# Patient Record
Sex: FEMALE | Race: BLACK OR AFRICAN AMERICAN | NOT HISPANIC OR LATINO | Employment: OTHER | URBAN - METROPOLITAN AREA
[De-identification: names, ages, dates, MRNs, and addresses within clinical notes are randomized per-mention and may not be internally consistent; named-entity substitution may affect disease eponyms.]

---

## 2017-05-02 ENCOUNTER — LAB REQUISITION (OUTPATIENT)
Dept: LAB | Facility: HOSPITAL | Age: 72
End: 2017-05-02

## 2017-05-02 DIAGNOSIS — I99.8 OTHER DISORDER OF CIRCULATORY SYSTEM: ICD-10-CM

## 2017-05-02 LAB
INR PPP: 1.73
PROTHROMBIN TIME: 19.2 SECONDS (ref 9.6–11.5)

## 2017-05-02 PROCEDURE — 85610 PROTHROMBIN TIME: CPT | Performed by: FAMILY MEDICINE

## 2017-05-04 ENCOUNTER — LAB REQUISITION (OUTPATIENT)
Dept: LAB | Facility: HOSPITAL | Age: 72
End: 2017-05-04

## 2017-05-04 DIAGNOSIS — E11.9 TYPE 2 DIABETES MELLITUS WITHOUT COMPLICATIONS (HCC): ICD-10-CM

## 2017-05-04 LAB — HBA1C MFR BLD: 4.5 % (ref 4.8–5.6)

## 2017-05-04 PROCEDURE — 83036 HEMOGLOBIN GLYCOSYLATED A1C: CPT | Performed by: FAMILY MEDICINE

## 2018-02-14 ENCOUNTER — APPOINTMENT (OUTPATIENT)
Dept: MRI IMAGING | Facility: HOSPITAL | Age: 73
End: 2018-02-14

## 2018-02-14 ENCOUNTER — APPOINTMENT (OUTPATIENT)
Dept: GENERAL RADIOLOGY | Facility: HOSPITAL | Age: 73
End: 2018-02-14

## 2018-02-14 ENCOUNTER — HOSPITAL ENCOUNTER (INPATIENT)
Facility: HOSPITAL | Age: 73
LOS: 2 days | Discharge: HOME HEALTH | End: 2018-02-16
Attending: EMERGENCY MEDICINE | Admitting: INTERNAL MEDICINE

## 2018-02-14 ENCOUNTER — TRANSCRIBE ORDERS (OUTPATIENT)
Dept: ADMINISTRATIVE | Facility: HOSPITAL | Age: 73
End: 2018-02-14

## 2018-02-14 ENCOUNTER — APPOINTMENT (OUTPATIENT)
Dept: CT IMAGING | Facility: HOSPITAL | Age: 73
End: 2018-02-14

## 2018-02-14 DIAGNOSIS — R47.01 EXPRESSIVE APHASIA: ICD-10-CM

## 2018-02-14 DIAGNOSIS — Z74.09 IMPAIRED MOBILITY AND ADLS: ICD-10-CM

## 2018-02-14 DIAGNOSIS — R47.81 SLURRED SPEECH: ICD-10-CM

## 2018-02-14 DIAGNOSIS — N28.9 RENAL INSUFFICIENCY: ICD-10-CM

## 2018-02-14 DIAGNOSIS — G93.6: ICD-10-CM

## 2018-02-14 DIAGNOSIS — Z74.09 IMPAIRED FUNCTIONAL MOBILITY, BALANCE, GAIT, AND ENDURANCE: ICD-10-CM

## 2018-02-14 DIAGNOSIS — G93.89 BRAIN MASS: ICD-10-CM

## 2018-02-14 DIAGNOSIS — D49.6 NEOPLASM OF BRAIN CAUSING MASS EFFECT ON ADJACENT STRUCTURES (HCC): Primary | ICD-10-CM

## 2018-02-14 DIAGNOSIS — R41.82 MENTAL STATUS, DECREASED: Primary | ICD-10-CM

## 2018-02-14 DIAGNOSIS — Z78.9 IMPAIRED MOBILITY AND ADLS: ICD-10-CM

## 2018-02-14 DIAGNOSIS — G93.5 NEOPLASM OF BRAIN CAUSING MASS EFFECT ON ADJACENT STRUCTURES (HCC): Primary | ICD-10-CM

## 2018-02-14 PROBLEM — I10 HTN (HYPERTENSION): Status: ACTIVE | Noted: 2018-02-14

## 2018-02-14 PROBLEM — R51.9 HEADACHE: Status: ACTIVE | Noted: 2018-02-14

## 2018-02-14 PROBLEM — I73.9 PAD (PERIPHERAL ARTERY DISEASE) (HCC): Status: ACTIVE | Noted: 2018-02-14

## 2018-02-14 PROBLEM — I82.501 CHRONIC VENOUS EMBOLISM AND THROMBOSIS OF DEEP VESSELS OF RIGHT LOWER EXTREMITY: Status: ACTIVE | Noted: 2018-02-14

## 2018-02-14 PROBLEM — Z79.01 CHRONIC ANTICOAGULATION: Status: ACTIVE | Noted: 2018-02-14

## 2018-02-14 LAB
ALBUMIN SERPL-MCNC: 4.4 G/DL (ref 3.2–4.8)
ALBUMIN/GLOB SERPL: 1.1 G/DL (ref 1.5–2.5)
ALP SERPL-CCNC: 127 U/L (ref 25–100)
ALT SERPL W P-5'-P-CCNC: 23 U/L (ref 7–40)
ANION GAP SERPL CALCULATED.3IONS-SCNC: 5 MMOL/L (ref 3–11)
AST SERPL-CCNC: 21 U/L (ref 0–33)
BACTERIA UR QL AUTO: ABNORMAL /HPF
BASOPHILS # BLD AUTO: 0.03 10*3/MM3 (ref 0–0.2)
BASOPHILS NFR BLD AUTO: 0.5 % (ref 0–1)
BILIRUB SERPL-MCNC: 0.7 MG/DL (ref 0.3–1.2)
BILIRUB UR QL STRIP: NEGATIVE
BUN BLD-MCNC: 22 MG/DL (ref 9–23)
BUN/CREAT SERPL: 14.7 (ref 7–25)
CALCIUM SPEC-SCNC: 9.5 MG/DL (ref 8.7–10.4)
CHLORIDE SERPL-SCNC: 105 MMOL/L (ref 99–109)
CLARITY UR: ABNORMAL
CO2 SERPL-SCNC: 29 MMOL/L (ref 20–31)
COLOR UR: YELLOW
CREAT BLD-MCNC: 1.5 MG/DL (ref 0.6–1.3)
DEPRECATED RDW RBC AUTO: 44.7 FL (ref 37–54)
EOSINOPHIL # BLD AUTO: 0.17 10*3/MM3 (ref 0–0.3)
EOSINOPHIL NFR BLD AUTO: 2.8 % (ref 0–3)
ERYTHROCYTE [DISTWIDTH] IN BLOOD BY AUTOMATED COUNT: 14.5 % (ref 11.3–14.5)
GFR SERPL CREATININE-BSD FRML MDRD: 34 ML/MIN/1.73
GFR SERPL CREATININE-BSD FRML MDRD: 42 ML/MIN/1.73
GLOBULIN UR ELPH-MCNC: 3.9 GM/DL
GLUCOSE BLD-MCNC: 105 MG/DL (ref 70–100)
GLUCOSE UR STRIP-MCNC: NEGATIVE MG/DL
HCT VFR BLD AUTO: 38.1 % (ref 34.5–44)
HGB BLD-MCNC: 12.3 G/DL (ref 11.5–15.5)
HGB UR QL STRIP.AUTO: ABNORMAL
HOLD SPECIMEN: NORMAL
HOLD SPECIMEN: NORMAL
IMM GRANULOCYTES # BLD: 0.01 10*3/MM3 (ref 0–0.03)
IMM GRANULOCYTES NFR BLD: 0.2 % (ref 0–0.6)
INR PPP: 4.07 (ref 0.91–1.09)
KETONES UR QL STRIP: NEGATIVE
LEUKOCYTE ESTERASE UR QL STRIP.AUTO: ABNORMAL
LYMPHOCYTES # BLD AUTO: 2.45 10*3/MM3 (ref 0.6–4.8)
LYMPHOCYTES NFR BLD AUTO: 39.7 % (ref 24–44)
MCH RBC QN AUTO: 27.1 PG (ref 27–31)
MCHC RBC AUTO-ENTMCNC: 32.3 G/DL (ref 32–36)
MCV RBC AUTO: 83.9 FL (ref 80–99)
MONOCYTES # BLD AUTO: 0.6 10*3/MM3 (ref 0–1)
MONOCYTES NFR BLD AUTO: 9.7 % (ref 0–12)
NEUTROPHILS # BLD AUTO: 2.91 10*3/MM3 (ref 1.5–8.3)
NEUTROPHILS NFR BLD AUTO: 47.1 % (ref 41–71)
NITRITE UR QL STRIP: NEGATIVE
PH UR STRIP.AUTO: 5.5 [PH] (ref 5–8)
PLATELET # BLD AUTO: 387 10*3/MM3 (ref 150–450)
PMV BLD AUTO: 8.9 FL (ref 6–12)
POTASSIUM BLD-SCNC: 4 MMOL/L (ref 3.5–5.5)
PROT SERPL-MCNC: 8.3 G/DL (ref 5.7–8.2)
PROT UR QL STRIP: ABNORMAL
PROTHROMBIN TIME: 42.7 SECONDS (ref 9.6–11.5)
RBC # BLD AUTO: 4.54 10*6/MM3 (ref 3.89–5.14)
RBC # UR: ABNORMAL /HPF
REF LAB TEST METHOD: ABNORMAL
SODIUM BLD-SCNC: 139 MMOL/L (ref 132–146)
SP GR UR STRIP: 1.02 (ref 1–1.03)
SQUAMOUS #/AREA URNS HPF: ABNORMAL /HPF
TROPONIN I SERPL-MCNC: 0.01 NG/ML (ref 0–0.07)
UROBILINOGEN UR QL STRIP: ABNORMAL
WBC NRBC COR # BLD: 6.17 10*3/MM3 (ref 3.5–10.8)
WBC UR QL AUTO: ABNORMAL /HPF
WHOLE BLOOD HOLD SPECIMEN: NORMAL
WHOLE BLOOD HOLD SPECIMEN: NORMAL

## 2018-02-14 PROCEDURE — 71045 X-RAY EXAM CHEST 1 VIEW: CPT

## 2018-02-14 PROCEDURE — 70553 MRI BRAIN STEM W/O & W/DYE: CPT

## 2018-02-14 PROCEDURE — 85610 PROTHROMBIN TIME: CPT | Performed by: HOSPITALIST

## 2018-02-14 PROCEDURE — 99221 1ST HOSP IP/OBS SF/LOW 40: CPT | Performed by: PHYSICIAN ASSISTANT

## 2018-02-14 PROCEDURE — 74176 CT ABD & PELVIS W/O CONTRAST: CPT

## 2018-02-14 PROCEDURE — 80053 COMPREHEN METABOLIC PANEL: CPT | Performed by: EMERGENCY MEDICINE

## 2018-02-14 PROCEDURE — 87086 URINE CULTURE/COLONY COUNT: CPT | Performed by: EMERGENCY MEDICINE

## 2018-02-14 PROCEDURE — 81001 URINALYSIS AUTO W/SCOPE: CPT | Performed by: EMERGENCY MEDICINE

## 2018-02-14 PROCEDURE — 0 GADOBENATE DIMEGLUMINE 529 MG/ML SOLUTION: Performed by: EMERGENCY MEDICINE

## 2018-02-14 PROCEDURE — 71250 CT THORAX DX C-: CPT

## 2018-02-14 PROCEDURE — A9577 INJ MULTIHANCE: HCPCS | Performed by: EMERGENCY MEDICINE

## 2018-02-14 PROCEDURE — 85025 COMPLETE CBC W/AUTO DIFF WBC: CPT | Performed by: EMERGENCY MEDICINE

## 2018-02-14 PROCEDURE — 99284 EMERGENCY DEPT VISIT MOD MDM: CPT

## 2018-02-14 PROCEDURE — 25010000002 HYDRALAZINE PER 20 MG: Performed by: HOSPITALIST

## 2018-02-14 PROCEDURE — 84484 ASSAY OF TROPONIN QUANT: CPT

## 2018-02-14 PROCEDURE — 93005 ELECTROCARDIOGRAM TRACING: CPT | Performed by: EMERGENCY MEDICINE

## 2018-02-14 PROCEDURE — 99223 1ST HOSP IP/OBS HIGH 75: CPT | Performed by: HOSPITALIST

## 2018-02-14 PROCEDURE — 25010000002 DEXAMETHASONE PER 1 MG: Performed by: EMERGENCY MEDICINE

## 2018-02-14 RX ORDER — WARFARIN SODIUM 7.5 MG/1
7.5 TABLET ORAL
COMMUNITY
End: 2018-02-16 | Stop reason: HOSPADM

## 2018-02-14 RX ORDER — DEXAMETHASONE SODIUM PHOSPHATE 4 MG/ML
4 INJECTION, SOLUTION INTRA-ARTICULAR; INTRALESIONAL; INTRAMUSCULAR; INTRAVENOUS; SOFT TISSUE EVERY 6 HOURS
Status: DISCONTINUED | OUTPATIENT
Start: 2018-02-15 | End: 2018-02-14 | Stop reason: SDUPTHER

## 2018-02-14 RX ORDER — HYDRALAZINE HYDROCHLORIDE 20 MG/ML
10 INJECTION INTRAMUSCULAR; INTRAVENOUS EVERY 6 HOURS PRN
Status: DISCONTINUED | OUTPATIENT
Start: 2018-02-14 | End: 2018-02-16 | Stop reason: HOSPADM

## 2018-02-14 RX ORDER — FUROSEMIDE 40 MG/1
20 TABLET ORAL DAILY
COMMUNITY
End: 2018-03-06

## 2018-02-14 RX ORDER — LOSARTAN POTASSIUM 100 MG/1
100 TABLET ORAL DAILY
COMMUNITY
End: 2019-02-20

## 2018-02-14 RX ORDER — DEXAMETHASONE SODIUM PHOSPHATE 4 MG/ML
10 INJECTION, SOLUTION INTRA-ARTICULAR; INTRALESIONAL; INTRAMUSCULAR; INTRAVENOUS; SOFT TISSUE ONCE
Status: DISCONTINUED | OUTPATIENT
Start: 2018-02-14 | End: 2018-02-14 | Stop reason: SDUPTHER

## 2018-02-14 RX ORDER — ATORVASTATIN CALCIUM 40 MG/1
40 TABLET, FILM COATED ORAL DAILY
COMMUNITY
End: 2021-01-18 | Stop reason: HOSPADM

## 2018-02-14 RX ORDER — HYDROCODONE BITARTRATE AND ACETAMINOPHEN 5; 325 MG/1; MG/1
1 TABLET ORAL EVERY 4 HOURS PRN
Status: DISCONTINUED | OUTPATIENT
Start: 2018-02-14 | End: 2018-02-16 | Stop reason: HOSPADM

## 2018-02-14 RX ORDER — DEXAMETHASONE IN 0.9 % SOD CHL 10 MG/50ML
10 INTRAVENOUS SOLUTION, PIGGYBACK (ML) INTRAVENOUS EVERY 8 HOURS
Status: DISCONTINUED | OUTPATIENT
Start: 2018-02-15 | End: 2018-02-14

## 2018-02-14 RX ORDER — DEXAMETHASONE SODIUM PHOSPHATE 4 MG/ML
4 INJECTION, SOLUTION INTRA-ARTICULAR; INTRALESIONAL; INTRAMUSCULAR; INTRAVENOUS; SOFT TISSUE EVERY 6 HOURS
Status: DISCONTINUED | OUTPATIENT
Start: 2018-02-15 | End: 2018-02-16 | Stop reason: HOSPADM

## 2018-02-14 RX ORDER — SODIUM CHLORIDE 9 MG/ML
125 INJECTION, SOLUTION INTRAVENOUS CONTINUOUS
Status: DISCONTINUED | OUTPATIENT
Start: 2018-02-14 | End: 2018-02-16

## 2018-02-14 RX ORDER — SODIUM CHLORIDE 0.9 % (FLUSH) 0.9 %
1-10 SYRINGE (ML) INJECTION AS NEEDED
Status: DISCONTINUED | OUTPATIENT
Start: 2018-02-14 | End: 2018-02-16 | Stop reason: HOSPADM

## 2018-02-14 RX ORDER — NIFEDIPINE 60 MG/1
60 TABLET, EXTENDED RELEASE ORAL 2 TIMES DAILY
COMMUNITY
End: 2018-02-16 | Stop reason: HOSPADM

## 2018-02-14 RX ORDER — CLONIDINE HYDROCHLORIDE 0.1 MG/1
0.1 TABLET ORAL DAILY
COMMUNITY
End: 2021-01-18 | Stop reason: HOSPADM

## 2018-02-14 RX ADMIN — DEXAMETHASONE SODIUM PHOSPHATE 10 MG: 10 INJECTION INTRAMUSCULAR; INTRAVENOUS at 18:05

## 2018-02-14 RX ADMIN — DEXAMETHASONE SODIUM PHOSPHATE 4 MG: 4 INJECTION, SOLUTION INTRAMUSCULAR; INTRAVENOUS at 23:50

## 2018-02-14 RX ADMIN — SODIUM CHLORIDE 125 ML/HR: 9 INJECTION, SOLUTION INTRAVENOUS at 15:44

## 2018-02-14 RX ADMIN — Medication 10 MG: at 18:15

## 2018-02-14 RX ADMIN — GADOBENATE DIMEGLUMINE 12 ML: 529 INJECTION, SOLUTION INTRAVENOUS at 16:41

## 2018-02-14 RX ADMIN — HYDROCODONE BITARTRATE AND ACETAMINOPHEN 1 TABLET: 5; 325 TABLET ORAL at 21:24

## 2018-02-14 NOTE — H&P
[facility] Medicine Services  HISTORY AND PHYSICAL    Patient Name: Chika Posey  : 1945  MRN: [id number]  Primary Care Physician: Alyssia Greenfield MD    Subjective   Subjective     Chief Complaint:  US    HPI:  Chika Posey is a 72 y.o. female presents with progressive HA. Waxes and wanes, but as bad as 10/10 frontal, described as a sledgehammer hitting her head. No visual disturbances. No ataxia. No dizziness but sister states that she was dizzy 3 days ago and had n/v. No n/v/dizziness in past 48 hours. But has loss of appetite now. No vertigo. No focal numbness/weakness/tingling. No rashes. No edema. No dyspnea. No palpitations.  Notes slurred speech since 2 AM today.    Review of Systems   Constitutional: Positive for activity change and fatigue.   Respiratory: Negative.    Cardiovascular: Negative.    Gastrointestinal: Negative.    Musculoskeletal: Negative.    Skin: Negative.    Neurological: Positive for speech difficulty and headaches.   Hematological: Negative.    Psychiatric/Behavioral: Negative.           Otherwise 10-system ROS reviewed and is negative except as mentioned in the HPI.    Personal History     Past Medical History:   Diagnosis Date   • DVT (deep venous thrombosis)/AT  last year, on coumadin therapy    • Hypertension    Hx of PAD, s/p B LE stents, at  data deficient    Past Surgical History:   Procedure Laterality Date   • TUBAL ABDOMINAL LIGATION         Family History: HTN and CAD. No Cancer  Social History:  Smoked but quit several years ago. She does not have any smokeless tobacco history on file. Alcohol use questions deferred to the physician. She reports that she does not use illicit drugs.  Social History     Social History Narrative   • No narrative on file       Medications:  Not on file, but takes coumadin    (Not in a hospital admission)    Not on File    Objective   Objective     Vital Signs:   Temp:  [98.9 °F (37.2 °C)] 98.9 °F  (37.2 °C)  Heart Rate:  [79] 79  Resp:  [16] 16  BP: (137-186)/(61-78) 186/75   Total (NIH Stroke Scale): 1    Physical Exam   NAD, alert and oriented  OP clear, MMM  PERRL  Neck supple  RRR  CTAB  +BS, ND, NT  DELEON, strength preserved  No c/c/e  Slurred speech, face symmetric, strength and sensation grossly intact  Gait not test  No palpable cervical/axillary/inguinal adenopathy    Results Reviewed:  I have personally reviewed current lab, radiology, and data and agree.      Results from last 7 days  Lab Units 02/14/18  1502   WBC 10*3/mm3 6.17   HEMOGLOBIN g/dL 12.3   HEMATOCRIT % 38.1   PLATELETS 10*3/mm3 387       Results from last 7 days  Lab Units 02/14/18  1502   SODIUM mmol/L 139   POTASSIUM mmol/L 4.0   CHLORIDE mmol/L 105   CO2 mmol/L 29.0   BUN mg/dL 22   CREATININE mg/dL 1.50*   GLUCOSE mg/dL 105*   CALCIUM mg/dL 9.5   ALT (SGPT) U/L 23   AST (SGOT) U/L 21     Estimated Creatinine Clearance: 29.1 mL/min (by C-G formula based on Cr of 1.5).  Brief Urine Lab Results  (Last result in the past 365 days)      Color   Clarity   Blood   Leuk Est   Nitrite   Protein   CREAT   Urine HCG        02/14/18 1544 Yellow Cloudy(A) Trace(A) Small (1+)(A) Negative 30 mg/dL (1+)(A)             No results found for: BNP  No results found for: PHART  Imaging Results (last 24 hours)     Procedure Component Value Units Date/Time    MRI Brain With & Without Contrast [[id number]] Collected:  02/14/18 1709     Updated:  02/14/18 1709    Narrative:       EXAMINATION: MRI BRAIN W/WO CONTRAST - 02/14/2018     INDICATION: Speech difficulty.      TECHNIQUE: MR datasets the brain were performed without and with  intravenous contrast.     COMPARISON: None.     FINDINGS:   1. There is mild tonsillar ectopia. The cervical medullary junction is  intact.     There is mild prominence of the pituitary gland with mixed signal  especially in the posterior pituitary. Please correlate for clinical  significance. The enlarged pituitary gland does  not compress the optic  chiasm. Pathologic significance is indeterminate.     2. Paranasal sinuses are clear. Ocular lens are symmetrical and intact.  The conal contents are normal. There is mild peripheral cortical  atrophy, and there is marked white matter microangiopathy on the FLAIR  datasets indicating severe small vessel disease.     3. The diffusion-weighted sequence reveals no acute ischemic restricted  diffusion.     4. The dominant finding is a mass in the left mid parietal hemisphere.  This is surrounded with significant edema. Interestingly, however, this  mass does not show significant enhancement. Although this may represent  a rounded infarct which is bland, a malignancy or low-grade glioma can  present in this fashion. The T1 datasets demonstrate a very slight  amount of increased T1 signal which could represent minimal petechial  hemorrhages. This mass is sizable and measures 3.6 x 2.6 x 3 cm.       Impression:       Large mass left mid parietal hemisphere involving gray and  white matter as measured above perhaps with the slightest degree of  petechial hemorrhage with surrounding edema and overall mass effect but  with a paucity of enhancement. Differential diagnosis would be a round  infarct with petechial hemorrhages and mass effect versus a low grade  metastatic process   versus a low-grade glioma or primary brain malignancy. This is the  dominant finding. The patient has a background pattern of extreme  microangiopathic white matter disease on the FLAIR datasets. There is  also enlargement of the pituitary gland with mixed signal as described.  Evidence of high-grade hemorrhage or midline shift is not identified.     DICTATED:     02/14/2018  EDITED    :     02/14/2018            XR Chest 1 View [[id number]] Collected:  02/14/18 1738     Updated:  02/14/18 1738    Narrative:          EXAMINATION: XR CHEST 1 VW - 02/14/2018     INDICATION: Brain cancer.       COMPARISON: None.     FINDINGS: The  heart is normal in size. The vasculature appears normal.  Lungs appear clear bilaterally. A couple of eventrations are noted of  the right hemidiaphragm.           Impression:       Portable chest exam with no evidence of active disease.     DICTATED:     02/14/2018  EDITED    :     02/14/2018                 Assessment/Plan   Assessment / Plan     Hospital Problem List     * (Principal)Brain mass    Headache    Slurred speech    Chronic venous embolism and thrombosis of deep vessels of right lower extremity    PAD (peripheral artery disease)    HTN (hypertension)    Chronic anticoagulation            Assessment & Plan:  Brain Mass  --steroids/consult NSG  --CT chest/abd/pelvis  HA  --steroids  Hx of DVT, on coumadin  --check INR, hold for now  --get records, may be able to hold  Hx of PAD s/p stents  --get [facility] records  Hx of HTN, prns for now      DVT prophylaxis:  SCDS    CODE STATUS:  No Order    Admission Status:  I believe this patient meets inpatient criteria    Electronically signed by Joseph Gipson MD, 02/14/18, 5:41 PM.

## 2018-02-14 NOTE — CONSULTS
[facility]    Referring Provider: Hospital medicine  Reason for Consultation: headache, garbled speech  [place] [facility] [place]    [phone]    Chika Posey is a 72 y.o. female , right handed    Chief Complaint   Patient presents with   • Headache   • Speech Problem     HPI  Patient is a very pleasant 72-year-old -American female who has been in her usual normal health until the weekend when she developed severe throbbing headaches associated with nausea or or vomiting. She's had decreased appetitie since the week-end.  She's been taking Tylenol for her headache.  This morning she developed difficulties with using the correct words and then had garbled speech that was uninterpretable by her sister.  Her sister left work and went and got her and [place] [facility] [place]. Huber today who diagnosed her with a urinary tract infection.  They were going to obtain a CT scan of the head however with her speech problems the sister decided it was best to bring her to the emergency room.  In the emergency room she has had a chest x-ray which was negative and an MRI of the brain which shows a left posterior temporal mass and associated edema.  She was started on IV dexamethasone and consultation was made for neurosurgery evaluation.      Allergies   Allergen Reactions   • Morphine And Related Itching   • Plavix [Clopidogrel Bisulfate] Itching         Current Facility-Administered Medications:   •  [START ON 2/15/2018] Dexamethasone Sod Phos-NaCl 10-0.9 MG/50ML-% IVPB solution 10 mg, 10 mg, Intravenous, Q8H, Joseph Gipson MD  •  hydrALAZINE (APRESOLINE) injection 10 mg, 10 mg, Intravenous, Q6H PRN, Joseph Gipson MD, 10 mg at 02/14/18 1815  •  sodium chloride 0.9 % infusion, 125 mL/hr, Intravenous, Continuous, Fredrick Brown MD, Last Rate: 125 mL/hr at 02/14/18 1544, 125 mL/hr at 02/14/18  "1544  Home medications:  Lipitor 40 mg daily, Catapres 0.1 mg daily, Cozaar 100 mg daily, magnesium 400 mg daily Procardia XL 60 mg, 24 hours tablet; Coumadin 7.5 mg daily    Past Medical History:   Diagnosis Date   • DVT (deep venous thrombosis)    • Hypertension    coumadin therapy    Past Surgical History:   Procedure Laterality Date   • TUBAL ABDOMINAL LIGATION         Social History     Social History   • Marital status: Unknown     Spouse name: N/A   • Number of children: N/A   • Years of education: N/A     Social History Main Topics   • Smoking status: Never Smoker   • Smokeless tobacco: None   • Alcohol use Defer   • Drug use: No   • Sexual activity: Defer     Other Topics Concern   • None     Social History Narrative   • None     Family history:  Hypertension.  No history of cancer or brain tumor.    Review of Systems  Constitutional: Positive for activity change and fatigue.   Respiratory: Negative for cough or sore throat.   Cardiovascular: Negative.    Gastrointestinal: Negative.    Musculoskeletal: Negative.    Skin: Negative.    Neurological: Positive for speech difficulty and headaches.   Hematological: Negative.    Psychiatric/Behavioral: Negative.      Otherwise 10-system ROS reviewed and is negative except as mentioned in the HPI.     Physical Exam:  /85 (BP Location: Left arm, Patient Position: Lying)  Pulse 89  Temp 98.6 °F (37 °C) (Oral)   Resp 16  Ht 154.9 cm (61\")  Wt 60.3 kg (133 lb)  SpO2 98%  BMI 25.13 kg/m2    HEENT:wnl  Neck:supple  Lungs: clear  COR: regular rate and rhythm  Abd: soft  EXT: positive pulses no edema    Neuro Exam:  CRANIAL NERVES:  Cranial nerve II:  Visual fields are full to confrontation.  Cranial nerves III, IV and VI:  PERRLADC.  Extraocular movements are intact.  Nystagmus is not present.  Cranial nerve V:  Facial sensation is intact to light touch.  Cranial nerve VII:  Muscles of facial expression reveal no asymmetry.  Cranial nerve VIII:  Hearing is " intact to finger rub bilaterally.  Cranial nerves IX and X:  Palate elevates symmetrically.  Cranial nerve XI:  Shoulder shrug is intact.  Cranial nerve XII:  Tongue is midline without evidence of atrophy or fasciculation.    Musculoskeletal exam:  No focal weakness to direct testing in the upper or lower extremities.    Radiological studies:  MRI of the brain is reviewed which shows a left temporal lobe mass associated edema.  Small vessel disease is noted    MDM  This unfortunate 72-year-old -American female has presented with severe headache and speech difficulties.  Her MRI scan shows a left temporal mass associated with Wernicke's aphasia.  At the present time dexamethasone is most appropriate treatment and she will be monitored as her response.  We will reevaluate and follow along with her workup to determine best treatment plan.  She is on Coumadin therapy for DVT from 2017.  The sister states that this has been more than 6 months ago.  Her PT is 4.07 therefore her Coumadin will be placed on hold.  We will plan on a follow-up CT scan of the head in the a.m. she is treated for hypertension in the emergency room she did have elevated blood pressure which has been treated with good response.      Lali Zamora PA-C  02/14/18  6:49 PM

## 2018-02-14 NOTE — ED PROVIDER NOTES
Subjective   HPI Comments: Ms. Chika Posey is a 72 y.o. female who presents to the ED with c/o speech difficulty onset approximately 6 hours ago. Pt reports she had a severe generalized headache 4 days ago that has been intermittent since. She states the headache was resolved yesterday however it came back this morning with slurred speech. Pt was seen today by her PCP for possible UTI due to frequent urination and she was instructed to come to ED now. She also complains of N/V/D but denies any facial asymmetry, fever, chills, change in vision, change in hearing, sore throat, difficulty walking, dizziness, rhinorrhea, congestion, and any other acute sx at this time. Family reports her current speech is abnormal compared to baseline. Pt has hx of HTN but denies a hx of similar sx, CVA, CAD, or DM.         Patient is a 72 y.o. female presenting with neurologic complaint.   History provided by:  Patient  Neurologic Problem   The patient's primary symptoms include slurred speech. The patient's pertinent negatives include no visual change. This is a new problem. The current episode started today. The neurological problem developed suddenly. Last Known Well Instant: No sx of slurred speech last night.  The problem is unchanged. Associated symptoms include headaches, nausea and vomiting. Pertinent negatives include no auditory change, dizziness or fever. Past treatments include nothing. There is no history of a CVA or head trauma.       Review of Systems   Constitutional: Negative for activity change, chills and fever.   HENT: Negative for congestion, rhinorrhea and sore throat.         No change in hearing.    Eyes:        No change in vision.    Gastrointestinal: Positive for diarrhea, nausea and vomiting.   Genitourinary: Positive for frequency.   Neurological: Positive for speech difficulty (Slurred speech) and headaches. Negative for dizziness and facial asymmetry.   All other systems reviewed and are  negative.      Past Medical History:   Diagnosis Date   • DVT (deep venous thrombosis)        Not on File    Past Surgical History:   Procedure Laterality Date   • TUBAL ABDOMINAL LIGATION         History reviewed. No pertinent family history.    Social History     Social History   • Marital status: Unknown     Spouse name: N/A   • Number of children: N/A   • Years of education: N/A     Social History Main Topics   • Smoking status: Never Smoker   • Smokeless tobacco: None   • Alcohol use Defer   • Drug use: No   • Sexual activity: Defer     Other Topics Concern   • None     Social History Narrative   • None         Objective   Physical Exam   Constitutional: She is oriented to person, place, and time. She appears well-developed and well-nourished. No distress.   Pt is alert, oriented and in NAD.    HENT:   Head: Normocephalic and atraumatic.   Right Ear: External ear normal.   Left Ear: External ear normal.   Nose: Nose normal.   Mouth/Throat: Oropharynx is clear and moist.   Mild generalized tenderness to palpation to head. Weave in place but no irritation around that.    Eyes: Conjunctivae and EOM are normal. Pupils are equal, round, and reactive to light. No scleral icterus.   Neck: Normal range of motion. Neck supple.   Cardiovascular: Normal rate, regular rhythm and normal heart sounds.  Exam reveals no gallop and no friction rub.    No murmur heard.  Pulmonary/Chest: Effort normal and breath sounds normal. No respiratory distress. She has no wheezes. She has no rales. She exhibits no tenderness.   Abdominal: Soft. Bowel sounds are normal. She exhibits no mass. There is no tenderness. There is no rebound and no guarding. No hernia.   Musculoskeletal: Normal range of motion.   Lymphadenopathy:     She has no cervical adenopathy.   Neurological: She is alert and oriented to person, place, and time. She has normal reflexes. No cranial nerve deficit.   Face symmetric, voice strong, tongue midline; Vision and   hearing preserved. Also montse motor strength normal, DTRs normal, and negative for sensory deficit. Pt had occasional catches in speech which is the only abnormality that family noticed as well. No pronator drift.    Skin: Skin is warm and dry. She is not diaphoretic.   Psychiatric: She has a normal mood and affect. Her behavior is normal.   Nursing note and vitals reviewed.      Procedures         ED Course  ED Course     Recent Results (from the past 24 hour(s))   Comprehensive Metabolic Panel    Collection Time: 02/14/18  3:02 PM   Result Value Ref Range    Glucose 105 (H) 70 - 100 mg/dL    BUN 22 9 - 23 mg/dL    Creatinine 1.50 (H) 0.60 - 1.30 mg/dL    Sodium 139 132 - 146 mmol/L    Potassium 4.0 3.5 - 5.5 mmol/L    Chloride 105 99 - 109 mmol/L    CO2 29.0 20.0 - 31.0 mmol/L    Calcium 9.5 8.7 - 10.4 mg/dL    Total Protein 8.3 (H) 5.7 - 8.2 g/dL    Albumin 4.40 3.20 - 4.80 g/dL    ALT (SGPT) 23 7 - 40 U/L    AST (SGOT) 21 0 - 33 U/L    Alkaline Phosphatase 127 (H) 25 - 100 U/L    Total Bilirubin 0.7 0.3 - 1.2 mg/dL    eGFR Non African Amer 34 (L) >60 mL/min/1.73    eGFR  African Amer 42 (L) >60 mL/min/1.73    Globulin 3.9 gm/dL    A/G Ratio 1.1 (L) 1.5 - 2.5 g/dL    BUN/Creatinine Ratio 14.7 7.0 - 25.0    Anion Gap 5.0 3.0 - 11.0 mmol/L   CBC Auto Differential    Collection Time: 02/14/18  3:02 PM   Result Value Ref Range    WBC 6.17 3.50 - 10.80 10*3/mm3    RBC 4.54 3.89 - 5.14 10*6/mm3    Hemoglobin 12.3 11.5 - 15.5 g/dL    Hematocrit 38.1 34.5 - 44.0 %    MCV 83.9 80.0 - 99.0 fL    MCH 27.1 27.0 - 31.0 pg    MCHC 32.3 32.0 - 36.0 g/dL    RDW 14.5 11.3 - 14.5 %    RDW-SD 44.7 37.0 - 54.0 fl    MPV 8.9 6.0 - 12.0 fL    Platelets 387 150 - 450 10*3/mm3    Neutrophil % 47.1 41.0 - 71.0 %    Lymphocyte % 39.7 24.0 - 44.0 %    Monocyte % 9.7 0.0 - 12.0 %    Eosinophil % 2.8 0.0 - 3.0 %    Basophil % 0.5 0.0 - 1.0 %    Immature Grans % 0.2 0.0 - 0.6 %    Neutrophils, Absolute 2.91 1.50 - 8.30 10*3/mm3    Lymphocytes,  Absolute 2.45 0.60 - 4.80 10*3/mm3    Monocytes, Absolute 0.60 0.00 - 1.00 10*3/mm3    Eosinophils, Absolute 0.17 0.00 - 0.30 10*3/mm3    Basophils, Absolute 0.03 0.00 - 0.20 10*3/mm3    Immature Grans, Absolute 0.01 0.00 - 0.03 10*3/mm3   Light Blue Top    Collection Time: 02/14/18  3:02 PM   Result Value Ref Range    Extra Tube hold for add-on    Green Top (Gel)    Collection Time: 02/14/18  3:02 PM   Result Value Ref Range    Extra Tube Hold for add-ons.    Lavender Top    Collection Time: 02/14/18  3:02 PM   Result Value Ref Range    Extra Tube hold for add-on    Gold Top - SST    Collection Time: 02/14/18  3:02 PM   Result Value Ref Range    Extra Tube Hold for add-ons.    POC Troponin, Rapid    Collection Time: 02/14/18  3:04 PM   Result Value Ref Range    Troponin I 0.01 0.00 - 0.07 ng/mL   Urinalysis With / Culture If Indicated - Urine, Clean Catch    Collection Time: 02/14/18  3:44 PM   Result Value Ref Range    Color, UA Yellow Yellow, Straw    Appearance, UA Cloudy (A) Clear    pH, UA 5.5 5.0 - 8.0    Specific Gravity, UA 1.021 1.001 - 1.030    Glucose, UA Negative Negative    Ketones, UA Negative Negative    Bilirubin, UA Negative Negative    Blood, UA Trace (A) Negative    Protein, UA 30 mg/dL (1+) (A) Negative    Leuk Esterase, UA Small (1+) (A) Negative    Nitrite, UA Negative Negative    Urobilinogen, UA 0.2 E.U./dL 0.2 - 1.0 E.U./dL   Urinalysis, Microscopic Only - Urine, Clean Catch    Collection Time: 02/14/18  3:44 PM   Result Value Ref Range    RBC, UA 0-2 None Seen, 0-2 /HPF    WBC, UA 0-2 (A) None Seen /HPF    Bacteria, UA None Seen None Seen, Trace /HPF    Squamous Epithelial Cells, UA 0-2 None Seen, 0-2 /HPF    Methodology Automated Microscopy      Note: In addition to lab results from this visit, the labs listed above may include labs taken at another facility or during a different encounter within the last 24 hours. Please correlate lab times with ED admission and discharge times for  further clarification of the services performed during this visit.    MRI Brain With & Without Contrast   Preliminary Result   Large mass left mid parietal hemisphere involving gray and   white matter as measured above perhaps with the slightest degree of   petechial hemorrhage with surrounding edema and overall mass effect but   with a paucity of enhancement. Differential diagnosis would be a round   infarct with petechial hemorrhages and mass effect versus a low grade   metastatic process    versus a low-grade glioma or primary brain malignancy. This is the   dominant finding. The patient has a background pattern of extreme   microangiopathic white matter disease on the FLAIR datasets. There is   also enlargement of the pituitary gland with mixed signal as described.   Evidence of high-grade hemorrhage or midline shift is not identified.       DICTATED:     02/14/2018   EDITED    :     02/14/2018                XR Chest 1 View    (Results Pending)     Vitals:    02/14/18 1502 02/14/18 1530 02/14/18 1531 02/14/18 1702   BP:  137/61  (!) 186/75   Pulse:   79    Resp:       Temp: 98.9 °F (37.2 °C)      TempSrc: Oral      SpO2:   98% 97%   Weight:       Height:         Medications   sodium chloride 0.9 % infusion (125 mL/hr Intravenous New Bag 2/14/18 1544)   dexamethasone (DECADRON) 10 mg in sodium chloride 0.9 % IVPB (not administered)   gadobenate dimeglumine (MULTIHANCE) injection 12 mL (12 mL Intravenous Given 2/14/18 1641)     ECG/EMG Results (last 24 hours)     Procedure Component Value Units Date/Time    ECG 12 Lead [[id number]] Collected:  02/14/18 1501     Updated:  02/14/18 1501                        MDM  Number of Diagnoses or Management Options  Expressive aphasia:   Neoplasm of brain causing mass effect on adjacent structures:   Nontraumatic cerebral edema:   Renal insufficiency:   Diagnosis management comments:       I reviewed all available studies at the bedside with the patient and her  family.    Unfortunately she appears to have a brain mass in the left temporoparietal area.  I think this is causing her current symptom complex.  Unfortunately it probably represents some form of cancer but in the differential per Dr. Grajeda around infarct cannot be excluded.  However does not enhance knee favors tumor.  She has cerebral edema and mass effect without midline shift.  Start the patient on IV Decadron here.  She is also hypertensive and has renal insufficiency and will proceed cautiously with this.    This point I think she needs to be admitted for symptomatic control this and to see if this represents a primary brain lesion or if she has metastatic disease from a different site to the brain.    I spoke to Dr. Cornejo, on-call neurosurgery, and he agrees with the plan.    I spoke Dr. Navarro, [facility] medicine he will admit the patient.    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 The electronic translation of spoken language may permit erroneous, or at times, nonsensical words or phrases to be inadvertently transcribed; Although I have reviewed the note for such errors, some may still exist.   Documentation assistance provided by morelia Schofield.  Information recorded by the scribe was done at my direction and has been verified and validated by me.     Cristi Schofield  02/14/18 1522       Fredrick Brown MD  02/14/18 1729       Fredrick Brown MD  02/14/18 1735

## 2018-02-15 ENCOUNTER — APPOINTMENT (OUTPATIENT)
Dept: CT IMAGING | Facility: HOSPITAL | Age: 73
End: 2018-02-15

## 2018-02-15 PROBLEM — I61.9 CEREBRAL HEMORRHAGE (HCC): Status: ACTIVE | Noted: 2018-02-14

## 2018-02-15 LAB
ANION GAP SERPL CALCULATED.3IONS-SCNC: 9 MMOL/L (ref 3–11)
BASOPHILS # BLD AUTO: 0 10*3/MM3 (ref 0–0.2)
BASOPHILS NFR BLD AUTO: 0 % (ref 0–1)
BUN BLD-MCNC: 19 MG/DL (ref 9–23)
BUN/CREAT SERPL: 15.8 (ref 7–25)
CALCIUM SPEC-SCNC: 9.1 MG/DL (ref 8.7–10.4)
CHLORIDE SERPL-SCNC: 109 MMOL/L (ref 99–109)
CO2 SERPL-SCNC: 19 MMOL/L (ref 20–31)
CREAT BLD-MCNC: 1.2 MG/DL (ref 0.6–1.3)
DEPRECATED RDW RBC AUTO: 44.7 FL (ref 37–54)
EOSINOPHIL # BLD AUTO: 0 10*3/MM3 (ref 0–0.3)
EOSINOPHIL NFR BLD AUTO: 0 % (ref 0–3)
ERYTHROCYTE [DISTWIDTH] IN BLOOD BY AUTOMATED COUNT: 14.6 % (ref 11.3–14.5)
GFR SERPL CREATININE-BSD FRML MDRD: 44 ML/MIN/1.73
GFR SERPL CREATININE-BSD FRML MDRD: 54 ML/MIN/1.73
GLUCOSE BLD-MCNC: 181 MG/DL (ref 70–100)
HCT VFR BLD AUTO: 37.3 % (ref 34.5–44)
HGB BLD-MCNC: 12 G/DL (ref 11.5–15.5)
IMM GRANULOCYTES # BLD: 0.03 10*3/MM3 (ref 0–0.03)
IMM GRANULOCYTES NFR BLD: 0.4 % (ref 0–0.6)
INR PPP: 2.3 (ref 0.91–1.09)
LYMPHOCYTES # BLD AUTO: 1.09 10*3/MM3 (ref 0.6–4.8)
LYMPHOCYTES NFR BLD AUTO: 13.8 % (ref 24–44)
MCH RBC QN AUTO: 26.9 PG (ref 27–31)
MCHC RBC AUTO-ENTMCNC: 32.2 G/DL (ref 32–36)
MCV RBC AUTO: 83.6 FL (ref 80–99)
MONOCYTES # BLD AUTO: 0.06 10*3/MM3 (ref 0–1)
MONOCYTES NFR BLD AUTO: 0.8 % (ref 0–12)
NEUTROPHILS # BLD AUTO: 6.74 10*3/MM3 (ref 1.5–8.3)
NEUTROPHILS NFR BLD AUTO: 85 % (ref 41–71)
PLATELET # BLD AUTO: 386 10*3/MM3 (ref 150–450)
PMV BLD AUTO: 9 FL (ref 6–12)
POTASSIUM BLD-SCNC: 3.9 MMOL/L (ref 3.5–5.5)
PROTHROMBIN TIME: 24.2 SECONDS (ref 9.6–11.5)
RBC # BLD AUTO: 4.46 10*6/MM3 (ref 3.89–5.14)
SODIUM BLD-SCNC: 137 MMOL/L (ref 132–146)
WBC NRBC COR # BLD: 7.92 10*3/MM3 (ref 3.5–10.8)

## 2018-02-15 PROCEDURE — 25010000002 DEXAMETHASONE PER 1 MG: Performed by: HOSPITALIST

## 2018-02-15 PROCEDURE — 85610 PROTHROMBIN TIME: CPT | Performed by: PHYSICIAN ASSISTANT

## 2018-02-15 PROCEDURE — 70496 CT ANGIOGRAPHY HEAD: CPT

## 2018-02-15 PROCEDURE — 99232 SBSQ HOSP IP/OBS MODERATE 35: CPT | Performed by: PHYSICIAN ASSISTANT

## 2018-02-15 PROCEDURE — 25010000002 HYDRALAZINE PER 20 MG: Performed by: HOSPITALIST

## 2018-02-15 PROCEDURE — 70450 CT HEAD/BRAIN W/O DYE: CPT

## 2018-02-15 PROCEDURE — 85025 COMPLETE CBC W/AUTO DIFF WBC: CPT | Performed by: HOSPITALIST

## 2018-02-15 PROCEDURE — 80048 BASIC METABOLIC PNL TOTAL CA: CPT | Performed by: HOSPITALIST

## 2018-02-15 PROCEDURE — 99233 SBSQ HOSP IP/OBS HIGH 50: CPT | Performed by: INTERNAL MEDICINE

## 2018-02-15 PROCEDURE — 0 IOPAMIDOL PER 1 ML: Performed by: INTERNAL MEDICINE

## 2018-02-15 RX ORDER — NIFEDIPINE 30 MG/1
60 TABLET, EXTENDED RELEASE ORAL 2 TIMES DAILY
Status: DISCONTINUED | OUTPATIENT
Start: 2018-02-15 | End: 2018-02-16 | Stop reason: HOSPADM

## 2018-02-15 RX ORDER — LABETALOL HYDROCHLORIDE 5 MG/ML
10 INJECTION, SOLUTION INTRAVENOUS ONCE
Status: COMPLETED | OUTPATIENT
Start: 2018-02-15 | End: 2018-02-15

## 2018-02-15 RX ORDER — ATORVASTATIN CALCIUM 40 MG/1
40 TABLET, FILM COATED ORAL DAILY
Status: DISCONTINUED | OUTPATIENT
Start: 2018-02-15 | End: 2018-02-16 | Stop reason: HOSPADM

## 2018-02-15 RX ORDER — LOSARTAN POTASSIUM 50 MG/1
100 TABLET ORAL DAILY
Status: DISCONTINUED | OUTPATIENT
Start: 2018-02-15 | End: 2018-02-16 | Stop reason: HOSPADM

## 2018-02-15 RX ADMIN — SODIUM CHLORIDE 125 ML/HR: 9 INJECTION, SOLUTION INTRAVENOUS at 23:32

## 2018-02-15 RX ADMIN — LOSARTAN POTASSIUM 100 MG: 50 TABLET ORAL at 13:30

## 2018-02-15 RX ADMIN — SODIUM CHLORIDE 125 ML/HR: 9 INJECTION, SOLUTION INTRAVENOUS at 03:27

## 2018-02-15 RX ADMIN — IOPAMIDOL 75 ML: 755 INJECTION, SOLUTION INTRAVENOUS at 11:31

## 2018-02-15 RX ADMIN — DEXAMETHASONE SODIUM PHOSPHATE 4 MG: 4 INJECTION, SOLUTION INTRAMUSCULAR; INTRAVENOUS at 05:25

## 2018-02-15 RX ADMIN — NIFEDIPINE 60 MG: 30 TABLET, FILM COATED, EXTENDED RELEASE ORAL at 22:07

## 2018-02-15 RX ADMIN — DEXAMETHASONE SODIUM PHOSPHATE 4 MG: 4 INJECTION, SOLUTION INTRAMUSCULAR; INTRAVENOUS at 23:34

## 2018-02-15 RX ADMIN — ATORVASTATIN CALCIUM 40 MG: 40 TABLET, FILM COATED ORAL at 22:08

## 2018-02-15 RX ADMIN — DEXAMETHASONE SODIUM PHOSPHATE 4 MG: 4 INJECTION, SOLUTION INTRAMUSCULAR; INTRAVENOUS at 13:31

## 2018-02-15 RX ADMIN — DEXAMETHASONE SODIUM PHOSPHATE 4 MG: 4 INJECTION, SOLUTION INTRAMUSCULAR; INTRAVENOUS at 17:34

## 2018-02-15 RX ADMIN — NIFEDIPINE 60 MG: 30 TABLET, FILM COATED, EXTENDED RELEASE ORAL at 13:31

## 2018-02-15 RX ADMIN — Medication 10 MG: at 03:26

## 2018-02-15 RX ADMIN — LABETALOL HYDROCHLORIDE 10 MG: 5 INJECTION INTRAVENOUS at 04:00

## 2018-02-15 NOTE — PROGRESS NOTES
"    [facility]    Chika Posey  1945  [id number]    Principal Problem:    Cerebral hemorrhage  Active Problems:    Headache    Slurred speech    Chronic venous embolism and thrombosis of deep vessels of right lower extremity    PAD (peripheral artery disease)    HTN (hypertension)    Chronic anticoagulation      HPI:  The patient continues with speech difficulties. She is taking po well. She has a large, very supportive family. I have updated the patient, brother and nephew regarding the findings on the new studies.    Past Medical History:   Diagnosis Date   • DVT (deep venous thrombosis)    • Hypertension        Allergies   Allergen Reactions   • Morphine And Related Itching   • Plavix [Clopidogrel Bisulfate] Itching         Current Facility-Administered Medications:   •  atorvastatin (LIPITOR) tablet 40 mg, 40 mg, Oral, Daily, Andie Randall II, DO  •  dexamethasone (DECADRON) injection 4 mg, 4 mg, Intravenous, Q6H, Joseph Gipson MD, 4 mg at 02/15/18 1734  •  hydrALAZINE (APRESOLINE) injection 10 mg, 10 mg, Intravenous, Q6H PRN, Joseph Gipson MD, 10 mg at 02/15/18 0326  •  HYDROcodone-acetaminophen (NORCO) 5-325 MG per tablet 1 tablet, 1 tablet, Oral, Q4H PRN, Joseph Gipson MD, 1 tablet at 02/14/18 2124  •  losartan (COZAAR) tablet 100 mg, 100 mg, Oral, Daily, Andieluis carlos Armendarizon II, DO, 100 mg at 02/15/18 1330  •  NIFEdipine XL (PROCARDIA XL) 24 hr tablet 60 mg, 60 mg, Oral, BID, Andie Armendarizon II, DO, 60 mg at 02/15/18 1331  •  sodium chloride 0.9 % flush 1-10 mL, 1-10 mL, Intravenous, PRN, Joseph Gipson MD  •  sodium chloride 0.9 % infusion, 125 mL/hr, Intravenous, Continuous, Fredrick Brown MD, Last Rate: 125 mL/hr at 02/15/18 0327, 125 mL/hr at 02/15/18 0327    PE:  Blood pressure 165/77, pulse 96, temperature 99.3 °F (37.4 °C), temperature source Oral, resp. rate 20, height 154.9 cm (61\"), weight 60.3 kg (133 lb), SpO2 97 %, not currently " breastfeeding.  I/O this shift:  In: 200 [P.O.:200]  Out: 600 [Urine:600]    Neurologic Exam  No weakness, speech is still dysarthric, some sentences are clear.    Plan:  PT, OT, we will change IV dexamethasone to po and continue to monitor at the present time. The records from [facility] would be very beneficial if she needs angiogram. We do option f/u MRI/MRA in 6-8 weeks. Will re-evaluate in am.    Lali Zamora PA-C    2/15/2018

## 2018-02-15 NOTE — PROGRESS NOTES
"NEUROSURGERY PROGRESS NOTE    Vital Signs  Blood pressure 178/89, pulse 95, temperature 99.2 °F (37.3 °C), temperature source Oral, resp. rate 18, height 154.9 cm (61\"), weight 60.3 kg (133 lb), SpO2 99 %, not currently breastfeeding.    Interval History:   1-2 week history of speech impairment, imbalance, and some visual disturbance.  CT scan of the head shows a left superior posterior temporal lobe intracerebral hemorrhage grade 2 x 3.5 cm.  MRI scan shows surrounding edema of this lesion in the posterior temporal lobe in Wernicke's area, and an odd vascular channel crossing through it.    She has a history of aortic stenosis and surgery about 10 or 11 years ago, and treatment at  about a year ago for recurrent stenosis of the aorta treated with stenting, but complicated by vascular problems in the left arm requiring skin grafting apparently.      ASSESSMENT: Left posterior temporal ICH.  Rule out AVM.    PLAN: Vascular study to rule out left posterior temporal AVM.    Adams Cornejo MD  02/15/18  8:26 AM    "

## 2018-02-15 NOTE — PROGRESS NOTES
[facility] Medicine Services  PROGRESS NOTE    Patient Name: Chika Posey  : 1945  MRN: [id number]    Date of Admission: 2018  Length of Stay: 1  Primary Care Physician: Alyssia Greenfield MD    Subjective   Subjective     CC: f/u ICH    HPI: Sitting up in bed with family at bedside. Feeling much better this am. Speech better.     Review of Systems  Gen- No fevers, chills  CV- No chest pain, palpitations  Resp- No cough, dyspnea  GI- No N/V/D, abd pain    Otherwise ROS is negative except as mentioned in the HPI.    Objective   Objective     Vital Signs:   Temp:  [98.6 °F (37 °C)-99.3 °F (37.4 °C)] 99.2 °F (37.3 °C)  Heart Rate:  [] 95  Resp:  [16-18] 18  BP: (137-218)/() 178/89  Total (NIH Stroke Scale): 1     Physical Exam:  Constitutional: No acute distress, awake, alert  HENT: NCAT, mucous membranes moist  Respiratory: Clear to auscultation bilaterally, respiratory effort normal   Cardiovascular: RRR, no murmurs, rubs, or gallops, palpable pedal pulses bilaterally  Gastrointestinal: Positive bowel sounds, soft, nontender, nondistended  Musculoskeletal: No bilateral ankle edema  Psychiatric: Appropriate affect, cooperative  Neurologic: Oriented x 3, mild expressive aphasia/dysarthria, strength symmetric in all extremities, Cranial Nerves grossly intact to confrontation, speech clear  Skin: No rashes    Results Reviewed:  I have personally reviewed current lab, radiology, and data and agree.      Results from last 7 days  Lab Units 02/15/18  0554 18  1502   WBC 10*3/mm3 7.92 6.17   HEMOGLOBIN g/dL 12.0 12.3   HEMATOCRIT % 37.3 38.1   PLATELETS 10*3/mm3 386 387   INR   --  4.07*       Results from last 7 days  Lab Units 02/15/18  0554 18  1502   SODIUM mmol/L 137 139   POTASSIUM mmol/L 3.9 4.0   CHLORIDE mmol/L 109 105   CO2 mmol/L 19.0* 29.0   BUN mg/dL 19 22   CREATININE mg/dL 1.20 1.50*   GLUCOSE mg/dL 181* 105*   CALCIUM mg/dL 9.1 9.5   ALT (SGPT)  U/L  --  23   AST (SGOT) U/L  --  21     Estimated Creatinine Clearance: 36.4 mL/min (by C-G formula based on Cr of 1.2).  No results found for: BNP  No results found for: PHART    Microbiology Results Abnormal     Procedure Component Value - Date/Time    Urine Culture - Urine, Urine, Clean Catch [[id number]]  (Normal) Collected:  02/14/18 1544    Lab Status:  Preliminary result Specimen:  Urine from Urine, Clean Catch Updated:  02/15/18 0905     Urine Culture No growth        MRI brain personally reviewed with large left sided mass-like area with surrounding edema. Agree with interpretation.  Imaging Results (last 24 hours)     Procedure Component Value Units Date/Time    MRI Brain With & Without Contrast [[id number]] Collected:  02/14/18 1709     Updated:  02/14/18 1848    Narrative:       EXAMINATION: MRI BRAIN W/WO CONTRAST - 02/14/2018     INDICATION: Speech difficulty.      TECHNIQUE: MR datasets the brain were performed without and with  intravenous contrast.     COMPARISON: None.     FINDINGS:   1. There is mild tonsillar ectopia. The cervical medullary junction is  intact.     There is mild prominence of the pituitary gland with mixed signal  especially in the posterior pituitary. Please correlate for clinical  significance. The enlarged pituitary gland does not compress the optic  chiasm. Pathologic significance is indeterminate.     2. Paranasal sinuses are clear. Ocular lens are symmetrical and intact.  The conal contents are normal. There is mild peripheral cortical  atrophy, and there is marked white matter microangiopathy on the FLAIR  datasets indicating severe small vessel disease.     3. The diffusion-weighted sequence reveals no acute ischemic restricted  diffusion.     4. The dominant finding is a mass in the left mid parietal hemisphere.  This is surrounded with significant edema. Interestingly, however, this  mass does not show significant enhancement. Although this may represent  a rounded  infarct which is bland, a malignancy or low-grade glioma can  present in this fashion. The T1 datasets demonstrate a very slight  amount of increased T1 signal which could represent minimal petechial  hemorrhages. This mass is sizable and measures 3.6 x 2.6 x 3 cm.       Impression:       Large mass left mid parietal hemisphere involving gray and  white matter as measured above perhaps with the slightest degree of  petechial hemorrhage with surrounding edema and overall mass effect but  with a paucity of enhancement. Differential diagnosis would be a round  infarct with petechial hemorrhages and mass effect versus a low grade  metastatic process   versus a low-grade glioma or primary brain malignancy. This is the  dominant finding. The patient has a background pattern of extreme  microangiopathic white matter disease on the FLAIR datasets. There is  also enlargement of the pituitary gland with mixed signal as described.  Evidence of high-grade hemorrhage or midline shift is not identified.     DICTATED:     02/14/2018  EDITED    :     02/14/2018          This report was finalized on 2/14/2018 6:46 PM by Dr. Adams Grajeda MD.       XR Chest 1 View [[id number]] Collected:  02/14/18 1738     Updated:  02/14/18 2154    Narrative:          EXAMINATION: XR CHEST 1 VW - 02/14/2018     INDICATION: Brain cancer.       COMPARISON: None.     FINDINGS: The heart is normal in size. The vasculature appears normal.  Lungs appear clear bilaterally. A couple of eventrations are noted of  the right hemidiaphragm.           Impression:       Portable chest exam with no evidence of active disease.     DICTATED:     02/14/2018  EDITED    :     02/14/2018      This report was finalized on 2/14/2018 9:52 PM by DR. Joseph Dumas MD.       CT Abdomen Pelvis Without Contrast [[id number]] Collected:  02/14/18 1853     Updated:  02/14/18 2176    Narrative:       EXAMINATION: CT CHEST WO CONTRAST, CT ABDOMEN PELVIS WO CONTRAST -  02/14/2018       INDICATION:  D49.6-Neoplasm of unspecified behavior of brain;  G93.6-Cerebral edema; N28.9-Disorder of kidney and ureter, unspecified;  R47.01-Aphasia.     TECHNIQUE: 5 mm unenhanced images through the chest, abdomen and pelvis.     The radiation dose reduction device was turned on for each scan per the  ALARA (As Low as Reasonably Achievable) protocol.     COMPARISON: None.     FINDINGS: Patient history indicates brain mass, evaluate for metastatic  disease.     Chest CT without Contrast: Mediastinal window images show no evidence of  mass or adenopathy, no pericardial or pleural effusion. There is a 15 mm  left thyroid nodule. Extensive atherosclerotic calcification is noted of  the thoracic aorta.     Lung window images show moderate upper lobe bullous change and mild  lingular scarring but no evidence of pulmonary parenchymal mass or other  active disease. Bony structures appear intact.       Impression:       No evidence of malignancy or metastasis in the chest.  Incidentally noted small left thyroid nodule.     Abdomen and Pelvis CT without Contrast: Lobular contour abnormality of  the anterior superior liver appears to simply represent liver within a  hemidiaphragm eventration. The liver parenchyma here is uniform in  comparison to liver parenchyma elsewhere. There are a couple of small  left lobe liver cysts. There appear to be multiple gallstones in the  dependent portion of the otherwise normal-appearing gallbladder. No  significant abnormalities are seen of the spleen, pancreas, adrenal  glands, or kidneys for a non-IV contrast-enhanced exam.     In the mid abdomen, there is a somewhat unusual round well-defined  low-density lesion apparently arising within or along the peritoneum of  the left paracolic gutter, anterior to but separate from the left psoas  muscle. The lesion measures approximately 3 cm and is intermediate in  density between water and soft tissue. No similar peritoneal disease is  seen  elsewhere. Mass has no obvious invasive qualities.     In the lower abdomen and pelvis, the uterus and ovaries are  appropriately small. The bladder is nondistended and grossly normal in  appearance. No mass, adenopathy, ascites or inflammatory change is seen.  Bony structures appear intact. A few very dense small bony lesions are  seen of the proximal femurs and right acetabulum, most likely incidental  benign bone islands.     IMPRESSION:   1. Simple-appearing liver cysts.  2. Gallstones.  3. Low-density, possibly cystic, 3 cm mass or cyst of the left lateral  peritoneal margin. Although neoplasm might appear similar, features are  relatively benign, and could represent an entity such as a primary  peritoneal cyst. If IV contrast is not an option for further imaging,  evaluation with MRI or even dedicated ultrasound via left lateral  approach could be considered.  4. Few small benign bone islands of the lower pelvis and proximal  femurs.  5. Liver contour abnormality which appears to simply reflect liver  contained within a right hemidiaphragm eventration. No evidence of  metastatic disease or other acute disease elsewhere.     DICTATED:     02/14/2018  EDITED    :     02/14/2018         This report was finalized on 2/14/2018 10:56 PM by DR. Joseph Dumas MD.       CT Chest Without Contrast [[id number]] Collected:  02/14/18 1853     Updated:  02/14/18 2258    Narrative:       EXAMINATION: CT CHEST WO CONTRAST, CT ABDOMEN PELVIS WO CONTRAST -  02/14/2018      INDICATION:  D49.6-Neoplasm of unspecified behavior of brain;  G93.6-Cerebral edema; N28.9-Disorder of kidney and ureter, unspecified;  R47.01-Aphasia.     TECHNIQUE: 5 mm unenhanced images through the chest, abdomen and pelvis.     The radiation dose reduction device was turned on for each scan per the  ALARA (As Low as Reasonably Achievable) protocol.     COMPARISON: None.     FINDINGS: Patient history indicates brain mass, evaluate for metastatic  disease.      Chest CT without Contrast: Mediastinal window images show no evidence of  mass or adenopathy, no pericardial or pleural effusion. There is a 15 mm  left thyroid nodule. Extensive atherosclerotic calcification is noted of  the thoracic aorta.     Lung window images show moderate upper lobe bullous change and mild  lingular scarring but no evidence of pulmonary parenchymal mass or other  active disease. Bony structures appear intact.       Impression:       No evidence of malignancy or metastasis in the chest.  Incidentally noted small left thyroid nodule.     Abdomen and Pelvis CT without Contrast: Lobular contour abnormality of  the anterior superior liver appears to simply represent liver within a  hemidiaphragm eventration. The liver parenchyma here is uniform in  comparison to liver parenchyma elsewhere. There are a couple of small  left lobe liver cysts. There appear to be multiple gallstones in the  dependent portion of the otherwise normal-appearing gallbladder. No  significant abnormalities are seen of the spleen, pancreas, adrenal  glands, or kidneys for a non-IV contrast-enhanced exam.     In the mid abdomen, there is a somewhat unusual round well-defined  low-density lesion apparently arising within or along the peritoneum of  the left paracolic gutter, anterior to but separate from the left psoas  muscle. The lesion measures approximately 3 cm and is intermediate in  density between water and soft tissue. No similar peritoneal disease is  seen elsewhere. Mass has no obvious invasive qualities.     In the lower abdomen and pelvis, the uterus and ovaries are  appropriately small. The bladder is nondistended and grossly normal in  appearance. No mass, adenopathy, ascites or inflammatory change is seen.  Bony structures appear intact. A few very dense small bony lesions are  seen of the proximal femurs and right acetabulum, most likely incidental  benign bone islands.     IMPRESSION:   1. Simple-appearing  liver cysts.  2. Gallstones.  3. Low-density, possibly cystic, 3 cm mass or cyst of the left lateral  peritoneal margin. Although neoplasm might appear similar, features are  relatively benign, and could represent an entity such as a primary  peritoneal cyst. If IV contrast is not an option for further imaging,  evaluation with MRI or even dedicated ultrasound via left lateral  approach could be considered.  4. Few small benign bone islands of the lower pelvis and proximal  femurs.  5. Liver contour abnormality which appears to simply reflect liver  contained within a right hemidiaphragm eventration. No evidence of  metastatic disease or other acute disease elsewhere.     DICTATED:     02/14/2018  EDITED    :     02/14/2018         This report was finalized on 2/14/2018 10:56 PM by DR. Joseph Dumas MD.       CT Head Without Contrast [[id number]] Collected:  02/15/18 1009     Updated:  02/15/18 1009    Narrative:       EXAMINATION: CT HEAD WO CONTRAST-02/15/2018:      INDICATION: Neoplasm-CNS primary.         TECHNIQUE: 5 mm unenhanced images through the brain.     The radiation dose reduction device was turned on for each scan per the  ALARA (As Low as Reasonably Achievable) protocol.     COMPARISON: NONE.     FINDINGS: Previous study report indicated left mid parietal mass with  evidence of some hemorrhage, questionable on that exam for primary brain  neoplasm.     CT scan, however, appears to show a well-defined, uncomplicated  parenchymal parietotemporal hemorrhage 3.2 x 2.4 cm in diameter, with  mild surrounding edema. There is no obvious underlying mass. By ABC/2  volume calculation, hemorrhage measures approximately 5.8 mL.There  appears to be a trace amount of hemorrhage along the most dependent  portion of the left sylvian cistern, and a small amount of subarachnoid  hemorrhage and possibly petechial hemorrhage in the superior left  parietal lobe. No hemorrhage, infarct, or mass is seen elsewhere. There  is  expected degree of generalized cerebral atrophy for age. There is no  hydrocephalus. Chronic appearing central white matter changes are again  noted.       Impression:       1. Approximately 3.2 x 2.4 cm, uncomplicated appearing parietotemporal  hemorrhage with mild surrounding edema, and no visible underlying mass  or infarct.  2. Small amount of adjacent subarachnoid hemorrhage in the sylvian  cistern, subarachnoid and petechial hemorrhage in the superior left  parietal lobe.  3. No evidence of acute intracranial disease is appreciated elsewhere.     D:  02/15/2018  E:  02/15/2018                      I have reviewed the medications.    Assessment/Plan   Assessment / Plan     Hospital Problem List     * (Principal)Brain mass    Headache    Slurred speech    Chronic venous embolism and thrombosis of deep vessels of right lower extremity    PAD (peripheral artery disease)    HTN (hypertension)    Chronic anticoagulation             Brief Hospital Course to date:  Chika Posey is a 72 y.o. female presenting from home with headache and difficulty ambulating x 2 weeks found to have left sided intracerebral hemorrhage w/ edema    Assessment & Plan:  --NS has seen. Planning for CTA head to r/o AVM. Hold coumadin.  --Continue steroids for now. Seems to have improved some with them per family report.  --BP control. Restart home meds.  --Creatinine somewhat improved. Continue to hold lasix.  --PT/OT. CM, may need rehab.  --Labs in am.    DVT Prophylaxis:  Mechanical    CODE STATUS: Full Code    Disposition: I expect the patient to be discharged TBD.      Electronically signed by Andie Randall II, DO, 02/15/18, 11:17 AM.

## 2018-02-15 NOTE — PROGRESS NOTES
CTA reviewed. No obvious structural etiology for her hemorrhage. No indication for endovascular intervention or DSA at this time.    Recommend MRI with and without contrast and MRA of the head in 6-8 weeks, and she can follow-up in my clinic at that time.

## 2018-02-15 NOTE — PLAN OF CARE
Problem: Patient Care Overview (Adult)  Goal: Plan of Care Review  Outcome: Ongoing (interventions implemented as appropriate)    Goal: Adult Individualization and Mutuality  Outcome: Ongoing (interventions implemented as appropriate)    Goal: Discharge Needs Assessment  Outcome: Ongoing (interventions implemented as appropriate)      Problem: Pain, Acute (Adult)  Goal: Identify Related Risk Factors and Signs and Symptoms  Outcome: Ongoing (interventions implemented as appropriate)    Goal: Acceptable Pain Control/Comfort Level  Outcome: Ongoing (interventions implemented as appropriate)

## 2018-02-15 NOTE — PROGRESS NOTES
Discharge Planning Assessment  [facility]     Patient Name: Chika Posey  MRN: [id number]  Today's Date: 2/15/2018    Admit Date: 2/14/2018          Discharge Needs Assessment       02/15/18 0913    Living Environment    Lives With alone    Living Arrangements house    Home Accessibility no concerns    Stair Railings at Home none    Type of Financial/Environmental Concern none    Transportation Available car;family or friend will provide    Living Environment    Provides Primary Care For no one    Quality Of Family Relationships supportive;involved    Able to Return to Prior Living Arrangements yes    Discharge Needs Assessment    Concerns To Be Addressed no discharge needs identified    Readmission Within The Last 30 Days no previous admission in last 30 days    Anticipated Changes Related to Illness none    Equipment Currently Used at Home cane, quad;shower chair;walker, rolling    Equipment Needed After Discharge none    Current Discharge Risk lives alone    Discharge Disposition still a patient            Discharge Plan       02/15/18 0914    Case Management/Social Work Plan    Plan Home    Patient/Family In Agreement With Plan yes    Additional Comments Spoke with patient in room to initiate discharge planning.  She lives alone in [place] [facility].  Prior to admission, she was independent with ADL's.  She has a quad cane, rolling walker and shower chair at home.  She has had [employer] in the past.  She has RX coverage and has her scripts filled at [email or web address] on [address].  Her plan is home at discharge.  She denies any needs at this time.  CM will continue to follow.  Latisha Weeks RN [phone]        Discharge Placement     No information found        Expected Discharge Date and Time     Expected Discharge Date Expected Discharge Time    Feb 18, 2018               Demographic Summary       02/15/18 0912    Referral Information    Admission Type inpatient    Arrived From home or self-care     Referral Source admission list    Reason For Consult discharge planning    Record Reviewed history and physical;medical record    Primary Care Physician Information    Name Alyssia Greenfield            Functional Status       02/15/18 0913    Functional Status Current    Current Functional Level Comment see nurse's notes    Change in Functional Status Since Onset of Current Illness/Injury no    Functional Status Prior    Ambulation 0-->independent    Transferring 0-->independent    Toileting 0-->independent    Bathing 0-->independent    Dressing 0-->independent    Eating 0-->independent    Communication 0-->understands/communicates without difficulty    IADL    Medications independent    Meal Preparation independent    Housekeeping independent    Laundry independent    Shopping independent    Oral Care independent    Activity Tolerance    Current Activity Limitations none    Usual Activity Tolerance moderate    Current Activity Tolerance moderate    Employment/Financial    Employment/Finance Comments Verified with patient that she has [employer].  No issues obtaining medications.            Psychosocial     None            Abuse/Neglect     None            Legal     None            Substance Abuse     None            Patient Forms     None          Latisha Weeks RN

## 2018-02-16 VITALS
DIASTOLIC BLOOD PRESSURE: 76 MMHG | TEMPERATURE: 98.8 F | SYSTOLIC BLOOD PRESSURE: 149 MMHG | OXYGEN SATURATION: 96 % | WEIGHT: 133 LBS | RESPIRATION RATE: 18 BRPM | HEART RATE: 87 BPM | HEIGHT: 61 IN | BODY MASS INDEX: 25.11 KG/M2

## 2018-02-16 LAB
ANION GAP SERPL CALCULATED.3IONS-SCNC: 10 MMOL/L (ref 3–11)
BACTERIA SPEC AEROBE CULT: NORMAL
BACTERIA SPEC AEROBE CULT: NORMAL
BUN BLD-MCNC: 16 MG/DL (ref 9–23)
BUN/CREAT SERPL: 16 (ref 7–25)
CALCIUM SPEC-SCNC: 8.5 MG/DL (ref 8.7–10.4)
CHLORIDE SERPL-SCNC: 108 MMOL/L (ref 99–109)
CO2 SERPL-SCNC: 19 MMOL/L (ref 20–31)
CREAT BLD-MCNC: 1 MG/DL (ref 0.6–1.3)
GFR SERPL CREATININE-BSD FRML MDRD: 55 ML/MIN/1.73
GFR SERPL CREATININE-BSD FRML MDRD: 66 ML/MIN/1.73
GLUCOSE BLD-MCNC: 170 MG/DL (ref 70–100)
POTASSIUM BLD-SCNC: 3.6 MMOL/L (ref 3.5–5.5)
SODIUM BLD-SCNC: 137 MMOL/L (ref 132–146)

## 2018-02-16 PROCEDURE — 80048 BASIC METABOLIC PNL TOTAL CA: CPT | Performed by: INTERNAL MEDICINE

## 2018-02-16 PROCEDURE — 99239 HOSP IP/OBS DSCHRG MGMT >30: CPT | Performed by: PHYSICIAN ASSISTANT

## 2018-02-16 PROCEDURE — 97162 PT EVAL MOD COMPLEX 30 MIN: CPT

## 2018-02-16 PROCEDURE — 25010000002 DEXAMETHASONE PER 1 MG: Performed by: HOSPITALIST

## 2018-02-16 PROCEDURE — 99221 1ST HOSP IP/OBS SF/LOW 40: CPT | Performed by: PHYSICIAN ASSISTANT

## 2018-02-16 PROCEDURE — 97165 OT EVAL LOW COMPLEX 30 MIN: CPT | Performed by: OCCUPATIONAL THERAPIST

## 2018-02-16 RX ADMIN — LOSARTAN POTASSIUM 100 MG: 50 TABLET ORAL at 09:38

## 2018-02-16 RX ADMIN — DEXAMETHASONE SODIUM PHOSPHATE 4 MG: 4 INJECTION, SOLUTION INTRAMUSCULAR; INTRAVENOUS at 11:35

## 2018-02-16 RX ADMIN — DEXAMETHASONE SODIUM PHOSPHATE 4 MG: 4 INJECTION, SOLUTION INTRAMUSCULAR; INTRAVENOUS at 06:53

## 2018-02-16 NOTE — PLAN OF CARE
Problem: Patient Care Overview (Adult)  Goal: Plan of Care Review  Outcome: Outcome(s) achieved Date Met: 02/16/18 02/16/18 1030   Coping/Psychosocial Response Interventions   Plan Of Care Reviewed With patient   Outcome Evaluation   Outcome Summary/Follow up Plan Patient demonstrates safe mobility around room and in hallway. Slightly elevated BP after [id number]. No further need for skilled PT   Patient Care Overview   Progress improving       Problem: Inpatient Physical Therapy  Goal: Gait Training Goal STG- PT  Outcome: Outcome(s) achieved Date Met: 02/16/18 02/16/18 1030   Gait Training PT STG   Gait Training Goal PT STG, Date Established 02/16/18   Gait Training Goal PT STG, Time to Achieve by discharge   Gait Training Goal PT STG, [place] Level supervision required   Gait Training Goal PT STG, Distance to Achieve 350   Gait Training Goal PT STG, Outcome goal met     Goal: Patient Education Goal STG- PT  Outcome: Outcome(s) achieved Date Met: 02/16/18 02/16/18 1030   Patient Education PT STG   Patient Education PT STG, Date Established 02/16/18   Patient Education PT STG, Time to Achieve by discharge   Patient Education PT STG, Education Type precaution per surgeon   Patient Education PT STG, Education Understanding demonstrate adequately   Patient Education PT STG Outcome goal met

## 2018-02-16 NOTE — THERAPY DISCHARGE NOTE
Acute Care - Physical Therapy Initial Eval/Discharge  [facility]     Patient Name: Chika Psoey  : 1945  MRN: [id number]  Today's Date: 2018   Onset of Illness/Injury or Date of Surgery Date: 18  Date of Referral to PT: 02/15/18  Referring Physician: DO Prakash      Admit Date: 2018    Visit Dx:    ICD-10-CM ICD-9-CM   1. 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 Nontraumatic cerebral edema G93.6 348.5   3. Renal insufficiency N28.9 593.9   4. Expressive aphasia R47.01 784.3   5. Impaired mobility and ADLs Z74.09 799.89   6. Impaired functional mobility, balance, gait, and endurance Z74.09 V49.89     Patient Active Problem List   Diagnosis   • Cerebral hemorrhage   • Headache   • Slurred speech   • Chronic venous embolism and thrombosis of deep vessels of right lower extremity   • PAD (peripheral artery disease)   • HTN (hypertension)   • Chronic anticoagulation     Past Medical History:   Diagnosis Date   • DVT (deep venous thrombosis)    • Hypertension      Past Surgical History:   Procedure Laterality Date   • TUBAL ABDOMINAL LIGATION            PT ASSESSMENT (last 72 hours)      PT Evaluation       18 0937 18 0805    Rehab Evaluation    Document Type evaluation  -SC evaluation;discharge summary  -ST    Subjective Information no complaints;agree to therapy  -SC no complaints;agree to therapy  -ST    Patient Effort, Rehab Treatment good  -SC good  -ST    Symptoms Noted During/After Treatment none  -SC none  -ST    General Information    Patient Profile Review yes  -SC yes  -ST    Onset of Illness/Injury or Date of Surgery Date 18  -SC 18  -ST    Referring Physician DO Prakash  -LUANNE Randall II DO  -    General Observations in bed,   -SC pt supine upon arrival; IV intact; sister present  -ST    Pertinent History Of Current Problem admitted with c/o expressive aphasia, HA x4 days, recent NVD. Dx with L posterior temporal ICH   -SC Pt presents to ED w/progressive H/A that waxes and wanes, 10/10 pain rating, frontal in nature with loss of appetite, N/V and difficulty expressing her words. MRI head: lg mass L mid parietal hemisphere, enlargement of pituitary gland and hemorrhage; L temporal ICH  -ST    Precautions/Limitations other (see comments)   BP  -SC other (see comments)   expressive aphasia; ICH  -ST    Prior Level of Function independent:;community mobility;gait;ADL's  -SC independent:;all household mobility;community mobility;transfer;bed mobility;feeding;grooming;dressing;bathing;home management;cooking;cleaning;driving;shopping  -    Equipment Currently Used at Home cane, straight;walker, rolling   does not use a walking devise at present  -SC cane, straight;walker, rolling  -    Plans/Goals Discussed With patient;agreed upon  -SC patient and family;agreed upon  -    Risks Reviewed patient:;increased discomfort;change in vital signs  -SC patient and family:;LOB;nausea/vomiting;dizziness;change in vital signs;increased discomfort  -    Benefits Reviewed patient:;improve function;increase independence;increase strength  -SC patient and family:;improve function;increase independence;increase strength;increase balance;decrease pain;increase knowledge  -    Barriers to Rehab none identified  -SC none identified  -ST    Living Environment    Lives With alone  -SC alone  -ST    Living Arrangements house  -SC house  -ST    Home Accessibility stairs within home  -SC stairs to enter home  -    Number of Stairs to Enter Home 1  -SC 1  -ST    Stair Railings at Home  none  -ST    Living Environment Comment  has tub and walk-in shower; has 5 sisters and b/t them/other family, can have 24/7 assist if needed  -    Clinical Impression    Date of Referral to PT 02/15/18  -SC     PT Diagnosis expressive aphasia  -SC     Patient/Family Goals Statement to go home  -SC     Criteria for Skilled Therapeutic Interventions Met yes;other (see  comments)   evaluation only  -SC     Pathology/Pathophysiology Noted (Describe Specifically for Each System) neuromuscular  -SC     Impairments Found (describe specific impairments) other (see comments)   expressive aphasia  -SC     Rehab Potential good, to achieve stated therapy goals  -SC     Vital Signs    Pre Systolic BP Rehab 147  -SC     Pre Treatment Diastolic BP 81  -SC     Post Systolic BP Rehab 153  -SC     Post Treatment Diastolic BP 74  -SC     Pretreatment Heart Rate (beats/min) 70  -SC     Intratreatment Heart Rate (beats/min) 108  -SC     Posttreatment Heart Rate (beats/min) 80  -SC     Pain Assessment    Pain Assessment Dan-Baker FACES  -SC No/denies pain  -    Dan-Phelan FACES Pain Rating 0  -SC     Vision Assessment/Intervention    Visual Impairment WNL  -SC WNL  -ST    Cognitive Assessment/Intervention    Current Cognitive/Communication Assessment impaired   expressive aphasia  -SC impaired   expressive aphasia  -    Orientation Status oriented x 4  -SC oriented x 4   able to correct most verbal mistakes w/increased time  -    Follows Commands/Answers Questions 100% of the time  -% of the time  -    Personal Safety WNL/WFL   moves quickly   -SC WNL/WFL  -ST    Personal Safety Interventions gait belt;fall prevention program maintained  -SC fall prevention program maintained;gait belt;nonskid shoes/slippers when out of bed  -ST    ROM (Range of Motion)    General ROM no range of motion deficits identified  -SC no range of motion deficits identified  -ST    MMT (Manual Muscle Testing)    General MMT Assessment no strength deficits identified  -SC no strength deficits identified  -    General MMT Assessment Detail  5/5 BUE's  -ST    Muscle Tone Assessment    Muscle Tone Assessment --   normal  -SC     Bed Mobility, Assessment/Treatment    Bed Mobility, Assistive Device  head of bed elevated  -ST    Bed Mobility, Scoot/Bridge, [place] independent  -SC independent  -    Bed Mob,  Supine to Sit, [place]  independent  -ST    Bed Mob, Sit to Supine, [place]  independent  -    Transfer Assessment/Treatment    Transfers, Sit-Stand [place] independent  -SC independent  -    Transfers, Stand-Sit [place] independent  -SC independent  -    Gait Assessment/Treatment    Gait, [place] Level independent  -SC     Gait, Distance (Feet) 500  -SC     Gait, Gait Pattern Analysis swing-through gait  -SC     Gait, Gait Deviations forward flexed posture  -SC     Gait, Comment demonstrated safe ambulation in hallway. Able to negoitate objects . No sway noted  -SC     Motor Skills/Interventions    Additional Documentation  Balance Skills Training (Group);Fine Motor Coordination Training (Group);Gross Motor Coordination Training (Group)  -    Balance Skills Training    Sitting-Level of Assistance  Independent  -    Standing-Level of Assistance  Independent  -    Standing-Balance Activities  Retrieve object from floor  -    Gait Balance-Level of Assistance Close supervision  -SC Close supervision  -    Fine Motor Coordination Training    Opposition  Right:;Left:;intact  -    Orthotics Prosthetics    Additional Documentation  --   rapid/alternating: intact bilaterally   -    Sensory Assessment/Intervention    Sensory Impairment --   wnl  -SC     Light Touch  LUE;RUE  -ST    LUE Light Touch  WNL  -    RUE Light Touch  WNL  -    Positioning and Restraints    Pre-Treatment Position in bed  -SC in bed  -    Post Treatment Position chair  -SC bed  -ST    In Bed sitting;call light within reach;encouraged to call for assist;with family/caregiver  -SC notified nsg;supine;call light within reach;encouraged to call for assist;with family/caregiver  -      02/16/18 0800 02/15/18 0913    General Information    Equipment Currently Used at Home  cane, quad;shower chair;walker, rolling  -PS    Living Environment    Lives With  alone  -PS    Living Arrangements  house  -PS    Home  Accessibility  no concerns  -PS    Stair Railings at Home  none  -PS    Type of Financial/Environmental Concern  none  -PS    Transportation Available  car;family or friend will provide  -PS    Sensory Assessment/Intervention    LUE Light Touch WNL  -LC     RUE Light Touch WNL  -LC       02/14/18 1905 02/14/18 1902    General Information    Equipment Currently Used at Home  none  -RB    Living Environment    Lives With alone  -RB     Living Arrangements house  -RB     Home Accessibility no concerns  -RB     Stair Railings at Home none  -RB     Type of Financial/Environmental Concern none  -RB     Transportation Available car;family or friend will provide  -RB       User Key  (r) = Recorded By, (t) = Taken By, (c) = Cosigned By    Initials Name Provider Type    SC Rachell Coto, PT Physical Therapist    ST Monica Chong, OTR Occupational Therapist    DEANN Vazquez, RN Registered Nurse    JOHNY Weeks, RN Registered Nurse    REGAN Riggs, RN Registered Nurse          Physical Therapy Education     Title: PT OT SLP Therapies (Done)     Topic: Physical Therapy (Done)     Point: Mobility training (Done)    Learning Progress Summary    Learner Readiness Method Response Comment Documented by Status   Patient MIGUEL ANGEL Steiner discussed benefits of activirty  discussed safety at home and need to keep walking paths clear  Sister to help with taking garbage out SC 02/16/18 1029 Done   Family MIGUEL ANGEL Steiner discussed benefits of activirty  discussed safety at home and need to keep walking paths clear  Sister to help with taking garbage out SC 02/16/18 1029 Done               Point: Home exercise program (Done)    Learning Progress Summary    Learner Readiness Method Response Comment Documented by Status   Patient MIGUEL ANGEL Steiner discussed benefits of activirty  discussed safety at home and need to keep walking paths clear  Sister to help with taking garbage out SC 02/16/18 1029 Done   Family MIGUEL ANGEL Steiner discussed  benefits of activirty  discussed safety at home and need to keep walking paths clear  Sister to help with taking garbage out SC 02/16/18 1029 Done               Point: Body mechanics (Done)    Learning Progress Summary    Learner Readiness Method Response Comment Documented by Status   Patient EagMIGUEL ANGEL Grubbs discussed benefits of activirty  discussed safety at home and need to keep walking paths clear  Sister to help with taking garbage out SC 02/16/18 1029 Done   Family MIGUEL ANGEL Steiner discussed benefits of activirty  discussed safety at home and need to keep walking paths clear  Sister to help with taking garbage out SC 02/16/18 1029 Done               Point: Precautions (Done)    Learning Progress Summary    Learner Readiness Method Response Comment Documented by Status   Patient MIGUEL ANGEL Steiner discussed benefits of activirty  discussed safety at home and need to keep walking paths clear  Sister to help with taking garbage out SC 02/16/18 1029 Done   Family MIGUEL ANGEL Steiner discussed benefits of activirty  discussed safety at home and need to keep walking paths clear  Sister to help with taking garbage out SC 02/16/18 1029 Done                      User Key     Initials Effective Dates Name Provider Type Discipline    SC 06/19/15 -  Rachell Coto, PT Physical Therapist PT                PT Recommendation and Plan  Anticipated Equipment Needs At Discharge:  (none)  Anticipated Discharge Disposition: home with assist  Planned Therapy Interventions: neuromuscular re-education, patient/family education  PT Frequency: evaluation only  Plan of Care Review  Plan Of Care Reviewed With: patient  Progress: improving  Outcome Summary/Follow up Plan: Patient demonstrates safe mobility arround room and in hallway. Slightly elevated BP after [id number]. No further need for skilled PT          IP PT Goals       02/16/18 1030          Gait Training PT STG    Gait Training Goal PT STG, Date Established 02/16/18  -SC      Gait  Training Goal PT STG, Time to Achieve by discharge  -SC      Gait Training Goal PT STG, [place] Level supervision required  -SC      Gait Training Goal PT STG, Distance to Achieve 350  -SC      Gait Training Goal PT STG, Outcome goal met  -SC      Patient Education PT STG    Patient Education PT STG, Date Established 02/16/18  -SC      Patient Education PT STG, Time to Achieve by discharge  -SC      Patient Education PT STG, Education Type precaution per surgeon  -SC      Patient Education PT STG, Education Understanding demonstrate adequately  -SC      Patient Education PT STG Outcome goal met  -SC        User Key  (r) = Recorded By, (t) = Taken By, (c) = Cosigned By    Initials Name Provider Type    SC Rachell Coto, PT Physical Therapist                Outcome Measures       02/16/18 1000 02/16/18 0805       How much help from another person do you currently need...    Turning from your back to your side while in flat bed without using bedrails? 4  -SC      Moving from lying on back to sitting on the side of a flat bed without bedrails? 4  -SC      Moving to and from a bed to a chair (including a wheelchair)? 4  -SC      Standing up from a chair using your arms (e.g., wheelchair, bedside chair)? 4  -SC      Climbing 3-5 steps with a railing? 3  -SC      To walk in hospital room? 3  -SC      AM-PAC 6 Clicks Score 22  -SC      How much help from another is currently needed...    Putting on and taking off regular lower body clothing?  4  -ST     Bathing (including washing, rinsing, and drying)  4  -ST     Toileting (which includes using toilet bed pan or urinal)  4  -ST     Putting on and taking off regular upper body clothing  4  -ST     Taking care of personal grooming (such as brushing teeth)  4  -ST     Eating meals  4  -ST     Score  24  -ST     Functional Assessment    Outcome Measure Options AM-PAC 6 Clicks Basic Mobility (PT)  -SC AM-PAC 6 Clicks Daily Activity (OT)  -ST       User Key  (r) = Recorded  By, (t) = Taken By, (c) = Cosigned By    Initials Name Provider Type    SC Rachell Coto, PT Physical Therapist    ST Monica Chong, OTR Occupational Therapist           Time Calculation:         PT Charges       02/16/18 1035          Time Calculation    Start Time 0937  -SC      PT Received On 02/16/18  -SC        User Key  (r) = Recorded By, (t) = Taken By, (c) = Cosigned By    Initials Name Provider Type    SC Rachell Coto, PT Physical Therapist          Therapy Charges for Today     Code Description Service Date Service Provider Modifiers Qty    16252176042 HC PT EVAL MOD COMPLEXITY 4 2/16/2018 Rachell Coto, PT GP 1          PT G-Codes  Outcome Measure Options: AM-PAC 6 Clicks Basic Mobility (PT)    PT Discharge Summary  Anticipated Discharge Disposition: home with assist  Reason for Discharge: All goals achieved  Outcomes Achieved: Able to achieve all goals within established timeline  Discharge Destination: Home with assist    Rachell Coto, PT  2/16/2018

## 2018-02-16 NOTE — THERAPY DISCHARGE NOTE
Acute Care - Occupational Therapy Initial Eval/Discharge  [facility]     Patient Name: Chika Posey  : 1945  MRN: [id number]  Today's Date: 2018  Onset of Illness/Injury or Date of Surgery Date: 18  Date of Referral to OT: 18  Referring Physician: Prakash ZIMMERMAN DO      Admit Date: 2018       ICD-10-CM ICD-9-CM   1. 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 Nontraumatic cerebral edema G93.6 348.5   3. Renal insufficiency N28.9 593.9   4. Expressive aphasia R47.01 784.3   5. Impaired mobility and ADLs Z74.09 799.89     Patient Active Problem List   Diagnosis   • Cerebral hemorrhage   • Headache   • Slurred speech   • Chronic venous embolism and thrombosis of deep vessels of right lower extremity   • PAD (peripheral artery disease)   • HTN (hypertension)   • Chronic anticoagulation     Past Medical History:   Diagnosis Date   • DVT (deep venous thrombosis)    • Hypertension      Past Surgical History:   Procedure Laterality Date   • TUBAL ABDOMINAL LIGATION            OT ASSESSMENT FLOWSHEET (last 72 hours)      OT Evaluation       18 0927 18 0805 02/15/18 0913 18 1905 18 1902    Rehab Evaluation    Document Type  evaluation;discharge summary  -ST       Subjective Information  no complaints;agree to therapy  -ST       Patient Effort, Rehab Treatment  good  -ST       Symptoms Noted During/After Treatment  none  -ST       General Information    Patient Profile Review  yes  -ST       Onset of Illness/Injury or Date of Surgery Date  18  -ST       Referring Physician  Prakash ZIMMERMAN DO  -ST       General Observations  pt supine upon arrival; IV intact; sister present  -ST       Pertinent History Of Current Problem  Pt presents to ED w/progressive H/A that waxes and wanes, 10/10 pain rating, frontal in nature with loss of appetite, N/V and difficulty expressing her words. MRI head: lg mass L mid parietal hemisphere, enlargement  of pituitary gland and hemorrhage; L temporal ICH  -ST       Precautions/Limitations  other (see comments)   expressive aphasia; ICH  -ST       Prior Level of Function  independent:;all household mobility;community mobility;transfer;bed mobility;feeding;grooming;dressing;bathing;home management;cooking;cleaning;driving;shopping  -ST       Equipment Currently Used at Home  cane, straight;walker, rolling  -ST cane, quad;shower chair;walker, rolling  -PS  none  -RB    Plans/Goals Discussed With  patient and family;agreed upon  -ST       Risks Reviewed  patient and family:;LOB;nausea/vomiting;dizziness;change in vital signs;increased discomfort  -ST       Benefits Reviewed  patient and family:;improve function;increase independence;increase strength;increase balance;decrease pain;increase knowledge  -ST       Barriers to Rehab  none identified  -ST       Living Environment    Lives With  alone  -ST alone  -PS alone  -RB     Living Arrangements  house  -ST house  -PS house  -RB     Home Accessibility  stairs to enter home  -ST no concerns  -PS no concerns  -RB     Number of Stairs to Enter Home  1  -ST       Stair Railings at Home  none  -ST none  -PS none  -RB     Type of Financial/Environmental Concern   none  -PS none  -RB     Transportation Available   car;family or friend will provide  -PS car;family or friend will provide  -RB     Living Environment Comment  has tub and walk-in shower; has 5 sisters and b/t them/other family, can have 24/7 assist if needed  -ST       Clinical Impression    Date of Referral to OT  02/16/18  -ST       OT Diagnosis  impaired ADLs  -ST       Prognosis  good  -ST       Patient/Family Goals Statement  return home  -ST       Criteria for Skilled Therapeutic Interventions Met  no problems identified which require skilled intervention  -ST       Therapy Frequency  evaluation only  -ST       Anticipated Equipment Needs At Discharge  --   none  -ST       Anticipated Discharge Disposition home  with outpatient services;home with home health;home with assist   HH/OP SLP services if recommended by speech pathology   -ST home with outpatient services;home with home health;home with assist   SLP services per their recommendations   -ST       Functional Level Prior    Ambulation   0-->independent  -PS  0-->independent  -RB    Transferring   0-->independent  -PS  0-->independent  -RB    Toileting   0-->independent  -PS  0-->independent  -RB    Bathing   0-->independent  -PS  0-->independent  -RB    Dressing   0-->independent  -PS  0-->independent  -RB    Eating   0-->independent  -PS  0-->independent  -RB    Communication   0-->understands/communicates without difficulty  -PS  0-->understands/communicates without difficulty  -RB    Swallowing     0-->swallows foods/liquids without difficulty  -RB    Pain Assessment    Pain Assessment  No/denies pain  -ST       Vision Assessment/Intervention    Visual Impairment  WNL  -ST       Cognitive Assessment/Intervention    Current Cognitive/Communication Assessment  impaired   expressive aphasia  -ST       Orientation Status  oriented x 4   able to correct most verbal mistakes w/increased time  -ST       Follows Commands/Answers Questions  100% of the time  -ST       Personal Safety  WNL/WFL  -ST       Personal Safety Interventions  fall prevention program maintained;gait belt;nonskid shoes/slippers when out of bed  -ST       ROM (Range of Motion)    General ROM  no range of motion deficits identified  -ST       MMT (Manual Muscle Testing)    General MMT Assessment  no strength deficits identified  -ST       General MMT Assessment Detail  5/5 BUE's  -ST       Bed Mobility, Assessment/Treatment    Bed Mobility, Assistive Device  head of bed elevated  -ST       Bed Mobility, Scoot/Bridge, [place]  independent  -ST       Bed Mob, Supine to Sit, [place]  independent  -ST       Bed Mob, Sit to Supine, [place]  independent  -ST       Transfer Assessment/Treatment     Transfers, Sit-Stand [place]  independent  -ST       Transfers, Stand-Sit [place]  independent  -ST       Functional Mobility    Functional Mobility- Ind. Level  supervision required  -ST       Functional Mobility-Distance (Feet)  10  -ST       Functional Mobility- Comment  no instances of LOB or dizziness  -ST       Lower Body Dressing Assessment/Training    LB Dressing Assess/Train, Clothing Type  doffing:;donning:;socks  -ST       LB Dressing Assess/Train, Position  sitting  -ST       LB Dressing Assess/Train, [place]  independent  -ST       Motor Skills/Interventions    Additional Documentation  Balance Skills Training (Group);Fine Motor Coordination Training (Group);Gross Motor Coordination Training (Group)  -ST       Balance Skills Training    Sitting-Level of Assistance  Independent  -ST       Standing-Level of Assistance  Independent  -ST       Standing-Balance Activities  Retrieve object from floor  -ST       Gait Balance-Level of Assistance  Close supervision  -ST       Fine Motor Coordination Training    Opposition  Right:;Left:;intact  -ST       Orthotics Prosthetics    Additional Documentation  --   rapid/alternating: intact bilaterally   -ST       Sensory Assessment/Intervention    Light Touch  LUE;RUE  -ST       LUE Light Touch  WNL  -ST       RUE Light Touch  WNL  -ST       Positioning and Restraints    Pre-Treatment Position  in bed  -ST       Post Treatment Position  bed  -ST       In Bed  notified nsg;supine;call light within reach;encouraged to call for assist;with family/caregiver  -ST         User Key  (r) = Recorded By, (t) = Taken By, (c) = Cosigned By    Initials Name Effective Dates     Monica LAVERNE Chong, OTR 02/20/17 -     RB Rachael Vazquez RN 06/16/16 -     PS Latisha Weeks RN 06/16/16 -           Occupational Therapy Education     Title: PT OT SLP Therapies (Done)     Topic: Occupational Therapy (Done)     Point: ADL training (Done)    Description: Instruct  learner(s) on proper safety adaptation and remediation techniques during self care or transfers.   Instruct in proper use of assistive devices.    Learning Progress Summary    Learner Readiness Method Response Comment Documented by Status   Patient Acceptance E,TB,D VU,DU role of OT, benefits of activity, transfers, bed mobility, home safety ST 02/16/18 0924 Done   Family Acceptance E,TB,D VU,DU role of OT, benefits of activity, transfers, bed mobility, home safety ST 02/16/18 0924 Done               Point: Home exercise program (Done)    Description: Instruct learner(s) on appropriate technique for monitoring, assisting and/or progressing therapeutic exercises/activities.    Learning Progress Summary    Learner Readiness Method Response Comment Documented by Status   Patient Acceptance E,TB,D VU,DU role of OT, benefits of activity, transfers, bed mobility, home safety ST 02/16/18 0924 Done   Family Acceptance E,TB,D VU,DU role of OT, benefits of activity, transfers, bed mobility, home safety ST 02/16/18 0924 Done               Point: Precautions (Done)    Description: Instruct learner(s) on prescribed precautions during self-care and functional transfers.    Learning Progress Summary    Learner Readiness Method Response Comment Documented by Status   Patient Acceptance E,TB,D VU,DU role of OT, benefits of activity, transfers, bed mobility, home safety ST 02/16/18 0924 Done   Family Acceptance E,TBDANII VU,DU role of OT, benefits of activity, transfers, bed mobility, home safety ST 02/16/18 0924 Done               Point: Body mechanics (Done)    Description: Instruct learner(s) on proper positioning and spine alignment during self-care, functional mobility activities and/or exercises.    Learning Progress Summary    Learner Readiness Method Response Comment Documented by Status   Patient Acceptance E,TB,D VU,DU role of OT, benefits of activity, transfers, bed mobility, home safety ST 02/16/18 0924 Done   Family  Acceptance E,TB,D VU,DU role of OT, benefits of activity, transfers, bed mobility, home safety  02/16/18 0924 Done                      User Key     Initials Effective Dates Name Provider Type [facility] 02/20/17 -  JACK Gonzalez Occupational Therapist OT                OT Recommendation and Plan  Anticipated Equipment Needs At Discharge:  (none)  Anticipated Discharge Disposition: home with outpatient services, home with home health, home with assist (HH/OP SLP services if recommended by speech pathology )  Therapy Frequency: evaluation only  Plan of Care Review  Plan Of Care Reviewed With: patient  Outcome Summary/Follow up Plan: Pt presents with speech difficulties noted as expressive aphasia this date. Pt able to complete all ADL related tasks and demonstrates normal BUE strength, coordination, sensation and appropriate balance and ability to perform bed mobility, transfers and fxnl mobility. No further skilled OT needed at this time.               Outcome Measures       02/16/18 0805          How much help from another is currently needed...    Putting on and taking off regular lower body clothing? 4  -ST      Bathing (including washing, rinsing, and drying) 4  -ST      Toileting (which includes using toilet bed pan or urinal) 4  -ST      Putting on and taking off regular upper body clothing 4  -ST      Taking care of personal grooming (such as brushing teeth) 4  -ST      Eating meals 4  -ST      Score 24  -ST      Functional Assessment    Outcome Measure Options AM-PAC 6 Clicks Daily Activity (OT)  -ST        User Key  (r) = Recorded By, (t) = Taken By, (c) = Cosigned By    Initials Name Provider Type     JACK Gonzalez Occupational Therapist          Time Calculation:         Time Calculation- OT       02/16/18 0928          Time Calculation- OT    OT Start Time 0805  -ST      Total Timed Code Minutes- OT 0 minute(s)  -ST      OT Received On 02/16/18  -ST        User Key  (r) =  Recorded By, (t) = Taken By, (c) = Cosigned By    Initials Name Provider Type     Monica Chong OTDAREN Occupational Therapist          Therapy Charges for Today     Code Description Service Date Service Provider Modifiers Qty    [id number]  OT EVAL LOW COMPLEXITY 4 2/16/2018 JACK Gonzalez GO 1               OT Discharge Summary  Anticipated Discharge Disposition: home with outpatient services, home with home health, home with assist (HH/OP SLP services if recommended by speech pathology )  Reason for Discharge: Maximum functional potential achieved  Outcomes Achieved: Refer to plan of care for updates on goals achieved  Discharge Destination: Home with assist, Home with home health, Home with outpatient services (see above)    JACK Aguilar  2/16/2018

## 2018-02-16 NOTE — PROGRESS NOTES
Continued Stay Note   [facility]     Patient Name: Chika Posey  MRN: [id number]  Today's Date: 2/16/2018    Admit Date: 2/14/2018          Discharge Plan       02/16/18 1622    Case Management/Social Work Plan    Plan Home with [facility]    Patient/Family In Agreement With Plan yes    Additional Comments Contacted by IVÁN Rees with Dr. Snell, to see if CM could arrange home health for PT and speech. Met with patient's family in the room. Patient was in the restroom. Family has requested [facility]. Referral called to [facility], and [facility] will f/u with patient at home to begin services. CM will continue to follow.               Discharge Codes       02/16/18 1622    Discharge Codes    Discharge Codes 06  Discharged/transferred to home under care of organized home health service organization in anticipation of skilled care        Expected Discharge Date and Time     Expected Discharge Date Expected Discharge Time    Feb 16, 2018             Farzaneh Marquez

## 2018-02-16 NOTE — PROGRESS NOTES
Continued Stay Note  [facility]     Patient Name: Chika Posey  MRN: [id number]  Today's Date: 2/16/2018    Admit Date: 2/14/2018          Discharge Plan       02/16/18 1110    Case Management/Social Work Plan    Plan Home    Patient/Family In Agreement With Plan yes    Additional Comments Spoke with patient in room.  Her plan is still to go home at discharge.  She denies and needs at this time.  CM will continue to follow.  Latisha Weeks RN [phone]              Discharge Codes     None        Expected Discharge Date and Time     Expected Discharge Date Expected Discharge Time    Feb 18, 2018             Latisha Weeks RN

## 2018-02-16 NOTE — PROGRESS NOTES
"NEUROSURGERY PROGRESS NOTE    Vital Signs  Blood pressure 130/74, pulse 94, temperature 98.5 °F (36.9 °C), temperature source Oral, resp. rate 16, height 154.9 cm (61\"), weight 60.3 kg (133 lb), SpO2 97 %, not currently breastfeeding.    Interval History:   Dr. Hitchcock's note appreciated.  Defer further recommendations to Dr. Hitchcock.      ASSESSMENT: Left temporal intracerebral hemorrhage.    PLAN: Follow-up MRI and MRA of the head in 6 weeks and follow up with Dr. Hitchcock.    Adams Cornejo MD  02/16/18  7:00 AM    "

## 2018-02-16 NOTE — PLAN OF CARE
Problem: Patient Care Overview (Adult)  Goal: Adult Individualization and Mutuality  Outcome: Ongoing (interventions implemented as appropriate)    Goal: Discharge Needs Assessment  Outcome: Ongoing (interventions implemented as appropriate)      Problem: Pain, Acute (Adult)  Goal: Identify Related Risk Factors and Signs and Symptoms  Outcome: Ongoing (interventions implemented as appropriate)    Goal: Acceptable Pain Control/Comfort Level  Outcome: Ongoing (interventions implemented as appropriate)

## 2018-02-16 NOTE — DISCHARGE SUMMARY
[facility] Medicine Services  DISCHARGE SUMMARY    Patient Name: Chika Posey  : 1945  MRN: [id number]    Date of Admission: 2018  Date of Discharge:  18  Primary Care Physician: Alyssia Greenfield MD    Consults     Date and Time Order Name Status Description    2018 1129 Inpatient Consult to Neurosurgery Completed         Hospital Course     Presenting Problem:   Neoplasm of brain causing mass effect on adjacent structures [D49.6]    Active Hospital Problems (** Indicates Principal Problem)    Diagnosis Date Noted   • **Cerebral hemorrhage [I61.9] 2018   • Headache [R51] 2018   • Slurred speech [R47.81] 2018   • Chronic venous embolism and thrombosis of deep vessels of right lower extremity [I82.501] 2018   • PAD (peripheral artery disease) [I73.9] 2018   • HTN (hypertension) [I10] 2018   • Chronic anticoagulation [Z79.01] 2018      Resolved Hospital Problems    Diagnosis Date Noted Date Resolved   No resolved problems to display.      Hospital Course:  Chika Posey is a 72 y.o. female with PMH significant for HTN, hyperlipidemia and PAD s/p aortobifemoral bypass and stents. 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 She underwent catheter-directed thrombolysis. Hospitalization was complicated by LUE compartment syndrome requiring fasciotomy, acute hypoxic respiratory failure requiring intubation (extubated ) and psoas muscle hematoma with significant extravasation. She received 6 units PRBCs, 2 units FFP, 1 unit of platelets and 1 unit Plasmalyte. She was discharged on Plavix and Coumadin. Plavix was later discontinued and she has been maintained on ASA and Coumadin.     She presented to [facility] ED on 18 for evaluation of headache and dizziness x 3 days and onset of slurred/garbled speech with word-finding  difficulties that morning. MRI brain revealed a left posterior temporal lobe intracerebral hemorrhage 2 x 3.5cm. MRI brain showed surrounding edema of the lesion in the posterior temporal lobe in Wernicke's area. A CT angiogram was performed to rule out AVM. CT angio showed no evidence of any vascularity associated with the patient's left-sided hemorrhage. Hemorrhage was felt to be spontaneous.     PT/OT evaluated Ms. Posey and felt that she was safe to return home. Case management will assist with arranging home PT and speech therapy.   Dr. Hitchcock recommends no anticoagulation x 1 week. She will follow up in the office on 2/21 and anticoagulation may be resumed at that time if he feels it is suitable.     The plan was discussed with the patient and her family who voiced understanding. Ms. Posey will return home in stable condition on 2/16/18.        Day of Discharge     HPI:   Doing well, she has no complaints currently. Denies headache, chest pain, dyspnea, nausea, vomiting or diarrhea. Did well with therapy today and wants to go home.     Review of Systems  Gen- No fevers, chills  CV- No chest pain, palpitations  Resp- No cough, dyspnea  GI- No N/V/D, abd pain    Otherwise ROS is negative except as mentioned in the HPI.    Vital Signs:   Temp:  [98.3 °F (36.8 °C)-99.3 °F (37.4 °C)] 98.8 °F (37.1 °C)  Heart Rate:  [81-95] 87  Resp:  [16-20] 18  BP: (130-181)/(69-86) 149/76     Physical Exam:  Constitutional: No acute distress, awake, alert  HENT: NCAT, mucous membranes moist  Respiratory: Clear to auscultation bilaterally, respiratory effort normal   Cardiovascular: RRR, no murmurs, rubs, or gallops, palpable pedal pulses bilaterally  Gastrointestinal: Positive bowel sounds, soft, nontender, nondistended  Musculoskeletal: No bilateral ankle edema  Psychiatric: Appropriate affect, cooperative  Neurologic: Oriented x 3, strength symmetric in all extremities, Cranial Nerves grossly intact to confrontation,  speech clear. Occasional word-finding difficulties.   Skin: No rashes    Pertinent  and/or Most Recent Results       Results from last 7 days  Lab Units 02/16/18  0517 02/15/18  0554 02/14/18  1502   WBC 10*3/mm3  --  7.92 6.17   HEMOGLOBIN g/dL  --  12.0 12.3   HEMATOCRIT %  --  37.3 38.1   PLATELETS 10*3/mm3  --  386 387   SODIUM mmol/L 137 137 139   POTASSIUM mmol/L 3.6 3.9 4.0   CHLORIDE mmol/L 108 109 105   CO2 mmol/L 19.0* 19.0* 29.0   BUN mg/dL 16 19 22   CREATININE mg/dL 1.00 1.20 1.50*   GLUCOSE mg/dL 170* 181* 105*   CALCIUM mg/dL 8.5* 9.1 9.5       Results from last 7 days  Lab Units 02/15/18  1710 02/14/18  1502   BILIRUBIN mg/dL  --  0.7   ALK PHOS U/L  --  127*   ALT (SGPT) U/L  --  23   AST (SGOT) U/L  --  21   PROTIME Seconds 24.2* 42.7*   INR  2.30* 4.07*         Brief Urine Lab Results  (Last result in the past 365 days)      Color   Clarity   Blood   Leuk Est   Nitrite   Protein   CREAT   Urine HCG        02/14/18 1544 Yellow Cloudy(A) Trace(A) Small (1+)(A) Negative 30 mg/dL (1+)(A)             Microbiology Results Abnormal     Procedure Component Value - Date/Time    Urine Culture - Urine, Urine, Clean Catch [[id number]] Collected:  02/14/18 1544    Lab Status:  Final result Specimen:  Urine from Urine, Clean Catch Updated:  02/16/18 1315     Urine Culture --      20,000-30,000 CFU/mL Normal Urogenital Ayesha        Imaging Results (all)     Procedure Component Value Units Date/Time    MRI Brain With & Without Contrast [[id number]] Collected:  02/14/18 1709     Updated:  02/14/18 1848    Narrative:       EXAMINATION: MRI BRAIN W/WO CONTRAST - 02/14/2018     INDICATION: Speech difficulty.      TECHNIQUE: MR datasets the brain were performed without and with  intravenous contrast.     COMPARISON: None.     FINDINGS:   1. There is mild tonsillar ectopia. The cervical medullary junction is  intact.     There is mild prominence of the pituitary gland with mixed signal  especially in the posterior  pituitary. Please correlate for clinical  significance. The enlarged pituitary gland does not compress the optic  chiasm. Pathologic significance is indeterminate.     2. Paranasal sinuses are clear. Ocular lens are symmetrical and intact.  The conal contents are normal. There is mild peripheral cortical  atrophy, and there is marked white matter microangiopathy on the FLAIR  datasets indicating severe small vessel disease.     3. The diffusion-weighted sequence reveals no acute ischemic restricted  diffusion.     4. The dominant finding is a mass in the left mid parietal hemisphere.  This is surrounded with significant edema. Interestingly, however, this  mass does not show significant enhancement. Although this may represent  a rounded infarct which is bland, a malignancy or low-grade glioma can  present in this fashion. The T1 datasets demonstrate a very slight  amount of increased T1 signal which could represent minimal petechial  hemorrhages. This mass is sizable and measures 3.6 x 2.6 x 3 cm.       Impression:       Large mass left mid parietal hemisphere involving gray and  white matter as measured above perhaps with the slightest degree of  petechial hemorrhage with surrounding edema and overall mass effect but  with a paucity of enhancement. Differential diagnosis would be a round  infarct with petechial hemorrhages and mass effect versus a low grade  metastatic process   versus a low-grade glioma or primary brain malignancy. This is the  dominant finding. The patient has a background pattern of extreme  microangiopathic white matter disease on the FLAIR datasets. There is  also enlargement of the pituitary gland with mixed signal as described.  Evidence of high-grade hemorrhage or midline shift is not identified.     DICTATED:     02/14/2018  EDITED    :     02/14/2018       This report was finalized on 2/14/2018 6:46 PM by Dr. Adams Grajeda MD.       XR Chest 1 View [[id number]] Collected:  02/14/18 0484      Updated:  02/14/18 2154    Narrative:          EXAMINATION: XR CHEST 1 VW - 02/14/2018     INDICATION: Brain cancer.       COMPARISON: None.     FINDINGS: The heart is normal in size. The vasculature appears normal.  Lungs appear clear bilaterally. A couple of eventrations are noted of  the right hemidiaphragm.           Impression:       Portable chest exam with no evidence of active disease.     DICTATED:     02/14/2018  EDITED    :     02/14/2018      This report was finalized on 2/14/2018 9:52 PM by DR. Joseph Dumas MD.       CT Abdomen Pelvis Without Contrast [[id number]] Collected:  02/14/18 1853     Updated:  02/14/18 2258    Narrative:       EXAMINATION: CT CHEST WO CONTRAST, CT ABDOMEN PELVIS WO CONTRAST -  02/14/2018      INDICATION:  D49.6-Neoplasm of unspecified behavior of brain;  G93.6-Cerebral edema; N28.9-Disorder of kidney and ureter, unspecified;  R47.01-Aphasia.     TECHNIQUE: 5 mm unenhanced images through the chest, abdomen and pelvis.     The radiation dose reduction device was turned on for each scan per the  ALARA (As Low as Reasonably Achievable) protocol.     COMPARISON: None.     FINDINGS: Patient history indicates brain mass, evaluate for metastatic  disease.     Chest CT without Contrast: Mediastinal window images show no evidence of  mass or adenopathy, no pericardial or pleural effusion. There is a 15 mm  left thyroid nodule. Extensive atherosclerotic calcification is noted of  the thoracic aorta.     Lung window images show moderate upper lobe bullous change and mild  lingular scarring but no evidence of pulmonary parenchymal mass or other  active disease. Bony structures appear intact.       Impression:       No evidence of malignancy or metastasis in the chest.  Incidentally noted small left thyroid nodule.     Abdomen and Pelvis CT without Contrast: Lobular contour abnormality of  the anterior superior liver appears to simply represent liver within a  hemidiaphragm eventration.  The liver parenchyma here is uniform in  comparison to liver parenchyma elsewhere. There are a couple of small  left lobe liver cysts. There appear to be multiple gallstones in the  dependent portion of the otherwise normal-appearing gallbladder. No  significant abnormalities are seen of the spleen, pancreas, adrenal  glands, or kidneys for a non-IV contrast-enhanced exam.     In the mid abdomen, there is a somewhat unusual round well-defined  low-density lesion apparently arising within or along the peritoneum of  the left paracolic gutter, anterior to but separate from the left psoas  muscle. The lesion measures approximately 3 cm and is intermediate in  density between water and soft tissue. No similar peritoneal disease is  seen elsewhere. Mass has no obvious invasive qualities.     In the lower abdomen and pelvis, the uterus and ovaries are  appropriately small. The bladder is nondistended and grossly normal in  appearance. No mass, adenopathy, ascites or inflammatory change is seen.  Bony structures appear intact. A few very dense small bony lesions are  seen of the proximal femurs and right acetabulum, most likely incidental  benign bone islands.     IMPRESSION:   1. Simple-appearing liver cysts.  2. Gallstones.  3. Low-density, possibly cystic, 3 cm mass or cyst of the left lateral  peritoneal margin. Although neoplasm might appear similar, features are  relatively benign, and could represent an entity such as a primary  peritoneal cyst. If IV contrast is not an option for further imaging,  evaluation with MRI or even dedicated ultrasound via left lateral  approach could be considered.  4. Few small benign bone islands of the lower pelvis and proximal  femurs.  5. Liver contour abnormality which appears to simply reflect liver  contained within a right hemidiaphragm eventration. No evidence of  metastatic disease or other acute disease elsewhere.     DICTATED:     02/14/2018  EDITED    :     02/14/2018       This report was finalized on 2/14/2018 10:56 PM by DR. Joseph Dumas MD.       CT Chest Without Contrast [[id number]] Collected:  02/14/18 1853     Updated:  02/14/18 2258    Narrative:       EXAMINATION: CT CHEST WO CONTRAST, CT ABDOMEN PELVIS WO CONTRAST -  02/14/2018      INDICATION:  D49.6-Neoplasm of unspecified behavior of brain;  G93.6-Cerebral edema; N28.9-Disorder of kidney and ureter, unspecified;  R47.01-Aphasia.     TECHNIQUE: 5 mm unenhanced images through the chest, abdomen and pelvis.     The radiation dose reduction device was turned on for each scan per the  ALARA (As Low as Reasonably Achievable) protocol.     COMPARISON: None.     FINDINGS: Patient history indicates brain mass, evaluate for metastatic  disease.     Chest CT without Contrast: Mediastinal window images show no evidence of  mass or adenopathy, no pericardial or pleural effusion. There is a 15 mm  left thyroid nodule. Extensive atherosclerotic calcification is noted of  the thoracic aorta.     Lung window images show moderate upper lobe bullous change and mild  lingular scarring but no evidence of pulmonary parenchymal mass or other  active disease. Bony structures appear intact.       Impression:       No evidence of malignancy or metastasis in the chest.  Incidentally noted small left thyroid nodule.     Abdomen and Pelvis CT without Contrast: Lobular contour abnormality of  the anterior superior liver appears to simply represent liver within a  hemidiaphragm eventration. The liver parenchyma here is uniform in  comparison to liver parenchyma elsewhere. There are a couple of small  left lobe liver cysts. There appear to be multiple gallstones in the  dependent portion of the otherwise normal-appearing gallbladder. No  significant abnormalities are seen of the spleen, pancreas, adrenal  glands, or kidneys for a non-IV contrast-enhanced exam.     In the mid abdomen, there is a somewhat unusual round well-defined  low-density lesion  apparently arising within or along the peritoneum of  the left paracolic gutter, anterior to but separate from the left psoas  muscle. The lesion measures approximately 3 cm and is intermediate in  density between water and soft tissue. No similar peritoneal disease is  seen elsewhere. Mass has no obvious invasive qualities.     In the lower abdomen and pelvis, the uterus and ovaries are  appropriately small. The bladder is nondistended and grossly normal in  appearance. No mass, adenopathy, ascites or inflammatory change is seen.  Bony structures appear intact. A few very dense small bony lesions are  seen of the proximal femurs and right acetabulum, most likely incidental  benign bone islands.     IMPRESSION:   1. Simple-appearing liver cysts.  2. Gallstones.  3. Low-density, possibly cystic, 3 cm mass or cyst of the left lateral  peritoneal margin. Although neoplasm might appear similar, features are  relatively benign, and could represent an entity such as a primary  peritoneal cyst. If IV contrast is not an option for further imaging,  evaluation with MRI or even dedicated ultrasound via left lateral  approach could be considered.  4. Few small benign bone islands of the lower pelvis and proximal  femurs.  5. Liver contour abnormality which appears to simply reflect liver  contained within a right hemidiaphragm eventration. No evidence of  metastatic disease or other acute disease elsewhere.     DICTATED:     02/14/2018  EDITED    :     02/14/2018      This report was finalized on 2/14/2018 10:56 PM by DR. Joseph Dumas MD.       CT Angiogram Head With & Without Contrast [[id number]] Collected:  02/15/18 1309     Updated:  02/15/18 1309    Narrative:       EXAMINATION: CT ANGIOGRAM HEAD W WO CONTRAST-      INDICATION: D49.6-Neoplasm of unspecified behavior of brain;  G93.6-Cerebral edema; N28.9-Disorder of kidney and ureter, unspecified;  R47.01-Aphasia.     TECHNIQUE: Pre and postcontrast 3 mm axial images through  "the brain with  sagittal, axial and coronal 2-D angiographic reconstructions and 3-D VRT  and MIP angiographic reconstructions.     The radiation dose reduction device was turned on for each scan per the  ALARA (As Low as Reasonably Achievable) protocol.     COMPARISON: Head CT scan 02/15/2018. Brain MRI 02/14/2018.     FINDINGS: History indicates headache, hemorrhage, questionable brain  mass. Patient's hyperdense left sided \"mass\" presumably discrete  hemorrhage is unchanged in appearance on the axial scan. Small amount of  subarachnoid hemorrhage is again noted.  Two-D images show no  interruption of the left parietotemporal vascular pattern, and no  evidence of any underlying tumor vascularity. The \"mass itself \" is  apparently completely avascular as seen on axial sagittal and coronal  series.     Review of the major intracranial vessels shows distal vertebral arteries  and basilar artery to appear grossly normal. Superior cerebellar  arteries appear normal. Posterior cerebral arteries are moderate in size  and appear grossly normal. The intracranial portions of the internal  carotid arteries are moderately calcified, and it would be difficult to  exclude a significant stenosis of the proximal right carotid siphon.  There is probably moderate stenosis of the distal left carotid siphon,  although again, imaging here is very limited. The left A1 segment is  hypoplastic. Anterior and middle cerebral arteries and proximal branches  otherwise appear unremarkable.       Impression:       1. No evidence of any vascularity associated with the patient's  left-sided parenchymal hemorrhage or hemorrhagic lesion.  2. Probable moderate stenoses of the right and left carotid siphons.   3. Hypoplastic left A1 segment noted.     D:  02/15/2018  E:  02/15/2018       CT Head Without Contrast [[id number]] Collected:  02/15/18 1009     Updated:  02/15/18 2336    Narrative:       EXAMINATION: CT HEAD WO CONTRAST-02/15/2018:    "   INDICATION: Neoplasm-CNS primary.         TECHNIQUE: 5 mm unenhanced images through the brain.     The radiation dose reduction device was turned on for each scan per the  ALARA (As Low as Reasonably Achievable) protocol.     COMPARISON: NONE.     FINDINGS: Previous study report indicated left mid parietal mass with  evidence of some hemorrhage, questionable on that exam for primary brain  neoplasm.     CT scan, however, appears to show a well-defined, uncomplicated  parenchymal parietotemporal hemorrhage 3.2 x 2.4 cm in diameter, with  mild surrounding edema. There is no obvious underlying mass. By ABC/2  volume calculation, hemorrhage measures approximately 5.8 mL.There  appears to be a trace amount of hemorrhage along the most dependent  portion of the left sylvian cistern, and a small amount of subarachnoid  hemorrhage and possibly petechial hemorrhage in the superior left  parietal lobe. No hemorrhage, infarct, or mass is seen elsewhere. There  is expected degree of generalized cerebral atrophy for age. There is no  hydrocephalus. Chronic appearing central white matter changes are again  noted.       Impression:       1. Approximately 3.2 x 2.4 cm, uncomplicated appearing left  parietotemporal hemorrhage with mild surrounding edema, and no visible  underlying mass or infarct.  2. Small amount of adjacent subarachnoid hemorrhage in the sylvian  cistern, subarachnoid and petechial hemorrhage in the superior left  parietal lobe.  3. No evidence of acute intracranial disease is appreciated elsewhere.     D:  02/15/2018  E:  02/15/2018     This report was finalized on 2/15/2018 11:33 PM by DR. Joseph Dumas MD.           Discharge Details      Chika Posey   Home Medication Instructions CHANTAL:[id number]    Printed on:02/16/18 1552   Medication Information                      atorvastatin (LIPITOR) 40 MG tablet  Take 40 mg by mouth Daily.             CloNIDine (CATAPRES) 0.1 MG tablet  Take 0.1 mg by mouth  Daily.             furosemide (LASIX) 40 MG tablet  Take 40 mg by mouth Daily.             losartan (COZAAR) 100 MG tablet  Take 100 mg by mouth Daily.             magnesium oxide (MAGOX) 400 (241.3 Mg) MG tablet tablet  Take 400 mg by mouth Daily.               Discharge Disposition:  Home or Self Care    Discharge Diet:  Diet Instructions     Diet: Regular, Cardiac; Thin       Discharge Diet:   Regular  Cardiac      Fluid Consistency:  Thin               Discharge Activity:   Activity Instructions     Activity as Tolerated                   Future Appointments  Date Time Provider Department Center   2/21/2018 10:30 AM Silvino Hitchcock MD [facility] None     Time Spent on Discharge:  45 minutes    Electronically signed by Negrita Estrada PA-C, 02/16/18, 3:57 PM.

## 2018-02-21 ENCOUNTER — OFFICE VISIT (OUTPATIENT)
Dept: NEUROSURGERY | Facility: CLINIC | Age: 73
End: 2018-02-21

## 2018-02-21 VITALS
DIASTOLIC BLOOD PRESSURE: 70 MMHG | SYSTOLIC BLOOD PRESSURE: 130 MMHG | BODY MASS INDEX: 25.26 KG/M2 | HEIGHT: 61 IN | TEMPERATURE: 97.4 F | WEIGHT: 133.8 LBS

## 2018-02-21 DIAGNOSIS — I61.9 CEREBRAL HEMORRHAGE (HCC): Primary | ICD-10-CM

## 2018-02-21 PROCEDURE — 99214 OFFICE O/P EST MOD 30 MIN: CPT | Performed by: NEUROLOGICAL SURGERY

## 2018-02-21 NOTE — PROGRESS NOTES
Subjective     Chief Complaint: Stroke, intracranial hemorrhage    Patient ID: Chika Posey is a 72 y.o. female is here today for follow-up.    Stroke   The current episode started in the past 7 days. The problem occurs rarely. The problem has been gradually improving. Associated symptoms include headaches. Pertinent negatives include no abdominal pain, anorexia, arthralgias, change in bowel habit, chest pain, chills, congestion, coughing, diaphoresis, fatigue, fever, joint swelling, myalgias, nausea, neck pain, numbness, rash, sore throat, swollen glands, urinary symptoms, vertigo, visual change, vomiting or weakness. Nothing aggravates the symptoms. She has tried nothing for the symptoms. The treatment provided significant relief.       This is a 72-year-old woman who I saw in consultation in the hospital last week for a spontaneous intracranial hemorrhage.  She has peripheral vascular disease and chronic hypertension.  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  She presents today for routine follow-up.  She reports no problems with numbness, tingling, weakness, or strokelike symptoms since being discharged from the hospital.  She does endorse occasional headaches.  She is currently taking aspirin and has held her Coumadin under my direction.    The following portions of the patient's history were reviewed and updated as appropriate: allergies, current medications, past family history, past medical history, past social history, past surgical history and problem list.    Family history: History reviewed. No pertinent family history.    Social history:   Social History     Social History   • Marital status: Single     Spouse name: N/A   • Number of children: N/A   • Years of education: N/A     Occupational History   • Not on file.     Social History Main Topics   • Smoking status: Never Smoker   • Smokeless  tobacco: Not on file   • Alcohol use Defer   • Drug use: No   • Sexual activity: Defer     Other Topics Concern   • Not on file     Social History Narrative       Review of Systems   Constitutional: Negative for activity change, appetite change, chills, diaphoresis, fatigue, fever and unexpected weight change.   HENT: Positive for dental problem and tinnitus. Negative for congestion, drooling, ear discharge, ear pain, facial swelling, hearing loss, mouth sores, nosebleeds, postnasal drip, rhinorrhea, sinus pressure, sneezing, sore throat, trouble swallowing and voice change.    Eyes: Negative for photophobia, pain, discharge, redness, itching and visual disturbance.   Respiratory: Negative for apnea, cough, choking, chest tightness, shortness of breath, wheezing and stridor.    Cardiovascular: Negative for chest pain, palpitations and leg swelling.   Gastrointestinal: Negative for abdominal distention, abdominal pain, anal bleeding, anorexia, blood in stool, change in bowel habit, constipation, diarrhea, nausea, rectal pain and vomiting.   Endocrine: Negative for cold intolerance, heat intolerance, polydipsia, polyphagia and polyuria.   Genitourinary: Positive for frequency. Negative for decreased urine volume, difficulty urinating, dysuria, enuresis, flank pain, genital sores, hematuria and urgency.   Musculoskeletal: Negative for arthralgias, back pain, gait problem, joint swelling, myalgias, neck pain and neck stiffness.   Skin: Negative for color change, pallor, rash and wound.   Allergic/Immunologic: Positive for food allergies. Negative for environmental allergies and immunocompromised state.   Neurological: Positive for speech difficulty and headaches. Negative for dizziness, vertigo, tremors, seizures, syncope, facial asymmetry, weakness, light-headedness and numbness.   Hematological: Negative for adenopathy. Bruises/bleeds easily.   Psychiatric/Behavioral: Positive for confusion. Negative for agitation,  "behavioral problems, decreased concentration, dysphoric mood, hallucinations, self-injury, sleep disturbance and suicidal ideas. The patient is not nervous/anxious and is not hyperactive.        Objective   Blood pressure 130/70, temperature 97.4 °F (36.3 °C), height 154.9 cm (60.98\"), weight 60.7 kg (133 lb 12.8 oz), not currently breastfeeding.  Body mass index is 25.29 kg/(m^2).    Physical Exam   Constitutional: She is oriented to person, place, and time. She appears well-developed and well-nourished.  Non-toxic appearance. No distress.   HENT:   Head: Normocephalic and atraumatic.   Mouth/Throat: Oropharynx is clear and moist.   Eyes: EOM are normal. Pupils are equal, round, and reactive to light. Right eye exhibits no discharge. Left eye exhibits no discharge.   Neck: Phonation normal. No JVD present. No tracheal deviation present. No thyromegaly present.   Cardiovascular: Normal rate and regular rhythm.    Pulmonary/Chest: Effort normal. No stridor. No respiratory distress. She has no wheezes.   Abdominal: Soft. Normal appearance. She exhibits no distension. There is no tenderness.   Musculoskeletal: She exhibits no edema.   Muscle Group     L          R  Deltoid                5          5  Bicep                  5          5  Tricep                 5          5                       5          5  Hand IO              5          5  Hip Flexor           5          5  Knee Extensor   5          5     ADF                    5          5  APF                    5          5      Neurological: She is alert and oriented to person, place, and time. She has normal reflexes. She displays normal reflexes. No cranial nerve deficit or sensory deficit. She exhibits normal muscle tone. She displays a negative Romberg sign. Coordination and gait normal. GCS eye subscore is 4. GCS verbal subscore is 5. GCS motor subscore is 6.   Reflex Scores:       Tricep reflexes are 2+ on the right side and 2+ on the left side.       " Bicep reflexes are 2+ on the right side and 2+ on the left side.       Brachioradialis reflexes are 2+ on the right side and 2+ on the left side.       Patellar reflexes are 2+ on the right side and 2+ on the left side.       Achilles reflexes are 2+ on the right side and 2+ on the left side.  CN II-XII grossly intact.    No pronator drift. FTN testing performed without dysmetria or bradykinesia.   Skin: Skin is warm and dry. She is not diaphoretic. No erythema.   Psychiatric: She has a normal mood and affect. Her speech is normal and behavior is normal. Judgment and thought content normal.   Nursing note and vitals reviewed.        Assessment/Plan     Independent Review of Radiographic Studies:      Available for my review is a noncontrast CT scan of the head that was performed last week.  Available also is a CT angiogram of the head that was performed during her hospitalization last week.  There is a roughly 3 x 2 1/2 cm hyperdensity situated in the posterior left frontal lobe.  There is no obvious arteriovenous malformation or fistula on the CT angiogram. the hemorrhage is stable interval scans.    Medical Decision Making:      This is a 72-year-old woman who was admitted to the hospital last week following a spontaneous intracranial hemorrhage.  There is no clear etiology for her hemorrhage, so my suspicion is that it is secondary to microvascular chronic hypertensive changes.  I would like to order a MRI and MRA of the brain with and without contrast in 8 weeks to determine if there is some sort of underlying structural etiology such as an arteriovenous malformation or tumor.  I reviewed the signs and symptoms of stroke and intracranial hemorrhage with the patient.  I directed her to contact my office with new, or worsening symptoms.    It is safe for her to start her anticoagulation back up again.    I like to follow up with her after the MRI and MRA of the brain have been performed.    Chika was seen today  for ed f/u.    Diagnoses and all orders for this visit:    Cerebral hemorrhage  -     MRI Brain With & Without Contrast; Future      Return in about 2 months (around 4/21/2018).           This document signed by GINGER Hitchcock MD February 21, 2018 12:07 PM

## 2018-02-26 ENCOUNTER — TELEPHONE (OUTPATIENT)
Dept: NEUROSURGERY | Facility: CLINIC | Age: 73
End: 2018-02-26

## 2018-02-26 NOTE — TELEPHONE ENCOUNTER
Provider:  Naldo  Caller: Dr. Greenfield  Time of call:   02:22 PM  Phone #:  [phone]  Last visit:   02/21/18  Next visit: none    Reason for call:       Dr. Greenfield called in and wanted to know if Dr. Hitchcock had a preference on Coumadin vs Plavix? They are wanting to switch her from the Coumadin to Plavix if that is ok with him

## 2018-02-28 NOTE — TELEPHONE ENCOUNTER
I verified with Yaritza that is was alright to switch her to Plavix and she said she spoke with Dr. Hitchcock and it is fine.  I will call Dr. Albarado and let them know.

## 2018-03-02 ENCOUNTER — HOSPITAL ENCOUNTER (EMERGENCY)
Facility: HOSPITAL | Age: 73
Discharge: HOME | End: 2018-03-02
Attending: EMERGENCY MEDICINE | Admitting: EMERGENCY MEDICINE

## 2018-03-02 ENCOUNTER — APPOINTMENT (OUTPATIENT)
Dept: CARDIOLOGY | Facility: HOSPITAL | Age: 73
End: 2018-03-02
Attending: EMERGENCY MEDICINE

## 2018-03-02 ENCOUNTER — APPOINTMENT (OUTPATIENT)
Dept: CT IMAGING | Facility: HOSPITAL | Age: 73
End: 2018-03-02

## 2018-03-02 VITALS
DIASTOLIC BLOOD PRESSURE: 69 MMHG | SYSTOLIC BLOOD PRESSURE: 148 MMHG | HEIGHT: 61 IN | HEART RATE: 67 BPM | WEIGHT: 128 LBS | RESPIRATION RATE: 16 BRPM | BODY MASS INDEX: 24.17 KG/M2 | TEMPERATURE: 98.3 F | OXYGEN SATURATION: 100 %

## 2018-03-02 DIAGNOSIS — I69.398 WEAKNESS FOLLOWING CEREBROVASCULAR ACCIDENT (CVA): ICD-10-CM

## 2018-03-02 DIAGNOSIS — I73.9 PERIPHERAL ARTERIAL DISEASE (HCC): Primary | ICD-10-CM

## 2018-03-02 DIAGNOSIS — I73.9 RIGHT LEG CLAUDICATION (HCC): ICD-10-CM

## 2018-03-02 DIAGNOSIS — R53.1 WEAKNESS FOLLOWING CEREBROVASCULAR ACCIDENT (CVA): ICD-10-CM

## 2018-03-02 LAB
ALBUMIN SERPL-MCNC: 4.2 G/DL (ref 3.2–4.8)
ALBUMIN/GLOB SERPL: 1.3 G/DL (ref 1.5–2.5)
ALP SERPL-CCNC: 103 U/L (ref 25–100)
ALT SERPL W P-5'-P-CCNC: 24 U/L (ref 7–40)
ANION GAP SERPL CALCULATED.3IONS-SCNC: 9 MMOL/L (ref 3–11)
AST SERPL-CCNC: 25 U/L (ref 0–33)
BASOPHILS # BLD AUTO: 0.03 10*3/MM3 (ref 0–0.2)
BASOPHILS NFR BLD AUTO: 0.4 % (ref 0–1)
BH CV ECHO MEAS - BSA(HAYCOCK): 1.6 M^2
BH CV ECHO MEAS - BSA: 1.6 M^2
BH CV ECHO MEAS - BZI_BMI: 24.2 KILOGRAMS/M^2
BH CV ECHO MEAS - BZI_METRIC_HEIGHT: 154.9 CM
BH CV ECHO MEAS - BZI_METRIC_WEIGHT: 58.1 KG
BH CV LOWER VASCULAR LEFT COMMON FEMORAL AUGMENT: NORMAL
BH CV LOWER VASCULAR LEFT COMMON FEMORAL COMPRESS: NORMAL
BH CV LOWER VASCULAR LEFT COMMON FEMORAL PHASIC: NORMAL
BH CV LOWER VASCULAR LEFT COMMON FEMORAL SPONT: NORMAL
BH CV LOWER VASCULAR LEFT SAPHENOFEMORAL JUNCTION AUGMENT: NORMAL
BH CV LOWER VASCULAR LEFT SAPHENOFEMORAL JUNCTION COMPRESS: NORMAL
BH CV LOWER VASCULAR LEFT SAPHENOFEMORAL JUNCTION PHASIC: NORMAL
BH CV LOWER VASCULAR LEFT SAPHENOFEMORAL JUNCTION SPONT: NORMAL
BH CV LOWER VASCULAR RIGHT COMMON FEMORAL AUGMENT: NORMAL
BH CV LOWER VASCULAR RIGHT COMMON FEMORAL COMPRESS: NORMAL
BH CV LOWER VASCULAR RIGHT COMMON FEMORAL PHASIC: NORMAL
BH CV LOWER VASCULAR RIGHT COMMON FEMORAL SPONT: NORMAL
BH CV LOWER VASCULAR RIGHT DISTAL FEMORAL AUGMENT: NORMAL
BH CV LOWER VASCULAR RIGHT DISTAL FEMORAL COMPRESS: NORMAL
BH CV LOWER VASCULAR RIGHT DISTAL FEMORAL PHASIC: NORMAL
BH CV LOWER VASCULAR RIGHT DISTAL FEMORAL SPONT: NORMAL
BH CV LOWER VASCULAR RIGHT GASTRONEMIUS COMPRESS: NORMAL
BH CV LOWER VASCULAR RIGHT GREATER SAPH AK COMPRESS: NORMAL
BH CV LOWER VASCULAR RIGHT GREATER SAPH BK COMPRESS: NORMAL
BH CV LOWER VASCULAR RIGHT LESSER SAPH COMPRESS: NORMAL
BH CV LOWER VASCULAR RIGHT MID FEMORAL AUGMENT: NORMAL
BH CV LOWER VASCULAR RIGHT MID FEMORAL COMPRESS: NORMAL
BH CV LOWER VASCULAR RIGHT MID FEMORAL PHASIC: NORMAL
BH CV LOWER VASCULAR RIGHT MID FEMORAL SPONT: NORMAL
BH CV LOWER VASCULAR RIGHT PERONEAL AUGMENT: NORMAL
BH CV LOWER VASCULAR RIGHT PERONEAL COMPRESS: NORMAL
BH CV LOWER VASCULAR RIGHT POPLITEAL AUGMENT: NORMAL
BH CV LOWER VASCULAR RIGHT POPLITEAL COMPRESS: NORMAL
BH CV LOWER VASCULAR RIGHT POPLITEAL PHASIC: NORMAL
BH CV LOWER VASCULAR RIGHT POPLITEAL SPONT: NORMAL
BH CV LOWER VASCULAR RIGHT POSTERIOR TIBIAL AUGMENT: NORMAL
BH CV LOWER VASCULAR RIGHT POSTERIOR TIBIAL COMPRESS: NORMAL
BH CV LOWER VASCULAR RIGHT PROXIMAL FEMORAL AUGMENT: NORMAL
BH CV LOWER VASCULAR RIGHT PROXIMAL FEMORAL COMPRESS: NORMAL
BH CV LOWER VASCULAR RIGHT PROXIMAL FEMORAL PHASIC: NORMAL
BH CV LOWER VASCULAR RIGHT PROXIMAL FEMORAL SPONT: NORMAL
BH CV LOWER VASCULAR RIGHT SAPHENOFEMORAL JUNCTION AUGMENT: NORMAL
BH CV LOWER VASCULAR RIGHT SAPHENOFEMORAL JUNCTION COMPRESS: NORMAL
BH CV LOWER VASCULAR RIGHT SAPHENOFEMORAL JUNCTION PHASIC: NORMAL
BH CV LOWER VASCULAR RIGHT SAPHENOFEMORAL JUNCTION SPONT: NORMAL
BILIRUB SERPL-MCNC: 0.9 MG/DL (ref 0.3–1.2)
BUN BLD-MCNC: 30 MG/DL (ref 9–23)
BUN/CREAT SERPL: 18.8 (ref 7–25)
CALCIUM SPEC-SCNC: 9.4 MG/DL (ref 8.7–10.4)
CHLORIDE SERPL-SCNC: 102 MMOL/L (ref 99–109)
CO2 SERPL-SCNC: 25 MMOL/L (ref 20–31)
CREAT BLD-MCNC: 1.6 MG/DL (ref 0.6–1.3)
DEPRECATED RDW RBC AUTO: 47.6 FL (ref 37–54)
EOSINOPHIL # BLD AUTO: 0.04 10*3/MM3 (ref 0–0.3)
EOSINOPHIL NFR BLD AUTO: 0.5 % (ref 0–3)
ERYTHROCYTE [DISTWIDTH] IN BLOOD BY AUTOMATED COUNT: 15.6 % (ref 11.3–14.5)
GFR SERPL CREATININE-BSD FRML MDRD: 32 ML/MIN/1.73
GFR SERPL CREATININE-BSD FRML MDRD: 39 ML/MIN/1.73
GLOBULIN UR ELPH-MCNC: 3.3 GM/DL
GLUCOSE BLD-MCNC: 105 MG/DL (ref 70–100)
HCT VFR BLD AUTO: 38.8 % (ref 34.5–44)
HGB BLD-MCNC: 12.3 G/DL (ref 11.5–15.5)
HOLD SPECIMEN: NORMAL
HOLD SPECIMEN: NORMAL
IMM GRANULOCYTES # BLD: 0.03 10*3/MM3 (ref 0–0.03)
IMM GRANULOCYTES NFR BLD: 0.4 % (ref 0–0.6)
LYMPHOCYTES # BLD AUTO: 1.58 10*3/MM3 (ref 0.6–4.8)
LYMPHOCYTES NFR BLD AUTO: 19.5 % (ref 24–44)
MCH RBC QN AUTO: 26.9 PG (ref 27–31)
MCHC RBC AUTO-ENTMCNC: 31.7 G/DL (ref 32–36)
MCV RBC AUTO: 84.7 FL (ref 80–99)
MONOCYTES # BLD AUTO: 0.9 10*3/MM3 (ref 0–1)
MONOCYTES NFR BLD AUTO: 11.1 % (ref 0–12)
NEUTROPHILS # BLD AUTO: 5.52 10*3/MM3 (ref 1.5–8.3)
NEUTROPHILS NFR BLD AUTO: 68.1 % (ref 41–71)
PLATELET # BLD AUTO: 326 10*3/MM3 (ref 150–450)
PMV BLD AUTO: 9.7 FL (ref 6–12)
POTASSIUM BLD-SCNC: 4.5 MMOL/L (ref 3.5–5.5)
PROT SERPL-MCNC: 7.5 G/DL (ref 5.7–8.2)
RBC # BLD AUTO: 4.58 10*6/MM3 (ref 3.89–5.14)
SODIUM BLD-SCNC: 136 MMOL/L (ref 132–146)
TROPONIN I SERPL-MCNC: 0.01 NG/ML (ref 0–0.07)
WBC NRBC COR # BLD: 8.1 10*3/MM3 (ref 3.5–10.8)
WHOLE BLOOD HOLD SPECIMEN: NORMAL
WHOLE BLOOD HOLD SPECIMEN: NORMAL

## 2018-03-02 PROCEDURE — 84484 ASSAY OF TROPONIN QUANT: CPT

## 2018-03-02 PROCEDURE — 70450 CT HEAD/BRAIN W/O DYE: CPT

## 2018-03-02 PROCEDURE — 80053 COMPREHEN METABOLIC PANEL: CPT | Performed by: EMERGENCY MEDICINE

## 2018-03-02 PROCEDURE — 99284 EMERGENCY DEPT VISIT MOD MDM: CPT

## 2018-03-02 PROCEDURE — 93925 LOWER EXTREMITY STUDY: CPT | Performed by: INTERNAL MEDICINE

## 2018-03-02 PROCEDURE — 85025 COMPLETE CBC W/AUTO DIFF WBC: CPT | Performed by: EMERGENCY MEDICINE

## 2018-03-02 PROCEDURE — 93971 EXTREMITY STUDY: CPT

## 2018-03-02 PROCEDURE — 93971 EXTREMITY STUDY: CPT | Performed by: INTERNAL MEDICINE

## 2018-03-02 PROCEDURE — 93925 LOWER EXTREMITY STUDY: CPT

## 2018-03-02 RX ORDER — AMLODIPINE BESYLATE 10 MG/1
5 TABLET ORAL DAILY
Status: ON HOLD | COMMUNITY
End: 2018-03-09

## 2018-03-02 RX ORDER — SODIUM CHLORIDE 0.9 % (FLUSH) 0.9 %
10 SYRINGE (ML) INJECTION AS NEEDED
Status: DISCONTINUED | OUTPATIENT
Start: 2018-03-02 | End: 2018-03-02 | Stop reason: HOSPADM

## 2018-03-02 RX ORDER — ASPIRIN 81 MG/1
81 TABLET ORAL DAILY
COMMUNITY
End: 2021-01-18 | Stop reason: HOSPADM

## 2018-03-02 NOTE — ED PROVIDER NOTES
Subjective   HPI Comments: Chika Posey is a 72 y.o.female who presents to the emergency department with complaints of difficult speaking. The patient had a hemorrhagic stroke three weeks ago that left her with a lingering speech deficit. She had been anticoagulated on Warfarin but was taken off this medication during admission. The patient followed up with her neurologist Dr. Haresh Hitchcock following discharge from the hospital and is scheduled for a repeat CT in two months. The patient tells us that her speech has not been getting better despite regular speech therapy and actually started getting worse two days ago. She also reports having pain and weakness in her right distal leg and ankle but states that she has able to ambulate without difficulty. Her leg pain has resolved here in the emergency department. The patient's family tells us that she has been sleeping more and has not been eating or drinking due to loss of appetite. They are concerned because it does not seem like she wants to talk at all and and now all she does is moan and rub at her legs. The patient has had frequent headaches since the stroke according to her family, however, she denies a headache at this time. When asked if she has had any abdominal pain, the patient states that she had some earlier but none now. She denies fever, cough, congestion, rhinorrhea, or other cold symptoms. She has not had nausea, vomiting, chest pain, or difficulty breathing. She denies history of urinary tract infections or related urinary symptoms. The patient apparently had a DVT in the right leg in April of 2017 and was seen at  for this. There are no other acute complaints at this time.    Patient is a 72 y.o. female presenting with general illness.   History provided by:  Patient and relative  Illness   Quality:  Speech difficulty  Severity:  Unable to specify  Onset quality:  Gradual  Duration:  3 weeks  Timing:  Constant  Progression:   Worsening  Chronicity:  New  Context:  The patient had a hemorrhagic stroke three weeks ago with lingering speech deficits. Her speech has gotten worse in the last two days.  Relieved by:  None tried  Worsened by:  Nothing  Ineffective treatments:  None tried  Associated symptoms: abdominal pain, fatigue and headaches    Associated symptoms: no chest pain, no congestion, no cough, no rhinorrhea and no shortness of breath    Abdominal pain:     Progression:  Resolved  Headaches:     Progression:  Resolved      Review of Systems   Constitutional: Positive for appetite change and fatigue.   HENT: Negative for congestion and rhinorrhea.    Respiratory: Negative for cough and shortness of breath.    Cardiovascular: Negative for chest pain.   Gastrointestinal: Positive for abdominal pain.   Genitourinary: Negative for difficulty urinating.   Musculoskeletal: Positive for arthralgias (right distal leg and ankle). Negative for gait problem.   Neurological: Positive for speech difficulty, weakness (right lower leg) and headaches.   All other systems reviewed and are negative.      Past Medical History:   Diagnosis Date   • DVT (deep venous thrombosis)    • Hypertension        Allergies   Allergen Reactions   • Morphine And Related Itching   • Plavix [Clopidogrel Bisulfate] Itching       Past Surgical History:   Procedure Laterality Date   • TUBAL ABDOMINAL LIGATION         History reviewed. No pertinent family history.    Social History     Social History   • Marital status: Single     Social History Main Topics   • Smoking status: Never Smoker   • Smokeless tobacco: Never Used   • Alcohol use No   • Drug use: No   • Sexual activity: Defer         Objective   Physical Exam   Constitutional: She is oriented to person, place, and time. She appears well-developed and well-nourished. No distress.   HENT:   Head: Normocephalic and atraumatic.   Nose: Nose normal.   Airway patent.   Eyes: Conjunctivae and EOM are normal. No  scleral icterus.   Pupils are constricted but round and equal.   Neck: Normal range of motion and phonation normal. Neck supple.   Cardiovascular: Normal rate, regular rhythm and normal heart sounds.    PT pulse is not easily palpable.   Pulmonary/Chest: Effort normal and breath sounds normal. No respiratory distress.   Abdominal: Soft. Bowel sounds are normal. She exhibits no distension and no mass. There is no tenderness. There is no rebound and no guarding.   Neurological: She is alert and oriented to person, place, and time. No cranial nerve deficit.   Cranial nerves are normal as tested.   Skin: Skin is dry.   Feet are cool to the touch.   Psychiatric: She has a normal mood and affect. Her behavior is normal.   Nursing note and vitals reviewed.      Procedures         ED Course  ED Course   Comment By Time   Preliminary tech read for venous Doppler was veins compressible.  Awaiting arterial studies. Demian Rodriguez MD 03/02 1318   Dr. Rodriguez is at the bedside reevaluating the patient. Tahmina Sheppard 03/02 1336   Dr. Rodriguez paged Dr. Azam Sheppard 03/02 1417   Dr. Rodriguez discussed the case in detail with Dr. Azam Sheppard 03/02 1419   Dr. Rodriguez is at the bedside reevaluating the patient. The patient is resting comfortably. Discussed findings and plan. All are agreeable. Tahmina Sheppard 03/02 1439     Recent Results (from the past 24 hour(s))   Light Blue Top    Collection Time: 03/02/18 10:47 AM   Result Value Ref Range    Extra Tube hold for add-on    Green Top (Gel)    Collection Time: 03/02/18 10:47 AM   Result Value Ref Range    Extra Tube Hold for add-ons.    Lavender Top    Collection Time: 03/02/18 10:47 AM   Result Value Ref Range    Extra Tube hold for add-on    Gold Top - SST    Collection Time: 03/02/18 10:47 AM   Result Value Ref Range    Extra Tube Hold for add-ons.    Comprehensive Metabolic Panel    Collection Time: 03/02/18 10:47 AM   Result Value Ref Range    Glucose 105  (H) 70 - 100 mg/dL    BUN 30 (H) 9 - 23 mg/dL    Creatinine 1.60 (H) 0.60 - 1.30 mg/dL    Sodium 136 132 - 146 mmol/L    Potassium 4.5 3.5 - 5.5 mmol/L    Chloride 102 99 - 109 mmol/L    CO2 25.0 20.0 - 31.0 mmol/L    Calcium 9.4 8.7 - 10.4 mg/dL    Total Protein 7.5 5.7 - 8.2 g/dL    Albumin 4.20 3.20 - 4.80 g/dL    ALT (SGPT) 24 7 - 40 U/L    AST (SGOT) 25 0 - 33 U/L    Alkaline Phosphatase 103 (H) 25 - 100 U/L    Total Bilirubin 0.9 0.3 - 1.2 mg/dL    eGFR Non African Amer 32 (L) >60 mL/min/1.73    eGFR  African Amer 39 (L) >60 mL/min/1.73    Globulin 3.3 gm/dL    A/G Ratio 1.3 (L) 1.5 - 2.5 g/dL    BUN/Creatinine Ratio 18.8 7.0 - 25.0    Anion Gap 9.0 3.0 - 11.0 mmol/L   CBC Auto Differential    Collection Time: 03/02/18 10:47 AM   Result Value Ref Range    WBC 8.10 3.50 - 10.80 10*3/mm3    RBC 4.58 3.89 - 5.14 10*6/mm3    Hemoglobin 12.3 11.5 - 15.5 g/dL    Hematocrit 38.8 34.5 - 44.0 %    MCV 84.7 80.0 - 99.0 fL    MCH 26.9 (L) 27.0 - 31.0 pg    MCHC 31.7 (L) 32.0 - 36.0 g/dL    RDW 15.6 (H) 11.3 - 14.5 %    RDW-SD 47.6 37.0 - 54.0 fl    MPV 9.7 6.0 - 12.0 fL    Platelets 326 150 - 450 10*3/mm3    Neutrophil % 68.1 41.0 - 71.0 %    Lymphocyte % 19.5 (L) 24.0 - 44.0 %    Monocyte % 11.1 0.0 - 12.0 %    Eosinophil % 0.5 0.0 - 3.0 %    Basophil % 0.4 0.0 - 1.0 %    Immature Grans % 0.4 0.0 - 0.6 %    Neutrophils, Absolute 5.52 1.50 - 8.30 10*3/mm3    Lymphocytes, Absolute 1.58 0.60 - 4.80 10*3/mm3    Monocytes, Absolute 0.90 0.00 - 1.00 10*3/mm3    Eosinophils, Absolute 0.04 0.00 - 0.30 10*3/mm3    Basophils, Absolute 0.03 0.00 - 0.20 10*3/mm3    Immature Grans, Absolute 0.03 0.00 - 0.03 10*3/mm3   POC Troponin, Rapid    Collection Time: 03/02/18 10:55 AM   Result Value Ref Range    Troponin I 0.01 0.00 - 0.07 ng/mL   Duplex Lower Extremity Art / Grafts - Bilateral    Collection Time: 03/02/18  2:36 PM   Result Value Ref Range    RIGHT EDDIE RATIO 0.18     LEFT EDDIE RATIO 0.25     Right Brachial Pressure 158 mmHg     DFA Prox PSV-Right -16.90 cm/s    SFA Prox PSV-Right 48.10 cm/s    SFA Mid PSV-Right 10.40 cm/s    SFA Distal PSV-Right 8.45 cm/s    Popiteal A Prox PSV-Right 9.04 cm/s    Popiteal A Distal PSV-Right 9.82 cm/s    PTA Prox PSV-Right 7.27 cm/s    PTA Mid PSV-Right 6.48 cm/s    PTA Distal PSV-Right 7.66 cm/s    Peroneal Mid PSV-Right 6.09 cm/s    Ant Tibial A Prox PSV-Right 11.20 cm/s    Ant Tibial A Mid PSV-Right 6.48 cm/s    Ant Tibial A Distal PSV-Right 12.00 cm/s    DFA Prox PSV-Left -19.30 cm/s    SFA Prox PSV-Left 19.60 cm/s    SFA Mid PSV-Left 20.60 cm/s    SFA Distal PSV-Left 11.40 cm/s    Popiteal A Prox PSV-Left 18.70 cm/s    Popiteal A Distal PSV-Left 10.20 cm/s    PTA Prox PSV-Left 6.88 cm/s    PTA Mid PSV-Left 6.29 cm/s    PTA Distal PSV-Left 9.23 cm/s    Peroneal Mid PSV-Left 5.89 cm/s    Ant Tibial A Prox PSV-Left 8.84 cm/s    Ant Tibial A Mid PSV-Left 7.46 cm/s    Ant Tibial A Distal PSV-Left 5.30 cm/s    LEFT PTA PRESSURE 40 mmHg    RIGHT DPA PRESSURE 30 mmHg   Duplex Venous Lower Extremity - Right    Collection Time: 03/02/18  2:36 PM   Result Value Ref Range    BSA 1.6 m^2     CV ECHO [place] - BZI_BMI 24.2 kilograms/m^2     CV ECHO [place] - BSA([facility]) 1.6 m^2     CV ECHO [place] - BZI_METRIC_WEIGHT 58.1 kg     CV ECHO [place] - BZI_METRIC_HEIGHT 154.9 cm    Right Common Femoral Spont Y     Right Common Femoral Phasic Y     Right Common Femoral Augment Y     Right Common Femoral Compress C     Right Saphenofemoral Junction Spont Y     Right Saphenofemoral Junction Phasic Y     Right Saphenofemoral Junction Augment Y     Right Saphenofemoral Junction Compress C     Right Proximal Femoral Spont Y     Right Proximal Femoral Phasic Y     Right Proximal Femoral Augment Y     Right Proximal Femoral Compress C     Right Mid Femoral Spont Y     Right Mid Femoral Phasic Y     Right Mid Femoral Augment Y     Right Mid Femoral Compress C     Right Distal Femoral Spont Y     Right Distal Femoral Phasic Y      Right Distal Femoral Augment Y     Right Distal Femoral Compress C     Right Popliteal Spont Y     Right Popliteal Phasic Y     Right Popliteal Augment Y     Right Popliteal Compress C     Right Posterior Tibial Augment Y     Right Posterior Tibial Compress C     Right Peroneal Augment Y     Right Peroneal Compress C     Right GastronemiusSoleal Compress C     Right Greater Saph AK Compress C     Right Greater Saph BK Compress C     Right Lesser Saph Compress C     Left Common Femoral Spont Y     Left Common Femoral Phasic Y     Left Common Femoral Augment Y     Left Common Femoral Compress C     Left Saphenofemoral Junction Spont Y     Left Saphenofemoral Junction Phasic Y     Left Saphenofemoral Junction Augment Y     Left Saphenofemoral Junction Compress C      Note: In addition to lab results from this visit, the labs listed above may include labs taken at another facility or during a different encounter within the last 24 hours. Please correlate lab times with ED admission and discharge times for further clarification of the services performed during this visit.    CT Head Without Contrast   Final Result   Interval evolution of left parietotemporal hemorrhage with   aging blood products and minimal increase in surrounding vasogenic edema   however no evidence for increasing mass effect developing hydrocephalus   or midline shift.       D:  03/02/2018   E:  03/02/2018           This report was finalized on 3/2/2018 12:58 PM by Dr. Tyler Beckford.            Vitals:    03/02/18 1254 03/02/18 1315 03/02/18 1400 03/02/18 1430   BP: 152/76 153/81 158/84 148/69   BP Location: Left arm      Patient Position: Lying      Pulse: 73 70 77 67   Resp: 16      Temp:       TempSrc:       SpO2: 100% 100% 100% 100%   Weight:       Height:         Medications   sodium chloride 0.9 % flush 10 mL (not administered)     ECG/EMG Results (last 24 hours)     ** No results found for the last 24 hours. **                         MDM    Final diagnoses:   Peripheral arterial disease   Weakness following cerebrovascular accident (CVA)   Right leg claudication       Documentation assistance provided by morelia Sheppard.  Information recorded by the scribe was done at my direction and has been verified and validated by me.     Tahmina Sheppard  03/02/18 [id number]       Demian Rodriguez MD  03/02/18 [id number]

## 2018-03-02 NOTE — DISCHARGE INSTRUCTIONS
Follow up with Dr. Nascimento on Monday, March 12th.  Call for an appointment time.    Activity as tolerated.    Follow up with Dr. Hitchcock as previously scheduled.    Immediately return to the emergency department if you develop new or worsening symptoms.    Continue your other medications as previously prescribed.      Please review the medications you are supposed to be taking according to prior physician recommendations. I have not changed your home medications during this visit. If your discharge instructions indicate that I have changed your home medications, this is not the case, and you should disregard. If you have any questions about the medication you should be taking at home, please call your physician.

## 2018-03-03 LAB
BH CV GRAFT BRACHIAL PRESSURE RIGHT: 158 MMHG
BH CV LEA LEFT ANT TIBIAL A DISTAL PSV: 5.3 CM/S
BH CV LEA LEFT ANT TIBIAL A MID PSV: 7.46 CM/S
BH CV LEA LEFT ANT TIBIAL A PROX PSV: 8.84 CM/S
BH CV LEA LEFT DFA PROX PSV: -19.3 CM/S
BH CV LEA LEFT PERONEAL  MID PSV: 5.89 CM/S
BH CV LEA LEFT POPITEAL A  DISTAL PSV: 10.2 CM/S
BH CV LEA LEFT POPITEAL A  PROX PSV: 18.7 CM/S
BH CV LEA LEFT PTA DISTAL PSV: 9.23 CM/S
BH CV LEA LEFT PTA MID PSV: 6.29 CM/S
BH CV LEA LEFT PTA PRESSURE: 40 MMHG
BH CV LEA LEFT PTA PROX PSV: 6.88 CM/S
BH CV LEA LEFT SFA DISTAL PSV: 11.4 CM/S
BH CV LEA LEFT SFA MID PSV: 20.6 CM/S
BH CV LEA LEFT SFA PROX PSV: 19.6 CM/S
BH CV LEA RIGHT ANT TIBIAL A DISTAL PSV: 12 CM/S
BH CV LEA RIGHT ANT TIBIAL A MID PSV: 6.48 CM/S
BH CV LEA RIGHT ANT TIBIAL A PROX PSV: 11.2 CM/S
BH CV LEA RIGHT DFA PROX PSV: -16.9 CM/S
BH CV LEA RIGHT DPA PRESSURE: 30 MMHG
BH CV LEA RIGHT PERONEAL  MID PSV: 6.09 CM/S
BH CV LEA RIGHT POPITEAL A  DISTAL PSV: 9.82 CM/S
BH CV LEA RIGHT POPITEAL A  PROX PSV: 9.04 CM/S
BH CV LEA RIGHT PTA DISTAL PSV: 7.66 CM/S
BH CV LEA RIGHT PTA MID PSV: 6.48 CM/S
BH CV LEA RIGHT PTA PROX PSV: 7.27 CM/S
BH CV LEA RIGHT SFA DISTAL PSV: 8.45 CM/S
BH CV LEA RIGHT SFA MID PSV: 10.4 CM/S
BH CV LEA RIGHT SFA PROX PSV: 48.1 CM/S
BH CV LOWER ARTERIAL LEFT ABI RATIO: 0.25
BH CV LOWER ARTERIAL RIGHT ABI RATIO: 0.18

## 2018-03-06 ENCOUNTER — APPOINTMENT (OUTPATIENT)
Dept: MRI IMAGING | Facility: HOSPITAL | Age: 73
End: 2018-03-06

## 2018-03-06 ENCOUNTER — APPOINTMENT (OUTPATIENT)
Dept: CT IMAGING | Facility: HOSPITAL | Age: 73
End: 2018-03-06

## 2018-03-06 ENCOUNTER — HOSPITAL ENCOUNTER (OUTPATIENT)
Facility: HOSPITAL | Age: 73
Setting detail: OBSERVATION
Discharge: HOME | End: 2018-03-09
Attending: EMERGENCY MEDICINE | Admitting: INTERNAL MEDICINE

## 2018-03-06 DIAGNOSIS — I82.501 CHRONIC VENOUS EMBOLISM AND THROMBOSIS OF DEEP VESSELS OF RIGHT LOWER EXTREMITY (HCC): ICD-10-CM

## 2018-03-06 DIAGNOSIS — G82.20 PARAPARESIS OF BOTH LOWER LIMBS (HCC): Primary | ICD-10-CM

## 2018-03-06 DIAGNOSIS — R55 PRE-SYNCOPE: ICD-10-CM

## 2018-03-06 DIAGNOSIS — Z86.79 HISTORY OF CEREBRAL HEMORRHAGE: ICD-10-CM

## 2018-03-06 DIAGNOSIS — R53.1 GENERALIZED WEAKNESS: ICD-10-CM

## 2018-03-06 DIAGNOSIS — I73.9 PAD (PERIPHERAL ARTERY DISEASE) (HCC): ICD-10-CM

## 2018-03-06 DIAGNOSIS — Z74.09 IMPAIRED MOBILITY AND ADLS: ICD-10-CM

## 2018-03-06 DIAGNOSIS — Z86.718 HISTORY OF DVT (DEEP VEIN THROMBOSIS): ICD-10-CM

## 2018-03-06 DIAGNOSIS — Z74.09 IMPAIRED FUNCTIONAL MOBILITY, BALANCE, GAIT, AND ENDURANCE: ICD-10-CM

## 2018-03-06 DIAGNOSIS — Z78.9 IMPAIRED MOBILITY AND ADLS: ICD-10-CM

## 2018-03-06 PROBLEM — R41.89 SPELL OF ALTERED COGNITION: Status: ACTIVE | Noted: 2018-03-06

## 2018-03-06 PROBLEM — N17.9 ACUTE-ON-CHRONIC KIDNEY INJURY (HCC): Status: ACTIVE | Noted: 2018-03-06

## 2018-03-06 PROBLEM — N18.9 ACUTE-ON-CHRONIC KIDNEY INJURY (HCC): Status: ACTIVE | Noted: 2018-03-06

## 2018-03-06 PROBLEM — M54.9 BACK PAIN: Status: ACTIVE | Noted: 2018-03-06

## 2018-03-06 PROBLEM — M62.81 MUSCULAR WEAKNESS: Status: ACTIVE | Noted: 2018-03-06

## 2018-03-06 LAB
ANION GAP SERPL CALCULATED.3IONS-SCNC: 9 MMOL/L (ref 3–11)
BACTERIA UR QL AUTO: ABNORMAL /HPF
BILIRUB UR QL STRIP: NEGATIVE
BUN BLD-MCNC: 18 MG/DL (ref 9–23)
BUN/CREAT SERPL: 12.9 (ref 7–25)
CALCIUM SPEC-SCNC: 9.8 MG/DL (ref 8.7–10.4)
CHLORIDE SERPL-SCNC: 102 MMOL/L (ref 99–109)
CK SERPL-CCNC: 98 U/L (ref 26–174)
CLARITY UR: CLEAR
CO2 SERPL-SCNC: 28 MMOL/L (ref 20–31)
COLOR UR: YELLOW
CREAT BLD-MCNC: 1.4 MG/DL (ref 0.6–1.3)
DEPRECATED RDW RBC AUTO: 47.6 FL (ref 37–54)
ERYTHROCYTE [DISTWIDTH] IN BLOOD BY AUTOMATED COUNT: 15.3 % (ref 11.3–14.5)
GFR SERPL CREATININE-BSD FRML MDRD: 37 ML/MIN/1.73
GFR SERPL CREATININE-BSD FRML MDRD: 45 ML/MIN/1.73
GLUCOSE BLD-MCNC: 176 MG/DL (ref 70–100)
GLUCOSE UR STRIP-MCNC: NEGATIVE MG/DL
HCT VFR BLD AUTO: 37.9 % (ref 34.5–44)
HGB BLD-MCNC: 12 G/DL (ref 11.5–15.5)
HGB UR QL STRIP.AUTO: NEGATIVE
HYALINE CASTS UR QL AUTO: ABNORMAL /LPF
KETONES UR QL STRIP: NEGATIVE
LEUKOCYTE ESTERASE UR QL STRIP.AUTO: ABNORMAL
MCH RBC QN AUTO: 27 PG (ref 27–31)
MCHC RBC AUTO-ENTMCNC: 31.7 G/DL (ref 32–36)
MCV RBC AUTO: 85.2 FL (ref 80–99)
NITRITE UR QL STRIP: NEGATIVE
PH UR STRIP.AUTO: 5.5 [PH] (ref 5–8)
PLATELET # BLD AUTO: 301 10*3/MM3 (ref 150–450)
PMV BLD AUTO: 9.1 FL (ref 6–12)
POTASSIUM BLD-SCNC: 3.9 MMOL/L (ref 3.5–5.5)
PROT UR QL STRIP: NEGATIVE
RBC # BLD AUTO: 4.45 10*6/MM3 (ref 3.89–5.14)
RBC # UR: ABNORMAL /HPF
REF LAB TEST METHOD: ABNORMAL
SODIUM BLD-SCNC: 139 MMOL/L (ref 132–146)
SP GR UR STRIP: 1.01 (ref 1–1.03)
SQUAMOUS #/AREA URNS HPF: ABNORMAL /HPF
UROBILINOGEN UR QL STRIP: ABNORMAL
WBC NRBC COR # BLD: 6.68 10*3/MM3 (ref 3.5–10.8)
WBC UR QL AUTO: ABNORMAL /HPF

## 2018-03-06 PROCEDURE — 85027 COMPLETE CBC AUTOMATED: CPT | Performed by: EMERGENCY MEDICINE

## 2018-03-06 PROCEDURE — 72146 MRI CHEST SPINE W/O DYE: CPT

## 2018-03-06 PROCEDURE — 81001 URINALYSIS AUTO W/SCOPE: CPT | Performed by: INTERNAL MEDICINE

## 2018-03-06 PROCEDURE — 99220 PR INITIAL OBSERVATION CARE/DAY 70 MINUTES: CPT | Performed by: INTERNAL MEDICINE

## 2018-03-06 PROCEDURE — G0378 HOSPITAL OBSERVATION PER HR: HCPCS

## 2018-03-06 PROCEDURE — 99284 EMERGENCY DEPT VISIT MOD MDM: CPT

## 2018-03-06 PROCEDURE — 70551 MRI BRAIN STEM W/O DYE: CPT

## 2018-03-06 PROCEDURE — 82550 ASSAY OF CK (CPK): CPT | Performed by: NURSE PRACTITIONER

## 2018-03-06 PROCEDURE — 70450 CT HEAD/BRAIN W/O DYE: CPT

## 2018-03-06 PROCEDURE — 72141 MRI NECK SPINE W/O DYE: CPT

## 2018-03-06 PROCEDURE — 72148 MRI LUMBAR SPINE W/O DYE: CPT

## 2018-03-06 PROCEDURE — 80048 BASIC METABOLIC PNL TOTAL CA: CPT | Performed by: EMERGENCY MEDICINE

## 2018-03-06 PROCEDURE — 87086 URINE CULTURE/COLONY COUNT: CPT | Performed by: INTERNAL MEDICINE

## 2018-03-06 RX ORDER — AMLODIPINE BESYLATE 5 MG/1
5 TABLET ORAL NIGHTLY
Status: DISCONTINUED | OUTPATIENT
Start: 2018-03-06 | End: 2018-03-09 | Stop reason: HOSPADM

## 2018-03-06 RX ORDER — ACETAMINOPHEN 325 MG/1
650 TABLET ORAL EVERY 4 HOURS PRN
Status: DISCONTINUED | OUTPATIENT
Start: 2018-03-06 | End: 2018-03-09 | Stop reason: HOSPADM

## 2018-03-06 RX ORDER — ONDANSETRON 2 MG/ML
4 INJECTION INTRAMUSCULAR; INTRAVENOUS EVERY 6 HOURS PRN
Status: DISCONTINUED | OUTPATIENT
Start: 2018-03-06 | End: 2018-03-09 | Stop reason: HOSPADM

## 2018-03-06 RX ORDER — AMLODIPINE BESYLATE 10 MG/1
10 TABLET ORAL DAILY
Status: DISCONTINUED | OUTPATIENT
Start: 2018-03-07 | End: 2018-03-06

## 2018-03-06 RX ORDER — POTASSIUM CHLORIDE 20 MEQ/1
20 TABLET, EXTENDED RELEASE ORAL DAILY
COMMUNITY
End: 2019-05-31

## 2018-03-06 RX ORDER — WARFARIN SODIUM 7.5 MG/1
7.5 TABLET ORAL
COMMUNITY
End: 2018-03-06

## 2018-03-06 RX ORDER — LOSARTAN POTASSIUM 50 MG/1
50 TABLET ORAL EVERY MORNING
Status: DISCONTINUED | OUTPATIENT
Start: 2018-03-07 | End: 2018-03-09 | Stop reason: HOSPADM

## 2018-03-06 RX ORDER — ASPIRIN 81 MG/1
81 TABLET ORAL DAILY
Status: DISCONTINUED | OUTPATIENT
Start: 2018-03-07 | End: 2018-03-09 | Stop reason: HOSPADM

## 2018-03-06 RX ORDER — FUROSEMIDE 20 MG/1
10 TABLET ORAL DAILY
COMMUNITY
End: 2018-03-09 | Stop reason: HOSPADM

## 2018-03-06 RX ORDER — ATORVASTATIN CALCIUM 40 MG/1
40 TABLET, FILM COATED ORAL DAILY
Status: DISCONTINUED | OUTPATIENT
Start: 2018-03-07 | End: 2018-03-09 | Stop reason: HOSPADM

## 2018-03-06 RX ORDER — SODIUM CHLORIDE 0.9 % (FLUSH) 0.9 %
1-10 SYRINGE (ML) INJECTION AS NEEDED
Status: DISCONTINUED | OUTPATIENT
Start: 2018-03-06 | End: 2018-03-09 | Stop reason: HOSPADM

## 2018-03-06 RX ORDER — SODIUM CHLORIDE AND POTASSIUM CHLORIDE 150; 900 MG/100ML; MG/100ML
75 INJECTION, SOLUTION INTRAVENOUS CONTINUOUS
Status: DISCONTINUED | OUTPATIENT
Start: 2018-03-06 | End: 2018-03-06

## 2018-03-06 RX ORDER — LOSARTAN POTASSIUM 50 MG/1
100 TABLET ORAL EVERY MORNING
Status: DISCONTINUED | OUTPATIENT
Start: 2018-03-07 | End: 2018-03-06

## 2018-03-06 RX ORDER — ONDANSETRON 4 MG/1
4 TABLET, FILM COATED ORAL EVERY 6 HOURS PRN
Status: DISCONTINUED | OUTPATIENT
Start: 2018-03-06 | End: 2018-03-09 | Stop reason: HOSPADM

## 2018-03-06 RX ORDER — CLONIDINE HYDROCHLORIDE 0.1 MG/1
0.1 TABLET ORAL DAILY
Status: DISCONTINUED | OUTPATIENT
Start: 2018-03-07 | End: 2018-03-09 | Stop reason: HOSPADM

## 2018-03-06 RX ORDER — CETIRIZINE HYDROCHLORIDE 10 MG/1
10 TABLET ORAL DAILY
COMMUNITY
End: 2020-06-03

## 2018-03-06 RX ORDER — SODIUM CHLORIDE 9 MG/ML
50 INJECTION, SOLUTION INTRAVENOUS CONTINUOUS
Status: DISCONTINUED | OUTPATIENT
Start: 2018-03-06 | End: 2018-03-09 | Stop reason: HOSPADM

## 2018-03-06 RX ADMIN — SODIUM CHLORIDE 50 ML/HR: 9 INJECTION, SOLUTION INTRAVENOUS at 22:32

## 2018-03-06 RX ADMIN — AMLODIPINE BESYLATE 5 MG: 5 TABLET ORAL at 22:32

## 2018-03-06 NOTE — H&P
[facility] Medicine Services  HISTORY AND PHYSICAL    Patient Name: Chika Posey  : 1945  MRN: [id number]  Primary Care Physician: Alyssia Greenfield MD    Subjective   Subjective     Chief Complaint:  Unresponsive event/ paraparesis BLE    HPI:  Chika Posey is a 72 y.o. female with PMH significant for HTN, HLP, PAD s/p aorto/fem bypass with subsequent occlusion of grafting and femoral arteries in 2017 where she was admitted to [facility] for complicate 1 month course requiring intubation/ thrombolysis/ fasciotomy or LUE for compartment syndrome. Patient was released on coumadin, which she has been on since May 2017 and admitted  for hemorrhagic stroke. Patient was evaluated by Dr. Hitchcock without surgical intervention and taken off coumadin until follow up (next week) and continued on ASA 81.     Patient returned on 3/2/18 for acute low back pain. RLE weakness, pain and poor po intake. Work up was unremarkable and patient sent home with no changes to medications. Patient returns today after unresponsive event occurring while shopping in [place] this morning. Patient was ambulating and developed acute pain in low back radiating to anterior bilateral thighs with severe bilateral weakness. Patient's daughter states they got her to car to come to ED and patient had eyes open, but not talking, did not move arms or legs during drive. When patient arrived she became more alert, with slight confusion and slurred speech that has now resolved.    No recent illness, but poor po intake last week. No n/v/d. No f/c/s. No falls or injuries. No similar previous episodes such as this. Daughter states BP been low on occasion and intermittently holding BP meds. Patient to be admitted to hospitalist service for further work up and management.    Review of Systems   Constitutional: Positive for activity change, appetite change and fatigue. Negative for chills and fever.   HENT: Negative.     Eyes: Negative.    Respiratory: Negative.    Cardiovascular: Negative.    Gastrointestinal: Positive for nausea.   Endocrine: Negative.    Genitourinary: Positive for decreased urine volume.   Musculoskeletal: Positive for back pain and gait problem.   Skin: Negative.    Neurological: Positive for syncope, speech difficulty and weakness.   Psychiatric/Behavioral: Positive for confusion.          Otherwise 10-system ROS reviewed and is negative except as mentioned in the HPI.    Personal History     Past Medical History:   Diagnosis Date   • Compartment syndrome    • DVT (deep venous thrombosis)    • Hyperlipidemia    • Hypertension    • Stroke     bleed       Past Surgical History:   Procedure Laterality Date   • FOREARM FASCIOTOMY Left    • HYSTERECTOMY     • THROMBOLYSIS     • TUBAL ABDOMINAL LIGATION         Family History: family history is not on file.     Social History:  reports that she has never smoked. She has never used smokeless tobacco. She reports that she does not drink alcohol or use illicit drugs.  Social History     Social History Narrative   • No narrative on file       Medications:    (Not in a hospital admission)    Allergies   Allergen Reactions   • Morphine And Related Itching   • Plavix [Clopidogrel Bisulfate] Itching       Objective   Objective     Vital Signs:   Temp:  [98 °F (36.7 °C)] 98 °F (36.7 °C)  Heart Rate:  [85-91] 85  Resp:  [18] 18  BP: (126-151)/(69-75) 151/72   Total (NIH Stroke Scale): 9    Physical Exam   Constitutional: No acute distress, awake, alert  Eyes: PERRLA, sclerae anicteric, no conjunctival injection  HENT: NCAT, mucous membranes moist  Neck: Supple, no thyromegaly, no lymphadenopathy, trachea midline  Respiratory: Clear to auscultation bilaterally, nonlabored respirations   Cardiovascular: RRR, no murmurs, rubs, or gallops, palpable pedal pulses bilaterally  Gastrointestinal: Positive bowel sounds, soft, nontender, nondistended  Musculoskeletal: No bilateral  ankle edema, no clubbing or cyanosis to extremities. Bilateral upper ext equal strength 3/5; BLE 1/5 pedal push/ pulls. Able to raise BLE for brief period   Psychiatric: Appropriate affect, cooperative  Neurologic: Oriented x 3, strength symmetric in all extremities, Cranial Nerves grossly intact to confrontation, speech clear. + DTR  Skin: No rashes      Results Reviewed:  I have personally reviewed current lab, radiology, and data and agree.      Results from last 7 days  Lab Units 03/06/18  1450   WBC 10*3/mm3 6.68   HEMOGLOBIN g/dL 12.0   HEMATOCRIT % 37.9   PLATELETS 10*3/mm3 301       Results from last 7 days  Lab Units 03/06/18  1450 03/02/18  1047   SODIUM mmol/L 139 136   POTASSIUM mmol/L 3.9 4.5   CHLORIDE mmol/L 102 102   CO2 mmol/L 28.0 25.0   BUN mg/dL 18 30*   CREATININE mg/dL 1.40* 1.60*   GLUCOSE mg/dL 176* 105*   CALCIUM mg/dL 9.8 9.4   ALT (SGPT) U/L  --  24   AST (SGOT) U/L  --  25     Estimated Creatinine Clearance: 30.6 mL/min (by C-G formula based on Cr of 1.4).  Brief Urine Lab Results  (Last result in the past 365 days)      Color   Clarity   Blood   Leuk Est   Nitrite   Protein   CREAT   Urine HCG        02/14/18 1544 Yellow Cloudy(A) Trace(A) Small (1+)(A) Negative 30 mg/dL (1+)(A)             No results found for: BNP  No results found for: PHART  Imaging Results (last 24 hours)     Procedure Component Value Units Date/Time    CT Head Without Contrast [[id number]] Updated:  03/06/18 1347    MRI Lumbar Spine Without Contrast [[id number]] Updated:  03/06/18 1623    MRI Brain Without Contrast [[id number]] Updated:  03/06/18 1628             Assessment/Plan   Assessment / Plan     Hospital Problem List     * (Principal)Pre-syncope    Spell of altered cognition    Acute-on-chronic kidney injury    Muscular weakness    History of cerebral hemorrhage    Chronic venous embolism and thrombosis of deep vessels of right lower extremity    PAD (peripheral artery disease)    HTN (hypertension)    Back  pain    History of DVT (deep vein thrombosis)            Assessment & Plan:  Presyncopal event  -- brief loss of consciousness/ unresponsiveness followed by mild confusion. ? New onset seizure vs. orthostasis  -- orthostatic vitals if able  -- EEG  -- neuro consult  -- MRI/ CT head with expected findings post hemorrhagic CVA on prelim readings  --  Neuro checks and sz/ fall precautions  -- Hold parameters on norvasc/ clonidine  -- am labs: cbc, bmp, a1c    Paraparesis BLE with new onset low back pain  -- MRI lumbar pending, check C and T spine  -- noted numbness in bilateral feet only (new). Possible EMG, defer to neuro  -- PT/OT/CM consult  -- check Vit D, B12, folate, tsh    MATT/ CKD  -- mild, poor po intake lately, but improving.  -- light IVF hydration  -- hold lasix and losartan  -- monitor volume overload    Recent ICH  -- followed by Dr. Hitchcock with upcoming appointment to reassess restarting coumadin  -- off coumadin x 3 weeks  -- continue Asa 81mg  -- c/s Dr. Hitchcock    PAD  -- h/o of aortobifemoral bypass and stents per Dr. Nascimento  -- evidence of PAD on recent duplex     DVT  -- complete occlusion of aorta/ common iliac grafts into superficial femoral arteries s/p thrombolysis April 2017 with complicated hospital course at  with compartment syndrome LUE/ fasciotomy/ Resp failure with intubation  -repeat RLE venous duplex 3/2/18 no dvt    DVT prophylaxis:mechanical    CODE STATUS:  Full Code    Admission Status:  I believe this patient meets INPATIENT status due to the need for care which can only be reasonably provided in an hospital setting such as aggressive/expedited ancillary services and/or consultation services, the necessity for IV medications, close physician monitoring and/or the possible need for procedures.  In such, I feel patient’s risk for adverse outcomes and need for care warrant INPATIENT evaluation and predict the patient’s care encounter to likely last beyond 2  "midnights.        Electronically signed by EDMOND Doran, 03/06/18, 6:35 PM.      Brief Attending Admission Attestation     I have seen and examined the patient, performing an independent face-to-face diagnostic evaluation with plan of care reviewed and developed with the advanced practice clinician (APC).      Brief Summary Statement/HPI:   Chika Posey is a 72 y.o. female with prior hx extensive right dvt (at ) for which was on coumadin and then sustained intracranial hemorrhage for which was admitted here at [facility] last month and saw neurosurgery during that stay. Was at [facility] today walking and had bilateral groin/thigh pain which prompted her to sit down. Went to car and when getting in care had worsening of her pain and then \"passed out\" per family at bedside, although daughter who was with the patient is not current at bedside. Was \"out for about 15 minutes, I think\" per daughter. No tonic/clonic activity. Has had intermittent confusion since admission per family but currently at baseline. While in the er, patient had significant proximal muscle weakness of the upper and lower extremities. ER contacted neurology and recommended mr brain and l-spine. MR brain was consistent with evolving findings c/w known intracranial hemorrhage. Asked to admit due to diffuse upper and lower extremity weakness and neurologic spell.      Attending Physical Exam:  Constitutional:Alert, oriented x 3, nontoxic appearing  Psych:slightly flat affect  HEENT:Ncat, oroph clear  Neck: neck supple, full range of motion  Neuro: face symmetric, speech clear, equal , 5/5 arm extension, arm flexion, bilaterally; 4/5 leg raise bilaterally and knee extension strength; toes downgoing bilaterally  Cardiac: Rrr; No pretibial pitting edema  Resp: Ctab, normal effort  GI: abd soft, nontender  Skin: No extremity rash  Musculoskeletal/extremities: no cyanosis extremities; no significant ankle edema              Brief " "Assessment/Plan :  See above for further detailed assessment and plan developed with [facility] which I have reviewed and/or edited.    *feels better this evening. Moving arms, legs, still having intermittent spells confusion and word finding difficulty per family at bedside but overall improved. Ck normal. Mr c/t/l spine ok. The \"spell\" of unclear etiology but could be due to vagal response from what sounds like bilateral groin/proximal thigh muscular pain while flexing hips to get in car. Of note, had recent RLE duplex 3/2 (prior er visit) negative for dvt    -neuro checks  -urinalysis  -decrease losartan and hydrate gently, monitor renal function  -eeg in a.m.  -tsh, b12, a.m. labs  -neurology in a.m  -was to see dr. Hitchcock of neurosurgery soon for follow up; will ask to see while here  -pt ot in a.m.      *observation status for now. Reevaluate based on clinilcal course      Electronically signed by Poli Watson MD, 03/06/18, 9:52 PM.           "

## 2018-03-06 NOTE — ED PROVIDER NOTES
Subjective   HPI Comments: Chika Posey is a 72 y.o.female who presents to the ED with c/o abnormal neurologic symptoms. Three weeks ago the patient had a hemorrhagic CVA while on Warfarin. She has had a speech deficit since that time. Six days ago her speech began worsening, prompting her to present to the ED four days ago. At that time she had a negative workup, including an ultrasound of her right lower extremity to evaluate her c/o distal right lower extremity pain. She was discharged home with no medication changes. Around 1230 today she suddenly developed lower extremity weakness, which caused difficulty ambulating. Due to her symptoms her family called EMS to bring her to the ED for evaluation. At he ED she is able to provide a history and c/o slight nausea and difficulty urinating. She denies headache, fever or any other acute symptoms at this time.      Patient is a 72 y.o. female presenting with weakness.   History provided by:  Patient, EMS personnel and medical records  Weakness - Generalized   Onset quality:  Gradual  Duration:  3 days  Timing:  Constant  Progression:  Worsening  Chronicity:  New  Relieved by:  None tried  Worsened by:  Nothing  Ineffective treatments:  None tried  Associated symptoms: difficulty walking and nausea    Associated symptoms: no fever, no headaches and no vomiting        Review of Systems   Constitutional: Positive for fatigue. Negative for fever.   Gastrointestinal: Positive for nausea. Negative for vomiting.   Genitourinary: Positive for difficulty urinating.   Neurological: Positive for speech difficulty (chronic since hemorrhagic CVA) and weakness (generalized). Negative for headaches.   All other systems reviewed and are negative.      Past Medical History:   Diagnosis Date   • Compartment syndrome    • DVT (deep venous thrombosis)    • Hyperlipidemia    • Hypertension    • Stroke     bleed       Allergies   Allergen Reactions   • Morphine And Related Itching   •  Plavix [Clopidogrel Bisulfate] Itching       Past Surgical History:   Procedure Laterality Date   • FOREARM FASCIOTOMY Left    • HYSTERECTOMY     • THROMBOLYSIS     • TUBAL ABDOMINAL LIGATION         History reviewed. No pertinent family history.    Social History     Social History   • Marital status: Single     Social History Main Topics   • Smoking status: Never Smoker   • Smokeless tobacco: Never Used   • Alcohol use No   • Drug use: No   • Sexual activity: Defer         Objective   Physical Exam   Constitutional: She appears well-developed and well-nourished. No distress.   HENT:   Head: Normocephalic and atraumatic.   Mouth/Throat: No oropharyngeal exudate.   Eyes: Conjunctivae are normal. No scleral icterus.   Neck: Normal range of motion. Neck supple. No JVD present.   Cardiovascular: Normal rate, regular rhythm and normal heart sounds.  Exam reveals no gallop and no friction rub.    No murmur heard.  Pulmonary/Chest: Effort normal and breath sounds normal. No respiratory distress. She has no wheezes. She has no rales.   Abdominal: Soft. Bowel sounds are normal. She exhibits no distension. There is no tenderness. There is no rebound and no guarding.   Musculoskeletal: Normal range of motion. She exhibits no edema.   Neurological: She is alert. A cranial nerve deficit is present.   Making some antigravity efforts but is unable to lift her legs against gravity. 2+ knee jerks. Could not get any achilles jerks. 2+ reflexes in the upper extremities. She has light touch sensitivity. Lag with raising of the left eyebrow, but no other facial weakness. No deviation of her tongue. Cranial nerve motor and sensory intact as examined aside from the left frontal weakness.   Skin: Skin is warm and dry. She is not diaphoretic.   Nursing note and vitals reviewed.      Procedures         ED Course  ED Course   Comment By Time   Dr. Rodriguez spoke with Dr. Burt. He recommends an MRI. Lion Hernandez 03/06 7137   Dr. Rodriguez  at bedside with the patient. All findings are discussed. [facility] 03/06 1719   Dr. Rodriguez paged the hospitalist. [facility] 03/06 1723   Dr. Rodriguez spoke with Dr. Watson, hospitalist, who will admit. [facility] 03/06 1739     Recent Results (from the past 24 hour(s))   CBC (No Diff)    Collection Time: 03/06/18  2:50 PM   Result Value Ref Range    WBC 6.68 3.50 - 10.80 10*3/mm3    RBC 4.45 3.89 - 5.14 10*6/mm3    Hemoglobin 12.0 11.5 - 15.5 g/dL    Hematocrit 37.9 34.5 - 44.0 %    MCV 85.2 80.0 - 99.0 fL    MCH 27.0 27.0 - 31.0 pg    MCHC 31.7 (L) 32.0 - 36.0 g/dL    RDW 15.3 (H) 11.3 - 14.5 %    RDW-SD 47.6 37.0 - 54.0 fl    MPV 9.1 6.0 - 12.0 fL    Platelets 301 150 - 450 10*3/mm3   Basic Metabolic Panel    Collection Time: 03/06/18  2:50 PM   Result Value Ref Range    Glucose 176 (H) 70 - 100 mg/dL    BUN 18 9 - 23 mg/dL    Creatinine 1.40 (H) 0.60 - 1.30 mg/dL    Sodium 139 132 - 146 mmol/L    Potassium 3.9 3.5 - 5.5 mmol/L    Chloride 102 99 - 109 mmol/L    CO2 28.0 20.0 - 31.0 mmol/L    Calcium 9.8 8.7 - 10.4 mg/dL    eGFR  African Amer 45 (L) >60 mL/min/1.73    eGFR Non African Amer 37 (L) >60 mL/min/1.73    BUN/Creatinine Ratio 12.9 7.0 - 25.0    Anion Gap 9.0 3.0 - 11.0 mmol/L   CK    Collection Time: 03/06/18  2:50 PM   Result Value Ref Range    Creatine Kinase 98 26 - 174 U/L     Note: In addition to lab results from this visit, the labs listed above may include labs taken at another facility or during a different encounter within the last 24 hours. Please correlate lab times with ED admission and discharge times for further clarification of the services performed during this visit.    MRI Thoracic Spine Without Contrast   Final Result   1.  No acute abnormality demonstrated.   2.  Lower thoracic spondylosis and degenerative changes as described.   3.  Multilevel bilateral foraminal small perineural cysts.      THIS DOCUMENT HAS BEEN ELECTRONICALLY SIGNED BY SARI MULTANI JR. MD       MRI Cervical Spine Without Contrast   Final Result   1.  No acute abnormality demonstrated.   2.  Multilevel spondylosis and degenerative changes as described   3.  Small left foraminal or extraforaminal perineural cysts C7-T1 and T1-2.      THIS DOCUMENT HAS BEEN ELECTRONICALLY SIGNED BY SARI MULTANI JR. MD      CT Head Without Contrast    (Results Pending)   MRI Brain Without Contrast    (Results Pending)   MRI Lumbar Spine Without Contrast    (Results Pending)     Vitals:    03/06/18 1938 03/06/18 1939 03/06/18 2000 03/06/18 2030   BP: 134/80  139/67 148/80   BP Location:       Patient Position:       Pulse:  81 79 82   Resp:       Temp:       TempSrc:       SpO2: 96% 95% 95% 94%   Weight:       Height:         Medications   sodium chloride 0.9 % flush 1-10 mL (not administered)   sodium chloride 0.9 % with KCl 20 mEq/L infusion (not administered)   acetaminophen (TYLENOL) tablet 650 mg (not administered)   ondansetron (ZOFRAN) tablet 4 mg (not administered)     Or   ondansetron (ZOFRAN) injection 4 mg (not administered)   magnesium hydroxide (MILK OF MAGNESIA) suspension 2400 mg/10mL 10 mL (not administered)   melatonin sublingual tablet 5 mg (not administered)     ECG/EMG Results (last 24 hours)     ** No results found for the last 24 hours. **                  [facility]    Final diagnoses:   Paraparesis of both lower limbs       Documentation assistance provided by morelia Hernandez.  Information recorded by the scribe was done at my direction and has been verified and validated by me.     Lion Hernandez  03/06/18 1411       Demian Rodriguez MD  03/06/18 7081

## 2018-03-06 NOTE — PROGRESS NOTES
Discharge Planning Assessment  [facility]     Patient Name: Chika oPsey  MRN: [id number]  Today's Date: 3/6/2018    Admit Date: 3/6/2018          Discharge Needs Assessment       03/06/18 1825    Living Environment    Lives With alone   pt resides in [facility]     Living Arrangements house    Type of Financial/Environmental Concern none    Transportation Available car;family or friend will provide    Living Environment    Provides Primary Care For no one    Quality Of Family Relationships supportive;helpful;involved    Able to Return to Prior Living Arrangements yes    Discharge Needs Assessment    Concerns To Be Addressed no discharge needs identified;denies needs/concerns at this time    Readmission Within The Last 30 Days current reason for admission unrelated to previous admission    Outpatient/Agency/Support Group Needs homecare agency (specify level of care)   Pt reports she has [facility] for speech therapy    Anticipated Changes Related to Illness none    Equipment Currently Used at Home cane, straight;walker, rolling   pt does not use DME at this time    Equipment Needed After Discharge cane, quad    Discharge Contact Information if Applicable [phone]            Discharge Plan       03/06/18 1827    Case Management/Social Work Plan    Plan home    Patient/Family In Agreement With Plan yes    Additional Comments CM spoke with pt, son Holger and sister Kathy at bedside. Pt plans to return home and have continued hh services. Pt has assistance from family as needed. Pt is interested in quad cane, will have PT assess pt with DME for safety prior to arranging. Pt denies further needs at this time. CM will continue to follow.         Discharge Placement     No information found                Demographic Summary       03/06/18 1823    Referral Information    Referral Source admission list    Contact Information    Permission Granted to Share Information With     Primary Care  Physician Information    Name Alyssia Greenfield            Functional Status       03/06/18 1823    Functional Status Current    Current Functional Level Comment unable to assess    Functional Status Prior    Ambulation 0-->independent    Transferring 0-->independent    Toileting 0-->independent    Bathing 0-->independent    Dressing 0-->independent    Eating 0-->independent    Communication 0-->understands/communicates without difficulty    Swallowing 0-->swallows foods/liquids without difficulty    IADL    Medications other (see comments)   pt confirms she has prescription drug coverage and denies issues obtaining or affording current medications    Meal Preparation independent    Housekeeping independent    Laundry independent    Shopping independent    Oral Care independent    Activity Tolerance    Current Activity Limitations other (see comments)   weakness in bilateral legs    Usual Activity Tolerance moderate    Current Activity Tolerance poor    Employment/Financial    Financial Concerns none   pt confirms she has [employer] Medicare, denies concerns or disruption in coverage            Psychosocial     None            Abuse/Neglect     None            Legal     None            Substance Abuse     None            Patient Forms       03/06/18 1822    Patient Forms    Patient Observation Letter Delivered    Delivered to Patient    Method of delivery In person          Judith Qiu

## 2018-03-07 ENCOUNTER — APPOINTMENT (OUTPATIENT)
Dept: CARDIOLOGY | Facility: HOSPITAL | Age: 73
End: 2018-03-07
Attending: INTERNAL MEDICINE

## 2018-03-07 ENCOUNTER — DOCUMENTATION (OUTPATIENT)
Dept: NEUROSURGERY | Facility: CLINIC | Age: 73
End: 2018-03-07

## 2018-03-07 ENCOUNTER — APPOINTMENT (OUTPATIENT)
Dept: NEUROLOGY | Facility: HOSPITAL | Age: 73
End: 2018-03-07

## 2018-03-07 LAB
25(OH)D3 SERPL-MCNC: 35.2 NG/ML
ANION GAP SERPL CALCULATED.3IONS-SCNC: 8 MMOL/L (ref 3–11)
BASOPHILS # BLD AUTO: 0.03 10*3/MM3 (ref 0–0.2)
BASOPHILS NFR BLD AUTO: 0.5 % (ref 0–1)
BILIRUB UR QL STRIP: NEGATIVE
BUN BLD-MCNC: 18 MG/DL (ref 9–23)
BUN/CREAT SERPL: 15 (ref 7–25)
CALCIUM SPEC-SCNC: 8.7 MG/DL (ref 8.7–10.4)
CHLORIDE SERPL-SCNC: 109 MMOL/L (ref 99–109)
CLARITY UR: CLEAR
CO2 SERPL-SCNC: 25 MMOL/L (ref 20–31)
COLOR UR: YELLOW
CREAT BLD-MCNC: 1.2 MG/DL (ref 0.6–1.3)
DEPRECATED RDW RBC AUTO: 47.1 FL (ref 37–54)
EOSINOPHIL # BLD AUTO: 0.24 10*3/MM3 (ref 0–0.3)
EOSINOPHIL NFR BLD AUTO: 4.1 % (ref 0–3)
ERYTHROCYTE [DISTWIDTH] IN BLOOD BY AUTOMATED COUNT: 15.3 % (ref 11.3–14.5)
FOLATE SERPL-MCNC: 7.28 NG/ML (ref 3.2–20)
GFR SERPL CREATININE-BSD FRML MDRD: 44 ML/MIN/1.73
GFR SERPL CREATININE-BSD FRML MDRD: 54 ML/MIN/1.73
GLUCOSE BLD-MCNC: 107 MG/DL (ref 70–100)
GLUCOSE BLDC GLUCOMTR-MCNC: 177 MG/DL (ref 70–130)
GLUCOSE UR STRIP-MCNC: NEGATIVE MG/DL
HBA1C MFR BLD: 6.6 % (ref 4.8–5.6)
HCT VFR BLD AUTO: 34.1 % (ref 34.5–44)
HGB BLD-MCNC: 10.8 G/DL (ref 11.5–15.5)
HGB UR QL STRIP.AUTO: NEGATIVE
IMM GRANULOCYTES # BLD: 0.02 10*3/MM3 (ref 0–0.03)
IMM GRANULOCYTES NFR BLD: 0.3 % (ref 0–0.6)
KETONES UR QL STRIP: NEGATIVE
LEUKOCYTE ESTERASE UR QL STRIP.AUTO: NEGATIVE
LYMPHOCYTES # BLD AUTO: 2.12 10*3/MM3 (ref 0.6–4.8)
LYMPHOCYTES NFR BLD AUTO: 36.3 % (ref 24–44)
MAGNESIUM SERPL-MCNC: 1.9 MG/DL (ref 1.3–2.7)
MCH RBC QN AUTO: 26.6 PG (ref 27–31)
MCHC RBC AUTO-ENTMCNC: 31.7 G/DL (ref 32–36)
MCV RBC AUTO: 84 FL (ref 80–99)
MONOCYTES # BLD AUTO: 0.58 10*3/MM3 (ref 0–1)
MONOCYTES NFR BLD AUTO: 9.9 % (ref 0–12)
NEUTROPHILS # BLD AUTO: 2.85 10*3/MM3 (ref 1.5–8.3)
NEUTROPHILS NFR BLD AUTO: 48.9 % (ref 41–71)
NITRITE UR QL STRIP: NEGATIVE
PH UR STRIP.AUTO: 6 [PH] (ref 5–8)
PLATELET # BLD AUTO: 318 10*3/MM3 (ref 150–450)
PMV BLD AUTO: 9.6 FL (ref 6–12)
POTASSIUM BLD-SCNC: 3.6 MMOL/L (ref 3.5–5.5)
PROT UR QL STRIP: NEGATIVE
RBC # BLD AUTO: 4.06 10*6/MM3 (ref 3.89–5.14)
SODIUM BLD-SCNC: 142 MMOL/L (ref 132–146)
SP GR UR STRIP: 1.02 (ref 1–1.03)
TSH SERPL DL<=0.05 MIU/L-ACNC: 0.91 MIU/ML (ref 0.35–5.35)
UROBILINOGEN UR QL STRIP: NORMAL
VIT B12 BLD-MCNC: 499 PG/ML (ref 211–911)
WBC NRBC COR # BLD: 5.84 10*3/MM3 (ref 3.5–10.8)

## 2018-03-07 PROCEDURE — 80048 BASIC METABOLIC PNL TOTAL CA: CPT | Performed by: NURSE PRACTITIONER

## 2018-03-07 PROCEDURE — 99221 1ST HOSP IP/OBS SF/LOW 40: CPT | Performed by: THORACIC SURGERY (CARDIOTHORACIC VASCULAR SURGERY)

## 2018-03-07 PROCEDURE — G8988 SELF CARE GOAL STATUS: HCPCS

## 2018-03-07 PROCEDURE — 82962 GLUCOSE BLOOD TEST: CPT

## 2018-03-07 PROCEDURE — G0378 HOSPITAL OBSERVATION PER HR: HCPCS

## 2018-03-07 PROCEDURE — 81003 URINALYSIS AUTO W/O SCOPE: CPT | Performed by: INTERNAL MEDICINE

## 2018-03-07 PROCEDURE — 97165 OT EVAL LOW COMPLEX 30 MIN: CPT

## 2018-03-07 PROCEDURE — G8987 SELF CARE CURRENT STATUS: HCPCS

## 2018-03-07 PROCEDURE — 82306 VITAMIN D 25 HYDROXY: CPT | Performed by: NURSE PRACTITIONER

## 2018-03-07 PROCEDURE — 93306 TTE W/DOPPLER COMPLETE: CPT

## 2018-03-07 PROCEDURE — 93306 TTE W/DOPPLER COMPLETE: CPT | Performed by: INTERNAL MEDICINE

## 2018-03-07 PROCEDURE — 93005 ELECTROCARDIOGRAM TRACING: CPT | Performed by: INTERNAL MEDICINE

## 2018-03-07 PROCEDURE — 99226 PR SBSQ OBSERVATION CARE/DAY 35 MINUTES: CPT | Performed by: INTERNAL MEDICINE

## 2018-03-07 PROCEDURE — 93010 ELECTROCARDIOGRAM REPORT: CPT | Performed by: INTERNAL MEDICINE

## 2018-03-07 PROCEDURE — 83036 HEMOGLOBIN GLYCOSYLATED A1C: CPT | Performed by: NURSE PRACTITIONER

## 2018-03-07 PROCEDURE — 97162 PT EVAL MOD COMPLEX 30 MIN: CPT

## 2018-03-07 PROCEDURE — 85025 COMPLETE CBC W/AUTO DIFF WBC: CPT | Performed by: NURSE PRACTITIONER

## 2018-03-07 PROCEDURE — 95816 EEG AWAKE AND DROWSY: CPT

## 2018-03-07 PROCEDURE — 82607 VITAMIN B-12: CPT | Performed by: NURSE PRACTITIONER

## 2018-03-07 PROCEDURE — G8978 MOBILITY CURRENT STATUS: HCPCS

## 2018-03-07 PROCEDURE — 82746 ASSAY OF FOLIC ACID SERUM: CPT | Performed by: NURSE PRACTITIONER

## 2018-03-07 PROCEDURE — 99205 OFFICE O/P NEW HI 60 MIN: CPT | Performed by: PSYCHIATRY & NEUROLOGY

## 2018-03-07 PROCEDURE — 84443 ASSAY THYROID STIM HORMONE: CPT | Performed by: NURSE PRACTITIONER

## 2018-03-07 PROCEDURE — 83735 ASSAY OF MAGNESIUM: CPT | Performed by: NURSE PRACTITIONER

## 2018-03-07 PROCEDURE — G8979 MOBILITY GOAL STATUS: HCPCS

## 2018-03-07 RX ADMIN — AMLODIPINE BESYLATE 5 MG: 5 TABLET ORAL at 20:05

## 2018-03-07 RX ADMIN — ATORVASTATIN CALCIUM 40 MG: 40 TABLET, FILM COATED ORAL at 09:14

## 2018-03-07 RX ADMIN — ASPIRIN 81 MG: 81 TABLET, COATED ORAL at 09:14

## 2018-03-07 RX ADMIN — SODIUM CHLORIDE 50 ML/HR: 9 INJECTION, SOLUTION INTRAVENOUS at 18:40

## 2018-03-07 RX ADMIN — CLONIDINE HYDROCHLORIDE 0.1 MG: 0.1 TABLET ORAL at 09:14

## 2018-03-07 RX ADMIN — LOSARTAN POTASSIUM 50 MG: 50 TABLET ORAL at 09:14

## 2018-03-07 NOTE — PROGRESS NOTES
Continued Stay Note  [facility]     Patient Name: Chika Posey  MRN: [id number]  Today's Date: 3/7/2018    Admit Date: 3/6/2018          Discharge Plan       03/07/18 1029    Case Management/Social Work Plan    Plan Home w/home health    Patient/Family In Agreement With Plan yes    Additional Comments Spoke with patient in room.  Her plan is still to go home with [facility] at discharge.  She is currently receiving PT and SLP services.  Will need resume home health orders at discharge.  CM will continue to follow.  Latisha Weeks RN [phone]              Discharge Codes     None        Expected Discharge Date and Time     Expected Discharge Date Expected Discharge Time    Mar 9, 2018             Latisha Weeks RN

## 2018-03-07 NOTE — PLAN OF CARE
Problem: Patient Care Overview (Adult)  Goal: Plan of Care Review  Outcome: Ongoing (interventions implemented as appropriate)   03/07/18 1533   Outcome Evaluation   Outcome Summary/Follow up Plan OT eval complete. Pt presents with weakness, decreased balance and decreased ADL independence. OT will follow to advance pt toward PLOF with self care. Recommend SNF for rehab upon d/c prior to return home.    Coping/Psychosocial Response Interventions   Plan Of Care Reviewed With patient   Patient Care Overview   Progress progress toward functional goals as expected       Problem: Inpatient Occupational Therapy  Goal: Transfer Training Goal 1 LTG- OT  Outcome: Ongoing (interventions implemented as appropriate)   03/07/18 1533   Transfer Training OT LTG   Transfer Training OT LTG, Date Established 03/07/18   Transfer Training OT LTG, Time to Achieve by discharge   Transfer Training OT LTG, Activity Type toilet;bed to chair /chair to bed   Transfer Training OT LTG, [place] Level supervision required   Transfer Training OT LTG, Assist Device walker, rolling     Goal: Toileting Goal LTG- OT  Outcome: Ongoing (interventions implemented as appropriate)   03/07/18 1533   Toileting OT LTG   Toileting Goal OT LTG, Date Established 03/07/18   Toileting Goal OT LTG, Time to Achieve by discharge   Toileting Goal OT LTG, [place] Level set up required     Goal: LB Dressing Goal LTG- OT  Outcome: Ongoing (interventions implemented as appropriate)   03/07/18 1533   LB Dressing OT LTG   LB Dressing Goal OT LTG, Date Established 03/07/18   LB Dressing Goal OT LTG, Time to Achieve by discharge   LB Dressing Goal OT LTG, [place] Level set up required;supervision required   LB Dressing Goal OT LTG, Adaptive Equipment (AE prn)     Goal: Functional Mobility Goal LTG- OT  Outcome: Ongoing (interventions implemented as appropriate)   03/07/18 1533   Functional Mobility OT LTG   Functional Mobility Goal OT LTG, Date Established  03/07/18   Functional Mobility Goal OT LTG, Time to Achieve by discharge   Functional Mobility Goal OT LTG, [place] Level contact guard   Functional Mobility Goal OT LTG, Assist Device rolling walker   Functional Mobility Goal OT LTG, Distance to Achieve to the bathroom

## 2018-03-07 NOTE — CONSULTS
Neurology    Referring Provider: Dr. Watson    Reason for Consultation: BLE weakness, syncope      Chief complaint: BLE weakness, syncope    Subjective .     History of present illness:  Ms. Posey is a pleasant 72-year-old female with a past medical history significant for DVT, prior compartment syndrome, hypertension, hyperlipidemia, prior intracranial hemorrhage who is admitted to the hospitalist service for a syncopal episode and bilateral lower extremity pain and weakness.  She reports that she was shopping in [employer] getting her prescription when she complained of sudden pain in the bilateral thighs and low back.  The pain became so severe that she was hunched over her shopping cart.  She eventually had to sit down but the pain was still present.  Family reports that she had a syncopal episode in the store while standing up and family each had to support her by one arm.  The first syncopal event did not last for long, approximately a few seconds per her sister.  When she was brought out to the car she had a second unresponsive episode that lasted for about 30 minutes or so.  She was brought here for further evaluation.    Review of Systems: Positive for leg pain and weakness, as well as occasional numbness.Otherwise complete review of systems was discussed with the patient and found to be negative except for that mentioned in history of present illness.    History  Past Medical History:   Diagnosis Date   • Compartment syndrome    • DVT (deep venous thrombosis)    • Hyperlipidemia    • Hypertension    • Stroke     bleed   ,   Past Surgical History:   Procedure Laterality Date   • FOREARM FASCIOTOMY Left    • HYSTERECTOMY     • THROMBOLYSIS     • TUBAL ABDOMINAL LIGATION     , History reviewed. No pertinent family history.,   Social History   Substance Use Topics   • Smoking status: Never Smoker   • Smokeless tobacco: Never Used   • Alcohol use No   ,   Prescriptions Prior to Admission   Medication Sig Dispense  "Refill Last Dose   • amLODIPine (NORVASC) 10 MG tablet Take 5 mg by mouth Daily.   3/5/2018 at Unknown time   • aspirin 81 MG EC tablet Take 81 mg by mouth Daily.   3/6/2018 at Unknown time   • atorvastatin (LIPITOR) 40 MG tablet Take 40 mg by mouth Daily.   3/5/2018 at Unknown time   • cetirizine (zyrTEC) 10 MG tablet Take 10 mg by mouth Daily.   3/5/2018 at Unknown time   • CloNIDine (CATAPRES) 0.1 MG tablet Take 0.1 mg by mouth Daily.   3/6/2018 at Unknown time   • furosemide (LASIX) 20 MG tablet Take 10 mg by mouth Daily.   3/6/2018 at Unknown time   • losartan (COZAAR) 100 MG tablet Take 100 mg by mouth Daily.   3/6/2018 at Unknown time   • potassium chloride (K-DUR,KLOR-CON) 20 MEQ CR tablet Take 20 mEq by mouth Daily.   3/5/2018 at Unknown time    and Allergies:  Morphine and related and Plavix [clopidogrel bisulfate]    Objective     Vital Signs   Blood pressure 161/75, pulse 78, temperature 98 °F (36.7 °C), temperature source Oral, resp. rate 18, height 170.2 cm (67\"), weight 60.7 kg (133 lb 14.4 oz), SpO2 95 %, not currently breastfeeding.    Physical Exam:      Gen: Lying in bedside chair with eyes open. In NAD. Appears stated age   Eyes: PERRL, conjuntivae/lids normal   ENT: External canals normal bilaterally. Oropharynx normal.    Neck: Supple. No thyroid enlargement noted   Respiratory: CTA bilaterally. Respirations unlabored   CV: RRR, S1 and S2 nml. Radial pulses 2+ bilaterally.    Skin: No rashes noted on exposed skin. Normal tugor.   MSK: Normal bulk and tone. Nml ROM     Neurologic:   Mental status: Awake, alert, oriented x4. Follows commands.  Occasional hesitancy when providing history but speech otherwise fluent.    CN: PERRL, EOM intact, sensation intact in upper/mid/lower face bilaterally, mild right lower facial weakness, hearing intact to finger rub bilaterally, palate elevates symmetrically, tongue movements and SCMs strong bilaterally    Motor: Full strength in the upper extremity is " bilaterally.  There is moderate weakness in the bilateral lower extremities with approximately 4-/5 hip flexion, 4+/5 quadriceps.  Dorsiflexion appears full bilaterally.  She does complain of subjective pain in the lumbar region, worse on the left, with straight leg raise    Reflexes: 2+ and symmetric in the upper extremity is bilaterally.  2+ right patellar and slightly more brisk 2+ left patellar.  Ankle jerks absent bilaterally    Sensory: Intact to LT throughout   Coordination: No dysmetria noted   Gait: Not tested        Results Reviewed:     Labs reviewed  MRI brain personally reviewed.  Her previously mentioned ICH is seen in the left temporoparietal region  MRI C-spine/T-spine/L-spine reports reviewed  EEG report reviewed and discussed with interpreting neurologist  EKG report reviewed                 Assessment/Plan     1.  Bilateral leg weakness = possibly related to her L-spine degeneration. The report discusses lumbar scoliosis with multi-level degenerative changes and foraminal stenosis. No canal stenosis seen in the C- or T-spines. Will check EMG/NCV in AM. Agree with neurosurgery consult. Rehab eval    2.  Syncope = unclear etiology but possibly secondary to pain or vagal response. EEG report negative for epileptiform activity. Recommend deferring AEDs for now unclear she has a clear seizure or unexplained recurrent syncope.        Andie Burt MD  03/07/18  1:47 PM

## 2018-03-07 NOTE — PROGRESS NOTES
Reason for consultation; follow-up intracerebral hemorrhage; numbness in both lower extremities.     Narrative of present illness: This Is a 72-year-old female who was hospitalized recently sustaining a intracerebral hemorrhage.  I'll to be related to hypertension and small vessel disease.  This is located in the posterior left frontal lobe.  She was seen by neurosurgery at that time and no intervention was indicated or warranted.  She had been off anticoagulants for generalized peripheral vascular disease.  It was thought that it was reasonable to continue.    She has been doing reasonably well until yesterday which time she had a syncopal episode and presented to the emergency department and was admitted to the hospitalist service.  Her major complaints other than syncope were that of coldness and numbness in both of her feet.  She has had peripheral vascular disease treated with stenting in the past.  A recent arterial DUS showed recurrent stenosis in her lower extremities.    She has shown steady improvement from the intracerebral hemorrhage showing no progressive deterioration or unilateral findings.    Neurological examination:    Mental status: The patient is alert and oriented.  This speech is clear.  There is no a dysphasia or dysarthria.  The patient is able to follow commands appropriately.  Cranial nerve examination:  Olfaction is intact.  The visual fields are full to finger confrontation.  There is no papilledema or retinal hemorrhages.  Eye movement is normal without diplopia or nystagmus.  There is no pupillary inequality.  Both pupils respond briskly to light.  Corneal reflex is intact.  There is no facial weakness or loss of sensation.  Finger rub is heard bilaterally.  The shoulder shrug is intact.  The voice is normal.  There is no dysarthria.  The tongue is protruded in the midline.  The gag reflex is intact.    Motor/sensory examination: She has no focal weakness.  I'm unable to palpate  peripheral pulses in the lower extremities and her feet feel a bit cool.  Her strength however is intact.  She is hyporeflexic.  Her gait was not tested.     Review of pertinent neurological data set:     CT HEAD/ IMPRESSION:  1. Improvement is noted in the intraaxial hemorrhage in the left  posterior temporal and lower parietal region surrounded by edema and  low-attenuation. Although there is still blood pigment in the region, it  is less dense and slightly decreased in size. The edema and mass effect  previously seen with a slight left to right midline shift is also  improved. There is low attenuation diffusely in the white matter.  Otherwise, there is no evidence of new hemorrhage, increasing edema,  increasing mass effect, or new acute finding superimposed upon slightly  improved previous abnormalities identified 4 days ago.    MRI BRAIN/IMPRESSION:  1.  There is a mass in the left posterior temporal region in the left  lower parietal area surrounded by edema and increased FLAIR and T2  signal indicative of some residual edema and reactive gliosis. The mass  is now mixed signal and is high signal on both T2 and T1 indicating  organizing intra-axial blood clot. Otherwise, the size of the mass has  not increased or changed nor has it improved or decreased.  2.  No other areas of acute insult are noted. There is no restricted  diffusion elsewhere in the brain. There is no edema, mass effect or  evidence of distant hemorrhage.  3.  The patient has a background pattern of substantial microangiopathy  and abnormal increased FLAIR white matter signal globally and diffusely.  These findings were noted on previous exams.    LUMBAR MRI/IMPRESSION:  1. Multilevel disc impingements are noted as described, most severe on  the right at L2-L3, bilaterally in the lateral recesses worse anterior  rightward on the dural sac and in the right lateral recess at L3-L4, and  high-grade central and lateral recess critical impingement  is noted  produced by a combination of factors described above at L4-L5. There is  left lateral recess impingement at L5-S1 produced by combination of disc  extension and protrusion in the left lateral recess with high-grade  facet bossing and hypertrophy.  2. Intramedullary lesion, acute epidural hematoma, cord or cauda equina  entrapment is not identified otherwise.    RECOMMENDATIONS:     1.  The syncopal episode is unlikely to have resulted from a resolving and cervical hemorrhage.  She has no deficit that would suggest progressive changes or focality.  Furthermore the CT/MRI has shown improvement.    2.  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  Furthermore, the family is related to history that she is had studies of the arteries in her lower extremities which are abnormal.    3.  I would recommend vascular surgery evaluation for peripheral vascular disease.    4.  Further evaluation for syncope to include EEG, echocardiogram would be appropriate.    Do not believe that she has a neurosurgical abnormality this time.         I greatly appreciate the opportunity of seeing this patient.  I have reviewed my observations and suggestions with the patient/family.    Richie Pacheco M.D.  [facility]  [phone]

## 2018-03-07 NOTE — PLAN OF CARE
Problem: Patient Care Overview (Adult)  Goal: Plan of Care Review  Outcome: Ongoing (interventions implemented as appropriate)   03/07/18 1850   Outcome Evaluation   Outcome Summary/Follow up Plan VSS, A&O x4, weak, up with assist x1 with PT and to chair/bsc. c/o pain bilat legs after ambulation, MD aware. SR on monitor. SCD on.    Coping/Psychosocial Response Interventions   Plan Of Care Reviewed With patient   Patient Care Overview   Progress no change       Problem: Fall Risk (Adult)  Goal: Identify Related Risk Factors and Signs and Symptoms  Outcome: Outcome(s) achieved Date Met: 03/07/18 03/07/18 1850   Fall Risk   Fall Risk: Related Risk Factors gait/mobility problems;history of falls;environment unfamiliar   Fall Risk: Signs and Symptoms presence of risk factors     Goal: Absence of Falls  Outcome: Ongoing (interventions implemented as appropriate)   03/07/18 1850   Fall Risk (Adult)   Absence of Falls making progress toward outcome       Problem: Seizure Disorder/Epilepsy (Adult)  Goal: Signs and Symptoms of Listed Potential Problems Will be Absent or Manageable (Seizure Disorder/Epilepsy)  Outcome: Ongoing (interventions implemented as appropriate)   03/07/18 1850   Seizure Disorder/Epilepsy   Problems Assessed (Seizure Disorder/Epilepsy) all   Problems Present (Seizure Disorder/Epilepsy) none

## 2018-03-07 NOTE — PLAN OF CARE
Problem: Patient Care Overview (Adult)  Goal: Plan of Care Review  Outcome: Ongoing (interventions implemented as appropriate)   03/07/18 1126   Outcome Evaluation   Outcome Summary/Follow up Plan PT franny completed. Pt presents with weakness, impaired balance, difficulty walking, and decreased independence with t/f's per PLOF. Pt amb 200ft with CGA x2, slow, unsteady gait. PT recommends d/c to acute rehab.   Coping/Psychosocial Response Interventions   Plan Of Care Reviewed With patient   Patient Care Overview   Progress progress toward functional goals as expected       Problem: Inpatient Physical Therapy  Goal: Transfer Training Goal 1 LTG- PT  Outcome: Ongoing (interventions implemented as appropriate)   03/07/18 1126   Transfer Training PT LTG   Transfer Training PT LTG, Date Established 03/07/18   Transfer Training PT LTG, Time to Achieve 2 wks   Transfer Training PT LTG, Activity Type bed to chair /chair to bed;sit to stand/stand to sit   Transfer Training PT LTG, [place] Level conditional independence   Transfer Training PT LTG, Outcome goal ongoing     Goal: Gait Training Goal LTG- PT  Outcome: Ongoing (interventions implemented as appropriate)   03/07/18 1126   Gait Training PT LTG   Gait Training Goal PT LTG, Date Established 03/07/18   Gait Training Goal PT LTG, Time to Achieve 2 wks   Gait Training Goal PT LTG, [place] Level conditional independence   Gait Training Goal PT LTG, Assist Device cane, straight  (or appropriate AD)   Gait Training Goal PT LTG, Distance to Achieve 400   Gait Training Goal PT LTG, Outcome goal ongoing     Goal: Dynamic Standing Balance Goal LTG- PT  Outcome: Ongoing (interventions implemented as appropriate)   03/07/18 1126   Dynamic Standing Balance PT LTG   Dynamic Standing Balance PT LTG, Date Established 03/07/18   Dynamic Standing Balance PT LTG, Time to Achieve 2 wks   Dynamic Standing Balance PT LTG, [place] Level conditional independence   Dynamic  Standing Balance PT LTG, Outcome goal ongoing

## 2018-03-07 NOTE — THERAPY EVALUATION
Acute Care - Physical Therapy Initial Evaluation  [facility]     Patient Name: Chika Posey  : 1945  MRN: [id number]  Today's Date: 3/7/2018   Onset of Illness/Injury or Date of Surgery Date: 18  Date of Referral to PT: 18  Referring Physician: EDMOND Pena      Admit Date: 3/6/2018     Visit Dx:    ICD-10-CM ICD-9-CM   1. 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 Impaired functional mobility, balance, gait, and endurance Z74.09 V49.89     Patient Active Problem List   Diagnosis   • Cerebral hemorrhage   • Headache   • Slurred speech   • Chronic venous embolism and thrombosis of deep vessels of right lower extremity   • PAD (peripheral artery disease)   • HTN (hypertension)   • Chronic anticoagulation   • Paraparesis of both lower limbs   • Pre-syncope   • Acute-on-chronic kidney injury   • Back pain   • Spell of altered cognition   • Generalized weakness   • Muscular weakness   • History of cerebral hemorrhage   • History of DVT (deep vein thrombosis)     Past Medical History:   Diagnosis Date   • Compartment syndrome    • DVT (deep venous thrombosis)    • Hyperlipidemia    • Hypertension    • Stroke     bleed     Past Surgical History:   Procedure Laterality Date   • FOREARM FASCIOTOMY Left    • HYSTERECTOMY     • THROMBOLYSIS     • TUBAL ABDOMINAL LIGATION            PT ASSESSMENT (last 72 hours)      PT Evaluation       18 1023 18 0830    Rehab Evaluation    Document Type evaluation  -SJ     Subjective Information agree to therapy;complains of;pain  -SJ     Patient Effort, Rehab Treatment good  -SJ     Symptoms Noted During/After Treatment increased pain  -SJ     General Information    Patient Profile Review yes  -SJ     Onset of Illness/Injury or Date of Surgery Date 18  -SJ     Referring Physician EDMOND Pena  -SJ     General Observations Pt supine in bed, awake, with family present  -SJ     Pertinent History Of Current Problem 72 y.o.F admitted after  unresponsive event while out shopping after experiencing LBP radiating down both legs. Pt hx incl LUE compartment syndrome, hemmorhagic CVA, RLE weakness, acute LBP  -SJ     Precautions/Limitations fall precautions  -SJ     Prior Level of Function independent:;all household mobility;community mobility;gait;transfer;bed mobility;ADL's;driving  -SJ     Equipment Currently Used at Home cane, straight;walker, rolling  -SJ     Plans/Goals Discussed With patient and family;agreed upon  -SJ     Risks Reviewed patient and family:;LOB;increased discomfort;change in vital signs;lines disloged  -SJ     Benefits Reviewed patient and family:;improve function;increase independence;increase strength;increase balance;decrease pain;increase knowledge  -SJ     Barriers to Rehab previous functional deficit;cognitive status  -SJ     Living Environment    Lives With alone  -SJ     Living Arrangements house  -SJ     Home Accessibility no concerns  -SJ     Living Environment Comment pt can have family assist at d/c if needed. Family states interest in patient going to acute rehab, however pt states she would prefer to go home.  -SJ     Clinical Impression    Date of Referral to PT 03/06/18  -SJ     PT Diagnosis impaired mobility  -SJ     Patient/Family Goals Statement find out source of pain and decrease pain  -SJ     Criteria for Skilled Therapeutic Interventions Met yes;treatment indicated  -SJ     Pathology/Pathophysiology Noted (Describe Specifically for Each System) musculoskeletal  -SJ     Impairments Found (describe specific impairments) gait, locomotion, and balance;arousal, attention, and cognition  -SJ     Functional Limitations in Following Categories (Describe Specific Limitations) self-care;home management;community/leisure  -SJ     Rehab Potential good, to achieve stated therapy goals  -SJ     Predicted Duration of Therapy Intervention (days/wks) 2wks  -SJ     Vital Signs    Pre Systolic BP Rehab 161  -SJ     Pre Treatment  Diastolic BP 75  -SJ     Pretreatment Heart Rate (beats/min) 84  -SJ     Pre SpO2 (%) 95  -SJ     O2 Delivery Pre Treatment room air  -SJ     Pre Patient Position Supine  -SJ     Intra Patient Position Standing  -SJ     Post Patient Position Sitting  -SJ     Pain Assessment    Pain Assessment [place] FACES  -SJ     Pain Score 2  -SJ     Post Pain Score 3  -SJ     Pain Type Acute pain  -     Pain Location Leg  -SJ     Pain Orientation Upper;Right;Left  -SJ     Pain Intervention(s) Repositioned;Ambulation/increased activity  -     Response to Interventions tyrone  -     Cognitive Assessment/Intervention    Current Cognitive/Communication Assessment impaired  -SJ     Orientation Status oriented to;person;place;situation  -SJ     Follows Commands/Answers Questions 100% of the time;able to follow single-step instructions;needs cueing;needs increased time  -     Personal Safety mild impairment;decreased awareness, need for assist;decreased awareness, need for safety;decreased insight to deficits  -     Personal Safety Interventions fall prevention program maintained;gait belt;muscle strengthening facilitated;nonskid shoes/slippers when out of bed  -SJ     ROM (Range of Motion)    General ROM no range of motion deficits identified  -SJ     MMT (Manual Muscle Testing)    General MMT Assessment lower extremity strength deficits identified  -SJ     General MMT Assessment Detail BLE's 4+/5  -SJ     Muscle Tone Assessment    Muscle Tone Assessment  Bilateral Upper Extremities;Bilateral Lower Extremities  -LN    Bilateral Upper Extremities Muscle Tone Assessment  mildly decreased tone  -LN    Bilateral Lower Extremities Muscle Tone Assessment  mildly decreased tone  -LN    Bed Mobility, Assessment/Treatment    Bed Mob, Supine to Sit, [place] supervision required  -SJ     Bed Mobility, Comment setup for lines and extra time needed  -SJ     Transfer Assessment/Treatment    Transfers, Sit-Stand [place] contact  guard assist;2 person assist required;verbal cues required  -SJ     Transfers, Stand-Sit [place] contact guard assist;verbal cues required  -SJ     Transfer, Impairments strength decreased;impaired balance;pain  -SJ     Transfer, Comment extra time needed, cues for safety  -SJ     Gait Assessment/Treatment    Gait, [place] Level contact guard assist;2 person assist required;verbal cues required  -SJ     Gait, Distance (Feet) 200  -SJ     Gait, Gait Pattern Analysis swing-through gait  -SJ     Gait, Gait Deviations ataxia;vy decreased;decreased heel strike;narrow base  -SJ     Gait, Safety Issues step length decreased;balance decreased during turns  -SJ     Gait, Impairments impaired balance;strength decreased;pain  -SJ     Gait, Comment slow speed, limited by pain and weakness  -SJ     Motor Skills/Interventions    Additional Documentation Balance Skills Training (Group)  -SJ     Balance Skills Training    Sitting-Level of Assistance Distant supervision  -SJ     Sitting-Balance Support Feet unsupported  -SJ     Standing-Level of Assistance Contact guard  -SJ     Static Standing Balance Support No upper extremity supported  -SJ     Gait Balance-Level of Assistance Contact guard;x2  -SJ     Gait Balance Support No upper extremity supported  -SJ     Positioning and Restraints    Pre-Treatment Position in bed  -SJ     Post Treatment Position chair  -SJ     In Chair reclined;call light within reach;encouraged to call for assist;exit alarm on;with family/caregiver;heels elevated;waffle cushion  -       03/06/18 2233 03/06/18 1820    General Information    Equipment Currently Used at Home  cane, straight;walker, rolling   pt does not use DME at this time  -KARLO    Living Environment    Lives With  alone   pt resides in [facility]   -KARLO    Living Arrangements  house  -    Type of Financial/Environmental Concern  none  -KARLO    Transportation Available  car;family or friend will provide  -KARLO    Muscle Tone  Assessment    Muscle Tone Assessment Bilateral Upper Extremities;Bilateral Lower Extremities  -DW     Bilateral Upper Extremities Muscle Tone Assessment mildly decreased tone  -DW     Bilateral Lower Extremities Muscle Tone Assessment mildly decreased tone  -DW       User Key  (r) = Recorded By, (t) = Taken By, (c) = Cosigned By    Initials Name Provider Type    OSMAR Rao, PT Physical Therapist    KARLO Qiu     NERI Smart, RN Registered Nurse    GEO Bacon RN Registered Nurse          Physical Therapy Education     Title: PT OT SLP Therapies (Active)     Topic: Physical Therapy (Active)     Point: Mobility training (Active)    Learning Progress Summary    Learner Readiness Method Response Comment Documented by Status   Patient Acceptance E,D NR   03/07/18 1128 Active   Family Acceptance E,D NR   03/07/18 1128 Active               Point: Home exercise program (Active)    Learning Progress Summary    Learner Readiness Method Response Comment Documented by Status   Patient Acceptance E,D NR   03/07/18 1128 Active   Family Acceptance E,D NR   03/07/18 1128 Active               Point: Body mechanics (Active)    Learning Progress Summary    Learner Readiness Method Response Comment Documented by Status   Patient Acceptance E,D NR   03/07/18 1128 Active   Family Acceptance E,D NR   03/07/18 1128 Active               Point: Precautions (Active)    Learning Progress Summary    Learner Readiness Method Response Comment Documented by Status   Patient Acceptance E,D NR   03/07/18 1128 Active   Family Acceptance E,D NR   03/07/18 1128 Active                      User Key     Initials Effective Dates Name Provider Type Discipline     06/19/15 -  Su Rao, PT Physical Therapist PT                PT Recommendation and Plan  Anticipated Equipment Needs At Discharge: standard cane  Anticipated Discharge Disposition: inpatient rehabilitation facility  Planned  Therapy Interventions: balance training, bed mobility training, gait training, home exercise program, patient/family education, strengthening, transfer training  PT Frequency: daily  Plan of Care Review  Plan Of Care Reviewed With: patient  Progress: progress toward functional goals as expected  Outcome Summary/Follow up Plan: PT franny completed. Pt presents with weakness, impaired balance, difficulty walking, and decreased independence with t/f's per PLOF. Pt amb 200ft with CGA x2, slow, unsteady gait. PT recommends d/c to acute rehab.          IP PT Goals       03/07/18 1126          Transfer Training PT LTG    Transfer Training PT LTG, Date Established 03/07/18  -SJ      Transfer Training PT LTG, Time to Achieve 2 wks  -SJ      Transfer Training PT LTG, Activity Type bed to chair /chair to bed;sit to stand/stand to sit  -SJ      Transfer Training PT LTG, [place] Level conditional independence  -SJ      Transfer Training PT LTG, Outcome goal ongoing  -SJ      Gait Training PT LTG    Gait Training Goal PT LTG, Date Established 03/07/18  -SJ      Gait Training Goal PT LTG, Time to Achieve 2 wks  -SJ      Gait Training Goal PT LTG, [place] Level conditional independence  -SJ      Gait Training Goal PT LTG, Assist Device cane, straight   or appropriate AD  -SJ      Gait Training Goal PT LTG, Distance to Achieve 400  -SJ      Gait Training Goal PT LTG, Outcome goal ongoing  -SJ      Dynamic Standing Balance PT LTG    Dynamic Standing Balance PT LTG, Date Established 03/07/18  -SJ      Dynamic Standing Balance PT LTG, Time to Achieve 2 wks  -SJ      Dynamic Standing Balance PT LTG, [place] Level conditional independence  -SJ      Dynamic Standing Balance PT LTG, Outcome goal ongoing  -SJ        User Key  (r) = Recorded By, (t) = Taken By, (c) = Cosigned By    Initials Name Provider Type    OSMAR Rao PT Physical Therapist                Outcome Measures       03/07/18 1023          How much help  from another person do you currently need...    Turning from your back to your side while in flat bed without using bedrails? 4  -SJ      Moving from lying on back to sitting on the side of a flat bed without bedrails? 4  -SJ      Moving to and from a bed to a chair (including a wheelchair)? 3  -SJ      Standing up from a chair using your arms (e.g., wheelchair, bedside chair)? 4  -SJ      Climbing 3-5 steps with a railing? 3  -SJ      To walk in hospital room? 3  -SJ      AM-PAC 6 Clicks Score 21  -SJ      Functional Assessment    Outcome Measure Options AM-PAC 6 Clicks Basic Mobility (PT)  -        User Key  (r) = Recorded By, (t) = Taken By, (c) = Cosigned By    Initials Name Provider Type    OSMAR Rao PT Physical Therapist           Time Calculation:         PT Charges       03/07/18 1129          Time Calculation    Start Time 1023  -SJ      PT Received On 03/07/18  -      PT Goal Re-Cert Due Date 03/17/18  -        User Key  (r) = Recorded By, (t) = Taken By, (c) = Cosigned By    Initials Name Provider Type     Su Rao PT Physical Therapist          Therapy Charges for Today     Code Description Service Date Service Provider Modifiers Qty    74325586326 HC PT EVAL MOD COMPLEXITY 4 3/7/2018 Su Rao, PT GP 1    67162118965 HC PT THER SUPP EA 15 MIN 3/7/2018 Su Rao, PT GP 2          PT G-Codes  Outcome Measure Options: AM-PAC 6 Clicks Basic Mobility (PT)      Su Rao PT  3/7/2018

## 2018-03-07 NOTE — PLAN OF CARE
Problem: Patient Care Overview (Adult)  Goal: Plan of Care Review  Outcome: Ongoing (interventions implemented as appropriate)  Pt arrived to unit with elevated BP. Pt alert and oriented. On room air. Seizure precautions maintained throughout shift. On NS at 50 ml/hr. No complaints overnight. Appeared to rest well.    Problem: Seizure Disorder/Epilepsy (Adult)  Goal: Signs and Symptoms of Listed Potential Problems Will be Absent or Manageable (Seizure Disorder/Epilepsy)  Outcome: Ongoing (interventions implemented as appropriate)

## 2018-03-07 NOTE — CONSULTS
CTS Consult  Chika Posey  [id number]  1945    Patient Care Team:  Alyssia Greenfield MD as PCP - General (Internal Medicine)  Alyssia Greenfield MD as Referring Physician (Internal Medicine)    CC: My legs hurt when I walk      Reason for Consult:  claudication both lower extremities    HPI: 72-year-old -American female being seen at the request of Dr. Cartwright for evaluation of claudication.  This patient has a complex past history.  Several years ago she underwent aortobifemoral bypass at [facility] for occlusion of the abdominal aorta.  She did very well for an extended period of time but last year was admitted to the [facility] with occlusion of her aortobifemoral bypass graft.  It was felt at the time that she should not be reoperated on with an open procedure.  Via the left brachial artery the patient was treated with TPA.  She had a complication of hemorrhage into the left arm she also developed renal insufficiency and had a prolonged hospital course but eventually was discharged doing reasonably well.  She was placed on Coumadin at that time to keep the graft open.  Unfortunately she had a subarachnoid bleed and the Coumadin needed to be stopped.  Now for the past several weeks she has had extreme claudication.  She's active up and around but states that if she walks more than 20 feet or so she gets severe pain in her buttocks that eventually moves down toward the calves.  She's gotten to the point that she has difficulty walking.  This was going to be evaluated as an outpatient over the next few weeks however yesterday the patient had a syncopal episode and was brought to the emergency room at [facility] and was admitted.  She has never had ulcers on her feet.  She has normal sensation in her feet and normal motor function.  She is a lifelong nonsmoker.  She denies diabetes.  She has hyperlipidemia and hypertension along with a positive family  history of hypertension.  She has previously had deep venous thrombosis bilaterally    Principal Problem:    Pre-syncope  Active Problems:    Chronic venous embolism and thrombosis of deep vessels of right lower extremity    PAD (peripheral artery disease)    HTN (hypertension)    Acute-on-chronic kidney injury    Back pain    Spell of altered cognition    Muscular weakness    History of cerebral hemorrhage    History of DVT (deep vein thrombosis)      Review of Systems:  General: No anorexia, no weight loss, general malaise and weakness.  No fever chills or night sweats.  HEENT: No headaches no visual changes no hearing loss no rhinitis no pharyngitis  Pulmonary: No shortness of breath, no cough, no hemoptysis  Heart: No palpitations,  No malaise or shortness of breath, no atrial fibrillation, no bradycardia, no syncope.  No anginal quality chest pain.  Gastrointestinal: No nausea, vomiting, diarrhea, or constipation. No acholic stool, no jaundice.  Renal: No dysuria, no frequency, no hematuria.  Skin: No rash, no skin lesions, no skin tumors.  Neurologic: No seizures, no muscle weakness, (+) syncopal episode, no sensory deficit, no amaurosis,  Psychiatric: No anxiety, no history of psychosis  Hematologic: No bleeding history, no ease of bruising, no history of blood disorder.  Extremities: Claudication when walking more than 15 or 20 feet.  Claudication starts in the buttocks    History  Past Medical History:   Diagnosis Date   • Compartment syndrome    • DVT (deep venous thrombosis)    • Hyperlipidemia    • Hypertension    • Stroke     bleed     Past Surgical History:   Procedure Laterality Date   • FOREARM FASCIOTOMY Left    • HYSTERECTOMY     • THROMBOLYSIS     • TUBAL ABDOMINAL LIGATION       History reviewed. No pertinent family history.  Social History   Substance Use Topics   • Smoking status: Never Smoker   • Smokeless tobacco: Never Used   • Alcohol use No     Prescriptions Prior to Admission   Medication  Sig Dispense Refill Last Dose   • amLODIPine (NORVASC) 10 MG tablet Take 5 mg by mouth Daily.   3/5/2018 at Unknown time   • aspirin 81 MG EC tablet Take 81 mg by mouth Daily.   3/6/2018 at Unknown time   • atorvastatin (LIPITOR) 40 MG tablet Take 40 mg by mouth Daily.   3/5/2018 at Unknown time   • cetirizine (zyrTEC) 10 MG tablet Take 10 mg by mouth Daily.   3/5/2018 at Unknown time   • CloNIDine (CATAPRES) 0.1 MG tablet Take 0.1 mg by mouth Daily.   3/6/2018 at Unknown time   • furosemide (LASIX) 20 MG tablet Take 10 mg by mouth Daily.   3/6/2018 at Unknown time   • losartan (COZAAR) 100 MG tablet Take 100 mg by mouth Daily.   3/6/2018 at Unknown time   • potassium chloride (K-DUR,KLOR-CON) 20 MEQ CR tablet Take 20 mEq by mouth Daily.   3/5/2018 at Unknown time     Allergies:  Morphine and related and Plavix [clopidogrel bisulfate]    Objective    Vital Signs  Temp:  [98 °F (36.7 °C)-98.2 °F (36.8 °C)] 98 °F (36.7 °C)  Heart Rate:  [76-91] 78  Resp:  [18] 18  BP: (134-178)/(67-91) 161/75    Physical Exam:  General Appearance: alert, appears stated age and cooperative  Head: normocephalic, without obvious abnormality and atraumatic  Throat: gums healthy and no oral lesions  Neck: no adenopathy, suppple, trachea midline, no thyromegaly, no carotid bruit and no JVD  Lungs: clear to auscultation, respirations regular, respirations even and respirations unlabored  Heart: regular rhythm & normal rate, normal S1, S2, no murmur  Abdomen: normal bowel sounds, no masses, soft, non-tender  Extremities: moves extremities well and no edema  Pulses: pulses not palpable, Doppler equal bilaterally (femoral, DP, PT) monophasic signals  Skin: no bleeding, bruising or rash  Neurologic: mental status orientated to person, place, time and situation, CN intact, no motor or sensory loss          Data Review:    Results from last 7 days  Lab Units 03/07/18  0524   WBC 10*3/mm3 5.84   HEMOGLOBIN g/dL 10.8*   HEMATOCRIT % 34.1*    PLATELETS 10*3/mm3 318       Results from last 7 days  Lab Units 03/07/18  0524   SODIUM mmol/L 142   POTASSIUM mmol/L 3.6   CHLORIDE mmol/L 109   CO2 mmol/L 25.0   BUN mg/dL 18   CREATININE mg/dL 1.20   GLUCOSE mg/dL 107*   CALCIUM mg/dL 8.7     Coagulation: No results found for: INR, APTT  Cardiac markers:     ABGs:       Invalid input(s): PO2, PCO2      Imaging Results (last 72 hours)     Procedure Component Value Units Date/Time    MRI Cervical Spine Without Contrast [[id number]] Collected:  03/06/18 1825     Updated:  03/06/18 2029    Narrative:       EXAM:  MR Cervical Spine Without Intravenous Contrast    EXAM DATE/TIME:  3/6/2018 6:25 PM    CLINICAL HISTORY:  72 years old, female; Paraplegia, unspecified; Signs and   symptoms; Weakness and other: Ataxia; Patient HX: Ataxia, stroke suspected as   etiology C/O abnormal neurologic symptoms. Three weeks ago the patient had a   hemorrhagic CVA while on warfarin. She has had a speech deficit since that   time. Six days ago her speech began worsening, prompting her to present to the   ed four days ago. At that time she had a negative workup, including an   ultrasound of her right lower extremity to evaluate her C/O distal right lower   extremity pain. She was discharged home with no medication changes. Around 1230   today she suddenly developed lower extremity weakness, which caused difficulty   ambulating. At he ed she is able to provide a history and C/O slight nausea and   difficulty urinating. No surgeries on back no HX of cancer    TECHNIQUE:  Magnetic resonance images of the cervical spine without intravenous   contrast in multiple planes.    COMPARISON:  No relevant prior studies available.    FINDINGS:    Vertebrae:  Slight degenerative C3 anterior subluxation.  Cervical   straightening with loss of the normal cervical lordosis.  No acute fracture.    Normal vertebral body height.  Multilevel spondylosis with degenerative   endplate spurring and osteophyte  formation.    Interspaces:  Multilevel disc desiccation and posterior disc bulging.    Spinal cord:  No acute abnormality.  Grossly normal cord caliber and cord   signal.    Soft tissues:  No significant soft tissue abnormalities.     DISCS/SPINAL CANAL/NEURAL FORAMINA:    C2-C3:  C2-3 mild bulging without significant spinal or foraminal stenosis.    C3-C4:  C3-4 mild diffuse disc bulging without significant spinal or   foraminal stenosis.    C4-C5:  C4-5 broad-based posterior disc bulging with indentation of the   ventral thecal sac, abutment of the ventral cord and mild to moderate spinal   and bilateral foraminal stenosis left greater than right.    C5-C6:  C5-6 diffuse posterior disc bulging with indentation of the ventral   thecal sac, mild spinal stenosis and moderate bilateral foraminal stenosis left   greater than right.    C6-C7:  C6-7 diffuse disc bulging with mild spinal stenosis and moderate left   foraminal stenosis.    C7-T1:  Small left foraminal or extraforaminal perineural cysts C7-T1 and   T1-2.  C7-T1 slight disc bulging without significant spinal or foraminal   stenosis.    Upper thoracic spine:  T1-2 slight posterior disc bulging without significant   spinal or foraminal stenosis.      Impression:       1.  No acute abnormality demonstrated.  2.  Multilevel spondylosis and degenerative changes as described  3.  Small left foraminal or extraforaminal perineural cysts C7-T1 and T1-2.    THIS DOCUMENT HAS BEEN ELECTRONICALLY SIGNED BY SARI MULTANI JR. MD    MRI Thoracic Spine Without Contrast [[id number]] Collected:  03/06/18 1825     Updated:  03/06/18 2037    Narrative:       EXAM:  MR Thoracic Spine Without Intravenous Contrast    EXAM DATE/TIME:  3/6/2018 6:25 PM    CLINICAL HISTORY:  72 years old, female; Paraplegia, unspecified; Signs and   symptoms; Weakness; Patient HX: T/l-spine trauma, significant injury suspected,   neurologic deficits ataxia, stroke suspected as etiology C/O abnormal    neurologic symptoms. Three weeks ago the patient had a hemorrhagic CVA while on   warfarin. She has had a speech deficit since that time. Six days ago her speech   began worsening, prompting her to present to the ed four days ago. At that time   she had a negative workup, including an ultrasound of her right lower extremity   to evaluate her C/O distal right lower extremity pain. She was discharged home   with no medication changes. Around 1230 today she suddenly developed lower   extremity weakness, which caused difficulty ambulating. At he ed she is able to   provide a history and C/O slight nausea and difficulty urinating. No surgeries   on back no HX of cancer    TECHNIQUE:  Magnetic resonance images of the thoracic spine without intravenous   contrast in multiple planes.    COMPARISON:  No relevant prior studies available.    FINDINGS:    Vertebrae:  Grossly normal thoracic alignment.  No subluxation.  No acute   fracture.  Normal vertebral body height.  Mild to moderate lower thoracic and   spondylosis with degenerative endplate spurring.    Marrow:  Incidental small T8 vertebral body hemangioma.    Discs/spinal canal/neural foramina:  Multilevel bilateral foraminal small   perineural cysts.  No focal disc herniation.  No significant spinal stenosis.    Spinal cord:  Unremarkable.  Normal cord caliber and cord signal.    Soft tissues:  No significant soft tissue abnormalities.      Impression:       1.  No acute abnormality demonstrated.  2.  Lower thoracic spondylosis and degenerative changes as described.  3.  Multilevel bilateral foraminal small perineural cysts.    THIS DOCUMENT HAS BEEN ELECTRONICALLY SIGNED BY SARI MULTANI JR. MD    MRI Lumbar Spine Without Contrast [[id number]] Collected:  03/07/18 1020     Updated:  03/07/18 1227    Narrative:       EXAMINATION: MRI LUMBAR SPINE WO CONTRAST-     INDICATION: Acute paraplegia.      TECHNIQUE: MR datasets of the lumbar spine were performed  without  intravenous contrast.     COMPARISON: None.     FINDINGS:   1. The patient has a substantial levoscoliosis of the lumbar spine.     2. Conus medullaris and cauda equina are intact without high-grade  entrapment or mass. Significant epidural hematoma is not identified.     3. By piecemeal examination considering the scoliosis, there is no acute  lumbar fracture. Paraspinal mass is not identified.     4. There is no marrow edema or marrow tumor.     5. The transaxial datasets are somewhat malaligned because of the  scoliosis.     There is a disc protrusion at multiple levels.     Specifically, at the L2-L3 level, there is a broad-based disc protrusion  which appears to embarrassed the right lateral recess with marked  stenosis and extension to the right lateral recess significantly,  compounded by the scoliosis.     At the L3-L4 level, there is a broad-based disc protrusion compressing  the central dural sac and narrowing the right lateral recess more than  the right lateral recess producing moderate impingement in the right  lateral recess at the 3-4 level.     The dominant finding is at L4-L5 where there is a large soft disc  protrusion in combination with facet arthropathy and hypertrophy and  ligamentum flaval hypertrophy.  This produces moderately severe central  impingement stenosis and bilateral high-grade lateral recess stenosis.  This is an impressive abnormal impingement level, however, this is  certainly not an acute event.     At L5-S1, there is a broad-based disc protrusion extending leftward to  the left lateral recess producing high-grade stenosis. This spares the  central dural sac.       Impression:       1. Multilevel disc impingements are noted as described, most severe on  the right at L2-L3, bilaterally in the lateral recesses worse anterior  rightward on the dural sac and in the right lateral recess at L3-L4, and  high-grade central and lateral recess critical impingement is  noted  produced by a combination of factors described above at L4-L5. There is  left lateral recess impingement at L5-S1 produced by combination of disc  extension and protrusion in the left lateral recess with high-grade  facet bossing and hypertrophy.  2. Intramedullary lesion, acute epidural hematoma, cord or cauda equina  entrapment is not identified otherwise.     D:  03/06/2018  E:  03/07/2018         This report was finalized on 3/7/2018 12:25 PM by Dr. Adams Grajeda MD.       MRI Brain Without Contrast [[id number]] Collected:  03/07/18 1011     Updated:  03/07/18 1227    Narrative:       EXAMINATION: MRI BRAIN WO CONTRAST-03/06/2018:     INDICATION: Acute paraplegia, change in neurologic status.     TECHNIQUE:  MR data sets of the brain were performed without intravenous  contrast.     COMPARISON:  Comparison is made to previous MR data sets of 02/14/2018.     FINDINGS:   1. There is an ovoid mass which is well delineated in the posterior left  temporal and left parietal region near the trigonal region on the left.  This mass exhibits mixed signal but is primarily high signal now on the  T2 data sets and intensely high signal on T1 data sets. On previous  comparative MR data sets of the brain, it was isointense and  intermediate in signal on T1 and had intermediate low signal on T2. This  has the appearance of organizing intra-axial left cerebral hematoma. It  has not changed in overall size or configuration and continues to  exhibit a mixed signal pattern internally. There is some mild reactive  edema and gliosis around this area of mass within the left posterior  temporal and lower parietal region.  2. There is small vessel microangiopathy diffusely. There is  periventricular abnormal increased white matter signal and there is  abnormal signal in the forceps major and forceps minor.  3. T1 data sets demonstrate that this mass lesion is now quite increased  in signal in its perimeter with central low  signal, likely indicative of  organizing products of hemorrhage. Although it remains abnormal on the  diffusion weighted sequence, there is no evidence of new restricted  diffusion to indicate an evolving infarct elsewhere. Further, on the T1  data sets, there is no evidence of hemorrhage elsewhere or a new  recurrent hemorrhage in the brain outside of the previous insult.       Impression:       1.  There is a mass in the left posterior temporal region in the left  lower parietal area surrounded by edema and increased FLAIR and T2  signal indicative of some residual edema and reactive gliosis. The mass  is now mixed signal and is high signal on both T2 and T1 indicating  organizing intra-axial blood clot. Otherwise, the size of the mass has  not increased or changed nor has it improved or decreased.  2.  No other areas of acute insult are noted. There is no restricted  diffusion elsewhere in the brain. There is no edema, mass effect or  evidence of distant hemorrhage.  3.  The patient has a background pattern of substantial microangiopathy  and abnormal increased FLAIR white matter signal globally and diffusely.  These findings were noted on previous exams.     D:  03/06/2018  E:  03/07/2018            This report was finalized on 3/7/2018 12:25 PM by Dr. Adams Grajeda MD.       CT Head Without Contrast [[id number]] Collected:  03/07/18 0804     Updated:  03/07/18 1228    Narrative:       EXAMINATION: CT HEAD WO CONTRAST-      INDICATION: Decreased alertness; slurred speech.     TECHNIQUE: CT datasets of the brain were performed without intravenous  contrast.     The radiation dose reduction device was turned on for each scan per the  ALARA (As Low as Reasonably Achievable) protocol.     COMPARISON: Compared to previous CT datasets of 4 days ago.     FINDINGS:   1. The left posterior temporal and parietal intraaxial hemorrhage is  again noted, less in overall size and density and slightly improved over  4 days.  2.  The edema and mass effect is also decreased.  There is only a trace  left to right midline shift which is substantially improved.  3. Evidence of new hemorrhage is not identified. The foramen magnum is  clear. Although there is low-attenuation surrounding the intraaxial clot  in the left posterior parietal and temporal region, and although there  is considerable low-attenuation throughout the periventricular white  matter, there is no evidence of new ischemic insult, mass, edema or new  area of hemorrhage. There is no extracerebral collection or midline  shift of significance.  4. Calvarium is intact. The paranasal sinuses and mastoids are clear.       Impression:       1. Improvement is noted in the intraaxial hemorrhage in the left  posterior temporal and lower parietal region surrounded by edema and  low-attenuation. Although there is still blood pigment in the region, it  is less dense and slightly decreased in size. The edema and mass effect  previously seen with a slight left to right midline shift is also  improved. There is low attenuation diffusely in the white matter.  Otherwise, there is no evidence of new hemorrhage, increasing edema,  increasing mass effect, or new acute finding superimposed upon slightly  improved previous abnormalities identified 4 days ago.  2. The datasets were complete at 1340 hours and the report is being  rendered to the emergency department at 1355 hours.     D:  03/06/2018  E:  03/07/2018        This report was finalized on 3/7/2018 12:26 PM by Dr. Adams Grajeda MD.               Imaging:none        Assessment:claudication secondary to aortic occlusion        Plan:  Angiography to assess possible ax-bifemoral bypass. I have reviewed, verified, and confirmed the above history and current status.  I have examined the patient and confirmed the above physical findings.Above plan and treatment regimen discussed in detail with patient.  Options of treatment, attendant risks vs  benefits, and my recommendations were discussed and all questions answered.      Bruce Ceballos MD  03/07/18  3:12 PM

## 2018-03-07 NOTE — PROGRESS NOTES
[facility] Medicine Services  PROGRESS NOTE    Patient Name: Chika Posey  : 1945  MRN: [id number]    Date of Admission: 3/6/2018  Length of Stay: 1  Primary Care Physician: Alyssia Greenfield MD    Subjective   Subjective     CC:  Syncope:  Started with leg pain, then lost consciousness.  Dtrs lowered her to a chair. Continued to be confused and sleepy for some time.     HPI:  Currently the bottoms of her feet are numb.   Some pain down left flank.  Leg pain increased whenshe walked with PT  No dizziness, chest pain or dyspnea   History supplied by patient, dtrs and bedside, and sister by phone    Review of Systems   As above    Otherwise ROS is negative except as mentioned in the HPI.    Objective   Objective     Vital Signs:   Temp:  [98 °F (36.7 °C)-98.2 °F (36.8 °C)] 98 °F (36.7 °C)  Heart Rate:  [76-91] 78  Resp:  [18] 18  BP: (126-178)/(67-91) 161/75  Total (NIH Stroke Scale): 5     Physical Exam:  Gen:  WD/WN lady in NAD in chair.  Pleasant, chatty, some memory defects but a fair historian.  Family helpful.   Neuro: alert and oriented, clear speech, follows commands, grossly nonfocal  HEENT:  NC/AT PERRL, OP benign  Neck:  Supple, no LAD  Heart RRR no murmur, rub, or gallop  Lungs CTA nonlabored.   Abd:  Soft, nontender, no rebound or guarding, pos BS  Extrem:  No c/c/e.  Could not palpate pulses in feet.  Feet warm.        Results Reviewed:  I have personally reviewed current lab, radiology, and data and agree.      Results from last 7 days  Lab Units 18  0524 18  1450 18  1047   WBC 10*3/mm3 5.84 6.68 8.10   HEMOGLOBIN g/dL 10.8* 12.0 12.3   HEMATOCRIT % 34.1* 37.9 38.8   PLATELETS 10*3/mm3 318 301 326       Results from last 7 days  Lab Units 18  0524 18  1450 18  1047   SODIUM mmol/L 142 139 136   POTASSIUM mmol/L 3.6 3.9 4.5   CHLORIDE mmol/L 109 102 102   CO2 mmol/L 25.0 28.0 25.0   BUN mg/dL 18 18 30*   CREATININE mg/dL 1.20  1.40* 1.60*   GLUCOSE mg/dL 107* 176* 105*   CALCIUM mg/dL 8.7 9.8 9.4   ALT (SGPT) U/L  --   --  24   AST (SGOT) U/L  --   --  25     Estimated Creatinine Clearance: 41.8 mL/min (by C-G formula based on Cr of 1.2).  No results found for: BNP  No results found for: PHART    Microbiology Results Abnormal     None          Imaging Results (last 24 hours)     Procedure Component Value Units Date/Time    MRI Cervical Spine Without Contrast [[id number]] Collected:  03/06/18 1825     Updated:  03/06/18 2029    Narrative:       EXAM:  MR Cervical Spine Without Intravenous Contrast    EXAM DATE/TIME:  3/6/2018 6:25 PM    CLINICAL HISTORY:  72 years old, female; Paraplegia, unspecified; Signs and   symptoms; Weakness and other: Ataxia; Patient HX: Ataxia, stroke suspected as   etiology C/O abnormal neurologic symptoms. Three weeks ago the patient had a   hemorrhagic CVA while on warfarin. She has had a speech deficit since that   time. Six days ago her speech began worsening, prompting her to present to the   ed four days ago. At that time she had a negative workup, including an   ultrasound of her right lower extremity to evaluate her C/O distal right lower   extremity pain. She was discharged home with no medication changes. Around 1230   today she suddenly developed lower extremity weakness, which caused difficulty   ambulating. At he ed she is able to provide a history and C/O slight nausea and   difficulty urinating. No surgeries on back no HX of cancer    TECHNIQUE:  Magnetic resonance images of the cervical spine without intravenous   contrast in multiple planes.    COMPARISON:  No relevant prior studies available.    FINDINGS:    Vertebrae:  Slight degenerative C3 anterior subluxation.  Cervical   straightening with loss of the normal cervical lordosis.  No acute fracture.    Normal vertebral body height.  Multilevel spondylosis with degenerative   endplate spurring and osteophyte formation.    Interspaces:   Multilevel disc desiccation and posterior disc bulging.    Spinal cord:  No acute abnormality.  Grossly normal cord caliber and cord   signal.    Soft tissues:  No significant soft tissue abnormalities.     DISCS/SPINAL CANAL/NEURAL FORAMINA:    C2-C3:  C2-3 mild bulging without significant spinal or foraminal stenosis.    C3-C4:  C3-4 mild diffuse disc bulging without significant spinal or   foraminal stenosis.    C4-C5:  C4-5 broad-based posterior disc bulging with indentation of the   ventral thecal sac, abutment of the ventral cord and mild to moderate spinal   and bilateral foraminal stenosis left greater than right.    C5-C6:  C5-6 diffuse posterior disc bulging with indentation of the ventral   thecal sac, mild spinal stenosis and moderate bilateral foraminal stenosis left   greater than right.    C6-C7:  C6-7 diffuse disc bulging with mild spinal stenosis and moderate left   foraminal stenosis.    C7-T1:  Small left foraminal or extraforaminal perineural cysts C7-T1 and   T1-2.  C7-T1 slight disc bulging without significant spinal or foraminal   stenosis.    Upper thoracic spine:  T1-2 slight posterior disc bulging without significant   spinal or foraminal stenosis.      Impression:       1.  No acute abnormality demonstrated.  2.  Multilevel spondylosis and degenerative changes as described  3.  Small left foraminal or extraforaminal perineural cysts C7-T1 and T1-2.    THIS DOCUMENT HAS BEEN ELECTRONICALLY SIGNED BY SARI MULTANI JR. MD    MRI Thoracic Spine Without Contrast [[id number]] Collected:  03/06/18 1825     Updated:  03/06/18 2037    Narrative:       EXAM:  MR Thoracic Spine Without Intravenous Contrast    EXAM DATE/TIME:  3/6/2018 6:25 PM    CLINICAL HISTORY:  72 years old, female; Paraplegia, unspecified; Signs and   symptoms; Weakness; Patient HX: T/l-spine trauma, significant injury suspected,   neurologic deficits ataxia, stroke suspected as etiology C/O abnormal   neurologic symptoms. Three  weeks ago the patient had a hemorrhagic CVA while on   warfarin. She has had a speech deficit since that time. Six days ago her speech   began worsening, prompting her to present to the ed four days ago. At that time   she had a negative workup, including an ultrasound of her right lower extremity   to evaluate her C/O distal right lower extremity pain. She was discharged home   with no medication changes. Around 1230 today she suddenly developed lower   extremity weakness, which caused difficulty ambulating. At he ed she is able to   provide a history and C/O slight nausea and difficulty urinating. No surgeries   on back no HX of cancer    TECHNIQUE:  Magnetic resonance images of the thoracic spine without intravenous   contrast in multiple planes.    COMPARISON:  No relevant prior studies available.    FINDINGS:    Vertebrae:  Grossly normal thoracic alignment.  No subluxation.  No acute   fracture.  Normal vertebral body height.  Mild to moderate lower thoracic and   spondylosis with degenerative endplate spurring.    Marrow:  Incidental small T8 vertebral body hemangioma.    Discs/spinal canal/neural foramina:  Multilevel bilateral foraminal small   perineural cysts.  No focal disc herniation.  No significant spinal stenosis.    Spinal cord:  Unremarkable.  Normal cord caliber and cord signal.    Soft tissues:  No significant soft tissue abnormalities.      Impression:       1.  No acute abnormality demonstrated.  2.  Lower thoracic spondylosis and degenerative changes as described.  3.  Multilevel bilateral foraminal small perineural cysts.    THIS DOCUMENT HAS BEEN ELECTRONICALLY SIGNED BY SARI MULTANI JR. MD    MRI Lumbar Spine Without Contrast [[id number]] Collected:  03/07/18 1020     Updated:  03/07/18 1227    Narrative:       EXAMINATION: MRI LUMBAR SPINE WO CONTRAST-     INDICATION: Acute paraplegia.      TECHNIQUE: MR datasets of the lumbar spine were performed without  intravenous contrast.      COMPARISON: None.     FINDINGS:   1. The patient has a substantial levoscoliosis of the lumbar spine.     2. Conus medullaris and cauda equina are intact without high-grade  entrapment or mass. Significant epidural hematoma is not identified.     3. By piecemeal examination considering the scoliosis, there is no acute  lumbar fracture. Paraspinal mass is not identified.     4. There is no marrow edema or marrow tumor.     5. The transaxial datasets are somewhat malaligned because of the  scoliosis.     There is a disc protrusion at multiple levels.     Specifically, at the L2-L3 level, there is a broad-based disc protrusion  which appears to embarrassed the right lateral recess with marked  stenosis and extension to the right lateral recess significantly,  compounded by the scoliosis.     At the L3-L4 level, there is a broad-based disc protrusion compressing  the central dural sac and narrowing the right lateral recess more than  the right lateral recess producing moderate impingement in the right  lateral recess at the 3-4 level.     The dominant finding is at L4-L5 where there is a large soft disc  protrusion in combination with facet arthropathy and hypertrophy and  ligamentum flaval hypertrophy.  This produces moderately severe central  impingement stenosis and bilateral high-grade lateral recess stenosis.  This is an impressive abnormal impingement level, however, this is  certainly not an acute event.     At L5-S1, there is a broad-based disc protrusion extending leftward to  the left lateral recess producing high-grade stenosis. This spares the  central dural sac.       Impression:       1. Multilevel disc impingements are noted as described, most severe on  the right at L2-L3, bilaterally in the lateral recesses worse anterior  rightward on the dural sac and in the right lateral recess at L3-L4, and  high-grade central and lateral recess critical impingement is noted  produced by a combination of factors  described above at L4-L5. There is  left lateral recess impingement at L5-S1 produced by combination of disc  extension and protrusion in the left lateral recess with high-grade  facet bossing and hypertrophy.  2. Intramedullary lesion, acute epidural hematoma, cord or cauda equina  entrapment is not identified otherwise.     D:  03/06/2018  E:  03/07/2018         This report was finalized on 3/7/2018 12:25 PM by Dr. Adams Grajeda MD.       MRI Brain Without Contrast [[id number]] Collected:  03/07/18 1011     Updated:  03/07/18 1227    Narrative:       EXAMINATION: MRI BRAIN WO CONTRAST-03/06/2018:     INDICATION: Acute paraplegia, change in neurologic status.     TECHNIQUE:  MR data sets of the brain were performed without intravenous  contrast.     COMPARISON:  Comparison is made to previous MR data sets of 02/14/2018.     FINDINGS:   1. There is an ovoid mass which is well delineated in the posterior left  temporal and left parietal region near the trigonal region on the left.  This mass exhibits mixed signal but is primarily high signal now on the  T2 data sets and intensely high signal on T1 data sets. On previous  comparative MR data sets of the brain, it was isointense and  intermediate in signal on T1 and had intermediate low signal on T2. This  has the appearance of organizing intra-axial left cerebral hematoma. It  has not changed in overall size or configuration and continues to  exhibit a mixed signal pattern internally. There is some mild reactive  edema and gliosis around this area of mass within the left posterior  temporal and lower parietal region.  2. There is small vessel microangiopathy diffusely. There is  periventricular abnormal increased white matter signal and there is  abnormal signal in the forceps major and forceps minor.  3. T1 data sets demonstrate that this mass lesion is now quite increased  in signal in its perimeter with central low signal, likely indicative of  organizing products  of hemorrhage. Although it remains abnormal on the  diffusion weighted sequence, there is no evidence of new restricted  diffusion to indicate an evolving infarct elsewhere. Further, on the T1  data sets, there is no evidence of hemorrhage elsewhere or a new  recurrent hemorrhage in the brain outside of the previous insult.       Impression:       1.  There is a mass in the left posterior temporal region in the left  lower parietal area surrounded by edema and increased FLAIR and T2  signal indicative of some residual edema and reactive gliosis. The mass  is now mixed signal and is high signal on both T2 and T1 indicating  organizing intra-axial blood clot. Otherwise, the size of the mass has  not increased or changed nor has it improved or decreased.  2.  No other areas of acute insult are noted. There is no restricted  diffusion elsewhere in the brain. There is no edema, mass effect or  evidence of distant hemorrhage.  3.  The patient has a background pattern of substantial microangiopathy  and abnormal increased FLAIR white matter signal globally and diffusely.  These findings were noted on previous exams.     D:  03/06/2018  E:  03/07/2018            This report was finalized on 3/7/2018 12:25 PM by Dr. Adams Grajeda MD.       CT Head Without Contrast [[id number]] Collected:  03/07/18 0804     Updated:  03/07/18 1228    Narrative:       EXAMINATION: CT HEAD WO CONTRAST-      INDICATION: Decreased alertness; slurred speech.     TECHNIQUE: CT datasets of the brain were performed without intravenous  contrast.     The radiation dose reduction device was turned on for each scan per the  ALARA (As Low as Reasonably Achievable) protocol.     COMPARISON: Compared to previous CT datasets of 4 days ago.     FINDINGS:   1. The left posterior temporal and parietal intraaxial hemorrhage is  again noted, less in overall size and density and slightly improved over  4 days.  2. The edema and mass effect is also decreased.   There is only a trace  left to right midline shift which is substantially improved.  3. Evidence of new hemorrhage is not identified. The foramen magnum is  clear. Although there is low-attenuation surrounding the intraaxial clot  in the left posterior parietal and temporal region, and although there  is considerable low-attenuation throughout the periventricular white  matter, there is no evidence of new ischemic insult, mass, edema or new  area of hemorrhage. There is no extracerebral collection or midline  shift of significance.  4. Calvarium is intact. The paranasal sinuses and mastoids are clear.       Impression:       1. Improvement is noted in the intraaxial hemorrhage in the left  posterior temporal and lower parietal region surrounded by edema and  low-attenuation. Although there is still blood pigment in the region, it  is less dense and slightly decreased in size. The edema and mass effect  previously seen with a slight left to right midline shift is also  improved. There is low attenuation diffusely in the white matter.  Otherwise, there is no evidence of new hemorrhage, increasing edema,  increasing mass effect, or new acute finding superimposed upon slightly  improved previous abnormalities identified 4 days ago.  2. The datasets were complete at 1340 hours and the report is being  rendered to the emergency department at 1355 hours.     D:  03/06/2018  E:  03/07/2018        This report was finalized on 3/7/2018 12:26 PM by Dr. Adams Grajeda MD.                I have reviewed the medications.    Assessment/Plan   Assessment / Plan     Hospital Problem List     * (Principal)Pre-syncope    Chronic venous embolism and thrombosis of deep vessels of right lower extremity    PAD (peripheral artery disease)    HTN (hypertension)    Acute-on-chronic kidney injury    Back pain    Spell of altered cognition    Muscular weakness    History of cerebral hemorrhage    History of DVT (deep vein thrombosis)              Brief Hospital Course to date:  Chika Posey is a 72 y.o. female  with compiated history as follows  - recent IC hemorrhage on coumadin, treated here.  Has had recent low back pain.  Then had spell of syncope while shopping 3/6 and has weankess and LE paresis after episode    Assessment & Plan:  Syncopal event, cause unclear  -- -- EEG - abnl but no epilept spikes  -- neuro consult pending  -- MRI/ CT head with expected findings post hemorrhagic CVA on prelim readings  - L post temp clot unchanged from 3 weeks ago  - check EKG and echo and carotids     Paraparesis BLE with new onset low back and bilat leg pain  -- MRI lumbar shows disc protrusions in L2-3 and L3-4, L4-5, L5-s1   -- these changes are chronic per NSurg, no surgery indicated.   -- suspect claudication, started when warfarin stopped in Feb  -- no longer follows with  Vascular - will get Vasc consult here.  Arterial dopplers noted    MATT/ CKD  -- improving  -- hold lasix and losartan  -- monitor for volume overload     Recent ICH  -- followed by Dr. 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 in ?? Days.       Electronically signed by Nichol Gr MD, 03/07/18, 1:29 PM.

## 2018-03-07 NOTE — THERAPY EVALUATION
Acute Care - Occupational Therapy Initial Evaluation  [facility]     Patient Name: Chika Posey  : 1945  MRN: [id number]  Today's Date: 3/7/2018  Onset of Illness/Injury or Date of Surgery Date: 18  Date of Referral to OT: 18  Referring Physician: EDMOND Pena    Admit Date: 3/6/2018       ICD-10-CM ICD-9-CM   1. 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 Impaired functional mobility, balance, gait, and endurance Z74.09 V49.89   3. Impaired mobility and ADLs Z74.09 799.89     Patient Active Problem List   Diagnosis   • Cerebral hemorrhage   • Headache   • Slurred speech   • Chronic venous embolism and thrombosis of deep vessels of right lower extremity   • PAD (peripheral artery disease)   • HTN (hypertension)   • Chronic anticoagulation   • Paraparesis of both lower limbs   • Pre-syncope   • Acute-on-chronic kidney injury   • Back pain   • Spell of altered cognition   • Generalized weakness   • Muscular weakness   • History of cerebral hemorrhage   • History of DVT (deep vein thrombosis)     Past Medical History:   Diagnosis Date   • Compartment syndrome    • DVT (deep venous thrombosis)    • Hyperlipidemia    • Hypertension    • Stroke     bleed     Past Surgical History:   Procedure Laterality Date   • FOREARM FASCIOTOMY Left    • HYSTERECTOMY     • THROMBOLYSIS     • TUBAL ABDOMINAL LIGATION            OT ASSESSMENT FLOWSHEET (last 72 hours)      OT Evaluation       18 1455 18 1200 18 1023 18 0830 18 2233    Rehab Evaluation    Document Type evaluation  -AC  evaluation  -SJ      Subjective Information agree to therapy  -AC  agree to therapy;complains of;pain  -SJ      Patient Effort, Rehab Treatment good  -AC  good  -SJ      Symptoms Noted During/After Treatment   increased pain  -SJ      General Information    Patient Profile Review yes  -AC  yes  -SJ      Onset of Illness/Injury or Date of Surgery Date 18  -AC  18  -SJ      Referring  Physician EDMOND Pena  -EDMOND Hendrickson  -OSMAR      General Observations Pt received supine in bed.  IV intact  -AC  Pt supine in bed, awake, with family present  -SJ      Pertinent History Of Current Problem 72 y.o.F admitted after unresponsive event while out shopping after experiencing LBP radiating down both legs. Pt hx incl LUE compartment syndrome, hemmorhagic CVA, RLE weakness, acute LBP  -AC  72 y.o.F admitted after unresponsive event while out shopping after experiencing LBP radiating down both legs. Pt hx incl LUE compartment syndrome, hemmorhagic CVA, RLE weakness, acute LBP  -SJ      Precautions/Limitations fall precautions  -AC  fall precautions  -SJ      Prior Level of Function independent:;all household mobility;ADL's;home management;driving  -AC  independent:;all household mobility;community mobility;gait;transfer;bed mobility;ADL's;driving  -SJ      Equipment Currently Used at Home cane, straight;walker, rolling  -AC  cane, straight;walker, rolling  -SJ      Plans/Goals Discussed With patient;agreed upon  -AC  patient and family;agreed upon  -      Risks Reviewed patient:;LOB  -AC  patient and family:;LOB;increased discomfort;change in vital signs;lines disloged  -SJ      Benefits Reviewed patient:;improve function;increase independence;increase balance;increase knowledge  -AC  patient and family:;improve function;increase independence;increase strength;increase balance;decrease pain;increase knowledge  -SJ      Barriers to Rehab   previous functional deficit;cognitive status  -SJ      Living Environment    Lives With alone  -AC  alone  -SJ      Living Arrangements house  -AC  house  -SJ      Home Accessibility stairs to enter home   walk in shower  -  no concerns  -SJ      Number of Stairs to Enter Home 1  -AC        Living Environment Comment   pt can have family assist at d/c if needed. Family states interest in patient going to acute rehab, however pt states she would prefer to go home.  -SJ       Clinical Impression    Date of Referral to OT 03/06/18  -AC        OT Diagnosis ADL decline  -AC        Patient/Family Goals Statement increase ADL indpendence  -AC        Criteria for Skilled Therapeutic Interventions Met yes;treatment indicated  -AC        Rehab Potential good, to achieve stated therapy goals  -AC        Therapy Frequency daily  -AC        Anticipated Discharge Disposition skilled nursing facility   for rehab  -AC        Vital Signs    Pre Systolic BP Rehab 130  -AC  161  -SJ      Pre Treatment Diastolic BP 86  -AC  75  -SJ      Post Systolic BP Rehab 143  -AC        Post Treatment Diastolic BP 83  -AC        Pretreatment Heart Rate (beats/min)   84  -SJ      Pre SpO2 (%)   95  -SJ      O2 Delivery Pre Treatment   room air  -SJ      Pre Patient Position   Supine  -SJ      Intra Patient Position   Standing  -SJ      Post Patient Position   Sitting  -SJ      Pain Assessment    Pain Assessment Dan-Phelan FACES  -AC  Dan-Phelan FACES  -SJ      Dan-Phelan FACES Pain Rating 2  -AC        Pain Score 1  -AC  2  -SJ      Post Pain Score 1  -AC  3  -SJ      Pain Type   Acute pain  -SJ      Pain Location Foot  -AC  Leg  -SJ      Pain Orientation Right;Left  -AC  Upper;Right;Left  -SJ      Pain Intervention(s) Repositioned  -AC  Repositioned;Ambulation/increased activity  -SJ      Response to Interventions tolerated  -AC  tyrone  -SJ      Vision Assessment/Intervention    Visual Impairment WFL with corrective lenses  -AC        Cognitive Assessment/Intervention    Current Cognitive/Communication Assessment functional  -AC  impaired  -SJ      Orientation Status oriented to;person;place   knew year, but not month  -AC  oriented to;person;place;situation  -SJ      Follows Commands/Answers Questions 100% of the time  -AC  100% of the time;able to follow single-step instructions;needs cueing;needs increased time  -SJ      Personal Safety mild impairment  -AC  mild impairment;decreased awareness, need for  assist;decreased awareness, need for safety;decreased insight to deficits  -      Personal Safety Interventions nonskid shoes/slippers when out of bed  -AC  fall prevention program maintained;gait belt;muscle strengthening facilitated;nonskid shoes/slippers when out of bed  -SJ      ROM (Range of Motion)    General ROM no range of motion deficits identified   UE  -AC  no range of motion deficits identified  -SJ      MMT (Manual Muscle Testing)    General MMT Assessment upper extremity strength deficits identified   BUE grossly 4/5  -AC  lower extremity strength deficits identified  -SJ      General MMT Assessment Detail   BLE's 4+/5  -SJ      Muscle Tone Assessment    Muscle Tone Assessment  Bilateral Upper Extremities;Bilateral Lower Extremities  -LN  Bilateral Upper Extremities;Bilateral Lower Extremities  -LN Bilateral Upper Extremities;Bilateral Lower Extremities  -DW    Bilateral Upper Extremities Muscle Tone Assessment  mildly decreased tone  -LN  mildly decreased tone  -LN mildly decreased tone  -DW    Bilateral Lower Extremities Muscle Tone Assessment  mildly decreased tone  -LN  mildly decreased tone  -LN mildly decreased tone  -DW    Bed Mobility, Assessment/Treatment    Bed Mobility, Assistive Device bed rails;head of bed elevated  -AC        Bed Mob, Supine to Sit, [place] supervision required  -  supervision required  -      Bed Mob, Sit to Supine, [place] verbal cues required;minimum assist (75% patient effort)  -        Bed Mobility, Comment   setup for lines and extra time needed  -      Transfer Assessment/Treatment    Transfers, Sit-Stand [place] verbal cues required;contact guard assist  -  contact guard assist;2 person assist required;verbal cues required  -SJ      Transfers, Stand-Sit [place] verbal cues required;supervision required  -  contact guard assist;verbal cues required  -      Transfers, Sit-Stand-Sit, Assist Device rolling walker  -         Transfer, Impairments strength decreased  -  strength decreased;impaired balance;pain  -SJ      Transfer, Comment   extra time needed, cues for safety  -      Functional Mobility    Functional Mobility- Ind. Level contact guard assist  -AC        Functional Mobility- Device rolling walker  -AC        Functional Mobility-Distance (Feet) 4  -AC        Functional Mobility- Comment sidestepped toward HOB  -        Motor Skills/Interventions    Additional Documentation Balance Skills Training (Group)  -  Balance Skills Training (Group)  -      Balance Skills Training    Sitting-Level of Assistance Distant supervision  -  Distant supervision  -      Sitting-Balance Support Feet unsupported  -  Feet unsupported  -SJ      Standing-Level of Assistance Contact guard  -AC  Contact guard  -SJ      Static Standing Balance Support assistive device  -AC  No upper extremity supported  -SJ      Gait Balance-Level of Assistance   Contact guard;x2  -SJ      Gait Balance Support   No upper extremity supported  -SJ      General Therapy Interventions    Planned Therapy Interventions adaptive equipment training;ADL retraining;balance training;bed mobility training;strengthening;transfer training  -        Positioning and Restraints    Pre-Treatment Position in bed  -AC  in bed  -      Post Treatment Position bed  -  chair  -      In Bed supine;call light within reach;encouraged to call for assist;exit alarm on  -        In Chair   reclined;call light within reach;encouraged to call for assist;exit alarm on;with family/caregiver;heels elevated;waffle cushion  -        03/06/18 2116 03/06/18 1825 03/06/18 1823          General Information    Equipment Currently Used at Home  cane, straight;walker, rolling   pt does not use DME at this time  -KARLO       Living Environment    Lives With  alone   pt resides in [facility]   -       Living Arrangements  house  -       Type of Financial/Environmental Concern   none  -KARLO       Transportation Available  car;family or friend will provide  -KARLO       Functional Level Prior    Ambulation 0-->independent  -SD  0-->independent  -KARLO      Transferring 0-->independent  -SD  0-->independent  -KARLO      Toileting 0-->independent  -SD  0-->independent  -KARLO      Bathing 0-->independent  -SD  0-->independent  -KARLO      Dressing 0-->independent  -SD  0-->independent  -KARLO      Eating 0-->independent  -SD  0-->independent  -KARLO      Communication 0-->understands/communicates without difficulty  -SD  0-->understands/communicates without difficulty  -KARLO      Swallowing 0-->swallows foods/liquids without difficulty  -SD  0-->swallows foods/liquids without difficulty  -KARLO      Prior Functional Level Comment independent  -SD          User Key  (r) = Recorded By, (t) = Taken By, (c) = Cosigned By    Initials Name Effective Dates     Bre Swanson OT 06/23/15 -     SJ Su Rao, PT 06/19/15 -     KARLO Judith Qiu 05/02/16 -     SD Debbie Cornell, RN 06/23/17 -     DW Mojgan Smart, [facility] 07/31/17 -     LN Genny Bacon, [facility] 10/16/17 -            Occupational Therapy Education     Title: PT OT SLP Therapies (Active)     Topic: Occupational Therapy (Active)     Point: ADL training (Done)    Description: Instruct learner(s) on proper safety adaptation and remediation techniques during self care or transfers.   Instruct in proper use of assistive devices.    Learning Progress Summary    Learner Readiness Method Response Comment Documented by Status   Patient Acceptance E VU Role of OT, benefits of activity  03/07/18 1532 Done                      User Key     Initials Effective Dates Name Provider Type Discipline     06/23/15 -  Bre Swanson OT Occupational Therapist OT                  OT Recommendation and Plan  Anticipated Discharge Disposition: skilled nursing facility (for rehab)  Planned Therapy Interventions: adaptive equipment training, ADL retraining, balance training, bed mobility  training, strengthening, transfer training  Therapy Frequency: daily  Plan of Care Review  Plan Of Care Reviewed With: patient  Progress: progress toward functional goals as expected  Outcome Summary/Follow up Plan: OT franny complete.  Pt presents with weakness, decreased balance and decreased ADL independence.  OT will follow to advance pt toward PLOF with self care.  Recommend SNF for rehab  upon d/c prior to return home.           OT Goals       03/07/18 1533          Transfer Training OT LTG    Transfer Training OT LTG, Date Established 03/07/18  -AC      Transfer Training OT LTG, Time to Achieve by discharge  -AC      Transfer Training OT LTG, Activity Type toilet;bed to chair /chair to bed  -AC      Transfer Training OT LTG, [place] Level supervision required  -AC      Transfer Training OT LTG, Assist Device walker, rolling  -AC      Toileting OT LTG    Toileting Goal OT LTG, Date Established 03/07/18  -AC      Toileting Goal OT LTG, Time to Achieve by discharge  -AC      Toileting Goal OT LTG, [place] Level set up required  -AC      LB Dressing OT LTG    LB Dressing Goal OT LTG, Date Established 03/07/18  -AC      LB Dressing Goal OT LTG, Time to Achieve by discharge  -AC      LB Dressing Goal OT LTG, [place] Level set up required;supervision required  -AC      LB Dressing Goal OT LTG, Adaptive Equipment --   AE prn  -AC      Functional Mobility OT LTG    Functional Mobility Goal OT LTG, Date Established 03/07/18  -AC      Functional Mobility Goal OT LTG, Time to Achieve by discharge  -AC      Functional Mobility Goal OT LTG, [place] Level contact guard  -AC      Functional Mobility Goal OT LTG, Assist Device rolling walker  -AC      Functional Mobility Goal OT LTG, Distance to Achieve to the bathroom  -AC        User Key  (r) = Recorded By, (t) = Taken By, (c) = Cosigned By    Initials Name Provider Type    HERBIE Swanson, OT Occupational Therapist                Outcome Measures        03/07/18 1455 03/07/18 1023       How much help from another person do you currently need...    Turning from your back to your side while in flat bed without using bedrails?  4  -SJ     Moving from lying on back to sitting on the side of a flat bed without bedrails?  4  -SJ     Moving to and from a bed to a chair (including a wheelchair)?  3  -SJ     Standing up from a chair using your arms (e.g., wheelchair, bedside chair)?  4  -SJ     Climbing 3-5 steps with a railing?  3  -SJ     To walk in hospital room?  3  -SJ     AM-PAC 6 Clicks Score  21  -SJ     How much help from another is currently needed...    Putting on and taking off regular lower body clothing? 2  -AC      Bathing (including washing, rinsing, and drying) 2  -AC      Toileting (which includes using toilet bed pan or urinal) 3  -AC      Putting on and taking off regular upper body clothing 3  -AC      Taking care of personal grooming (such as brushing teeth) 4  -AC      Eating meals 4  -AC      Score 18  -AC      Functional Assessment    Outcome Measure Options AM-PAC 6 Clicks Daily Activity (OT)  -AC AM-PAC 6 Clicks Basic Mobility (PT)  -SJ       User Key  (r) = Recorded By, (t) = Taken By, (c) = Cosigned By    Initials Name Provider Type    AC Bre Swanson OT Occupational Therapist    OSMAR Rao PT Physical Therapist          Time Calculation:   OT Start Time: 1455    Therapy Charges for Today     Code Description Service Date Service Provider Modifiers Qty    [id number]  OT EVAL LOW COMPLEXITY 4 3/7/2018 Bre Swanson OT GO 1               Bre Swanson OT  3/7/2018

## 2018-03-07 NOTE — SIGNIFICANT NOTE
03/07/18 0929   SLP Deferred Reason   SLP Deferred Reason Routine  (Pt eating breakfast tray, no concerns noted. Pt passed RN screen, on regular and thins. Pt also confirmed she was baseline w/ speech/langauge/cognition. Declined further SLP eval 2' baseline status. ST signing off.  )

## 2018-03-08 ENCOUNTER — APPOINTMENT (OUTPATIENT)
Dept: CARDIOLOGY | Facility: HOSPITAL | Age: 73
End: 2018-03-08
Attending: INTERNAL MEDICINE

## 2018-03-08 ENCOUNTER — APPOINTMENT (OUTPATIENT)
Dept: NEUROLOGY | Facility: HOSPITAL | Age: 73
End: 2018-03-08
Attending: PSYCHIATRY & NEUROLOGY

## 2018-03-08 ENCOUNTER — APPOINTMENT (OUTPATIENT)
Dept: CT IMAGING | Facility: HOSPITAL | Age: 73
End: 2018-03-08

## 2018-03-08 LAB
ANION GAP SERPL CALCULATED.3IONS-SCNC: 4 MMOL/L (ref 3–11)
BH CV ECHO MEAS - AO ROOT AREA (BSA CORRECTED): 1.5
BH CV ECHO MEAS - AO ROOT AREA: 5 CM^2
BH CV ECHO MEAS - AO ROOT DIAM: 2.5 CM
BH CV ECHO MEAS - BSA(HAYCOCK): 1.7 M^2
BH CV ECHO MEAS - BSA(HAYCOCK): 1.7 M^2
BH CV ECHO MEAS - BSA: 1.7 M^2
BH CV ECHO MEAS - BSA: 1.7 M^2
BH CV ECHO MEAS - BZI_BMI: 20.8 KILOGRAMS/M^2
BH CV ECHO MEAS - BZI_BMI: 20.8 KILOGRAMS/M^2
BH CV ECHO MEAS - BZI_METRIC_HEIGHT: 170.2 CM
BH CV ECHO MEAS - BZI_METRIC_HEIGHT: 170.2 CM
BH CV ECHO MEAS - BZI_METRIC_WEIGHT: 60.3 KG
BH CV ECHO MEAS - BZI_METRIC_WEIGHT: 60.3 KG
BH CV ECHO MEAS - CONTRAST EF (2CH): 47.2 ML/M^2
BH CV ECHO MEAS - CONTRAST EF 4CH: 55.3 ML/M^2
BH CV ECHO MEAS - EDV(CUBED): 52.1 ML
BH CV ECHO MEAS - EDV(MOD-SP2): 36 ML
BH CV ECHO MEAS - EDV(MOD-SP4): 47 ML
BH CV ECHO MEAS - EDV(TEICH): 59.4 ML
BH CV ECHO MEAS - EF(CUBED): 81.5 %
BH CV ECHO MEAS - EF(MOD-SP2): 47.2 %
BH CV ECHO MEAS - EF(MOD-SP4): 55.3 %
BH CV ECHO MEAS - EF(TEICH): 75 %
BH CV ECHO MEAS - ESV(CUBED): 9.6 ML
BH CV ECHO MEAS - ESV(MOD-SP2): 19 ML
BH CV ECHO MEAS - ESV(MOD-SP4): 21 ML
BH CV ECHO MEAS - ESV(TEICH): 14.9 ML
BH CV ECHO MEAS - FS: 43 %
BH CV ECHO MEAS - IVS/LVPW: 0.89
BH CV ECHO MEAS - IVSD: 0.9 CM
BH CV ECHO MEAS - LA DIMENSION: 3 CM
BH CV ECHO MEAS - LA/AO: 1.2
BH CV ECHO MEAS - LAT PEAK E' VEL: 4.8 CM/SEC
BH CV ECHO MEAS - LV DIASTOLIC VOL/BSA (35-75): 27.6 ML/M^2
BH CV ECHO MEAS - LV MASS(C)D: 107.7 GRAMS
BH CV ECHO MEAS - LV MASS(C)DI: 63.4 GRAMS/M^2
BH CV ECHO MEAS - LV SYSTOLIC VOL/BSA (12-30): 12.4 ML/M^2
BH CV ECHO MEAS - LVIDD: 3.7 CM
BH CV ECHO MEAS - LVIDS: 2.1 CM
BH CV ECHO MEAS - LVLD AP2: 7 CM
BH CV ECHO MEAS - LVLD AP4: 6.8 CM
BH CV ECHO MEAS - LVLS AP2: 5.7 CM
BH CV ECHO MEAS - LVLS AP4: 5.1 CM
BH CV ECHO MEAS - LVPWD: 1 CM
BH CV ECHO MEAS - MED PEAK E' VEL: 6.3 CM/SEC
BH CV ECHO MEAS - MV A MAX VEL: 71.1 CM/SEC
BH CV ECHO MEAS - MV E MAX VEL: 37.5 CM/SEC
BH CV ECHO MEAS - MV E/A: 0.53
BH CV ECHO MEAS - PA ACC SLOPE: 373 CM/SEC^2
BH CV ECHO MEAS - PA ACC TIME: 0.2 SEC
BH CV ECHO MEAS - PA PR(ACCEL): -9.7 MMHG
BH CV ECHO MEAS - SI(CUBED): 25 ML/M^2
BH CV ECHO MEAS - SI(MOD-SP2): 10 ML/M^2
BH CV ECHO MEAS - SI(MOD-SP4): 15.3 ML/M^2
BH CV ECHO MEAS - SI(TEICH): 26.2 ML/M^2
BH CV ECHO MEAS - SV(CUBED): 42.4 ML
BH CV ECHO MEAS - SV(MOD-SP2): 17 ML
BH CV ECHO MEAS - SV(MOD-SP4): 26 ML
BH CV ECHO MEAS - SV(TEICH): 44.5 ML
BH CV ECHO MEAS - TAPSE (>1.6): 1.1 CM2
BH CV VAS BP RIGHT ARM: NORMAL MMHG
BH CV XLRA - RV BASE: 2.8 CM
BH CV XLRA - RV LENGTH: 6.2 CM
BH CV XLRA - RV MID: 2.1 CM
BH CV XLRA - TDI S': 9.2 CM/SEC
BH CV XLRA MEAS LEFT CCA RATIO VEL: 77.8 CM/SEC
BH CV XLRA MEAS LEFT DIST CCA EDV: 22.8 CM/SEC
BH CV XLRA MEAS LEFT DIST CCA PSV: 92.7 CM/SEC
BH CV XLRA MEAS LEFT DIST ICA EDV: 33 CM/SEC
BH CV XLRA MEAS LEFT DIST ICA PSV: 91.9 CM/SEC
BH CV XLRA MEAS LEFT ICA RATIO VEL: 181 CM/SEC
BH CV XLRA MEAS LEFT ICA/CCA RATIO: 2.3
BH CV XLRA MEAS LEFT MID CCA EDV: 16.5 CM/SEC
BH CV XLRA MEAS LEFT MID CCA PSV: 78.6 CM/SEC
BH CV XLRA MEAS LEFT MID ICA EDV: 24.4 CM/SEC
BH CV XLRA MEAS LEFT MID ICA PSV: 89.6 CM/SEC
BH CV XLRA MEAS LEFT PROX CCA EDV: 16.7 CM/SEC
BH CV XLRA MEAS LEFT PROX CCA PSV: 96.3 CM/SEC
BH CV XLRA MEAS LEFT PROX ECA PSV: 88.6 CM/SEC
BH CV XLRA MEAS LEFT PROX ICA EDV: 51.9 CM/SEC
BH CV XLRA MEAS LEFT PROX ICA PSV: 182.4 CM/SEC
BH CV XLRA MEAS LEFT PROX SCLA PSV: 171.9 CM/SEC
BH CV XLRA MEAS LEFT VERTEBRAL A PSV: 57.9 CM/SEC
BH CV XLRA MEAS RIGHT CCA RATIO VEL: 81.7 CM/SEC
BH CV XLRA MEAS RIGHT DIST CCA EDV: 20.3 CM/SEC
BH CV XLRA MEAS RIGHT DIST CCA PSV: 84.5 CM/SEC
BH CV XLRA MEAS RIGHT ICA RATIO VEL: 105 CM/SEC
BH CV XLRA MEAS RIGHT ICA/CCA RATIO: 1.3
BH CV XLRA MEAS RIGHT MID CCA EDV: 14 CM/SEC
BH CV XLRA MEAS RIGHT MID CCA PSV: 82.4 CM/SEC
BH CV XLRA MEAS RIGHT MID ICA EDV: 22 CM/SEC
BH CV XLRA MEAS RIGHT MID ICA PSV: 68.4 CM/SEC
BH CV XLRA MEAS RIGHT PROX CCA EDV: 11.2 CM/SEC
BH CV XLRA MEAS RIGHT PROX CCA PSV: 67 CM/SEC
BH CV XLRA MEAS RIGHT PROX ECA PSV: 157.9 CM/SEC
BH CV XLRA MEAS RIGHT PROX ICA EDV: 25.1 CM/SEC
BH CV XLRA MEAS RIGHT PROX ICA PSV: 106.1 CM/SEC
BH CV XLRA MEAS RIGHT PROX SCLA PSV: 224.5 CM/SEC
BH CV XLRA MEAS RIGHT VERTEBRAL A PSV: 38.4 CM/SEC
BUN BLD-MCNC: 13 MG/DL (ref 9–23)
BUN/CREAT SERPL: 13 (ref 7–25)
CALCIUM SPEC-SCNC: 8.7 MG/DL (ref 8.7–10.4)
CHLORIDE SERPL-SCNC: 110 MMOL/L (ref 99–109)
CO2 SERPL-SCNC: 24 MMOL/L (ref 20–31)
CREAT BLD-MCNC: 1 MG/DL (ref 0.6–1.3)
DEPRECATED RDW RBC AUTO: 47.7 FL (ref 37–54)
ERYTHROCYTE [DISTWIDTH] IN BLOOD BY AUTOMATED COUNT: 15.4 % (ref 11.3–14.5)
GFR SERPL CREATININE-BSD FRML MDRD: 55 ML/MIN/1.73
GFR SERPL CREATININE-BSD FRML MDRD: 66 ML/MIN/1.73
GLUCOSE BLD-MCNC: 114 MG/DL (ref 70–100)
HCT VFR BLD AUTO: 35.2 % (ref 34.5–44)
HGB BLD-MCNC: 11.1 G/DL (ref 11.5–15.5)
LV EF 2D ECHO EST: 72 %
MAXIMAL PREDICTED HEART RATE: 150 BPM
MCH RBC QN AUTO: 26.7 PG (ref 27–31)
MCHC RBC AUTO-ENTMCNC: 31.5 G/DL (ref 32–36)
MCV RBC AUTO: 84.6 FL (ref 80–99)
PLATELET # BLD AUTO: 316 10*3/MM3 (ref 150–450)
PMV BLD AUTO: 9.2 FL (ref 6–12)
POTASSIUM BLD-SCNC: 3.9 MMOL/L (ref 3.5–5.5)
RBC # BLD AUTO: 4.16 10*6/MM3 (ref 3.89–5.14)
RIGHT ARM BP: NORMAL MMHG
SODIUM BLD-SCNC: 138 MMOL/L (ref 132–146)
STRESS TARGET HR: 128 BPM
WBC NRBC COR # BLD: 5.78 10*3/MM3 (ref 3.5–10.8)

## 2018-03-08 PROCEDURE — 85027 COMPLETE CBC AUTOMATED: CPT | Performed by: INTERNAL MEDICINE

## 2018-03-08 PROCEDURE — 0 IOPAMIDOL PER 1 ML: Performed by: INTERNAL MEDICINE

## 2018-03-08 PROCEDURE — G0378 HOSPITAL OBSERVATION PER HR: HCPCS

## 2018-03-08 PROCEDURE — 93880 EXTRACRANIAL BILAT STUDY: CPT | Performed by: INTERNAL MEDICINE

## 2018-03-08 PROCEDURE — 95886 MUSC TEST DONE W/N TEST COMP: CPT

## 2018-03-08 PROCEDURE — 95910 NRV CNDJ TEST 7-8 STUDIES: CPT

## 2018-03-08 PROCEDURE — 99231 SBSQ HOSP IP/OBS SF/LOW 25: CPT | Performed by: PHYSICIAN ASSISTANT

## 2018-03-08 PROCEDURE — 93880 EXTRACRANIAL BILAT STUDY: CPT

## 2018-03-08 PROCEDURE — 97110 THERAPEUTIC EXERCISES: CPT

## 2018-03-08 PROCEDURE — 99225 PR SBSQ OBSERVATION CARE/DAY 25 MINUTES: CPT | Performed by: INTERNAL MEDICINE

## 2018-03-08 PROCEDURE — 71275 CT ANGIOGRAPHY CHEST: CPT

## 2018-03-08 PROCEDURE — 99212 OFFICE O/P EST SF 10 MIN: CPT | Performed by: PSYCHIATRY & NEUROLOGY

## 2018-03-08 PROCEDURE — 97116 GAIT TRAINING THERAPY: CPT

## 2018-03-08 PROCEDURE — 80048 BASIC METABOLIC PNL TOTAL CA: CPT | Performed by: INTERNAL MEDICINE

## 2018-03-08 PROCEDURE — 75635 CT ANGIO ABDOMINAL ARTERIES: CPT

## 2018-03-08 RX ADMIN — CLONIDINE HYDROCHLORIDE 0.1 MG: 0.1 TABLET ORAL at 09:35

## 2018-03-08 RX ADMIN — AMLODIPINE BESYLATE 5 MG: 5 TABLET ORAL at 21:05

## 2018-03-08 RX ADMIN — ATORVASTATIN CALCIUM 40 MG: 40 TABLET, FILM COATED ORAL at 09:35

## 2018-03-08 RX ADMIN — LOSARTAN POTASSIUM 50 MG: 50 TABLET ORAL at 09:33

## 2018-03-08 RX ADMIN — ASPIRIN 81 MG: 81 TABLET, COATED ORAL at 09:35

## 2018-03-08 RX ADMIN — IOPAMIDOL 125 ML: 755 INJECTION, SOLUTION INTRAVENOUS at 10:35

## 2018-03-08 NOTE — PROGRESS NOTES
[facility] Medicine Services  PROGRESS NOTE    Patient Name: Chika Posey  : 1945  MRN: [id number]    Date of Admission: 3/6/2018  Length of Stay: 2  Primary Care Physician: Alyssia Greenfield MD    Subjective   Subjective     CC:  Syncope:  Started with leg pain, then lost consciousness.  Dtrs lowered her to a chair. Continued to be confused and sleepy for some time.     HPI:  Legs are about the same.  Walked a little.  Not much appetite.  No new complaints  No chest pain or dyspnea  No dizziness today    Review of Systems   As above  Notes that she had a benign mass resected left neck remotely.    Otherwise ROS is negative except as mentioned in the HPI.    Objective   Objective     Vital Signs:   Temp:  [98.8 °F (37.1 °C)-99 °F (37.2 °C)] 99 °F (37.2 °C)  Heart Rate:  [71-97] 82  Resp:  [14-18] 18  BP: (123-173)/(74-91) 123/76  Total (NIH Stroke Scale): 4     Physical Exam:  Gen:  WD/WN lady in NAD in bed.  Alert and pleasant.  No family today  Neuro: alert and oriented, clear speech, follows commands, grossly nonfocal, fair historian  HEENT:  NC/AT PERRL, OP benign  Neck:  Supple, no LAD.  Healed surg scar left neck  Heart RRR no murmur, rub, or gallop  Lungs CTA nonlabored.   Abd:  Soft, nontender, no rebound or guarding, pos BS  Extrem:  No c/c/e.  Could not palpate pulses in feet.  Feet warm.        Results Reviewed:  I have personally reviewed current lab, radiology, and data and agree.      Results from last 7 days  Lab Units 18  0621 18  0524 18  1450   WBC 10*3/mm3 5.78 5.84 6.68   HEMOGLOBIN g/dL 11.1* 10.8* 12.0   HEMATOCRIT % 35.2 34.1* 37.9   PLATELETS 10*3/mm3 316 318 301       Results from last 7 days  Lab Units 18  0621 18  0524 18  1450 18  1047   SODIUM mmol/L 138 142 139 136   POTASSIUM mmol/L 3.9 3.6 3.9 4.5   CHLORIDE mmol/L 110* 109 102 102   CO2 mmol/L 24.0 25.0 28.0 25.0   BUN mg/dL 13 18 18 30*   CREATININE  mg/dL 1.00 1.20 1.40* 1.60*   GLUCOSE mg/dL 114* 107* 176* 105*   CALCIUM mg/dL 8.7 8.7 9.8 9.4   ALT (SGPT) U/L  --   --   --  24   AST (SGOT) U/L  --   --   --  25     Estimated Creatinine Clearance: 50.2 mL/min (by C-G formula based on Cr of 1).  No results found for: BNP  No results found for: PHART    Microbiology Results Abnormal     Procedure Component Value - Date/Time    Urine Culture - Urine, Urine, Clean Catch [[id number]]  (Normal) Collected:  03/06/18 2217    Lab Status:  Preliminary result Specimen:  Urine from Urine, Clean Catch Updated:  03/08/18 0820     Urine Culture Culture in progress          Imaging Results (last 24 hours)     Procedure Component Value Units Date/Time    CT Angio Abdominal Aorta Bilateral Iliofem Runoff With & Without Contrast [[id number]] Collected:  03/08/18 1233     Updated:  03/08/18 1233    Narrative:       EXAMINATION: CT ANGIOGRAM CHEST W WO CONTRAST-, CT ANGIO ABDOMINAL AORTA  AND BILATERAL ILIOFEMORAL RUNOFF W WO CONTRAST-03/08/2018:      INDICATION: Needs ax-fem bypass for ischemic legs; G82.20-Paraplegia,  unspecified; Z74.09-Other reduced mobility; Z74.09-Other reduced  mobility.         TECHNIQUE: Spiral acquisition unenhanced 5 mm axial images through the  chest, abdomen and pelvis, with subsequent 3 mm arterial phase images,  with 2-D and 3-D angiographic reconstructions of the aortoiliac system  and lower extremity arteries.     The radiation dose reduction device was turned on for each scan per the  ALARA (As Low as Reasonably Achievable) protocol.     COMPARISON: NONE.     FINDINGS: The patient history indicates occluded aorta, ischemic  extremities.     Angiographic images of the chest show heavy atherosclerotic  calcification of the aorta, no evidence of aneurysm and no evidence of  intramural hematoma. Postcontrast images show extensive and irregular  calcified and noncalcified thrombus throughout the thoracic aorta, but  with relatively mild luminal  narrowing.     In the abdomen, there is increasing circumferential narrowing of the  aorta by irregular plaque down to the level of the renal arteries, which  appear patent, the left renal origin stented, and narrow beyond the  level of the stent, right renal origin irregular and probably  significantly narrowed. Below the level of the more inferior, left renal  artery, the aorta appears occluded. No contrast is seen in the aorta or  common or external iliac arteries. Internal iliac arteries appear  contrast opacified, and contiguous with the deep femoral system. No  contrast opacification is seen of the hypoplastic, calcified superficial  femoral arteries. Multiple small deep collaterals are seen  reconstituting very small bilateral popliteal arteries, both  approximately 2 mm in diameter. On the right, trifurcation vessels are  all faintly visible to the level of the distal leg, but difficult to  identify at the ankle except for the posterior tibial artery.  On the  left, the peroneal and posterior tibial artery are visible all the way  to the level of the ankle. Anterior tibial is seen to the level of  several centimeters above the ankle. No contrast opacified vessels are  yet seen in the feet, indicating slow or restricted flow.  No evidence of active lung disease is seen. Mediastinal window images  show a few thyroid nodules. There is no evidence of mediastinal  adenopathy or pericardial effusion. There is fatty liver change and  several left lobe liver cysts. There is left renal midpole scarring.  There is a mildly dilated appearing common bile duct, and there are  multiple noncalcified gallstones. Maximal diameter of the common duct is  approximately 8 or 9 mm. No intra-abdominal inflammatory change, ascites  or adenopathy is seen.       Impression:       1.  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 No visible blood flow in the common, external iliac  arteries, or  superficial femoral arteries.   2.  Reconstitution of very small popliteal arteries, patent trifurcation  arteries, with some attenuation of flow above the right and left ankle  as discussed above.  3. Left renal artery stent and probable moderate post-stent stenosis.  Moderate or greater proximal right renal artery stenosis, but no  significant delay in contrast opacification of renal cortex.  4. Multiple gallstones and mildly dilated common bile duct.     D:  03/08/2018  E:  03/08/2018          CT Angiogram Chest With & Without Contrast [[id number]] Collected:  03/08/18 1233     Updated:  03/08/18 1233    Narrative:       EXAMINATION: CT ANGIOGRAM CHEST W WO CONTRAST-, CT ANGIO ABDOMINAL AORTA  AND BILATERAL ILIOFEMORAL RUNOFF W WO CONTRAST-03/08/2018:      INDICATION: Needs ax-fem bypass for ischemic legs; G82.20-Paraplegia,  unspecified; Z74.09-Other reduced mobility; Z74.09-Other reduced  mobility.         TECHNIQUE: Spiral acquisition unenhanced 5 mm axial images through the  chest, abdomen and pelvis, with subsequent 3 mm arterial phase images,  with 2-D and 3-D angiographic reconstructions of the aortoiliac system  and lower extremity arteries.     The radiation dose reduction device was turned on for each scan per the  ALARA (As Low as Reasonably Achievable) protocol.     COMPARISON: NONE.     FINDINGS: The patient history indicates occluded aorta, ischemic  extremities.     Angiographic images of the chest show heavy atherosclerotic  calcification of the aorta, no evidence of aneurysm and no evidence of  intramural hematoma. Postcontrast images show extensive and irregular  calcified and noncalcified thrombus throughout the thoracic aorta, but  with relatively mild luminal narrowing.     In the abdomen, there is increasing circumferential narrowing of the  aorta by irregular plaque down to the level of the renal arteries, which  appear patent, the left renal origin stented, and narrow beyond the  level of  the stent, right renal origin irregular and probably  significantly narrowed. Below the level of the more inferior, left renal  artery, the aorta appears occluded. No contrast is seen in the aorta or  common or external iliac arteries. Internal iliac arteries appear  contrast opacified, and contiguous with the deep femoral system. No  contrast opacification is seen of the hypoplastic, calcified superficial  femoral arteries. Multiple small deep collaterals are seen  reconstituting very small bilateral popliteal arteries, both  approximately 2 mm in diameter. On the right, trifurcation vessels are  all faintly visible to the level of the distal leg, but difficult to  identify at the ankle except for the posterior tibial artery.  On the  left, the peroneal and posterior tibial artery are visible all the way  to the level of the ankle. Anterior tibial is seen to the level of  several centimeters above the ankle. No contrast opacified vessels are  yet seen in the feet, indicating slow or restricted flow.  No evidence of active lung disease is seen. Mediastinal window images  show a few thyroid nodules. There is no evidence of mediastinal  adenopathy or pericardial effusion. There is fatty liver change and  several left lobe liver cysts. There is left renal midpole scarring.  There is a mildly dilated appearing common bile duct, and there are  multiple noncalcified gallstones. Maximal diameter of the common duct is  approximately 8 or 9 mm. No intra-abdominal inflammatory change, ascites  or adenopathy is seen.       Impression:       1.  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 No visible blood flow in the common, external iliac  arteries, or superficial femoral arteries.   2.  Reconstitution of very small popliteal arteries, patent trifurcation  arteries, with some attenuation of flow above the right and left ankle  as discussed above.  3. Left renal artery stent and probable  moderate post-stent stenosis.  Moderate or greater proximal right renal artery stenosis, but no  significant delay in contrast opacification of renal cortex.  4. Multiple gallstones and mildly dilated common bile duct.     D:  03/08/2018  E:  03/08/2018              Results for orders placed during the hospital encounter of 03/06/18   Adult Transthoracic Echo Complete W/ Cont if Necessary Per Protocol    Narrative · Left ventricular systolic function is hyperdynamic (EF > 70).  · Left ventricular diastolic dysfunction (grade I) consistent with   impaired relaxation.  · Normal right ventricular cavity size, wall thickness, systolic function   and septal motion noted.  · Saline test results are negative ( technically difficult and limited   study ).  · There is no evidence of pericardial effusion.  · No evidence of pulmonary hypertension is present.  · No significant structural valvular abnormality demonstrated.          I have reviewed the medications.    Assessment/Plan   Assessment / Plan     Hospital Problem List     * (Principal)Pre-syncope    Chronic venous embolism and thrombosis of deep vessels of right lower extremity    PAD (peripheral artery disease)    HTN (hypertension)    Acute-on-chronic kidney injury    Back pain    Spell of altered cognition    Muscular weakness    History of cerebral hemorrhage    History of DVT (deep vein thrombosis)             Brief Hospital Course to date:  Chika Posey is a 72 y.o. female  with complicated history as follows:  Many years ago, aorto-bifem at [facility].  2017 - bypass clotted off, got TPA at  with subsequent arm bleed, compartment syndrome, fasciotomy.  Was discharged on coumadin and has done well.  2/14//18 - head bleed; coumadin stopped.  Since coumadin stopped, increased bilat leg pain which is worse with ambulation.  3/6 - leg pain with syncope resulting in admission      Assessment & Plan:  Syncopal event, cause unclear  -- EEG - abnl but no epilept  spikes  --  MRI/ CT head with expected findings post hemorrhagic CVA   - carotids pending     Paraparesis BLE with new onset low back and bilat leg pain  -- MRI lumbar shows disc protrusions in L2-3 and L3-4, L4-5, L5-s1   -- spine changes are chronic per NSurg, no surgery indicated.   -- transient weakness was likely from chronic ischemia/pain    MATT/ CKD  -- improving  -- hold lasix and losartan  -- monitor for volume overload     Recent ICH  -- followed by Dr. Hitchcock with upcoming appointment to reassess restarting coumadin  -- off coumadin x 3 weeks  -- continue Asa 81mg  -- Nsurg following     PAD, severe, with claudication after aorto-bifem clotting last year and now off coumadin  -- AAA clotted off below renal arteries  -- CTS considering ax-bifem  -- would like to know NSurg opinion on what/when antiplatelets and anticoags could be restarted, in case of need. ASA for now.  Stable.     DVT prophylaxis:mechanical     CODE STATUS:  Full Code     Disposition: I expect the patient to be discharged ?? in ?? Days.       Electronically signed by Nichol Gr MD, 03/08/18, 2:14 PM.

## 2018-03-08 NOTE — PROGRESS NOTES
FOLLOW UP ENCOUNTER          WORKING DIAGNOSIS:       Vascular occlusive disease: Syncope; degenerative arthritis of the lumbar spine                          MEDICAL HISTORY SINCE LAST ENCOUNTER :       Ask her surgery note appreciated.  She will require angiography or further diagnostic studies.    She denies back pain and does not have symptoms that would suggest that she has neurological issues explaining the symptoms that she has.  She has numbness at rest.    Her examination today shows her strength to be intact and normal.  She is areflexic.  Straight leg raising is negative.                                      ASSESSMENT/DISPOSITION :    .  Await angiography and further studies.  I will continue to follow.

## 2018-03-08 NOTE — PLAN OF CARE
Problem: Patient Care Overview (Adult)  Goal: Plan of Care Review  Outcome: Ongoing (interventions implemented as appropriate)   03/07/18 1850 03/08/18 0443   Outcome Evaluation   Outcome Summary/Follow up Plan --  Pt rested throughout the shift, VSS. Pt ambulating to bathroom with assist x1. C/o tingling in bilateral feet after ambulation.    Coping/Psychosocial Response Interventions   Plan Of Care Reviewed With --  patient   Patient Care Overview   Progress no change --

## 2018-03-08 NOTE — PROGRESS NOTES
CTS Progress Note      POD  s/p        LOS: 2 days   Patient Care Team:  Alyssia Greenfield MD as PCP - General (Internal Medicine)  Alyssia Greenfield MD as Referring Physician (Internal Medicine)    Subjective  Rested well  No C/O    CC: lower extremity claudication    Objective    Vital Signs  Temp:  [98.8 °F (37.1 °C)-99 °F (37.2 °C)] 98.9 °F (37.2 °C)  Heart Rate:  [71-97] 80  Resp:  [14-16] 16  BP: (142-173)/(74-91) 173/91    Physical Exam:   General Appearance: alert, appears stated age and cooperative   Lungs: clear to auscultation, respirations regular, respirations even and respirations unlabored   Heart: regular rhythm & normal rate, normal S1, S2, no murmur, no chip, no rub and no click   Skin: warm, dry     Results     Results from last 7 days  Lab Units 03/08/18  0621   WBC 10*3/mm3 5.78   HEMOGLOBIN g/dL 11.1*   HEMATOCRIT % 35.2   PLATELETS 10*3/mm3 316       Results from last 7 days  Lab Units 03/08/18  0621   SODIUM mmol/L 138   POTASSIUM mmol/L 3.9   CHLORIDE mmol/L 110*   CO2 mmol/L 24.0   BUN mg/dL 13   CREATININE mg/dL 1.00   GLUCOSE mg/dL 114*   CALCIUM mg/dL 8.7           Imaging Results (last 24 hours)     Procedure Component Value Units Date/Time    MRI Lumbar Spine Without Contrast [[id number]] Collected:  03/07/18 1020     Updated:  03/07/18 1227    Narrative:       EXAMINATION: MRI LUMBAR SPINE WO CONTRAST-     INDICATION: Acute paraplegia.      TECHNIQUE: MR datasets of the lumbar spine were performed without  intravenous contrast.     COMPARISON: None.     FINDINGS:   1. The patient has a substantial levoscoliosis of the lumbar spine.     2. Conus medullaris and cauda equina are intact without high-grade  entrapment or mass. Significant epidural hematoma is not identified.     3. By piecemeal examination considering the scoliosis, there is no acute  lumbar fracture. Paraspinal mass is not identified.     4. There is no marrow edema or marrow tumor.     5. The transaxial datasets are  somewhat malaligned because of the  scoliosis.     There is a disc protrusion at multiple levels.     Specifically, at the L2-L3 level, there is a broad-based disc protrusion  which appears to embarrassed the right lateral recess with marked  stenosis and extension to the right lateral recess significantly,  compounded by the scoliosis.     At the L3-L4 level, there is a broad-based disc protrusion compressing  the central dural sac and narrowing the right lateral recess more than  the right lateral recess producing moderate impingement in the right  lateral recess at the 3-4 level.     The dominant finding is at L4-L5 where there is a large soft disc  protrusion in combination with facet arthropathy and hypertrophy and  ligamentum flaval hypertrophy.  This produces moderately severe central  impingement stenosis and bilateral high-grade lateral recess stenosis.  This is an impressive abnormal impingement level, however, this is  certainly not an acute event.     At L5-S1, there is a broad-based disc protrusion extending leftward to  the left lateral recess producing high-grade stenosis. This spares the  central dural sac.       Impression:       1. Multilevel disc impingements are noted as described, most severe on  the right at L2-L3, bilaterally in the lateral recesses worse anterior  rightward on the dural sac and in the right lateral recess at L3-L4, and  high-grade central and lateral recess critical impingement is noted  produced by a combination of factors described above at L4-L5. There is  left lateral recess impingement at L5-S1 produced by combination of disc  extension and protrusion in the left lateral recess with high-grade  facet bossing and hypertrophy.  2. Intramedullary lesion, acute epidural hematoma, cord or cauda equina  entrapment is not identified otherwise.     D:  03/06/2018  E:  03/07/2018         This report was finalized on 3/7/2018 12:25 PM by Dr. Adams Grajeda MD.       MRI Brain  Without Contrast [[id number]] Collected:  03/07/18 1011     Updated:  03/07/18 1227    Narrative:       EXAMINATION: MRI BRAIN WO CONTRAST-03/06/2018:     INDICATION: Acute paraplegia, change in neurologic status.     TECHNIQUE:  MR data sets of the brain were performed without intravenous  contrast.     COMPARISON:  Comparison is made to previous MR data sets of 02/14/2018.     FINDINGS:   1. There is an ovoid mass which is well delineated in the posterior left  temporal and left parietal region near the trigonal region on the left.  This mass exhibits mixed signal but is primarily high signal now on the  T2 data sets and intensely high signal on T1 data sets. On previous  comparative MR data sets of the brain, it was isointense and  intermediate in signal on T1 and had intermediate low signal on T2. This  has the appearance of organizing intra-axial left cerebral hematoma. It  has not changed in overall size or configuration and continues to  exhibit a mixed signal pattern internally. There is some mild reactive  edema and gliosis around this area of mass within the left posterior  temporal and lower parietal region.  2. There is small vessel microangiopathy diffusely. There is  periventricular abnormal increased white matter signal and there is  abnormal signal in the forceps major and forceps minor.  3. T1 data sets demonstrate that this mass lesion is now quite increased  in signal in its perimeter with central low signal, likely indicative of  organizing products of hemorrhage. Although it remains abnormal on the  diffusion weighted sequence, there is no evidence of new restricted  diffusion to indicate an evolving infarct elsewhere. Further, on the T1  data sets, there is no evidence of hemorrhage elsewhere or a new  recurrent hemorrhage in the brain outside of the previous insult.       Impression:       1.  There is a mass in the left posterior temporal region in the left  lower parietal area surrounded by  edema and increased FLAIR and T2  signal indicative of some residual edema and reactive gliosis. The mass  is now mixed signal and is high signal on both T2 and T1 indicating  organizing intra-axial blood clot. Otherwise, the size of the mass has  not increased or changed nor has it improved or decreased.  2.  No other areas of acute insult are noted. There is no restricted  diffusion elsewhere in the brain. There is no edema, mass effect or  evidence of distant hemorrhage.  3.  The patient has a background pattern of substantial microangiopathy  and abnormal increased FLAIR white matter signal globally and diffusely.  These findings were noted on previous exams.     D:  03/06/2018  E:  03/07/2018            This report was finalized on 3/7/2018 12:25 PM by Dr. Adams Grajeda MD.       CT Head Without Contrast [[id number]] Collected:  03/07/18 0804     Updated:  03/07/18 1228    Narrative:       EXAMINATION: CT HEAD WO CONTRAST-      INDICATION: Decreased alertness; slurred speech.     TECHNIQUE: CT datasets of the brain were performed without intravenous  contrast.     The radiation dose reduction device was turned on for each scan per the  ALARA (As Low as Reasonably Achievable) protocol.     COMPARISON: Compared to previous CT datasets of 4 days ago.     FINDINGS:   1. The left posterior temporal and parietal intraaxial hemorrhage is  again noted, less in overall size and density and slightly improved over  4 days.  2. The edema and mass effect is also decreased.  There is only a trace  left to right midline shift which is substantially improved.  3. Evidence of new hemorrhage is not identified. The foramen magnum is  clear. Although there is low-attenuation surrounding the intraaxial clot  in the left posterior parietal and temporal region, and although there  is considerable low-attenuation throughout the periventricular white  matter, there is no evidence of new ischemic insult, mass, edema or new  area of  hemorrhage. There is no extracerebral collection or midline  shift of significance.  4. Calvarium is intact. The paranasal sinuses and mastoids are clear.       Impression:       1. Improvement is noted in the intraaxial hemorrhage in the left  posterior temporal and lower parietal region surrounded by edema and  low-attenuation. Although there is still blood pigment in the region, it  is less dense and slightly decreased in size. The edema and mass effect  previously seen with a slight left to right midline shift is also  improved. There is low attenuation diffusely in the white matter.  Otherwise, there is no evidence of new hemorrhage, increasing edema,  increasing mass effect, or new acute finding superimposed upon slightly  improved previous abnormalities identified 4 days ago.  2. The datasets were complete at 1340 hours and the report is being  rendered to the emergency department at 1355 hours.     D:  03/06/2018  E:  03/07/2018        This report was finalized on 3/7/2018 12:26 PM by Dr. Adasm Grajeda MD.             Assessment    Principal Problem:    Pre-syncope  Active Problems:    Chronic venous embolism and thrombosis of deep vessels of right lower extremity    PAD (peripheral artery disease)    HTN (hypertension)    Acute-on-chronic kidney injury    Back pain    Spell of altered cognition    Muscular weakness    History of cerebral hemorrhage    History of DVT (deep vein thrombosis)        Plan   Await aortogram with runoff today    IVÁN Seaman  03/08/18  8:50 AM    As above.  Aortogram scheduled for this a.m.  Suspect the patient will require an axillobifemoral bypass at some point.I have reviewed, verified, and confirmed the above history and current status.  I have examined the patient and confirmed the above physical findings.    Bruce Ceballos MD  [facility]  03/08/18   09:25 AM

## 2018-03-08 NOTE — THERAPY TREATMENT NOTE
Acute Care - Physical Therapy Treatment Note  [facility]     Patient Name: Chika Posey  : 1945  MRN: [id number]  Today's Date: 3/8/2018  Onset of Illness/Injury or Date of Surgery Date: 18  Date of Referral to PT: 18  Referring Physician: EDMOND Pena    Admit Date: 3/6/2018    Visit Dx:    ICD-10-CM ICD-9-CM   1. 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 Impaired functional mobility, balance, gait, and endurance Z74.09 V49.89   3. Impaired mobility and ADLs Z74.09 799.89     Patient Active Problem List   Diagnosis   • Cerebral hemorrhage   • Headache   • Slurred speech   • Chronic venous embolism and thrombosis of deep vessels of right lower extremity   • PAD (peripheral artery disease)   • HTN (hypertension)   • Chronic anticoagulation   • Paraparesis of both lower limbs   • Pre-syncope   • Acute-on-chronic kidney injury   • Back pain   • Spell of altered cognition   • Generalized weakness   • Muscular weakness   • History of cerebral hemorrhage   • History of DVT (deep vein thrombosis)               Adult Rehabilitation Note       18 1109          Rehab Assessment/Intervention    Discipline physical therapist  -KM      Document Type therapy note (daily note)  -KM      Subjective Information agree to therapy  -KM      Patient Effort, Rehab Treatment good  -KM      Precautions/Limitations fall precautions  -KM      Recorded by [KM] Elif Morejon, PT      Pain Assessment    Pain Assessment No/denies pain  -KM      Recorded by [KM] Elif Morejon, PT      Cognitive Assessment/Intervention    Current Cognitive/Communication Assessment functional  -KM      Orientation Status oriented x 4  -KM      Follows Commands/Answers Questions able to follow single-step instructions  -KM      Personal Safety WNL/WFL  -KM      Personal Safety Interventions fall prevention program maintained  -KM      Recorded by [KM] Elif Morejon, PT      Bed Mobility,  Assessment/Treatment    Bed Mobility, Assistive Device bed rails;head of bed elevated  -KM      Bed Mob, Supine to Sit, [place] independent  -KM      Bed Mob, Sit to Supine, [place] independent  -KM      Recorded by [KM] Elif Morejon PT      Transfer Assessment/Treatment    Transfers, Sit-Stand [place] supervision required  -KM      Transfers, Stand-Sit [place] supervision required  -KM      Transfers, Sit-Stand-Sit, Assist Device rolling walker  -KM      Toilet Transfer, [place] independent  -KM      Toilet Transfer, Assistive Device elevated toilet seat;rolling walker  -KM      Recorded by [KM] Elif Morejon PT      Gait Assessment/Treatment    Gait, [place] Level contact guard assist;1 person + 1 person to manage equipment  -KM      Gait, Assistive Device rolling walker  -KM      Gait, Distance (Feet) --   400  -KM      Gait, Gait Deviations vy decreased   step thru gait, equal stride length  -KM      Recorded by [KM] Elif Morejon PT      Balance Skills Training    Sitting-Level of Assistance Independent  -KM      Sitting-Balance Support Feet supported  -KM      Standing-Level of Assistance Contact guard  -KM      Static Standing Balance Support assistive device  -KM      Gait Balance-Level of Assistance Contact guard  -KM      Gait Balance Support assistive device  -KM      Recorded by [KM] Elif Morejon PT      Therapy Exercises    Bilateral Lower Extremities AROM:;10 reps;ankle pumps/circles;hip flexion;LAQ  -KM      Recorded by [KM] Elif Morejon PT      Positioning and Restraints    Pre-Treatment Position in bed  -KM      Post Treatment Position bed  -KM      In Bed supine;call light within reach;encouraged to call for assist  -KM      Recorded by [KM] Elif Morejon PT        User Key  (r) = Recorded By, (t) = Taken By, (c) = Cosigned By    Initials Name Effective Dates    KM Elif Morejon PT 06/19/15 -                  IP PT Goals       03/08/18 1146 03/07/18 1126       Transfer Training PT LTG    Transfer Training PT LTG, Date Established  03/07/18  -SJ     Transfer Training PT LTG, Time to Achieve  2 wks  -SJ     Transfer Training PT LTG, Activity Type  bed to chair /chair to bed;sit to stand/stand to sit  -SJ     Transfer Training PT LTG, [place] Level  conditional independence  -SJ     Transfer Training PT LTG, Outcome goal ongoing  -KM goal ongoing  -SJ     Gait Training PT LTG    Gait Training Goal PT LTG, Date Established  03/07/18  -SJ     Gait Training Goal PT LTG, Time to Achieve  2 wks  -SJ     Gait Training Goal PT LTG, [place] Level  conditional independence  -SJ     Gait Training Goal PT LTG, Assist Device  cane, straight   or appropriate AD  -SJ     Gait Training Goal PT LTG, Distance to Achieve  400  -SJ     Gait Training Goal PT LTG, Outcome goal ongoing  -KM goal ongoing  -SJ     Dynamic Standing Balance PT LTG    Dynamic Standing Balance PT LTG, Date Established  03/07/18  -SJ     Dynamic Standing Balance PT LTG, Time to Achieve  2 wks  -SJ     Dynamic Standing Balance PT LTG, [place] Level  conditional independence  -SJ     Dynamic Standing Balance PT LTG, Outcome goal ongoing  -KM goal ongoing  -SJ       User Key  (r) = Recorded By, (t) = Taken By, (c) = Cosigned By    Initials Name Provider Type    OSMAR Rao, PT Physical Therapist    SANJAY Morejon, PT Physical Therapist          Physical Therapy Education     Title: PT OT SLP Therapies (Active)     Topic: Physical Therapy (Active)     Point: Mobility training (Active)    Learning Progress Summary    Learner Readiness Method Response Comment Documented by Status   Patient Acceptance E HARISH GRACE 03/08/18 1149 Done    Acceptance E,D RAMYA PRASAD 03/07/18 1128 Active   Family Acceptance E,D NR  OSMAR 03/07/18 1128 Active               Point: Home exercise program (Active)    Learning Progress Summary    Learner Readiness Method  Response Comment Documented by Status   Patient Acceptance E [facility] 03/08/18 1149 Done    Acceptance E,D NR   03/07/18 1128 Active   Family Acceptance E,D NR   03/07/18 1128 Active               Point: Body mechanics (Active)    Learning Progress Summary    Learner Readiness Method Response Comment Documented by Status   Patient Acceptance E [facility] 03/08/18 1149 Done    Acceptance E,D NR   03/07/18 1128 Active   Family Acceptance E,D NR   03/07/18 1128 Active               Point: Precautions (Active)    Learning Progress Summary    Learner Readiness Method Response Comment Documented by Status   Patient Acceptance E [facility] 03/08/18 1149 Done    Acceptance E,D NR   03/07/18 1128 Active   Family Acceptance E,D NR   03/07/18 1128 Active                      User Key     Initials Effective Dates Name Provider Type Discipline     06/19/15 -  Su Rao, PT Physical Therapist PT     06/19/15 -  Elif Morejon, PT Physical Therapist PT                    PT Recommendation and Plan  Anticipated Equipment Needs At Discharge: standard cane  Anticipated Discharge Disposition: inpatient rehabilitation facility  Planned Therapy Interventions: balance training, bed mobility training, gait training, home exercise program, patient/family education, strengthening, transfer training  PT Frequency: daily  Plan of Care Review  Plan Of Care Reviewed With: patient  Outcome Summary/Follow up Plan: Pt improving in all areas, recommend HHPT, able to wal 400 ft with r wx and cg assist          Outcome Measures       03/08/18 1109 03/07/18 1455 03/07/18 1023    How much help from another person do you currently need...    Turning from your back to your side while in flat bed without using bedrails? 4  -KM  4  -SJ    Moving from lying on back to sitting on the side of a flat bed without bedrails? 4  -KM  4  -SJ    Moving to and from a bed to a chair (including a wheelchair)? 3  -KM  3  -SJ    Standing up from a  chair using your arms (e.g., wheelchair, bedside chair)? 4  -KM  4  -SJ    Climbing 3-5 steps with a railing? 3  -KM  3  -SJ    To walk in hospital room? 3  -KM  3  -SJ    AM-PAC 6 Clicks Score 21  -KM  21  -SJ    How much help from another is currently needed...    Putting on and taking off regular lower body clothing?  2  -AC     Bathing (including washing, rinsing, and drying)  2  -AC     Toileting (which includes using toilet bed pan or urinal)  3  -AC     Putting on and taking off regular upper body clothing  3  -AC     Taking care of personal grooming (such as brushing teeth)  4  -AC     Eating meals  4  -AC     Score  18  -AC     Functional Assessment    Outcome Measure Options AM-PAC 6 Clicks Basic Mobility (PT)  -KM AM-PAC 6 Clicks Daily Activity (OT)  -AC AM-PAC 6 Clicks Basic Mobility (PT)  -SJ      User Key  (r) = Recorded By, (t) = Taken By, (c) = Cosigned By    Initials Name Provider Type    AC Bre Swanson, OT Occupational Therapist    OSMAR Rao, PT Physical Therapist    KM Elif Morejon, PT Physical Therapist           Time Calculation:         PT Charges       03/08/18 1144          Time Calculation    Start Time 1109  -KM      PT Received On 03/08/18  -KM      PT Goal Re-Cert Due Date 03/17/18  -KM      Time Calculation- PT    Total Timed Code Minutes- PT 27 minute(s)  -KM        User Key  (r) = Recorded By, (t) = Taken By, (c) = Cosigned By    Initials Name Provider Type     Elif Morejon, LEAH Physical Therapist          Therapy Charges for Today     Code Description Service Date Service Provider Modifiers Qty    82863521284 HC PT THER PROC EA 15 MIN 3/8/2018 Elif Morejon, PT GP 1    57317446419 HC PT THER SUPP EA 15 MIN 3/8/2018 Elif Morejon, PT GP 2    10587665562 HC GAIT TRAINING EA 15 MIN 3/8/2018 Elif Morejon, PT GP 1          PT G-Codes  Outcome Measure Options: AM-PAC 6 Clicks Basic Mobility (PT)    Elif Morejon,  PT  3/8/2018

## 2018-03-08 NOTE — PLAN OF CARE
Problem: Patient Care Overview (Adult)  Goal: Plan of Care Review  Outcome: Ongoing (interventions implemented as appropriate)   03/08/18 1146   Outcome Evaluation   Outcome Summary/Follow up Plan Pt improving in all areas, recommend HHPT, able to wal 400 ft with r wx and cg assist   Coping/Psychosocial Response Interventions   Plan Of Care Reviewed With patient       Problem: Inpatient Physical Therapy  Goal: Transfer Training Goal 1 LTG- PT  Outcome: Ongoing (interventions implemented as appropriate)   03/08/18 1146   Transfer Training PT LTG   Transfer Training PT LTG, Outcome goal ongoing     Goal: Gait Training Goal LTG- PT  Outcome: Ongoing (interventions implemented as appropriate)   03/08/18 1146   Gait Training PT LTG   Gait Training Goal PT LTG, Outcome goal ongoing     Goal: Dynamic Standing Balance Goal LTG- PT  Outcome: Ongoing (interventions implemented as appropriate)   03/08/18 1146   Dynamic Standing Balance PT LTG   Dynamic Standing Balance PT LTG, Outcome goal ongoing

## 2018-03-08 NOTE — PROGRESS NOTES
Daily Progress Note  Neurology     LOS: 2 days     Subjective     Chief Complaint: Bilateral lower extremity weakness, syncope    Interval History:  No acute events overnight    ROS: Negative for fever    Objective     Vital signs in last 24 hours:  Temp:  [98.8 °F (37.1 °C)-99 °F (37.2 °C)] 99 °F (37.2 °C)  Heart Rate:  [71-97] 82  Resp:  [14-18] 18  BP: (123-173)/(74-91) 123/76      Physical Exam:   General: Lying in bed with eyes open. In NAD.     Respiratory: Respirations unlabored   CV: RRR       Neurologic Exam:   Mental status: Awake, alert, Follows commands. Speech fluent   CN: II-XII intact                       Results from last 7 days  Lab Units 03/08/18  0621   WBC 10*3/mm3 5.78   HEMOGLOBIN g/dL 11.1*   HEMATOCRIT % 35.2   PLATELETS 10*3/mm3 316           Results Review:    Labs reviewed  EMG/NCV report reviewed and discussed with interpreting neurologist    Assessment/Plan     Principal Problem:    Pre-syncope  Active Problems:    Chronic venous embolism and thrombosis of deep vessels of right lower extremity    PAD (peripheral artery disease)    HTN (hypertension)    Acute-on-chronic kidney injury    Back pain    Spell of altered cognition    Muscular weakness    History of cerebral hemorrhage    History of DVT (deep vein thrombosis)        1.  Bilateral leg weakness = concerning for vascular claudication according to CTS. 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 EMG/NCV report unremarkable. Will defer further workup to surgical services     2.  Syncope = unclear etiology but possibly secondary to pain or vagal response. EEG report negative for epileptiform activity. Recommend deferring AEDs for now unclear she has a clear seizure or unexplained recurrent syncope.    Nothing further to add. Will sign off.     Andie Burt MD  03/08/18  1:14 PM

## 2018-03-09 VITALS
TEMPERATURE: 99 F | RESPIRATION RATE: 18 BRPM | DIASTOLIC BLOOD PRESSURE: 68 MMHG | HEART RATE: 83 BPM | HEIGHT: 67 IN | SYSTOLIC BLOOD PRESSURE: 142 MMHG | OXYGEN SATURATION: 96 % | WEIGHT: 133 LBS | BODY MASS INDEX: 20.88 KG/M2

## 2018-03-09 PROBLEM — I82.501 CHRONIC VENOUS EMBOLISM AND THROMBOSIS OF DEEP VESSELS OF RIGHT LOWER EXTREMITY (HCC): Status: RESOLVED | Noted: 2018-02-14 | Resolved: 2018-03-09

## 2018-03-09 LAB
BACTERIA SPEC AEROBE CULT: NORMAL
BACTERIA SPEC AEROBE CULT: NORMAL

## 2018-03-09 PROCEDURE — G0378 HOSPITAL OBSERVATION PER HR: HCPCS

## 2018-03-09 PROCEDURE — 97116 GAIT TRAINING THERAPY: CPT

## 2018-03-09 PROCEDURE — 99217 PR OBSERVATION CARE DISCHARGE MANAGEMENT: CPT | Performed by: INTERNAL MEDICINE

## 2018-03-09 PROCEDURE — 96372 THER/PROPH/DIAG INJ SC/IM: CPT

## 2018-03-09 PROCEDURE — 97110 THERAPEUTIC EXERCISES: CPT

## 2018-03-09 PROCEDURE — 25010000002 ENOXAPARIN PER 10 MG: Performed by: INTERNAL MEDICINE

## 2018-03-09 RX ORDER — AMLODIPINE BESYLATE 10 MG/1
5 TABLET ORAL DAILY
Start: 2018-03-09 | End: 2021-01-18 | Stop reason: HOSPADM

## 2018-03-09 RX ADMIN — ENOXAPARIN SODIUM 40 MG: 40 INJECTION, SOLUTION INTRAVENOUS; SUBCUTANEOUS at 11:17

## 2018-03-09 RX ADMIN — CLONIDINE HYDROCHLORIDE 0.1 MG: 0.1 TABLET ORAL at 08:20

## 2018-03-09 RX ADMIN — LOSARTAN POTASSIUM 50 MG: 50 TABLET ORAL at 08:20

## 2018-03-09 RX ADMIN — ASPIRIN 81 MG: 81 TABLET, COATED ORAL at 08:20

## 2018-03-09 RX ADMIN — SODIUM CHLORIDE 50 ML/HR: 9 INJECTION, SOLUTION INTRAVENOUS at 08:21

## 2018-03-09 RX ADMIN — MAGNESIUM HYDROXIDE 10 ML: 2400 SUSPENSION ORAL at 10:16

## 2018-03-09 RX ADMIN — ATORVASTATIN CALCIUM 40 MG: 40 TABLET, FILM COATED ORAL at 08:20

## 2018-03-09 NOTE — PLAN OF CARE
Problem: Patient Care Overview (Adult)  Goal: Plan of Care Review  Outcome: Ongoing (interventions implemented as appropriate)   03/09/18 1139   Outcome Evaluation   Outcome Summary/Follow up Plan Showing improvement in overall strength and mobility   Coping/Psychosocial Response Interventions   Plan Of Care Reviewed With patient   Patient Care Overview   Progress improving       Problem: Inpatient Physical Therapy  Goal: Transfer Training Goal 1 LTG- PT   03/07/18 1126 03/08/18 1146   Transfer Training PT LTG   Transfer Training PT LTG, Date Established 03/07/18 --    Transfer Training PT LTG, Time to Achieve 2 wks --    Transfer Training PT LTG, Activity Type bed to chair /chair to bed;sit to stand/stand to sit --    Transfer Training PT LTG, [place] Level conditional independence --    Transfer Training PT LTG, Outcome --  goal ongoing     Goal: Gait Training Goal LTG- PT   03/07/18 1126 03/08/18 1146   Gait Training PT LTG   Gait Training Goal PT LTG, Date Established 03/07/18 --    Gait Training Goal PT LTG, Time to Achieve 2 wks --    Gait Training Goal PT LTG, [place] Level conditional independence --    Gait Training Goal PT LTG, Assist Device cane, straight  (or appropriate AD) --    Gait Training Goal PT LTG, Distance to Achieve 400 --    Gait Training Goal PT LTG, Outcome --  goal ongoing     Goal: Dynamic Standing Balance Goal LTG- PT   03/07/18 1126 03/08/18 1146   Dynamic Standing Balance PT LTG   Dynamic Standing Balance PT LTG, Date Established 03/07/18 --    Dynamic Standing Balance PT LTG, Time to Achieve 2 wks --    Dynamic Standing Balance PT LTG, [place] Level conditional independence --    Dynamic Standing Balance PT LTG, Outcome --  goal ongoing

## 2018-03-09 NOTE — THERAPY TREATMENT NOTE
Acute Care - Physical Therapy Treatment Note  [facility]     Patient Name: Chika Posey  : 1945  MRN: [id number]  Today's Date: 3/9/2018  Onset of Illness/Injury or Date of Surgery Date: 18  Date of Referral to PT: 18  Referring Physician: EDMOND Pena    Admit Date: 3/6/2018    Visit Dx:    ICD-10-CM ICD-9-CM   1. 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 Impaired functional mobility, balance, gait, and endurance Z74.09 V49.89   3. Impaired mobility and ADLs Z74.09 799.89   4. Chronic venous embolism and thrombosis of deep vessels of right lower extremity I82.501 453.50   5. History of cerebral hemorrhage Z86.79 V12.59   6. Generalized weakness R53.1 780.79   7. History of DVT (deep vein thrombosis) Z86.718 V12.51   8. PAD (peripheral artery disease) I73.9 443.9   9. Pre-syncope R55 780.2     Patient Active Problem List   Diagnosis   • Cerebral hemorrhage   • Headache   • Slurred speech   • PAD (peripheral artery disease)   • HTN (hypertension)   • Chronic anticoagulation   • Paraparesis of both lower limbs   • Pre-syncope   • Acute-on-chronic kidney injury   • Back pain   • Spell of altered cognition   • Generalized weakness   • Muscular weakness   • History of cerebral hemorrhage   • History of DVT (deep vein thrombosis)               Adult Rehabilitation Note       18 0913 18 1109       Rehab Assessment/Intervention    Discipline physical therapist  -SC physical therapist  -KM     Document Type therapy note (daily note)  -SC therapy note (daily note)  -KM     Subjective Information agree to therapy  -SC agree to therapy  -KM     Patient Effort, Rehab Treatment good  -SC good  -KM     Precautions/Limitations fall precautions  -SC fall precautions  -KM     Recorded by [SC] Rachell Coto, PT [KM] Elif Morejon, LEAH     Pain Assessment    Pain Assessment Dan-Baker FACES  -SC No/denies pain  -KM     Dan-Baker FACES Pain Rating 2  -SC      Pain Type Acute pain   -SC      Pain Location Ankle  -SC      Pain Orientation Right  -SC      Pain Intervention(s) Repositioned  -SC      Response to Interventions tolerated  -SC      Recorded by [SC] Rachell Coto, PT [KM] Elif Morejon, PT     Cognitive Assessment/Intervention    Current Cognitive/Communication Assessment functional  -SC functional  -KM     Orientation Status oriented x 4  -SC oriented x 4  -KM     Follows Commands/Answers Questions 100% of the time  -SC able to follow single-step instructions  -KM     Personal Safety WNL/WFL  -SC WNL/WFL  -KM     Personal Safety Interventions gait belt;fall prevention program maintained  -SC fall prevention program maintained  -KM     Recorded by [SC] Rachell Coto, PT [KM] Elif Morejon, PT     Bed Mobility, Assessment/Treatment    Bed Mobility, Assistive Device head of bed elevated  -SC bed rails;head of bed elevated  -KM     Bed Mob, Supine to Sit, [place] independent  -SC independent  -KM     Bed Mob, Sit to Supine, [place] independent  -SC independent  -KM     Recorded by [SC] Rachell Coto, PT [KM] Elif Morejon, LEAH     Transfer Assessment/Treatment    Transfers, Sit-Stand [place] supervision required  -SC supervision required  -KM     Transfers, Stand-Sit [place] supervision required  -SC supervision required  -KM     Transfers, Sit-Stand-Sit, Assist Device rolling walker  -SC rolling walker  -KM     Toilet Transfer, [place]  independent  -KM     Toilet Transfer, Assistive Device  elevated toilet seat;rolling walker  -KM     Transfer, Impairments strength decreased  -SC      Transfer, Comment demonstrates slow transitions  -SC      Recorded by [SC] Rachell Coto, PT [KM] Elif Morejon, PT     Gait Assessment/Treatment    Gait, [place] Level contact guard assist  -SC contact guard assist;1 person + 1 person to manage equipment  -KM     Gait, Assistive Device rolling walker  -SC rolling walker  -KM     Gait,  Distance (Feet) 600  -SC --   400  -KM     Gait, Gait Pattern Analysis swing-through gait  -SC      Gait, Gait Deviations vy decreased  -SC vy decreased   step thru gait, equal stride length  -KM     Gait, Impairments strength decreased;motor control impaired  -SC      Gait, Comment cues for staying close to walker. Some fatigue at end of walk  -SC      Recorded by [SC] Rachell Coto, PT [KM] Elif Morejon PT     Balance Skills Training    Sitting-Level of Assistance  Independent  -KM     Sitting-Balance Support  Feet supported  -KM     Standing-Level of Assistance  Contact guard  -     Static Standing Balance Support  assistive device  -KM     Gait Balance-Level of Assistance Contact guard  -SC Contact guard  -KM     Gait Balance Support assistive device  -SC assistive device  -KM     Recorded by [SC] Rachell Coto PT [KM] Elif Morejon PT     Therapy Exercises    Bilateral Lower Extremities AROM:;10 reps;sitting;ankle pumps/circles;LAQ;hip abduction/adduction  -SC AROM:;10 reps;ankle pumps/circles;hip flexion;LAQ  -KM     Recorded by [SC] Rachell Coto, PT [KM] Elif Morejon PT     Positioning and Restraints    Pre-Treatment Position in bed  -SC in bed  -KM     Post Treatment Position chair  -SC bed  -KM     In Bed sitting;call light within reach;encouraged to call for assist;exit alarm on  -SC supine;call light within reach;encouraged to call for assist  -KM     Recorded by [SC] Rachell Coto, PT [KM] Elif Morejon PT       User Key  (r) = Recorded By, (t) = Taken By, (c) = Cosigned By    Initials Name Effective Dates    SC Rachell Coto PT 06/19/15 -     KM Elif Morejon PT 06/19/15 -                 IP PT Goals       03/08/18 1146 03/07/18 1126       Transfer Training PT LTG    Transfer Training PT LTG, Date Established  03/07/18  -SJ     Transfer Training PT LTG, Time to Achieve  2 wks  -SJ     Transfer Training PT LTG, Activity Type  bed to chair  /chair to bed;sit to stand/stand to sit  -SJ     Transfer Training PT LTG, [place] Level  conditional independence  -SJ     Transfer Training PT LTG, Outcome goal ongoing  -KM goal ongoing  -SJ     Gait Training PT LTG    Gait Training Goal PT LTG, Date Established  03/07/18  -SJ     Gait Training Goal PT LTG, Time to Achieve  2 wks  -SJ     Gait Training Goal PT LTG, [place] Level  conditional independence  -SJ     Gait Training Goal PT LTG, Assist Device  cane, straight   or appropriate AD  -SJ     Gait Training Goal PT LTG, Distance to Achieve  400  -SJ     Gait Training Goal PT LTG, Outcome goal ongoing  -KM goal ongoing  -SJ     Dynamic Standing Balance PT LTG    Dynamic Standing Balance PT LTG, Date Established  03/07/18  -SJ     Dynamic Standing Balance PT LTG, Time to Achieve  2 wks  -SJ     Dynamic Standing Balance PT LTG, [place] Level  conditional independence  -SJ     Dynamic Standing Balance PT LTG, Outcome goal ongoing  -KM goal ongoing  -SJ       User Key  (r) = Recorded By, (t) = Taken By, (c) = Cosigned By    Initials Name Provider Type    OSMAR Rao, PT Physical Therapist     Elif Morejon, PT Physical Therapist          Physical Therapy Education     Title: PT OT SLP Therapies (Active)     Topic: Physical Therapy (Active)     Point: Mobility training (Active)    Learning Progress Summary    Learner Readiness Method Response Comment Documented by Status   Patient Eager GAGE FOY VU discussed safety with mobility and benefits of exercise SC 03/09/18 1138 Done    Acceptance E HARISH GRACE 03/08/18 1149 Done    Acceptance E,D NR   03/07/18 1128 Active   Family Acceptance E,D NR   03/07/18 1128 Active               Point: Home exercise program (Active)    Learning Progress Summary    Learner Readiness Method Response Comment Documented by Status   Patient Eager GAGE FOY VU discussed safety with mobility and benefits of exercise SC 03/09/18 1138 Done    Acceptance E   KM  03/08/18 1149 Done    Acceptance E,D NR   03/07/18 1128 Active   Family Acceptance E,D NR   03/07/18 1128 Active               Point: Body mechanics (Active)    Learning Progress Summary    Learner Readiness Method Response Comment Documented by Status   Patient Eager DANIIE HARISH MICHELLE discussed safety with mobility and benefits of exercise SC 03/09/18 1138 Done    Acceptance E VU   03/08/18 1149 Done    Acceptance E,D NR   03/07/18 1128 Active   Family Acceptance E,D NR   03/07/18 1128 Active               Point: Precautions (Active)    Learning Progress Summary    Learner Readiness Method Response Comment Documented by Status   Patient Eager GAGE FOY VU discussed safety with mobility and benefits of exercise SC 03/09/18 1138 Done    Acceptance E VU   03/08/18 1149 Done    Acceptance E,D NR   03/07/18 1128 Active   Family Acceptance E,D NR   03/07/18 1128 Active                      User Key     Initials Effective Dates Name Provider Type Discipline    SC 06/19/15 -  Rachell Coto, PT Physical Therapist PT     06/19/15 -  Su Rao, PT Physical Therapist PT     06/19/15 -  Elif Morejon, PT Physical Therapist PT                    PT Recommendation and Plan  Anticipated Equipment Needs At Discharge: standard cane  Anticipated Discharge Disposition: inpatient rehabilitation facility  Planned Therapy Interventions: balance training, bed mobility training, gait training, home exercise program, patient/family education, strengthening, transfer training  PT Frequency: daily  Plan of Care Review  Plan Of Care Reviewed With: patient  Progress: improving  Outcome Summary/Follow up Plan: Showing improvement in overall strengrth and mobility          Outcome Measures       03/09/18 0913 03/08/18 1109 03/07/18 1455    How much help from another person do you currently need...    Turning from your back to your side while in flat bed without using bedrails? 4  -SC 4  -KM     Moving from lying on back to  sitting on the side of a flat bed without bedrails? 4  -SC 4  -KM     Moving to and from a bed to a chair (including a wheelchair)? 3  -SC 3  -KM     Standing up from a chair using your arms (e.g., wheelchair, bedside chair)? 4  -SC 4  -KM     Climbing 3-5 steps with a railing? 3  -SC 3  -KM     To walk in hospital room? 3  -SC 3  -KM     AM-PAC 6 Clicks Score 21  -SC 21  -KM     How much help from another is currently needed...    Putting on and taking off regular lower body clothing?   2  -AC    Bathing (including washing, rinsing, and drying)   2  -AC    Toileting (which includes using toilet bed pan or urinal)   3  -AC    Putting on and taking off regular upper body clothing   3  -AC    Taking care of personal grooming (such as brushing teeth)   4  -AC    Eating meals   4  -AC    Score   18  -AC    Functional Assessment    Outcome Measure Options AM-PAC 6 Clicks Basic Mobility (PT)  -SC AM-PAC 6 Clicks Basic Mobility (PT)  -KM AM-PAC 6 Clicks Daily Activity (OT)  -AC      03/07/18 1023          How much help from another person do you currently need...    Turning from your back to your side while in flat bed without using bedrails? 4  -SJ      Moving from lying on back to sitting on the side of a flat bed without bedrails? 4  -SJ      Moving to and from a bed to a chair (including a wheelchair)? 3  -SJ      Standing up from a chair using your arms (e.g., wheelchair, bedside chair)? 4  -SJ      Climbing 3-5 steps with a railing? 3  -SJ      To walk in hospital room? 3  -SJ      AM-PAC 6 Clicks Score 21  -SJ      Functional Assessment    Outcome Measure Options AM-PAC 6 Clicks Basic Mobility (PT)  -SJ        User Key  (r) = Recorded By, (t) = Taken By, (c) = Cosigned By    Initials Name Provider Type    LUANNE Coto, PT Physical Therapist    AC Bre Swanson, OT Occupational Therapist    SJ Su Rao, PT Physical Therapist    KM Elif Morejon, PT Physical Therapist           Time Calculation:          PT Charges       03/09/18 1140          Time Calculation    Start Time 0913  -SC      PT Received On 03/09/18  -SC      PT Goal Re-Cert Due Date 03/17/18  -SC      Time Calculation- PT    Total Timed Code Minutes- PT 24 minute(s)  -SC        User Key  (r) = Recorded By, (t) = Taken By, (c) = Cosigned By    Initials Name Provider Type    SC Rachell Coto, PT Physical Therapist          Therapy Charges for Today     Code Description Service Date Service Provider Modifiers Qty    51808068059 HC GAIT TRAINING EA 15 MIN 3/9/2018 Rachell Coto, PT GP 1    48533927296 HC PT THER PROC EA 15 MIN 3/9/2018 Rachell Coto, PT GP 1          PT G-Codes  Outcome Measure Options: AM-PAC 6 Clicks Basic Mobility (PT)    Rachell Coto PT  3/9/2018

## 2018-03-09 NOTE — PROGRESS NOTES
[facility] Medicine Services  PROGRESS NOTE    Patient Name: Chika Posey  : 1945  MRN: [id number]    Date of Admission: 3/6/2018  Length of Stay: 0  Primary Care Physician: Alyssia Greenfield MD    Subjective   Subjective     CC:  Syncope:  Started with leg pain, then lost consciousness.  Dtrs lowered her to a chair. Continued to be confused and sleepy for some time.     HPI:  Legs are about the same. Still hurting.   No other problems - no dizziness.   No new complaints  No chest pain or dyspnea    Review of Systems   As above  Notes that she had a benign mass resected left neck remotely.    Otherwise ROS is negative except as mentioned in the HPI.    Objective   Objective     Vital Signs:   Temp:  [98.8 °F (37.1 °C)-99.1 °F (37.3 °C)] 99 °F (37.2 °C)  Heart Rate:  [71-85] 82  Resp:  [16-18] 18  BP: (123-165)/(69-85) 138/69  Total (NIH Stroke Scale): 4     Physical Exam:  Gen:  WD/WN lady in NAD, up in chair.  Alert and pleasant.  No family today  Neuro: alert and oriented, clear speech, follows commands, grossly nonfocal, fair historian  HEENT:  NC/AT PERRL, OP benign  Neck:  Supple, no LAD.  Healed surg scar left neck  Heart RRR no murmur, rub, or gallop  Lungs CTA nonlabored.   Abd:  Soft, nontender, no rebound or guarding, pos BS  Extrem:  No c/c/e.  Could not palpate pulses in feet.  Feet warm.        Results Reviewed:  I have personally reviewed current lab, radiology, and data and agree.      Results from last 7 days  Lab Units 18  0621 18  0524 18  1450   WBC 10*3/mm3 5.78 5.84 6.68   HEMOGLOBIN g/dL 11.1* 10.8* 12.0   HEMATOCRIT % 35.2 34.1* 37.9   PLATELETS 10*3/mm3 316 318 301       Results from last 7 days  Lab Units 18  0621 18  0524 18  1450 18  1047   SODIUM mmol/L 138 142 139 136   POTASSIUM mmol/L 3.9 3.6 3.9 4.5   CHLORIDE mmol/L 110* 109 102 102   CO2 mmol/L 24.0 25.0 28.0 25.0   BUN mg/dL 13 18 18 30*   CREATININE  mg/dL 1.00 1.20 1.40* 1.60*   GLUCOSE mg/dL 114* 107* 176* 105*   CALCIUM mg/dL 8.7 8.7 9.8 9.4   ALT (SGPT) U/L  --   --   --  24   AST (SGOT) U/L  --   --   --  25     Estimated Creatinine Clearance: 49.8 mL/min (by C-G formula based on Cr of 1).  No results found for: BNP  No results found for: PHART    Microbiology Results Abnormal     Procedure Component Value - Date/Time    Urine Culture - Urine, Urine, Clean Catch [[id number]]  (Normal) Collected:  03/06/18 2217    Lab Status:  Preliminary result Specimen:  Urine from Urine, Clean Catch Updated:  03/08/18 0820     Urine Culture Culture in progress          Imaging Results (last 24 hours)     Procedure Component Value Units Date/Time    CT Angio Abdominal Aorta Bilateral Iliofem Runoff With & Without Contrast [[id number]] Collected:  03/08/18 1233     Updated:  03/08/18 2147    Narrative:       EXAMINATION: CT ANGIOGRAM CHEST W WO CONTRAST-, CT ANGIO ABDOMINAL AORTA  AND BILATERAL ILIOFEMORAL RUNOFF W WO CONTRAST-03/08/2018:      INDICATION: Needs ax-fem bypass for ischemic legs; G82.20-Paraplegia,  unspecified; Z74.09-Other reduced mobility; Z74.09-Other reduced  mobility.         TECHNIQUE: Spiral acquisition unenhanced 5 mm axial images through the  chest, abdomen and pelvis, with subsequent 3 mm arterial phase images,  with 2-D and 3-D angiographic reconstructions of the aortoiliac system  and lower extremity arteries.     The radiation dose reduction device was turned on for each scan per the  ALARA (As Low as Reasonably Achievable) protocol.     COMPARISON: NONE.     FINDINGS: The patient history indicates occluded aorta, ischemic  extremities.     Angiographic images of the chest show heavy atherosclerotic  calcification of the aorta, no evidence of aneurysm and no evidence of  intramural hematoma. Postcontrast images show extensive and irregular  calcified and noncalcified thrombus throughout the thoracic aorta, but  with relatively mild luminal  narrowing.     In the abdomen, there is increasing circumferential narrowing of the  aorta by irregular plaque down to the level of the renal arteries, which  appear patent, the left renal origin stented, and narrow beyond the  level of the stent, right renal origin irregular and probably  significantly narrowed. Below the level of the more inferior, left renal  artery, the aorta appears occluded. No contrast is seen in the aorta or  common or external iliac arteries. Internal iliac arteries appear  contrast opacified, and contiguous with the deep femoral system. No  contrast opacification is seen of the hypoplastic, calcified superficial  femoral arteries.      Multiple small deep collaterals are seen reconstituting very small  bilateral popliteal arteries, both approximately 2 mm in diameter. On  the right, trifurcation vessels are all faintly visible to the level of  the distal leg, but difficult to identify at the ankle except for the  posterior tibial artery.  On the left, the peroneal and posterior tibial  artery are visible all the way to the level of the ankle. Anterior  tibial is seen to the level of several centimeters above the ankle. No  contrast opacified vessels are yet seen in the feet, indicating slow or  restricted flow.        No evidence of active lung disease is seen. Mediastinal window images  show a few thyroid nodules. There is no evidence of mediastinal  adenopathy or pericardial effusion. There is fatty liver change and  several left lobe liver cysts. There is left renal midpole scarring.  There is a mildly dilated appearing common bile duct, and there are  multiple noncalcified gallstones. Maximal diameter of the common duct is  approximately 8 or 9 mm. No intra-abdominal inflammatory change, ascites  or adenopathy is seen.       Impression:       1.  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 No visible blood flow in the common, external  iliac  arteries, or superficial femoral arteries.   2.  Reconstitution of very small popliteal arteries, patent trifurcation  arteries, with some attenuation of flow above the right and left ankle  as discussed above.  3. Left renal artery stent and probable moderate post-stent stenosis.  Moderate or greater proximal right renal artery stenosis, but no  significant delay in contrast opacification of renal cortex.  4. Multiple gallstones and mildly dilated common bile duct.     D:  03/08/2018  E:  03/08/2018     This report was finalized on 3/8/2018 9:45 PM by DR. Joseph Dumas MD.       CT Angiogram Chest With & Without Contrast [[id number]] Collected:  03/08/18 1233     Updated:  03/08/18 2147    Narrative:       EXAMINATION: CT ANGIOGRAM CHEST W WO CONTRAST-, CT ANGIO ABDOMINAL AORTA  AND BILATERAL ILIOFEMORAL RUNOFF W WO CONTRAST-03/08/2018:      INDICATION: Needs ax-fem bypass for ischemic legs; G82.20-Paraplegia,  unspecified; Z74.09-Other reduced mobility; Z74.09-Other reduced  mobility.         TECHNIQUE: Spiral acquisition unenhanced 5 mm axial images through the  chest, abdomen and pelvis, with subsequent 3 mm arterial phase images,  with 2-D and 3-D angiographic reconstructions of the aortoiliac system  and lower extremity arteries.     The radiation dose reduction device was turned on for each scan per the  ALARA (As Low as Reasonably Achievable) protocol.     COMPARISON: NONE.     FINDINGS: The patient history indicates occluded aorta, ischemic  extremities.     Angiographic images of the chest show heavy atherosclerotic  calcification of the aorta, no evidence of aneurysm and no evidence of  intramural hematoma. Postcontrast images show extensive and irregular  calcified and noncalcified thrombus throughout the thoracic aorta, but  with relatively mild luminal narrowing.     In the abdomen, there is increasing circumferential narrowing of the  aorta by irregular plaque down to the level of the renal  arteries, which  appear patent, the left renal origin stented, and narrow beyond the  level of the stent, right renal origin irregular and probably  significantly narrowed. Below the level of the more inferior, left renal  artery, the aorta appears occluded. No contrast is seen in the aorta or  common or external iliac arteries. Internal iliac arteries appear  contrast opacified, and contiguous with the deep femoral system. No  contrast opacification is seen of the hypoplastic, calcified superficial  femoral arteries.      Multiple small deep collaterals are seen reconstituting very small  bilateral popliteal arteries, both approximately 2 mm in diameter. On  the right, trifurcation vessels are all faintly visible to the level of  the distal leg, but difficult to identify at the ankle except for the  posterior tibial artery.  On the left, the peroneal and posterior tibial  artery are visible all the way to the level of the ankle. Anterior  tibial is seen to the level of several centimeters above the ankle. No  contrast opacified vessels are yet seen in the feet, indicating slow or  restricted flow.        No evidence of active lung disease is seen. Mediastinal window images  show a few thyroid nodules. There is no evidence of mediastinal  adenopathy or pericardial effusion. There is fatty liver change and  several left lobe liver cysts. There is left renal midpole scarring.  There is a mildly dilated appearing common bile duct, and there are  multiple noncalcified gallstones. Maximal diameter of the common duct is  approximately 8 or 9 mm. No intra-abdominal inflammatory change, ascites  or adenopathy is seen.       Impression:       1.  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 No visible blood flow in the common, external iliac  arteries, or superficial femoral arteries.   2.  Reconstitution of very small popliteal arteries, patent trifurcation  arteries, with some  attenuation of flow above the right and left ankle  as discussed above.  3. Left renal artery stent and probable moderate post-stent stenosis.  Moderate or greater proximal right renal artery stenosis, but no  significant delay in contrast opacification of renal cortex.  4. Multiple gallstones and mildly dilated common bile duct.     D:  03/08/2018  E:  03/08/2018     This report was finalized on 3/8/2018 9:45 PM by DR. Joseph Dumas MD.           Results for orders placed during the hospital encounter of 03/06/18   Adult Transthoracic Echo Complete W/ Cont if Necessary Per Protocol    Narrative · Left ventricular systolic function is hyperdynamic (EF > 70).  · Left ventricular diastolic dysfunction (grade I) consistent with   impaired relaxation.  · Normal right ventricular cavity size, wall thickness, systolic function   and septal motion noted.  · Saline test results are negative ( technically difficult and limited   study ).  · There is no evidence of pericardial effusion.  · No evidence of pulmonary hypertension is present.  · No significant structural valvular abnormality demonstrated.          I have reviewed the medications.    Assessment/Plan   Assessment / Plan     Hospital Problem List     * (Principal)Pre-syncope    Chronic venous embolism and thrombosis of deep vessels of right lower extremity    PAD (peripheral artery disease)    HTN (hypertension)    Acute-on-chronic kidney injury    Back pain    Spell of altered cognition    Muscular weakness    History of cerebral hemorrhage    History of DVT (deep vein thrombosis)             Brief Hospital Course to date:  Chika Posey is a 72 y.o. female  with complicated history as follows:  Many years ago, aorto-bifem at [facility].  2017 - bypass clotted off, got TPA at  with subsequent arm bleed, compartment syndrome, fasciotomy.  Was discharged on coumadin and has done well.  2/14//18 - head bleed; coumadin stopped.  Since coumadin stopped, increased bilat leg  pain which is worse with ambulation.  3/6 - leg pain with syncope resulting in admission      Assessment & Plan:  Syncopal event, cause unclear - likely vagal r/t pain.   -- EEG - abnl but no epilept spikes  --  MRI/ CT head with expected findings post hemorrhagic CVA   - carotids noted.  LICA 50-69 - NSurg to follow longitudinially.      Paraparesis BLE with new onset low back and bilat leg pain  -- MRI lumbar shows disc protrusions in L2-3 and L3-4, L4-5, L5-s1   -- spine changes are chronic per NSurg, no surgery indicated.   -- transient weakness was likely from chronic ischemia/pain    MATT/ CKD  -- improving  -- hold lasix and losartan  -- monitor for volume overload     Recent ICH  -- followed by Dr. Hitchcock with upcoming appointment to reassess restarting coumadin  -- off coumadin x 3 weeks  -- continue Asa 81mg  -- Nsurg following     PAD, severe, with claudication after aorto-bifem clotting last year and now off coumadin  -- AAA clotted off below renal arteries  -- CTS considering ax-bifem  -- would like to know NSurg opinion on starting plavix if surgery proceeds       DVT prophylaxis:mechanical.  However, she is at risk for VTE and I will start prophylactic lovenox     CODE STATUS:  Full Code     Disposition: I expect the patient to be discharged ?? in ?? Days.       Electronically signed by Nichol Gr MD, 03/09/18, 10:02 AM.

## 2018-03-09 NOTE — PLAN OF CARE
Problem: Patient Care Overview (Adult)  Goal: Plan of Care Review  Outcome: Ongoing (interventions implemented as appropriate)    Goal: Adult Individualization and Mutuality  Outcome: Ongoing (interventions implemented as appropriate)    Goal: Discharge Needs Assessment  Outcome: Ongoing (interventions implemented as appropriate)      Problem: Fall Risk (Adult)  Goal: Absence of Falls  Outcome: Ongoing (interventions implemented as appropriate)   03/09/18 0446   Fall Risk (Adult)   Absence of Falls making progress toward outcome       Problem: Seizure Disorder/Epilepsy (Adult)  Goal: Signs and Symptoms of Listed Potential Problems Will be Absent or Manageable (Seizure Disorder/Epilepsy)  Outcome: Ongoing (interventions implemented as appropriate)   03/09/18 0446   Seizure Disorder/Epilepsy   Problems Assessed (Seizure Disorder/Epilepsy) all   Problems Present (Seizure Disorder/Epilepsy) none

## 2018-03-09 NOTE — PLAN OF CARE
Problem: Patient Care Overview (Adult)  Goal: Plan of Care Review  Outcome: Outcome(s) achieved Date Met: 03/09/18 03/09/18 1325   Outcome Evaluation   Outcome Summary/Follow up Plan Pt. up to bathroom on own and toileting on own leaving bathroom without assist. Per pt. now able to dress on own also. Goals met. No further skilled OT in this setting needed.   Coping/Psychosocial Response Interventions   Plan Of Care Reviewed With patient   Patient Care Overview   Progress progress toward functional goals as expected       Problem: Inpatient Occupational Therapy  Goal: Transfer Training Goal 1 LTG- OT  Outcome: Outcome(s) achieved Date Met: 03/09/18 03/07/18 1533 03/09/18 1325   Transfer Training OT LTG   Transfer Training OT LTG, Date Established 03/07/18 --    Transfer Training OT LTG, Time to Achieve by discharge --    Transfer Training OT LTG, Activity Type toilet;bed to chair /chair to bed --    Transfer Training OT LTG, [place] Level supervision required --    Transfer Training OT LTG, Assist Device walker, rolling --    Transfer Training OT LTG, Outcome --  goal met  (pt. seen coming from bathroom where toileted I. I per pt.)     Goal: Toileting Goal LTG- OT  Outcome: Outcome(s) achieved Date Met: 03/09/18 03/07/18 1533 03/09/18 1325   Toileting OT LTG   Toileting Goal OT LTG, Date Established 03/07/18 --    Toileting Goal OT LTG, Time to Achieve by discharge --    Toileting Goal OT LTG, [place] Level set up required --    Toileting Goal OT LTG, Outcome --  goal met  (pt. just coming from Br toileting on own)     Goal: LB Dressing Goal LTG- OT  Outcome: Outcome(s) achieved Date Met: 03/09/18 03/07/18 1533 03/09/18 1325   LB Dressing OT LTG   LB Dressing Goal OT LTG, Date Established 03/07/18 --    LB Dressing Goal OT LTG, Time to Achieve by discharge --    LB Dressing Goal OT LTG, [place] Level set up required;supervision required --    LB Dressing Goal OT LTG, Adaptive Equipment (AE prn)  --    LB Dressing Goal OT LTG, Outcome --  goal met  (per pt. report only)     Goal: Functional Mobility Goal LTG- OT  Outcome: Outcome(s) achieved Date Met: 03/09/18 03/07/18 1533 03/09/18 1325   Functional Mobility OT LTG   Functional Mobility Goal OT LTG, Date Established 03/07/18 --    Functional Mobility Goal OT LTG, Time to Achieve by discharge --    Functional Mobility Goal OT LTG, [place] Level contact guard --    Functional Mobility Goal OT LTG, Assist Device rolling walker --    Functional Mobility Goal OT LTG, Distance to Achieve to the bathroom --    Functional Mobility Goal OT LTG, Outcome --  goal met

## 2018-03-09 NOTE — THERAPY DISCHARGE NOTE
Acute Care - Occupational Therapy Discharge Summary  [facility]     Patient Name: Chika Posey  : 1945  MRN: [id number]    Today's Date: 3/9/2018  Onset of Illness/Injury or Date of Surgery Date: 18    Date of Referral to OT: 18  Referring Physician: EDMOND Pena      Admit Date: 3/6/2018        OT Recommendation and Plan    Visit Dx:    ICD-10-CM ICD-9-CM   1. 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 Impaired functional mobility, balance, gait, and endurance Z74.09 V49.89   3. Impaired mobility and ADLs Z74.09 799.89   4. Chronic venous embolism and thrombosis of deep vessels of right lower extremity I82.501 453.50   5. History of cerebral hemorrhage Z86.79 V12.59   6. Generalized weakness R53.1 780.79   7. History of DVT (deep vein thrombosis) Z86.718 V12.51   8. PAD (peripheral artery disease) I73.9 443.9   9. Pre-syncope R55 780.2                     OT Goals       18 1325 18 1533       Transfer Training OT LTG    Transfer Training OT LTG, Date Established  18  -AC     Transfer Training OT LTG, Time to Achieve  by discharge  -AC     Transfer Training OT LTG, Activity Type  toilet;bed to chair /chair to bed  -AC     Transfer Training OT LTG, [place] Level  supervision required  -AC     Transfer Training OT LTG, Assist Device  walker, rolling  -AC     Transfer Training OT LTG, Outcome goal met   pt. seen coming from bathroom where toileted I. I per pt.  -JACOB      Toileting OT LTG    Toileting Goal OT LTG, Date Established  18  -AC     Toileting Goal OT LTG, Time to Achieve  by discharge  -AC     Toileting Goal OT LTG, [place] Level  set up required  -AC     Toileting Goal OT LTG, Outcome goal met   pt. just coming from Br toileting on own  -JACOB      LB Dressing OT LTG    LB Dressing Goal OT LTG, Date Established  18  -AC     LB Dressing Goal OT LTG, Time to Achieve  by discharge  -AC     LB Dressing Goal OT LTG, [place] Level  set up  required;supervision required  -AC     LB Dressing Goal OT LTG, Adaptive Equipment  --   AE prn  -AC     LB Dressing Goal OT LTG, Outcome goal met   per pt. report only  -JACOB      Functional Mobility OT LTG    Functional Mobility Goal OT LTG, Date Established  03/07/18  -AC     Functional Mobility Goal OT LTG, Time to Achieve  by discharge  -AC     Functional Mobility Goal OT LTG, [place] Level  contact guard  -AC     Functional Mobility Goal OT LTG, Assist Device  rolling walker  -AC     Functional Mobility Goal OT LTG, Distance to Achieve  to the bathroom  -AC     Functional Mobility Goal OT LTG, Outcome goal met  -JACOB        User Key  (r) = Recorded By, (t) = Taken By, (c) = Cosigned By    Initials Name Provider Type    JACOB Tran, OT Occupational Therapist    HERBIE Swanson, OT Occupational Therapist                Outcome Measures       03/09/18 0913 03/08/18 1109 03/07/18 1455    How much help from another person do you currently need...    Turning from your back to your side while in flat bed without using bedrails? 4  -SC 4  -KM     Moving from lying on back to sitting on the side of a flat bed without bedrails? 4  -SC 4  -KM     Moving to and from a bed to a chair (including a wheelchair)? 3  -SC 3  -KM     Standing up from a chair using your arms (e.g., wheelchair, bedside chair)? 4  -SC 4  -KM     Climbing 3-5 steps with a railing? 3  -SC 3  -KM     To walk in hospital room? 3  -SC 3  -KM     AM-PAC 6 Clicks Score 21  -SC 21  -KM     How much help from another is currently needed...    Putting on and taking off regular lower body clothing?   2  -AC    Bathing (including washing, rinsing, and drying)   2  -AC    Toileting (which includes using toilet bed pan or urinal)   3  -AC    Putting on and taking off regular upper body clothing   3  -AC    Taking care of personal grooming (such as brushing teeth)   4  -AC    Eating meals   4  -AC    Score   18  -AC    Functional Assessment    Outcome  Measure Options AM-PAC 6 Clicks Basic Mobility (PT)  -SC AM-PAC 6 Clicks Basic Mobility (PT)  -KM AM-PAC 6 Clicks Daily Activity (OT)  -AC      03/07/18 1023          How much help from another person do you currently need...    Turning from your back to your side while in flat bed without using bedrails? 4  -SJ      Moving from lying on back to sitting on the side of a flat bed without bedrails? 4  -SJ      Moving to and from a bed to a chair (including a wheelchair)? 3  -SJ      Standing up from a chair using your arms (e.g., wheelchair, bedside chair)? 4  -SJ      Climbing 3-5 steps with a railing? 3  -SJ      To walk in hospital room? 3  -SJ      AM-PAC 6 Clicks Score 21  -SJ      Functional Assessment    Outcome Measure Options AM-PAC 6 Clicks Basic Mobility (PT)  -        User Key  (r) = Recorded By, (t) = Taken By, (c) = Cosigned By    Initials Name Provider Type    SC Rachell Coto, PT Physical Therapist    AC Bre Swanson, OT Occupational Therapist    SJ Su Rao, PT Physical Therapist    SANJAY Morejon, PT Physical Therapist              OT Discharge Summary  Reason for Discharge: All goals achieved  Outcomes Achieved: Able to achieve all goals within established timeline  Discharge Destination: Home with home health      Aby Tran OT  3/9/2018

## 2018-03-09 NOTE — DISCHARGE SUMMARY
[facility] Medicine Services  DISCHARGE SUMMARY    Patient Name: Chika Posey  : 1945  MRN: [id number]    Date of Admission: 3/6/2018  Date of Discharge:  3/9/2018  Primary Care Physician: Alyssia Greenfield MD    Consults     Date and Time Order Name Status Description    3/7/2018 1408 Inpatient Cardiothoracic Surgery Consult Completed     3/6/2018 2152 Inpatient Consult to Neurology Completed     3/6/2018 1833 Inpatient Consult to Neurosurgery      2018 1739 Inpatient Consult to Neurosurgery Completed         Hospital Course     Presenting Problem:   Paraparesis of both lower limbs [G82.20]  Paraparesis of both lower limbs [G82.20]  Paraparesis of both lower limbs [G82.20]    Active Hospital Problems (** Indicates Principal Problem)    Diagnosis Date Noted   • **PAD (peripheral artery disease) [I73.9] 2018   • Pre-syncope [R55] 2018   • Spell of altered cognition [R41.89] 2018   • Muscular weakness [M62.81] 2018   • History of cerebral hemorrhage [Z86.79] 2018   • HTN (hypertension) [I10] 2018      Resolved Hospital Problems    Diagnosis Date Noted Date Resolved   • Chronic venous embolism and thrombosis of deep vessels of right lower extremity [I82.501] 2018          Hospital Course:  Chika Posey is a 72 y.o. female with a complex recent history.  She was admitted after having severe leg pain and presyncope while shopping.     History:  She had an aorto-bifem years ago at [facility].  She had thrombosis of her bypass last year and was treated at [facility] with TPA.  She was eventually discharged on coumadin.  However, she had intracerebral hemorrhage in  and was taken off coumadin.  Ever since then, she has had bilateral leg pain worse with ambulation.      While shopping, her legs began hurting unbearably and she became briefly weak and confused.     She was admitted.  MRI showed stable residuum of recent ICH.  EEG was  reassuring.  Her mentation was normal and remained normal.  She was evaluated by both neurosurgery and neurology.  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      The main issue was her claudication.  She had CT-angio studies showing that her AAA has clotted off below the renal arteries.  She was seen by Dr. Ceballos, and the tentative plan is axillary-bifem bypass.      She is ambulatory and has been going without pain meds.  Her sister lives next door and will help her.  She is comfortable going home, returning to see Dr. Nascimento in 10 days for pre-op planning, and making a final decision on surgery at that time.     Home health will be arranged.     I encouraged her to walk frequently at home to decrease risk of DVTs.  She will remain on ASA and statin.  Per brief dicussion with neurosurgery PA, plavix is not likely to be contraindicated postop.            Day of Discharge     HPI: Feels same.  Legs hurt a bit but she can get up independently.  She has no dizziness, no chest pain or dyspnea. No other complaints.     Review of Systems   Gen- No fevers, chills  CV- No chest pain, palpitations  Resp- No cough, dyspnea  GI- No N/V/D, abd pain      Otherwise ROS is negative except as mentioned in the HPI.    Vital Signs:   Temp:  [98.8 °F (37.1 °C)-99.1 °F (37.3 °C)] 99 °F (37.2 °C)  Heart Rate:  [71-85] 82  Resp:  [16-18] 18  BP: (123-165)/(69-85) 138/69     Physical Exam:  Constitutional: No acute distress, awake, alert, very pleasant woman, fair historian  Eyes: PERRLA, sclerae anicteric, no conjunctival injection  HENT: NCAT, mucous membranes moist  Neck: Supple,  trachea midline  Respiratory: Clear to auscultation bilaterally, nonlabored respirations   Cardiovascular: RRR, no murmurs, rubs, or gallops.  Feet are warm but no pulses are palpable.  No pedal ulcers.   Gastrointestinal: Positive bowel sounds, soft, nontender, nondistended  Musculoskeletal: No bilateral ankle edema, no clubbing or cyanosis to  extremities  Psychiatric: Appropriate affect, cooperative  Neurologic: Oriented x 3, strength symmetric in all extremities, Cranial Nerves grossly intact to confrontation, speech clear  Skin: No rashes      Pertinent  and/or Most Recent Results       Results from last 7 days  Lab Units 03/08/18  0621 03/07/18  0524 03/06/18  1450   WBC 10*3/mm3 5.78 5.84 6.68   HEMOGLOBIN g/dL 11.1* 10.8* 12.0   HEMATOCRIT % 35.2 34.1* 37.9   PLATELETS 10*3/mm3 316 318 301   SODIUM mmol/L 138 142 139   POTASSIUM mmol/L 3.9 3.6 3.9   CHLORIDE mmol/L 110* 109 102   CO2 mmol/L 24.0 25.0 28.0   BUN mg/dL 13 18 18   CREATININE mg/dL 1.00 1.20 1.40*   GLUCOSE mg/dL 114* 107* 176*   CALCIUM mg/dL 8.7 8.7 9.8           Invalid input(s): PROT, LABALBU        Invalid input(s): TG, LDLCALC, LDLREALC    Results from last 7 days  Lab Units 03/07/18  0524   TSH mIU/mL 0.908   HEMOGLOBIN A1C % 6.60*     Brief Urine Lab Results  (Last result in the past 365 days)      Color   Clarity   Blood   Leuk Est   Nitrite   Protein   CREAT   Urine HCG        03/07/18 0204 Yellow Clear Negative Negative Negative Negative               Microbiology Results Abnormal     Procedure Component Value - Date/Time    Urine Culture - Urine, Urine, Clean Catch [[id number]]  (Normal) Collected:  03/06/18 2217    Lab Status:  Preliminary result Specimen:  Urine from Urine, Clean Catch Updated:  03/08/18 0820     Urine Culture Culture in progress          Imaging Results (all)     Procedure Component Value Units Date/Time    MRI Cervical Spine Without Contrast [[id number]] Collected:  03/06/18 1825     Updated:  03/06/18 2029    Narrative:       EXAM:  MR Cervical Spine Without Intravenous Contrast    EXAM DATE/TIME:  3/6/2018 6:25 PM    CLINICAL HISTORY:  72 years old, female; Paraplegia, unspecified; Signs and   symptoms; Weakness and other: Ataxia; Patient HX: Ataxia, stroke suspected as   etiology C/O abnormal neurologic symptoms. Three weeks ago the patient had a    hemorrhagic CVA while on warfarin. She has had a speech deficit since that   time. Six days ago her speech began worsening, prompting her to present to the   ed four days ago. At that time she had a negative workup, including an   ultrasound of her right lower extremity to evaluate her C/O distal right lower   extremity pain. She was discharged home with no medication changes. Around 1230   today she suddenly developed lower extremity weakness, which caused difficulty   ambulating. At he ed she is able to provide a history and C/O slight nausea and   difficulty urinating. No surgeries on back no HX of cancer    TECHNIQUE:  Magnetic resonance images of the cervical spine without intravenous   contrast in multiple planes.    COMPARISON:  No relevant prior studies available.    FINDINGS:    Vertebrae:  Slight degenerative C3 anterior subluxation.  Cervical   straightening with loss of the normal cervical lordosis.  No acute fracture.    Normal vertebral body height.  Multilevel spondylosis with degenerative   endplate spurring and osteophyte formation.    Interspaces:  Multilevel disc desiccation and posterior disc bulging.    Spinal cord:  No acute abnormality.  Grossly normal cord caliber and cord   signal.    Soft tissues:  No significant soft tissue abnormalities.     DISCS/SPINAL CANAL/NEURAL FORAMINA:    C2-C3:  C2-3 mild bulging without significant spinal or foraminal stenosis.    C3-C4:  C3-4 mild diffuse disc bulging without significant spinal or   foraminal stenosis.    C4-C5:  C4-5 broad-based posterior disc bulging with indentation of the   ventral thecal sac, abutment of the ventral cord and mild to moderate spinal   and bilateral foraminal stenosis left greater than right.    C5-C6:  C5-6 diffuse posterior disc bulging with indentation of the ventral   thecal sac, mild spinal stenosis and moderate bilateral foraminal stenosis left   greater than right.    C6-C7:  C6-7 diffuse disc bulging with mild  spinal stenosis and moderate left   foraminal stenosis.    C7-T1:  Small left foraminal or extraforaminal perineural cysts C7-T1 and   T1-2.  C7-T1 slight disc bulging without significant spinal or foraminal   stenosis.    Upper thoracic spine:  T1-2 slight posterior disc bulging without significant   spinal or foraminal stenosis.      Impression:       1.  No acute abnormality demonstrated.  2.  Multilevel spondylosis and degenerative changes as described  3.  Small left foraminal or extraforaminal perineural cysts C7-T1 and T1-2.    THIS DOCUMENT HAS BEEN ELECTRONICALLY SIGNED BY SARI MULTANI JR. MD    MRI Thoracic Spine Without Contrast [[id number]] Collected:  03/06/18 1825     Updated:  03/06/18 2037    Narrative:       EXAM:  MR Thoracic Spine Without Intravenous Contrast    EXAM DATE/TIME:  3/6/2018 6:25 PM    CLINICAL HISTORY:  72 years old, female; Paraplegia, unspecified; Signs and   symptoms; Weakness; Patient HX: T/l-spine trauma, significant injury suspected,   neurologic deficits ataxia, stroke suspected as etiology C/O abnormal   neurologic symptoms. Three weeks ago the patient had a hemorrhagic CVA while on   warfarin. She has had a speech deficit since that time. Six days ago her speech   began worsening, prompting her to present to the ed four days ago. At that time   she had a negative workup, including an ultrasound of her right lower extremity   to evaluate her C/O distal right lower extremity pain. She was discharged home   with no medication changes. Around 1230 today she suddenly developed lower   extremity weakness, which caused difficulty ambulating. At he ed she is able to   provide a history and C/O slight nausea and difficulty urinating. No surgeries   on back no HX of cancer    TECHNIQUE:  Magnetic resonance images of the thoracic spine without intravenous   contrast in multiple planes.    COMPARISON:  No relevant prior studies available.    FINDINGS:    Vertebrae:  Grossly normal  thoracic alignment.  No subluxation.  No acute   fracture.  Normal vertebral body height.  Mild to moderate lower thoracic and   spondylosis with degenerative endplate spurring.    Marrow:  Incidental small T8 vertebral body hemangioma.    Discs/spinal canal/neural foramina:  Multilevel bilateral foraminal small   perineural cysts.  No focal disc herniation.  No significant spinal stenosis.    Spinal cord:  Unremarkable.  Normal cord caliber and cord signal.    Soft tissues:  No significant soft tissue abnormalities.      Impression:       1.  No acute abnormality demonstrated.  2.  Lower thoracic spondylosis and degenerative changes as described.  3.  Multilevel bilateral foraminal small perineural cysts.    THIS DOCUMENT HAS BEEN ELECTRONICALLY SIGNED BY SARI MULTANI JR. MD    MRI Lumbar Spine Without Contrast [[id number]] Collected:  03/07/18 1020     Updated:  03/07/18 1227    Narrative:       EXAMINATION: MRI LUMBAR SPINE WO CONTRAST-     INDICATION: Acute paraplegia.      TECHNIQUE: MR datasets of the lumbar spine were performed without  intravenous contrast.     COMPARISON: None.     FINDINGS:   1. The patient has a substantial levoscoliosis of the lumbar spine.     2. Conus medullaris and cauda equina are intact without high-grade  entrapment or mass. Significant epidural hematoma is not identified.     3. By piecemeal examination considering the scoliosis, there is no acute  lumbar fracture. Paraspinal mass is not identified.     4. There is no marrow edema or marrow tumor.     5. The transaxial datasets are somewhat malaligned because of the  scoliosis.     There is a disc protrusion at multiple levels.     Specifically, at the L2-L3 level, there is a broad-based disc protrusion  which appears to embarrassed the right lateral recess with marked  stenosis and extension to the right lateral recess significantly,  compounded by the scoliosis.     At the L3-L4 level, there is a broad-based disc protrusion  compressing  the central dural sac and narrowing the right lateral recess more than  the right lateral recess producing moderate impingement in the right  lateral recess at the 3-4 level.     The dominant finding is at L4-L5 where there is a large soft disc  protrusion in combination with facet arthropathy and hypertrophy and  ligamentum flaval hypertrophy.  This produces moderately severe central  impingement stenosis and bilateral high-grade lateral recess stenosis.  This is an impressive abnormal impingement level, however, this is  certainly not an acute event.     At L5-S1, there is a broad-based disc protrusion extending leftward to  the left lateral recess producing high-grade stenosis. This spares the  central dural sac.       Impression:       1. Multilevel disc impingements are noted as described, most severe on  the right at L2-L3, bilaterally in the lateral recesses worse anterior  rightward on the dural sac and in the right lateral recess at L3-L4, and  high-grade central and lateral recess critical impingement is noted  produced by a combination of factors described above at L4-L5. There is  left lateral recess impingement at L5-S1 produced by combination of disc  extension and protrusion in the left lateral recess with high-grade  facet bossing and hypertrophy.  2. Intramedullary lesion, acute epidural hematoma, cord or cauda equina  entrapment is not identified otherwise.     D:  03/06/2018  E:  03/07/2018         This report was finalized on 3/7/2018 12:25 PM by Dr. Adams Grajeda MD.       MRI Brain Without Contrast [[id number]] Collected:  03/07/18 1011     Updated:  03/07/18 1227    Narrative:       EXAMINATION: MRI BRAIN WO CONTRAST-03/06/2018:     INDICATION: Acute paraplegia, change in neurologic status.     TECHNIQUE:  MR data sets of the brain were performed without intravenous  contrast.     COMPARISON:  Comparison is made to previous MR data sets of 02/14/2018.     FINDINGS:   1. There is  an ovoid mass which is well delineated in the posterior left  temporal and left parietal region near the trigonal region on the left.  This mass exhibits mixed signal but is primarily high signal now on the  T2 data sets and intensely high signal on T1 data sets. On previous  comparative MR data sets of the brain, it was isointense and  intermediate in signal on T1 and had intermediate low signal on T2. This  has the appearance of organizing intra-axial left cerebral hematoma. It  has not changed in overall size or configuration and continues to  exhibit a mixed signal pattern internally. There is some mild reactive  edema and gliosis around this area of mass within the left posterior  temporal and lower parietal region.  2. There is small vessel microangiopathy diffusely. There is  periventricular abnormal increased white matter signal and there is  abnormal signal in the forceps major and forceps minor.  3. T1 data sets demonstrate that this mass lesion is now quite increased  in signal in its perimeter with central low signal, likely indicative of  organizing products of hemorrhage. Although it remains abnormal on the  diffusion weighted sequence, there is no evidence of new restricted  diffusion to indicate an evolving infarct elsewhere. Further, on the T1  data sets, there is no evidence of hemorrhage elsewhere or a new  recurrent hemorrhage in the brain outside of the previous insult.       Impression:       1.  There is a mass in the left posterior temporal region in the left  lower parietal area surrounded by edema and increased FLAIR and T2  signal indicative of some residual edema and reactive gliosis. The mass  is now mixed signal and is high signal on both T2 and T1 indicating  organizing intra-axial blood clot. Otherwise, the size of the mass has  not increased or changed nor has it improved or decreased.  2.  No other areas of acute insult are noted. There is no restricted  diffusion elsewhere in the  brain. There is no edema, mass effect or  evidence of distant hemorrhage.  3.  The patient has a background pattern of substantial microangiopathy  and abnormal increased FLAIR white matter signal globally and diffusely.  These findings were noted on previous exams.     D:  03/06/2018  E:  03/07/2018            This report was finalized on 3/7/2018 12:25 PM by Dr. Adams Grajeda MD.       CT Head Without Contrast [[id number]] Collected:  03/07/18 0804     Updated:  03/07/18 1228    Narrative:       EXAMINATION: CT HEAD WO CONTRAST-      INDICATION: Decreased alertness; slurred speech.     TECHNIQUE: CT datasets of the brain were performed without intravenous  contrast.     The radiation dose reduction device was turned on for each scan per the  ALARA (As Low as Reasonably Achievable) protocol.     COMPARISON: Compared to previous CT datasets of 4 days ago.     FINDINGS:   1. The left posterior temporal and parietal intraaxial hemorrhage is  again noted, less in overall size and density and slightly improved over  4 days.  2. The edema and mass effect is also decreased.  There is only a trace  left to right midline shift which is substantially improved.  3. Evidence of new hemorrhage is not identified. The foramen magnum is  clear. Although there is low-attenuation surrounding the intraaxial clot  in the left posterior parietal and temporal region, and although there  is considerable low-attenuation throughout the periventricular white  matter, there is no evidence of new ischemic insult, mass, edema or new  area of hemorrhage. There is no extracerebral collection or midline  shift of significance.  4. Calvarium is intact. The paranasal sinuses and mastoids are clear.       Impression:       1. Improvement is noted in the intraaxial hemorrhage in the left  posterior temporal and lower parietal region surrounded by edema and  low-attenuation. Although there is still blood pigment in the region, it  is less dense and  slightly decreased in size. The edema and mass effect  previously seen with a slight left to right midline shift is also  improved. There is low attenuation diffusely in the white matter.  Otherwise, there is no evidence of new hemorrhage, increasing edema,  increasing mass effect, or new acute finding superimposed upon slightly  improved previous abnormalities identified 4 days ago.  2. The datasets were complete at 1340 hours and the report is being  rendered to the emergency department at 1355 hours.     D:  03/06/2018  E:  03/07/2018        This report was finalized on 3/7/2018 12:26 PM by Dr. Adams Grajeda MD.       CT Angio Abdominal Aorta Bilateral Iliofem Runoff With & Without Contrast [[id number]] Collected:  03/08/18 1233     Updated:  03/08/18 2147    Narrative:       EXAMINATION: CT ANGIOGRAM CHEST W WO CONTRAST-, CT ANGIO ABDOMINAL AORTA  AND BILATERAL ILIOFEMORAL RUNOFF W WO CONTRAST-03/08/2018:      INDICATION: Needs ax-fem bypass for ischemic legs; G82.20-Paraplegia,  unspecified; Z74.09-Other reduced mobility; Z74.09-Other reduced  mobility.         TECHNIQUE: Spiral acquisition unenhanced 5 mm axial images through the  chest, abdomen and pelvis, with subsequent 3 mm arterial phase images,  with 2-D and 3-D angiographic reconstructions of the aortoiliac system  and lower extremity arteries.     The radiation dose reduction device was turned on for each scan per the  ALARA (As Low as Reasonably Achievable) protocol.     COMPARISON: NONE.     FINDINGS: The patient history indicates occluded aorta, ischemic  extremities.     Angiographic images of the chest show heavy atherosclerotic  calcification of the aorta, no evidence of aneurysm and no evidence of  intramural hematoma. Postcontrast images show extensive and irregular  calcified and noncalcified thrombus throughout the thoracic aorta, but  with relatively mild luminal narrowing.     In the abdomen, there is increasing circumferential narrowing of  the  aorta by irregular plaque down to the level of the renal arteries, which  appear patent, the left renal origin stented, and narrow beyond the  level of the stent, right renal origin irregular and probably  significantly narrowed. Below the level of the more inferior, left renal  artery, the aorta appears occluded. No contrast is seen in the aorta or  common or external iliac arteries. Internal iliac arteries appear  contrast opacified, and contiguous with the deep femoral system. No  contrast opacification is seen of the hypoplastic, calcified superficial  femoral arteries.      Multiple small deep collaterals are seen reconstituting very small  bilateral popliteal arteries, both approximately 2 mm in diameter. On  the right, trifurcation vessels are all faintly visible to the level of  the distal leg, but difficult to identify at the ankle except for the  posterior tibial artery.  On the left, the peroneal and posterior tibial  artery are visible all the way to the level of the ankle. Anterior  tibial is seen to the level of several centimeters above the ankle. No  contrast opacified vessels are yet seen in the feet, indicating slow or  restricted flow.        No evidence of active lung disease is seen. Mediastinal window images  show a few thyroid nodules. There is no evidence of mediastinal  adenopathy or pericardial effusion. There is fatty liver change and  several left lobe liver cysts. There is left renal midpole scarring.  There is a mildly dilated appearing common bile duct, and there are  multiple noncalcified gallstones. Maximal diameter of the common duct is  approximately 8 or 9 mm. No intra-abdominal inflammatory change, ascites  or adenopathy is seen.       Impression:       1.  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 No visible blood flow in the common, external iliac  arteries, or superficial femoral arteries.   2.  Reconstitution of very small  popliteal arteries, patent trifurcation  arteries, with some attenuation of flow above the right and left ankle  as discussed above.  3. Left renal artery stent and probable moderate post-stent stenosis.  Moderate or greater proximal right renal artery stenosis, but no  significant delay in contrast opacification of renal cortex.  4. Multiple gallstones and mildly dilated common bile duct.     D:  03/08/2018  E:  03/08/2018     This report was finalized on 3/8/2018 9:45 PM by DR. Joseph Dumas MD.       CT Angiogram Chest With & Without Contrast [[id number]] Collected:  03/08/18 1233     Updated:  03/08/18 2147    Narrative:       EXAMINATION: CT ANGIOGRAM CHEST W WO CONTRAST-, CT ANGIO ABDOMINAL AORTA  AND BILATERAL ILIOFEMORAL RUNOFF W WO CONTRAST-03/08/2018:      INDICATION: Needs ax-fem bypass for ischemic legs; G82.20-Paraplegia,  unspecified; Z74.09-Other reduced mobility; Z74.09-Other reduced  mobility.         TECHNIQUE: Spiral acquisition unenhanced 5 mm axial images through the  chest, abdomen and pelvis, with subsequent 3 mm arterial phase images,  with 2-D and 3-D angiographic reconstructions of the aortoiliac system  and lower extremity arteries.     The radiation dose reduction device was turned on for each scan per the  ALARA (As Low as Reasonably Achievable) protocol.     COMPARISON: NONE.     FINDINGS: The patient history indicates occluded aorta, ischemic  extremities.     Angiographic images of the chest show heavy atherosclerotic  calcification of the aorta, no evidence of aneurysm and no evidence of  intramural hematoma. Postcontrast images show extensive and irregular  calcified and noncalcified thrombus throughout the thoracic aorta, but  with relatively mild luminal narrowing.     In the abdomen, there is increasing circumferential narrowing of the  aorta by irregular plaque down to the level of the renal arteries, which  appear patent, the left renal origin stented, and narrow beyond the  level  of the stent, right renal origin irregular and probably  significantly narrowed. Below the level of the more inferior, left renal  artery, the aorta appears occluded. No contrast is seen in the aorta or  common or external iliac arteries. Internal iliac arteries appear  contrast opacified, and contiguous with the deep femoral system. No  contrast opacification is seen of the hypoplastic, calcified superficial  femoral arteries.      Multiple small deep collaterals are seen reconstituting very small  bilateral popliteal arteries, both approximately 2 mm in diameter. On  the right, trifurcation vessels are all faintly visible to the level of  the distal leg, but difficult to identify at the ankle except for the  posterior tibial artery.  On the left, the peroneal and posterior tibial  artery are visible all the way to the level of the ankle. Anterior  tibial is seen to the level of several centimeters above the ankle. No  contrast opacified vessels are yet seen in the feet, indicating slow or  restricted flow.        No evidence of active lung disease is seen. Mediastinal window images  show a few thyroid nodules. There is no evidence of mediastinal  adenopathy or pericardial effusion. There is fatty liver change and  several left lobe liver cysts. There is left renal midpole scarring.  There is a mildly dilated appearing common bile duct, and there are  multiple noncalcified gallstones. Maximal diameter of the common duct is  approximately 8 or 9 mm. No intra-abdominal inflammatory change, ascites  or adenopathy is seen.       Impression:       1.  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 No visible blood flow in the common, external iliac  arteries, or superficial femoral arteries.   2.  Reconstitution of very small popliteal arteries, patent trifurcation  arteries, with some attenuation of flow above the right and left ankle  as discussed above.  3. Left renal artery stent  and probable moderate post-stent stenosis.  Moderate or greater proximal right renal artery stenosis, but no  significant delay in contrast opacification of renal cortex.  4. Multiple gallstones and mildly dilated common bile duct.     D:  03/08/2018  E:  03/08/2018     This report was finalized on 3/8/2018 9:45 PM by DR. Joseph Dumas MD.             Results for orders placed during the hospital encounter of 03/06/18   Duplex Carotid Ultrasound CAR    Narrative · Right internal carotid artery stenosis of 0-49%.  · Left internal carotid artery stenosis of 50-69%.          Results for orders placed during the hospital encounter of 03/06/18   Duplex Carotid Ultrasound CAR    Narrative · Right internal carotid artery stenosis of 0-49%.  · Left internal carotid artery stenosis of 50-69%.          Results for orders placed during the hospital encounter of 03/06/18   Adult Transthoracic Echo Complete W/ Cont if Necessary Per Protocol    Narrative · Left ventricular systolic function is hyperdynamic (EF > 70).  · Left ventricular diastolic dysfunction (grade I) consistent with   impaired relaxation.  · Normal right ventricular cavity size, wall thickness, systolic function   and septal motion noted.  · Saline test results are negative ( technically difficult and limited   study ).  · There is no evidence of pericardial effusion.  · No evidence of pulmonary hypertension is present.  · No significant structural valvular abnormality demonstrated.           Order Current Status    Urine Culture - Urine, Urine, Clean Catch Preliminary result        Discharge Details      Chika Posey   Home Medication Instructions CHANTAL:[id number]    Printed on:03/09/18 1055   Medication Information                      amLODIPine (NORVASC) 10 MG tablet  Take 0.5 tablets by mouth Daily.             aspirin 81 MG EC tablet  Take 81 mg by mouth Daily.             atorvastatin (LIPITOR) 40 MG tablet  Take 40 mg by mouth Daily.              cetirizine (zyrTEC) 10 MG tablet  Take 10 mg by mouth Daily.             CloNIDine (CATAPRES) 0.1 MG tablet  Take 0.1 mg by mouth Daily.             losartan (COZAAR) 100 MG tablet  Take 100 mg by mouth Daily.             potassium chloride (K-DUR,KLOR-CON) 20 MEQ CR tablet  Take 20 mEq by mouth Daily.                   Discharge Disposition:  Home or Self Care    Discharge Diet: routine      Discharge Activity:  Walk at home every 2 hours to decrease DVT risk.       Special Instructions: return to Dr. Nascimento's office for scheduled appointment, for pre-op planning.     Nsurgery followup q 6 months for carotid stenosis, ICH, and lumbar disease.     Time Spent on Discharge:  45 minutes    Electronically signed by Nichol Gr MD, 03/09/18, 10:56 AM.

## 2018-03-09 NOTE — PROGRESS NOTES
Continued Stay Note  [facility]     Patient Name: Chika Posey  MRN: [id number]  Today's Date: 3/9/2018    Admit Date: 3/6/2018          Discharge Plan       03/09/18 1117    Case Management/Social Work Plan    Plan Home w/[facility] includes the [facility]    Patient/Family In Agreement With Plan yes    Additional Comments Spoke with patient in room.  Her plan is to return home.  Was current with [facility] on admission.  New home health orders received.  Notified Malik that patient was discharging today.  Patient denies any other needs at this time.  Latisha Weeks RN [phone]              Discharge Codes     None        Expected Discharge Date and Time     Expected Discharge Date Expected Discharge Time    Mar 9, 2018             Latisha Weeks RN

## 2018-03-09 NOTE — PLAN OF CARE
Problem: Patient Care Overview (Adult)  Goal: Plan of Care Review  Outcome: Ongoing (interventions implemented as appropriate)   03/09/18 1033   Outcome Evaluation   Outcome Summary/Follow up Plan pt denies any pain/discomfort when asked. NS currently infusing at 50/hr. Pt stable this shift. Up in chair with PT. Will cont to monitor.   Coping/Psychosocial Response Interventions   Plan Of Care Reviewed With patient     Goal: Adult Individualization and Mutuality  Outcome: Ongoing (interventions implemented as appropriate)    Goal: Discharge Needs Assessment  Outcome: Ongoing (interventions implemented as appropriate)      Problem: Fall Risk (Adult)  Goal: Absence of Falls  Outcome: Ongoing (interventions implemented as appropriate)      Problem: Seizure Disorder/Epilepsy (Adult)  Goal: Signs and Symptoms of Listed Potential Problems Will be Absent or Manageable (Seizure Disorder/Epilepsy)  Outcome: Ongoing (interventions implemented as appropriate)

## 2018-03-09 NOTE — PROGRESS NOTES
Chika Posey  [id number]  1945      CC: I feel pretty good    Subjective:  No change in general status.  Patient is pain-free unless she is ambulating CT angiography reviewed and discussed with Dr. Nascimento.    Principal Problem:    PAD (peripheral artery disease)  Active Problems:    HTN (hypertension)    Pre-syncope    Spell of altered cognition    Muscular weakness    History of cerebral hemorrhage      Objective:    Vital Signs:  Temp:  [98.8 °F (37.1 °C)-99.1 °F (37.3 °C)] 99 °F (37.2 °C)  Heart Rate:  [71-85] 83  Resp:  [16-18] 18  BP: (137-165)/(68-85) 142/68    Physical Exam:   General Appearance: alert, appears stated age    Lungs: clear to auscultation    Heart: regular rhythm & normal rate, normal S1, S2,no murmur    Skin:warm and dry          Results from last 7 days  Lab Units 03/08/18  0621   WBC 10*3/mm3 5.78   HEMOGLOBIN g/dL 11.1*   HEMATOCRIT % 35.2   PLATELETS 10*3/mm3 316       Results from last 7 days  Lab Units 03/08/18  0621   SODIUM mmol/L 138   POTASSIUM mmol/L 3.9   CHLORIDE mmol/L 110*   CO2 mmol/L 24.0   BUN mg/dL 13   CREATININE mg/dL 1.00   GLUCOSE mg/dL 114*   CALCIUM mg/dL 8.7       Imaging Results (last 24 hours)     Procedure Component Value Units Date/Time    CT Angio Abdominal Aorta Bilateral Iliofem Runoff With & Without Contrast [[id number]] Collected:  03/08/18 1233     Updated:  03/08/18 2147    Narrative:       EXAMINATION: CT ANGIOGRAM CHEST W WO CONTRAST-, CT ANGIO ABDOMINAL AORTA  AND BILATERAL ILIOFEMORAL RUNOFF W WO CONTRAST-03/08/2018:      INDICATION: Needs ax-fem bypass for ischemic legs; G82.20-Paraplegia,  unspecified; Z74.09-Other reduced mobility; Z74.09-Other reduced  mobility.         TECHNIQUE: Spiral acquisition unenhanced 5 mm axial images through the  chest, abdomen and pelvis, with subsequent 3 mm arterial phase images,  with 2-D and 3-D angiographic reconstructions of the aortoiliac system  and lower extremity arteries.     The radiation dose  reduction device was turned on for each scan per the  ALARA (As Low as Reasonably Achievable) protocol.     COMPARISON: NONE.     FINDINGS: The patient history indicates occluded aorta, ischemic  extremities.     Angiographic images of the chest show heavy atherosclerotic  calcification of the aorta, no evidence of aneurysm and no evidence of  intramural hematoma. Postcontrast images show extensive and irregular  calcified and noncalcified thrombus throughout the thoracic aorta, but  with relatively mild luminal narrowing.     In the abdomen, there is increasing circumferential narrowing of the  aorta by irregular plaque down to the level of the renal arteries, which  appear patent, the left renal origin stented, and narrow beyond the  level of the stent, right renal origin irregular and probably  significantly narrowed. Below the level of the more inferior, left renal  artery, the aorta appears occluded. No contrast is seen in the aorta or  common or external iliac arteries. Internal iliac arteries appear  contrast opacified, and contiguous with the deep femoral system. No  contrast opacification is seen of the hypoplastic, calcified superficial  femoral arteries.      Multiple small deep collaterals are seen reconstituting very small  bilateral popliteal arteries, both approximately 2 mm in diameter. On  the right, trifurcation vessels are all faintly visible to the level of  the distal leg, but difficult to identify at the ankle except for the  posterior tibial artery.  On the left, the peroneal and posterior tibial  artery are visible all the way to the level of the ankle. Anterior  tibial is seen to the level of several centimeters above the ankle. No  contrast opacified vessels are yet seen in the feet, indicating slow or  restricted flow.        No evidence of active lung disease is seen. Mediastinal window images  show a few thyroid nodules. There is no evidence of mediastinal  adenopathy or pericardial  effusion. There is fatty liver change and  several left lobe liver cysts. There is left renal midpole scarring.  There is a mildly dilated appearing common bile duct, and there are  multiple noncalcified gallstones. Maximal diameter of the common duct is  approximately 8 or 9 mm. No intra-abdominal inflammatory change, ascites  or adenopathy is seen.       Impression:       1.  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 No visible blood flow in the common, external iliac  arteries, or superficial femoral arteries.   2.  Reconstitution of very small popliteal arteries, patent trifurcation  arteries, with some attenuation of flow above the right and left ankle  as discussed above.  3. Left renal artery stent and probable moderate post-stent stenosis.  Moderate or greater proximal right renal artery stenosis, but no  significant delay in contrast opacification of renal cortex.  4. Multiple gallstones and mildly dilated common bile duct.     D:  03/08/2018  E:  03/08/2018     This report was finalized on 3/8/2018 9:45 PM by DR. Joseph Dumas MD.       CT Angiogram Chest With & Without Contrast [[id number]] Collected:  03/08/18 1233     Updated:  03/08/18 2147    Narrative:       EXAMINATION: CT ANGIOGRAM CHEST W WO CONTRAST-, CT ANGIO ABDOMINAL AORTA  AND BILATERAL ILIOFEMORAL RUNOFF W WO CONTRAST-03/08/2018:      INDICATION: Needs ax-fem bypass for ischemic legs; G82.20-Paraplegia,  unspecified; Z74.09-Other reduced mobility; Z74.09-Other reduced  mobility.         TECHNIQUE: Spiral acquisition unenhanced 5 mm axial images through the  chest, abdomen and pelvis, with subsequent 3 mm arterial phase images,  with 2-D and 3-D angiographic reconstructions of the aortoiliac system  and lower extremity arteries.     The radiation dose reduction device was turned on for each scan per the  ALARA (As Low as Reasonably Achievable) protocol.     COMPARISON: NONE.     FINDINGS: The patient  history indicates occluded aorta, ischemic  extremities.     Angiographic images of the chest show heavy atherosclerotic  calcification of the aorta, no evidence of aneurysm and no evidence of  intramural hematoma. Postcontrast images show extensive and irregular  calcified and noncalcified thrombus throughout the thoracic aorta, but  with relatively mild luminal narrowing.     In the abdomen, there is increasing circumferential narrowing of the  aorta by irregular plaque down to the level of the renal arteries, which  appear patent, the left renal origin stented, and narrow beyond the  level of the stent, right renal origin irregular and probably  significantly narrowed. Below the level of the more inferior, left renal  artery, the aorta appears occluded. No contrast is seen in the aorta or  common or external iliac arteries. Internal iliac arteries appear  contrast opacified, and contiguous with the deep femoral system. No  contrast opacification is seen of the hypoplastic, calcified superficial  femoral arteries.      Multiple small deep collaterals are seen reconstituting very small  bilateral popliteal arteries, both approximately 2 mm in diameter. On  the right, trifurcation vessels are all faintly visible to the level of  the distal leg, but difficult to identify at the ankle except for the  posterior tibial artery.  On the left, the peroneal and posterior tibial  artery are visible all the way to the level of the ankle. Anterior  tibial is seen to the level of several centimeters above the ankle. No  contrast opacified vessels are yet seen in the feet, indicating slow or  restricted flow.        No evidence of active lung disease is seen. Mediastinal window images  show a few thyroid nodules. There is no evidence of mediastinal  adenopathy or pericardial effusion. There is fatty liver change and  several left lobe liver cysts. There is left renal midpole scarring.  There is a mildly dilated appearing common  bile duct, and there are  multiple noncalcified gallstones. Maximal diameter of the common duct is  approximately 8 or 9 mm. No intra-abdominal inflammatory change, ascites  or adenopathy is seen.       Impression:       1.  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 No visible blood flow in the common, external iliac  arteries, or superficial femoral arteries.   2.  Reconstitution of very small popliteal arteries, patent trifurcation  arteries, with some attenuation of flow above the right and left ankle  as discussed above.  3. Left renal artery stent and probable moderate post-stent stenosis.  Moderate or greater proximal right renal artery stenosis, but no  significant delay in contrast opacification of renal cortex.  4. Multiple gallstones and mildly dilated common bile duct.     D:  03/08/2018  E:  03/08/2018     This report was finalized on 3/8/2018 9:45 PM by DR. Joseph Dumas MD.              Diagnosis:  Occlusion of the infrarenal abdominal aorta.  Occlusion of an aortobifemoral bypass graft.  Occlusion of both superficial femoral artery.  Patent bilateral profunda femoris artery.      Plan   Patient will be seen by Dr. Nascimento March 19.  Plan is to schedule the patient for axillary low bifemoral bypass in the next 2-3 weeks. I have reviewed, verified, and confirmed the above history and current status.  I have examined the patient and confirmed the above physical findings.    Bruce Ceballos MD  03/09/18  1:54 PM

## 2018-03-09 NOTE — PROGRESS NOTES
FOLLOW UP ENCOUNTER          WORKING DIAGNOSIS:   Vascular occlusive disease                          MEDICAL HISTORY SINCE LAST ENCOUNTER :       Await definitive studies in reference to peripheral vascular disease.                                      ASSESSMENT/DISPOSITION :       1.  I don't think that neurosurgery has much to offer at this point in time.  She does have degenerative osteoarthritis and moderately severe spinal stenosis.  Nevertheless there is no rush for laminectomy should that be indicated which I do not believe to be the case.

## 2018-03-16 ENCOUNTER — TELEPHONE (OUTPATIENT)
Dept: CARDIAC SURGERY | Facility: CLINIC | Age: 73
End: 2018-03-16

## 2018-03-16 NOTE — TELEPHONE ENCOUNTER
After speaking with Dr. Ceballos, if patient can not be sent to  , then he would see the patient in office and try to devise a plan best suitable for the patient.

## 2018-03-16 NOTE — TELEPHONE ENCOUNTER
S/w pts sister explained to her that I would need to speak with Dr. Ceballos regarding our next step. She said [facility] has declined to see them.

## 2018-03-19 ENCOUNTER — TELEPHONE (OUTPATIENT)
Dept: CARDIAC SURGERY | Facility: CLINIC | Age: 73
End: 2018-03-19

## 2018-03-19 NOTE — TELEPHONE ENCOUNTER
Chyna from [facility] called today on behalf of the patient. Patient is wanting to know when she can be seen by one of our physicians. Chyna states that the patient has cold lower extremities. I advised Chyna that per my  we will have to ask Dr. Ceballos what he would like to do in regards to this patient.    Advised Chyna to tell the patient if she has cold extremities she should report to the Emergency Dept.    This information has been passed onto Shawn, as he was taking care of this patient. See telephone encounters from 3/16.

## 2018-03-22 ENCOUNTER — OFFICE VISIT (OUTPATIENT)
Dept: CARDIAC SURGERY | Facility: CLINIC | Age: 73
End: 2018-03-22

## 2018-03-22 VITALS
DIASTOLIC BLOOD PRESSURE: 90 MMHG | HEIGHT: 61 IN | TEMPERATURE: 99.4 F | OXYGEN SATURATION: 92 % | SYSTOLIC BLOOD PRESSURE: 140 MMHG | BODY MASS INDEX: 24.55 KG/M2 | HEART RATE: 99 BPM | WEIGHT: 130 LBS

## 2018-03-22 DIAGNOSIS — I73.9 PAD (PERIPHERAL ARTERY DISEASE) (HCC): Primary | ICD-10-CM

## 2018-03-22 PROCEDURE — 99213 OFFICE O/P EST LOW 20 MIN: CPT | Performed by: THORACIC SURGERY (CARDIOTHORACIC VASCULAR SURGERY)

## 2018-03-22 NOTE — PROGRESS NOTES
03/22/2018  Patient Information  Chika Posey                                                                                          [address]  [facility] [address]   1945  'PCP/Referring Physician'  Alyssia Greenfield MD  [phone]  No ref. provider found    Chief Complaint   Patient presents with   • Follow-up     FU Bilateral Claudication   CC: My feet hurt all the time, my legs hurt and I will    History of Present Illness: 72-year-old -American female with a complex past medical history of peripheral arterial disease.  This patient has had an aortobifemoral bypass in the remote past approximately a year ago she was seen at the [facility] with severe leg pain.  Angiography revealed complete occlusion of the aortobifemoral bypass.  She was treated with TPA and anticoagulation.  Successful revascularization of her obtained with this regimen she presented to [facility] emergency room last week with similar complaints to those that she had a year ago at the [facility].  ABIs were consistent with severe vascular occlusive disease of both lower extremities.  The patient described fairly typical claudication at very short distances of walking she also has foot pain most of the time.  She is a past cigarette smoker but stopped smoking several years ago she does not have diabetes.  She does have significant hypertension.  She has had a previous stroke.  CT angiogram performed at [facility] reveals complete occlusion of the abdominal aorta and both common iliac and external iliac artery the profunda femoris arteries are open bilaterally.  Superficial femoral arteries are closed.  Runoff is at least one-vessel bilaterally but is difficult to assess secondary to low flow.      Patient Active Problem List   Diagnosis   • Cerebral hemorrhage   • Headache   • Slurred speech   • PAD (peripheral artery disease)   • HTN  (hypertension)   • Chronic anticoagulation   • Paraparesis of both lower limbs   • Pre-syncope   • Acute-on-chronic kidney injury   • Back pain   • Spell of altered cognition   • Generalized weakness   • Muscular weakness   • History of cerebral hemorrhage   • History of DVT (deep vein thrombosis)     Past Medical History:   Diagnosis Date   • Compartment syndrome    • DVT (deep venous thrombosis)     Bilateral   • Hyperlipidemia    • Hypertension    • Peripheral vascular disease    • Stroke     bleed     Past Surgical History:   Procedure Laterality Date   • FOREARM FASCIOTOMY Left    • HYSTERECTOMY     • THROMBOLYSIS     • TUBAL ABDOMINAL LIGATION     • VASCULAR SURGERY      Bilateral Stents       Current Outpatient Prescriptions:   •  amLODIPine (NORVASC) 10 MG tablet, Take 0.5 tablets by mouth Daily., Disp: , Rfl:   •  aspirin 81 MG EC tablet, Take 81 mg by mouth Daily., Disp: , Rfl:   •  atorvastatin (LIPITOR) 40 MG tablet, Take 40 mg by mouth Daily., Disp: , Rfl:   •  cetirizine (zyrTEC) 10 MG tablet, Take 10 mg by mouth Daily., Disp: , Rfl:   •  CloNIDine (CATAPRES) 0.1 MG tablet, Take 0.1 mg by mouth Daily., Disp: , Rfl:   •  losartan (COZAAR) 100 MG tablet, Take 100 mg by mouth Daily., Disp: , Rfl:   •  potassium chloride (K-DUR,KLOR-CON) 20 MEQ CR tablet, Take 20 mEq by mouth Daily., Disp: , Rfl:   Allergies   Allergen Reactions   • Morphine And Related Itching   • Plavix [Clopidogrel Bisulfate] Itching     Social History     Social History   • Marital status: Single     Spouse name: N/A   • Number of children: 1   • Years of education: N/A     Occupational History   • [employer]      Retired     Social History Main Topics   • Smoking status: Former Smoker     Packs/day: 1.00     Years: 25.00     Types: Cigarettes     Quit date: 3/22/2008   • Smokeless tobacco: Never Used   • Alcohol use No   • Drug use: No   • Sexual activity: Defer     Other Topics Concern   • Not on file     Social History Narrative   •  "No narrative on file     Family History   Problem Relation Age of Onset   • Hypertension Mother    • Hypertension Father      Review of Systems   Constitution: Positive for malaise/fatigue and weight loss (8 lbs in 3/7/18). Negative for chills, fever and night sweats.   HENT: Negative for hearing loss, odynophagia and sore throat.    Cardiovascular: Positive for claudication (bilateral). Negative for chest pain, dyspnea on exertion, leg swelling, orthopnea and palpitations.   Respiratory: Negative for cough and hemoptysis.    Endocrine: Negative for cold intolerance, heat intolerance, polydipsia, polyphagia and polyuria.   Hematologic/Lymphatic: Bruises/bleeds easily.   Skin: Negative for itching and rash.   Musculoskeletal: Positive for back pain. Negative for joint pain, joint swelling and myalgias.   Gastrointestinal: Positive for constipation. Negative for abdominal pain, diarrhea, hematemesis, hematochezia, melena, nausea and vomiting.   Genitourinary: Negative for dysuria, frequency and hematuria.   Neurological: Negative for focal weakness, headaches, numbness and seizures.   Psychiatric/Behavioral: Negative for suicidal ideas.   All other systems reviewed and are negative.    Vitals:    03/22/18 1208   BP: 140/90   BP Location: Left arm   Patient Position: Sitting   Pulse: 99   Temp: 99.4 °F (37.4 °C)   SpO2: 92%   Weight: 59 kg (130 lb)   Height: 154.9 cm (61\")      Physical Exam   Constitutional: She is oriented to person, place, and time. She appears well-nourished.   Elderly -American female no acute distress.  Thin well-nourished and normally developed.   HENT:   Head: Normocephalic and atraumatic.   Right Ear: External ear normal.   Left Ear: External ear normal.   Nose: Nose normal.   Mouth/Throat: Oropharynx is clear and moist.   Eyes: Pupils are equal, round, and reactive to light.   Neck: Normal range of motion. No JVD present. No tracheal deviation present. No thyromegaly present. "   Cardiovascular: Normal rate and regular rhythm.    Murmur heard.  Absent distal pulses.  1/6 systolic murmur left sternal border   Pulmonary/Chest: Effort normal and breath sounds normal. No stridor. No respiratory distress. She has no wheezes. She has no rales. She exhibits no tenderness.   Abdominal: Soft. Bowel sounds are normal. She exhibits no distension and no mass. There is no tenderness. There is no rebound and no guarding.   Musculoskeletal: She exhibits no edema, tenderness or deformity.   Doppler pulses of dorsalis pedis and posterior tibial arteries bilaterally are not obtainable   Lymphadenopathy:     She has no cervical adenopathy.   Neurological: She is alert and oriented to person, place, and time. She has normal reflexes. She displays normal reflexes. No cranial nerve deficit. She exhibits normal muscle tone. Coordination normal.   Alert, oriented, no apparent neurologic problems   Skin: Skin is warm and dry.   Psychiatric: She has a normal mood and affect.       Labs/Imaging: CT angiogram [facility] last week reviewed in detail.    Assessment: Elderly -American female with complete occlusion of abdominal aorta and an aortobifemoral bypass and subsequent severe chronic ischemia of both lower extremities    Plan: I had a long discussion with the patient and with her daughter I have outlined to them that the only possible surgical intervention would involve an axillobifemoral bypass.  I have outlined the risks and benefits of this approach I stressed to them that frequently axillary grafts will only last 2 or 3 years.  Meanwhile I have asked the patient if she can get along with her current pain.  The patient states that she would rather tolerate the current pain than to have a large risky operation.  The patient will return for a recheck and 2 months.  Meanwhile I have given her a prescription for Pletal 100 mg twice a day          Patient Active Problem List   Diagnosis   •  Cerebral hemorrhage   • Headache   • Slurred speech   • PAD (peripheral artery disease)   • HTN (hypertension)   • Chronic anticoagulation   • Paraparesis of both lower limbs   • Pre-syncope   • Acute-on-chronic kidney injury   • Back pain   • Spell of altered cognition   • Generalized weakness   • Muscular weakness   • History of cerebral hemorrhage   • History of DVT (deep vein thrombosis)     Signed by:      Bruce Ceballos MD  [facility]gery  03/26/18   2:11 PM

## 2018-04-20 ENCOUNTER — OFFICE VISIT (OUTPATIENT)
Dept: CARDIAC SURGERY | Facility: CLINIC | Age: 73
End: 2018-04-20

## 2018-04-20 ENCOUNTER — OUTSIDE FACILITY SERVICE (OUTPATIENT)
Dept: HOSPITALIST | Facility: HOSPITAL | Age: 73
End: 2018-04-20

## 2018-04-20 VITALS
HEART RATE: 122 BPM | OXYGEN SATURATION: 95 % | DIASTOLIC BLOOD PRESSURE: 67 MMHG | HEIGHT: 61 IN | WEIGHT: 136.3 LBS | BODY MASS INDEX: 25.73 KG/M2 | TEMPERATURE: 98.2 F | SYSTOLIC BLOOD PRESSURE: 102 MMHG

## 2018-04-20 DIAGNOSIS — I73.9 PAD (PERIPHERAL ARTERY DISEASE) (HCC): Primary | ICD-10-CM

## 2018-04-20 PROCEDURE — G0180 MD CERTIFICATION HHA PATIENT: HCPCS | Performed by: INTERNAL MEDICINE

## 2018-04-20 PROCEDURE — 99213 OFFICE O/P EST LOW 20 MIN: CPT | Performed by: THORACIC SURGERY (CARDIOTHORACIC VASCULAR SURGERY)

## 2018-04-20 RX ORDER — CILOSTAZOL 100 MG/1
100 TABLET ORAL 2 TIMES DAILY
COMMUNITY
Start: 2018-04-17 | End: 2018-08-15

## 2018-04-20 NOTE — PROGRESS NOTES
04/20/2018  Patient Information  Chika Posey                                                                                          [address]  [facility] [address]   1945  'PCP/Referring Physician'  Alyssia Greenfield MD  [phone]  No ref. provider found    Chief Complaint   Patient presents with   • Follow-up     1 mo f/u for PVD   • Peripheral Vascular Disease   CC: I feel a lot better    History of Present Illness: 72-year-old -American female with severe peripheral vascular disease this patient has had a previous aortobifemoral bypass.  This bypass is occluded a little over a year ago and was opened back up at the [facility] with a TPA protocol.  In the last 2-3 months patient has had increased claudication and rest pain.  She states that her legs actually feels better today.  Her rest pain has dissipated and feet feel better.  She still gets claudication with a relatively limited ambulation but she states that she can tolerate this and would prefer to avoid surgical intervention.      Patient Active Problem List   Diagnosis   • Cerebral hemorrhage   • Headache   • Slurred speech   • PAD (peripheral artery disease)   • HTN (hypertension)   • Chronic anticoagulation   • Paraparesis of both lower limbs   • Pre-syncope   • Acute-on-chronic kidney injury   • Back pain   • Spell of altered cognition   • Generalized weakness   • Muscular weakness   • History of cerebral hemorrhage   • History of DVT (deep vein thrombosis)     Past Medical History:   Diagnosis Date   • Compartment syndrome    • DVT (deep venous thrombosis)     Bilateral   • Hyperlipidemia    • Hypertension    • Peripheral vascular disease    • Stroke     bleed     Past Surgical History:   Procedure Laterality Date   • FOREARM FASCIOTOMY Left    • HYSTERECTOMY     • THROMBOLYSIS     • TUBAL ABDOMINAL LIGATION     • VASCULAR SURGERY      Bilateral Stents       Current Outpatient Prescriptions:   •   amLODIPine (NORVASC) 10 MG tablet, Take 0.5 tablets by mouth Daily., Disp: , Rfl:   •  aspirin 81 MG EC tablet, Take 81 mg by mouth Daily., Disp: , Rfl:   •  atorvastatin (LIPITOR) 40 MG tablet, Take 40 mg by mouth Daily., Disp: , Rfl:   •  cetirizine (zyrTEC) 10 MG tablet, Take 10 mg by mouth Daily., Disp: , Rfl:   •  cilostazol (PLETAL) 100 MG tablet, Take 100 mg by mouth 2 (Two) Times a Day., Disp: , Rfl:   •  CloNIDine (CATAPRES) 0.1 MG tablet, Take 0.1 mg by mouth Daily., Disp: , Rfl:   •  losartan (COZAAR) 100 MG tablet, Take 100 mg by mouth Daily., Disp: , Rfl:   •  potassium chloride (K-DUR,KLOR-CON) 20 MEQ CR tablet, Take 20 mEq by mouth Daily., Disp: , Rfl:   Allergies   Allergen Reactions   • Morphine And Related Itching   • Plavix [Clopidogrel Bisulfate] Itching     Social History     Social History   • Marital status: Single     Spouse name: N/A   • Number of children: 1   • Years of education: N/A     Occupational History   • [employer]      Retired     Social History Main Topics   • Smoking status: Former Smoker     Packs/day: 1.00     Years: 25.00     Types: Cigarettes     Quit date: 3/22/2008   • Smokeless tobacco: Never Used   • Alcohol use No   • Drug use: No   • Sexual activity: Defer     Other Topics Concern   • Not on file     Social History Narrative    Patient lives alone, in [place].     Family History   Problem Relation Age of Onset   • Hypertension Mother    • Hypertension Father      Review of Systems   Constitution: Positive for chills. Negative for fever, malaise/fatigue, night sweats and weight loss.   HENT: Negative for hearing loss, odynophagia and sore throat.    Cardiovascular: Positive for dyspnea on exertion and leg swelling. Negative for chest pain, orthopnea and palpitations.   Respiratory: Negative for cough and hemoptysis.    Endocrine: Negative for cold intolerance, heat intolerance, polydipsia, polyphagia and polyuria.   Hematologic/Lymphatic: Bruises/bleeds easily.  "  Skin: Negative for itching and rash.   Musculoskeletal: Positive for joint pain and joint swelling. Negative for myalgias.   Gastrointestinal: Positive for constipation. Negative for abdominal pain, diarrhea, hematemesis, hematochezia, melena, nausea and vomiting.   Genitourinary: Negative for dysuria, frequency and hematuria.   Neurological: Negative for focal weakness, headaches, numbness and seizures.   Psychiatric/Behavioral: Negative for suicidal ideas.   All other systems reviewed and are negative.    Vitals:    04/20/18 1016   BP: 102/67   BP Location: Right arm   Patient Position: Sitting   Pulse: (!) 122   Temp: 98.2 °F (36.8 °C)   TempSrc: Temporal Artery    SpO2: 95%   Weight: 61.8 kg (136 lb 4.8 oz)   Height: 154.9 cm (61\")      Physical Exam   Constitutional: She is oriented to person, place, and time. She appears well-developed and well-nourished. No distress.   HENT:   Head: Normocephalic.   Right Ear: External ear normal.   Left Ear: External ear normal.   Nose: Nose normal.   Eyes: Pupils are equal, round, and reactive to light.   Neck: Normal range of motion. Neck supple. No tracheal deviation present. No thyromegaly present.   Cardiovascular: Normal rate, normal heart sounds and intact distal pulses.    No murmur heard.  Pulmonary/Chest: Effort normal and breath sounds normal. No respiratory distress. She has no wheezes. She has no rales. She exhibits no tenderness.   Decreased breath sounds both bases   Abdominal: Soft. Bowel sounds are normal. She exhibits no distension and no mass. There is no tenderness.   Musculoskeletal: Normal range of motion. She exhibits no edema.   Skin and nails are normal in both the period pulses are not palpable and are monophasic with Doppler bilaterally.  No ulcerations noted.   Lymphadenopathy:     She has no cervical adenopathy.   Neurological: She is alert and oriented to person, place, and time. She has normal reflexes. No cranial nerve deficit.   Skin: Skin " is warm and dry. No rash noted. No erythema.   Psychiatric: She has a normal mood and affect.       Labs/Imaging: None    Assessment: Elderly -American female with severe peripheral vascular disease.  Currently both lower extremities are stable.    Plan: The patient will continue the regimen of walking to tolerance.  She will return to this clinic for routine follow-up in 6 months.         Patient Active Problem List   Diagnosis   • Cerebral hemorrhage   • Headache   • Slurred speech   • PAD (peripheral artery disease)   • HTN (hypertension)   • Chronic anticoagulation   • Paraparesis of both lower limbs   • Pre-syncope   • Acute-on-chronic kidney injury   • Back pain   • Spell of altered cognition   • Generalized weakness   • Muscular weakness   • History of cerebral hemorrhage   • History of DVT (deep vein thrombosis)     Signed by:      Bruce Ceballos MD  [facility]  04/30/18   1:57 PM

## 2018-04-23 ENCOUNTER — HOSPITAL ENCOUNTER (OUTPATIENT)
Dept: MRI IMAGING | Facility: HOSPITAL | Age: 73
Discharge: HOME | End: 2018-04-23
Attending: NEUROLOGICAL SURGERY

## 2018-04-23 ENCOUNTER — HOSPITAL ENCOUNTER (OUTPATIENT)
Dept: MRI IMAGING | Facility: HOSPITAL | Age: 73
Discharge: HOME | End: 2018-04-23
Attending: NEUROLOGICAL SURGERY | Admitting: NEUROLOGICAL SURGERY

## 2018-04-23 DIAGNOSIS — I61.9 CEREBRAL HEMORRHAGE (HCC): ICD-10-CM

## 2018-04-23 PROCEDURE — 82565 ASSAY OF CREATININE: CPT

## 2018-04-23 PROCEDURE — A9577 INJ MULTIHANCE: HCPCS | Performed by: NEUROLOGICAL SURGERY

## 2018-04-23 PROCEDURE — 0 GADOBENATE DIMEGLUMINE 529 MG/ML SOLUTION: Performed by: NEUROLOGICAL SURGERY

## 2018-04-23 PROCEDURE — 70553 MRI BRAIN STEM W/O & W/DYE: CPT

## 2018-04-23 PROCEDURE — 70546 MR ANGIOGRAPH HEAD W/O&W/DYE: CPT

## 2018-04-23 RX ADMIN — GADOBENATE DIMEGLUMINE 12 ML: 529 INJECTION, SOLUTION INTRAVENOUS at 16:24

## 2018-04-25 LAB — CREAT BLDA-MCNC: 1.2 MG/DL (ref 0.6–1.3)

## 2018-04-30 ENCOUNTER — OFFICE VISIT (OUTPATIENT)
Dept: NEUROSURGERY | Facility: CLINIC | Age: 73
End: 2018-04-30

## 2018-04-30 ENCOUNTER — TELEPHONE (OUTPATIENT)
Dept: NEUROSURGERY | Facility: CLINIC | Age: 73
End: 2018-04-30

## 2018-04-30 VITALS
SYSTOLIC BLOOD PRESSURE: 108 MMHG | TEMPERATURE: 97.2 F | WEIGHT: 136 LBS | HEIGHT: 61 IN | DIASTOLIC BLOOD PRESSURE: 56 MMHG | BODY MASS INDEX: 25.68 KG/M2

## 2018-04-30 DIAGNOSIS — G89.29 CHRONIC BILATERAL LOW BACK PAIN WITHOUT SCIATICA: ICD-10-CM

## 2018-04-30 DIAGNOSIS — I73.9 PVD (PERIPHERAL VASCULAR DISEASE) WITH CLAUDICATION (HCC): Primary | ICD-10-CM

## 2018-04-30 DIAGNOSIS — Z86.79 HISTORY OF CEREBRAL HEMORRHAGE: ICD-10-CM

## 2018-04-30 DIAGNOSIS — M54.50 CHRONIC BILATERAL LOW BACK PAIN WITHOUT SCIATICA: ICD-10-CM

## 2018-04-30 PROCEDURE — 99215 OFFICE O/P EST HI 40 MIN: CPT | Performed by: NEUROLOGICAL SURGERY

## 2018-04-30 RX ORDER — TRAZODONE HYDROCHLORIDE 50 MG/1
50 TABLET ORAL NIGHTLY
COMMUNITY
Start: 2018-04-25 | End: 2021-01-18 | Stop reason: HOSPADM

## 2018-04-30 RX ORDER — VALSARTAN 160 MG/1
160 TABLET ORAL DAILY
COMMUNITY
Start: 2018-04-25 | End: 2018-08-15

## 2018-04-30 NOTE — TELEPHONE ENCOUNTER
Provider:  Naldo  Caller: [address]  Time of call:  1:21   Phone #:  [phone]  Last visit: 4/30/18    Next visit: not scheduled         Reason for call:   Pt was given a coupon for Eliquis but there is no active RX for this medicine for this pt. She needs an order for this medicine in order to use the coupon.

## 2018-04-30 NOTE — PROGRESS NOTES
Subjective     Chief Complaint: Stroke, peripheral vascular disease, claudication    Patient ID: Chika Posey is a 72 y.o. female is here today for follow-up.    Stroke   The current episode started in the past 7 days. The problem occurs rarely. The problem has been gradually improving. Associated symptoms include fatigue and numbness. Pertinent negatives include no abdominal pain, anorexia, arthralgias, change in bowel habit, chest pain, chills, congestion, coughing, diaphoresis, fever, headaches, joint swelling, myalgias, nausea, neck pain, rash, sore throat, swollen glands, urinary symptoms, vertigo, visual change, vomiting or weakness. Nothing aggravates the symptoms. She has tried nothing for the symptoms. The treatment provided significant relief.       This is a 72-year-old woman who I have been following for a spontaneous intracranial hemorrhage.  She presents today to discuss the results of her follow-up MRI.  She reports no problems with headaches, nausea, vomiting, strokelike symptoms, or seizures.  She endorses pain and bilateral leg pain which is worse with ambulation and exercise.    The following portions of the patient's history were reviewed and updated as appropriate: allergies, current medications, past family history, past medical history, past social history, past surgical history and problem list.    Family history:   Family History   Problem Relation Age of Onset   • Hypertension Mother    • Hypertension Father        Social history:   Social History     Social History   • Marital status: Single     Spouse name: N/A   • Number of children: 1   • Years of education: N/A     Occupational History   • [employer]      Retired     Social History Main Topics   • Smoking status: Former Smoker     Packs/day: 1.00     Years: 25.00     Types: Cigarettes     Quit date: 3/22/2008   • Smokeless tobacco: Never Used   • Alcohol use No   • Drug use: No   • Sexual activity: Defer     Other Topics Concern    • Not on file     Social History Narrative    Patient lives alone, in [place].       Review of Systems   Constitutional: Positive for fatigue. Negative for activity change, appetite change, chills, diaphoresis, fever and unexpected weight change.   HENT: Positive for dental problem. Negative for congestion, drooling, ear discharge, ear pain, facial swelling, hearing loss, mouth sores, nosebleeds, postnasal drip, rhinorrhea, sinus pressure, sneezing, sore throat, tinnitus, trouble swallowing and voice change.    Eyes: Negative for photophobia, pain, discharge, redness, itching and visual disturbance.   Respiratory: Negative for apnea, cough, choking, chest tightness, shortness of breath, wheezing and stridor.    Cardiovascular: Negative for chest pain, palpitations and leg swelling.   Gastrointestinal: Negative for abdominal distention, abdominal pain, anal bleeding, anorexia, blood in stool, change in bowel habit, constipation, diarrhea, nausea, rectal pain and vomiting.   Endocrine: Positive for cold intolerance. Negative for heat intolerance, polydipsia, polyphagia and polyuria.   Genitourinary: Negative for decreased urine volume, difficulty urinating, dysuria, enuresis, flank pain, frequency, genital sores, hematuria and urgency.   Musculoskeletal: Positive for gait problem. Negative for arthralgias, back pain, joint swelling, myalgias, neck pain and neck stiffness.   Skin: Negative for color change, pallor, rash and wound.   Allergic/Immunologic: Positive for food allergies. Negative for environmental allergies and immunocompromised state.   Neurological: Positive for speech difficulty and numbness. Negative for dizziness, vertigo, tremors, seizures, syncope, facial asymmetry, weakness, light-headedness and headaches.   Hematological: Negative for adenopathy. Does not bruise/bleed easily.   Psychiatric/Behavioral: Positive for sleep disturbance. Negative for agitation, behavioral problems, confusion,  "decreased concentration, dysphoric mood, hallucinations, self-injury and suicidal ideas. The patient is not nervous/anxious and is not hyperactive.    All other systems reviewed and are negative.      Objective   Blood pressure 108/56, temperature 97.2 °F (36.2 °C), temperature source Temporal Artery , height 154.9 cm (60.98\"), weight 61.7 kg (136 lb), not currently breastfeeding.  Body mass index is 25.71 kg/m².    Physical Exam   Constitutional: She is oriented to person, place, and time. She appears well-developed and well-nourished.  Non-toxic appearance. No distress.   HENT:   Head: Normocephalic and atraumatic.   Mouth/Throat: Oropharynx is clear and moist.   Eyes: EOM are normal. Pupils are equal, round, and reactive to light. Right eye exhibits no discharge. Left eye exhibits no discharge.   Neck: Phonation normal. No JVD present. No tracheal deviation present. No thyromegaly present.   Cardiovascular: Normal rate and regular rhythm.    Pulmonary/Chest: Effort normal. No stridor. No respiratory distress. She has no wheezes.   Abdominal: Soft. Normal appearance. She exhibits no distension. There is no tenderness.   Musculoskeletal: She exhibits no edema.   Muscle Group     L          R  Deltoid                5          5  Bicep                  5          5  Tricep                 5          5                       5          5  Hand IO              5          5  Hip Flexor           5          5  Knee Extensor   5          5     ADF                    5          5  APF                    5          5      Neurological: She is alert and oriented to person, place, and time. She has normal reflexes. She displays normal reflexes. No cranial nerve deficit or sensory deficit. She exhibits normal muscle tone. She displays a negative Romberg sign. Coordination and gait normal. GCS eye subscore is 4. GCS verbal subscore is 5. GCS motor subscore is 6.   Reflex Scores:       Tricep reflexes are 2+ on the right " side and 2+ on the left side.       Bicep reflexes are 2+ on the right side and 2+ on the left side.       Brachioradialis reflexes are 2+ on the right side and 2+ on the left side.       Patellar reflexes are 2+ on the right side and 2+ on the left side.       Achilles reflexes are 2+ on the right side and 2+ on the left side.  CN II-XII grossly intact.    No pronator drift. FTN testing performed without dysmetria or bradykinesia.   Skin: Skin is warm and dry. She is not diaphoretic. No erythema.   Psychiatric: She has a normal mood and affect. Her speech is normal and behavior is normal. Judgment and thought content normal.   Nursing note and vitals reviewed.        Assessment/Plan     Independent Review of Radiographic Studies:      Available for my review is a MRI of the brain that was performed on 4/23/18.  The radiologist notes:  IMPRESSION:   1. Near total resolution of left temporoparietal hemorrhage with  residual susceptibility artifact from hemosiderin deposition in this  region, however, no T1 shortening to suggest acute blood products.  Decreased vasogenic edema in the surrounding parenchyma. No development  of hydrocephalus.  2. Underlying 19 x 9 mm mass lesion with abnormal enhancement is seen  within the posterior left temporal region. No abnormal enhancing focus  is otherwise identified.  3. Extensive chronic small vessel ischemic changes within the background  supratentorial white matter.         Medical Decision Making:      This is a 72-year-old woman who suffered a spontaneous intraparenchymal hemorrhage while on Coumadin.  She has a history of bilateral DVTs.    She also has a history of severe peripheral vascular disease and what sounds like some worsening vascular claudication.    From the standpoint of her intracranial hemorrhage, there does not appear to be any underlying mass or tumor.  It sounds to me like she probably should be on some sort of anticoagulation.  A lengthy and protracted  conversation was held with the patient's and her caregiver regarding the complex medical decision making at work.  On the one hand, she is at risk for recurrent spontaneous intracranial hemorrhage if she is anticoagulated.  On the other hand, her bilateral DVTs also pose a risk to her health.  A reasonable compromise would be to start her on a novel anticoagulant.  I did advise her that there is still a risk of spontaneous intracranial hemorrhage, however given that we are in somewhat of a therapeutic dilemma, I'm not sure that there is a truly evidence based guidelines that we'll help guide us in this circumstance.    I did review the signs and symptoms of intracranial hemorrhage with the patient.  I directed her to contact my office if she has any new, or worsening neurological symptoms, or to call 911 if she thinks she is having a stroke.    I've also made a referral to vascular surgery to assess her for her worsening leg pain.    She does have some stenosis and lumbar degenerative disc disease, however none of this is severe enough to warrant any sort of surgical intervention at this point.    Chika was seen today for follow-up.    Diagnoses and all orders for this visit:    PVD (peripheral vascular disease) with claudication  -     Ambulatory Referral to Vascular Surgery  -     Doppler Ankle Brachial Index Multi Level CAR    History of cerebral hemorrhage    Chronic bilateral low back pain without sciatica    Other orders  -     apixaban (ELIQUIS) 5 MG tablet tablet; Take 1 tablet by mouth 2 (Two) Times a Day.        No Follow-up on file.           This document signed by GINGER Hitchcock MD April 30, 2018 1:57 PM

## 2018-05-01 NOTE — PROGRESS NOTES
[facility] AT [address], [address]  [place], [place], [id number] [phone]  [email or web address]           OUTPATIENT CLINIC CONSULTATION NOTE    Patient Care Team/Referring Provider:  Patient Care Team:  Alyssia Greenfield MD as PCP - General (Internal Medicine)  Alyssia Greenfield MD as Referring Physician (Internal Medicine)    Subjective:   Reason for consultation:   Chief Complaint   Patient presents with   • Cerebrovascular Accident       HPI:    Chika Posey is a 72 y.o. female.  History of Present Illness    History of spontaneous intraparenchymal hemorrhage while on Coumadin (INR was 4) 2/2018, carotid stenosis s/p remote Left CEA, bilateral DVTs (details unclear), peripheral vascular disease with previous aortobifemoral bypass status post occlusion and reopening with a TPA protocol at  4/2017 that was complicated by LUE compartment syndrome requiring fasciotomy as well as acute hypoxic respiratory failure requiring intubation and psoas muscle hematoma with significant extravasation, hypertension, and lupus. The patient presents to establish care.    The patient has no known history of CAD, CHF, abnormal heart rhythm.  The patient has been told she has a fast heart rate at rest.  Resting heart rate ranges in the 90s to 110s.  Possibly a remote stress test.  Results unknown.  The patient does have significant vascular disease including carotid stenosis and peripheral arterial disease.    Today the patient does complain of exertional dyspnea, fatigue.  No chest pain.  Has chronic severe claudication.  Was recently recommended to trial Eliquis in place of warfarin.  She has not done so yet.    PFSH:  Patient Active Problem List   Diagnosis   • Cerebral hemorrhage   • Headache   • Slurred speech   • PAD (peripheral artery disease)   • HTN (hypertension)   • Chronic anticoagulation   • Paraparesis of both lower limbs   • Pre-syncope   • Acute-on-chronic kidney  injury   • Back pain   • Spell of altered cognition   • Generalized weakness   • Muscular weakness   • History of cerebral hemorrhage   • History of DVT (deep vein thrombosis)         Current Outpatient Prescriptions:   •  amLODIPine (NORVASC) 10 MG tablet, Take 0.5 tablets by mouth Daily., Disp: , Rfl:   •  apixaban (ELIQUIS) 5 MG tablet tablet, Take 1 tablet by mouth 2 (Two) Times a Day., Disp: 60 tablet, Rfl: 5  •  aspirin 81 MG EC tablet, Take 81 mg by mouth Daily., Disp: , Rfl:   •  atorvastatin (LIPITOR) 40 MG tablet, Take 40 mg by mouth Daily., Disp: , Rfl:   •  cetirizine (zyrTEC) 10 MG tablet, Take 10 mg by mouth Daily., Disp: , Rfl:   •  cilostazol (PLETAL) 100 MG tablet, Take 100 mg by mouth 2 (Two) Times a Day., Disp: , Rfl:   •  CloNIDine (CATAPRES) 0.1 MG tablet, Take 0.1 mg by mouth Daily., Disp: , Rfl:   •  losartan (COZAAR) 100 MG tablet, Take 100 mg by mouth Daily., Disp: , Rfl:   •  potassium chloride (K-DUR,KLOR-CON) 20 MEQ CR tablet, Take 20 mEq by mouth Daily., Disp: , Rfl:   •  traZODone (DESYREL) 50 MG tablet, , Disp: , Rfl:   •  valsartan (DIOVAN) 160 MG tablet, , Disp: , Rfl:     Allergies   Allergen Reactions   • Lactose Intolerance (Gi) GI Bleeding   • Morphine And Related Itching   • Plavix [Clopidogrel Bisulfate] Itching       Social History     Social History   • Marital status: Single   • Number of children: 1     Occupational History   • [employer]      Retired     Social History Main Topics   • Smoking status: Former Smoker     Packs/day: 1.00     Years: 25.00     Types: Cigarettes     Quit date: 3/22/2008   • Smokeless tobacco: Never Used   • Alcohol use No   • Drug use: No   • Sexual activity: Defer     Other Topics Concern   • Not on file     Social History Narrative    Patient lives alone, in [place].     Family History   Problem Relation Age of Onset   • Hypertension Mother    • Hypertension Father        Review of Systems:  Positive for claudication, exertional shortness of  "breath and fatigue  Negative for exertional chest pain, orthopnea, PND, lower extremity edema, palpitations, lightheadedness, syncope.       Objective:   Blood pressure 162/92, pulse 116, height 154.9 cm (61\"), weight 60.3 kg (132 lb 14.4 oz), not currently breastfeeding.  CONSTITUTIONAL: Well-nourished. In no acute distress.   SKIN: Warm and dry. No rashes noted  HEENT: Head is normocephalic and atraumatic.  Mucous membranes are pink and moist.   NECK: Supple without masses or thyromegaly. There is no jugular venous distention at 30°.  LUNGS: Normal effort. Clear to auscultation bilaterally without wheezing, rhonchi, or rales noted.   CARDIOVASCULAR: The heart has a regular rhythm, tachycardic rate with a normal S1 and S2. There is no murmur, gallop, rub, or click appreciated.   PERIPHERAL VASCULAR: Carotid upstroke is 2+ bilaterally and without bruits. Radial pulses are 2+ bilaterally. There is no lower extremity edema.   ABDOMEN: Soft with no tenderness with palpitation. No hepatosplenomegaly  MUSCULOSKELETAL: Normal gait. No digital cyanosis  NEUROLOGICAL: CN II - XII grossly intact  PSYCHIATRIC: Alert, orientated x 3, appropriate affect     Labs:  Lab Results   Component Value Date    ALT 24 03/02/2018    AST 25 03/02/2018     Lab Results   Component Value Date    CREATININE 1.20 04/23/2018       No components found for: LDLDIRECTC  No results found for: LDL  No results found for: LDLHDL    3/30/2018  Sodium 139  Potassium 3.9  BUN 13  Creatinine 1.25  Hemoglobin 10.9  Hematocrit 34.2  Platelets 324    Diagnostic Data:      ECG 12 Lead  Date/Time: 5/2/2018 1:17 PM  Performed by: CHUN TORREZ  Authorized by: CHUN TORREZ   Rhythm: sinus tachycardia  Comments: Possible prior septal infarct of indeterminate age            TTE 3/2018  · Left ventricular systolic function is hyperdynamic (EF > 70).  · Left ventricular diastolic dysfunction (grade I) consistent with impaired relaxation.  · Normal right ventricular " cavity size, wall thickness, systolic function and septal motion noted.  · Saline test results are negative ( technically difficult and limited study ).  · There is no evidence of pericardial effusion.  · No evidence of pulmonary hypertension is present.  · No significant structural valvular abnormality demonstrated.     Carotid US 3/2018  · Right internal carotid artery stenosis of 0-49%.  · Left internal carotid artery stenosis of 50-69%.      Assessment and Plan:   Chika was seen today for cerebrovascular accident.    Diagnoses and all orders for this visit:    Exertional shortness of breath, sinus tachycardia, possible septal infarct on EKG with poor R-wave progression, known carotid stenosis and PAD  -Patient is at high risk for ischemic heart disease.  Stress test ordered  -ASA, amlodipine, statin  -Possible future beta blocker; patient currently reluctant to make any changes to her medicines because she is overall felt little bit confused by her medications.  Her list was rediscussed with her and the only change is that of her apixaban dosing (see below)    PAD (peripheral artery disease)  Remote DVT  History of cerebral hemorrhage  -Followed by Dr Ceballos  -Symptoms were better on anti coagulation; would trial apixaban and suggested after evaluation by Dr. Hitchcock, but would start at a lower dose given patient's history of CKD/MATT with a Cr at one time greater than 1.5, Wt of 60Kg, and being on ASA/cilostazol at the same time    Essential hypertension  -BP elevated today.  Labs that the patient takes clonidine when necessary.  If the patient has persistently elevated blood pressure, and the patient is amenable to changes, would switch clonidine to a beta blocker.  This may aid her tachycardia as well.  The patient is reluctant to make too many changes today because she feels a bit overwhelmed by her medicines    - Return in about 3 months (around 8/2/2018).     Bobo Berrios MD, MSc, FACC  Interventional  Cardiology  [facility]

## 2018-05-02 ENCOUNTER — OFFICE VISIT (OUTPATIENT)
Dept: CARDIOLOGY | Facility: CLINIC | Age: 73
End: 2018-05-02

## 2018-05-02 VITALS
WEIGHT: 132.9 LBS | DIASTOLIC BLOOD PRESSURE: 92 MMHG | HEIGHT: 61 IN | SYSTOLIC BLOOD PRESSURE: 162 MMHG | BODY MASS INDEX: 25.09 KG/M2 | HEART RATE: 116 BPM

## 2018-05-02 DIAGNOSIS — I73.9 PAD (PERIPHERAL ARTERY DISEASE) (HCC): ICD-10-CM

## 2018-05-02 DIAGNOSIS — I10 ESSENTIAL HYPERTENSION: ICD-10-CM

## 2018-05-02 DIAGNOSIS — I20.9 ANGINA PECTORIS (HCC): Primary | ICD-10-CM

## 2018-05-02 DIAGNOSIS — Z86.79 HISTORY OF CEREBRAL HEMORRHAGE: ICD-10-CM

## 2018-05-02 PROCEDURE — 99204 OFFICE O/P NEW MOD 45 MIN: CPT | Performed by: INTERNAL MEDICINE

## 2018-05-02 PROCEDURE — 93000 ELECTROCARDIOGRAM COMPLETE: CPT | Performed by: INTERNAL MEDICINE

## 2018-05-02 RX ORDER — FUROSEMIDE 20 MG/1
20 TABLET ORAL EVERY OTHER DAY
COMMUNITY
End: 2021-01-18 | Stop reason: HOSPADM

## 2018-05-29 ENCOUNTER — HOSPITAL ENCOUNTER (OUTPATIENT)
Dept: CARDIOLOGY | Facility: HOSPITAL | Age: 73
Discharge: HOME | End: 2018-05-29
Attending: INTERNAL MEDICINE

## 2018-05-29 ENCOUNTER — TELEPHONE (OUTPATIENT)
Dept: CARDIOLOGY | Facility: CLINIC | Age: 73
End: 2018-05-29

## 2018-05-29 DIAGNOSIS — I20.9 ANGINA PECTORIS (HCC): ICD-10-CM

## 2018-05-29 LAB
BH CV STRESS BP STAGE 2: NORMAL
BH CV STRESS BP STAGE 4: NORMAL
BH CV STRESS COMMENTS STAGE 1: NORMAL
BH CV STRESS DOSE REGADENOSON STAGE 1: 0.4
BH CV STRESS DURATION MIN STAGE 1: 0
BH CV STRESS DURATION MIN STAGE 2: 1
BH CV STRESS DURATION MIN STAGE 3: 1
BH CV STRESS DURATION MIN STAGE 4: 1
BH CV STRESS DURATION SEC STAGE 1: 10
BH CV STRESS DURATION SEC STAGE 2: 0
BH CV STRESS HR STAGE 1: 102
BH CV STRESS HR STAGE 2: 108
BH CV STRESS HR STAGE 3: 107
BH CV STRESS HR STAGE 4: 104
BH CV STRESS PROTOCOL 1: NORMAL
BH CV STRESS RECOVERY BP: NORMAL MMHG
BH CV STRESS RECOVERY HR: 105 BPM
BH CV STRESS STAGE 1: 1
BH CV STRESS STAGE 2: 2
BH CV STRESS STAGE 3: 3
BH CV STRESS STAGE 4: 4
LV EF NUC BP: 67 %
MAXIMAL PREDICTED HEART RATE: 149 BPM
PERCENT MAX PREDICTED HR: 73.83 %
STRESS BASELINE BP: NORMAL MMHG
STRESS BASELINE HR: 96 BPM
STRESS PERCENT HR: 87 %
STRESS POST PEAK BP: NORMAL MMHG
STRESS POST PEAK HR: 110 BPM
STRESS TARGET HR: 127 BPM

## 2018-05-29 PROCEDURE — 78452 HT MUSCLE IMAGE SPECT MULT: CPT | Performed by: INTERNAL MEDICINE

## 2018-05-29 PROCEDURE — A9500 TC99M SESTAMIBI: HCPCS | Performed by: INTERNAL MEDICINE

## 2018-05-29 PROCEDURE — 78452 HT MUSCLE IMAGE SPECT MULT: CPT

## 2018-05-29 PROCEDURE — 0 TECHNETIUM SESTAMIBI: Performed by: INTERNAL MEDICINE

## 2018-05-29 PROCEDURE — 25010000002 REGADENOSON 0.4 MG/5ML SOLUTION: Performed by: INTERNAL MEDICINE

## 2018-05-29 PROCEDURE — 93018 CV STRESS TEST I&R ONLY: CPT | Performed by: INTERNAL MEDICINE

## 2018-05-29 PROCEDURE — 93017 CV STRESS TEST TRACING ONLY: CPT

## 2018-05-29 RX ADMIN — TECHNETIUM TC 99M SESTAMIBI 1 DOSE: 1 INJECTION INTRAVENOUS at 11:05

## 2018-05-29 RX ADMIN — REGADENOSON 0.4 MG: 0.08 INJECTION, SOLUTION INTRAVENOUS at 12:48

## 2018-05-29 RX ADMIN — TECHNETIUM TC 99M SESTAMIBI 1 DOSE: 1 INJECTION INTRAVENOUS at 12:50

## 2018-05-29 NOTE — TELEPHONE ENCOUNTER
Results of stress test reviewed with the patient.  Advised per MJS that cardiac catheterization is recommended at this time.  Procedure explained to patient in detail.  She is amenable to this plan.  All questions and concerns addressed at this time.  Understanding verbalized.

## 2018-08-14 NOTE — PROGRESS NOTES
[facility] AT [address], [address]  [place], [place], [address] [phone]  [facility]           OUTPATIENT CLINIC FOLLOW UP NOTE    Patient Care Team:  Patient Care Team:  Alyssia Greenfield MD as PCP - General (Internal Medicine)  Alyssia Greenfield MD as Referring Physician (Internal Medicine)    Subjective:      Chief Complaint   Patient presents with   • Post-op Peripheral Arterial Bypass       HPI:    Chika Posey is a 73 y.o. female.  History of Present Illness    History of spontaneous intraparenchymal hemorrhage while on Coumadin (INR was 4) 2/2018, carotid stenosis s/p remote Left CEA, bilateral DVTs (details unclear), peripheral vascular disease with previous aortobifemoral bypass status post occlusion and reopening with a TPA protocol at  4/2017 that was complicated by LUE compartment syndrome requiring fasciotomy as well as acute hypoxic respiratory failure requiring intubation and psoas muscle hematoma with significant extravasation, hypertension, and lupus. The patient presents today for follow-up.    Since her last visit the patient is feeling better.  She has less exertional dyspnea and fatigue.  Continues to not complain of chest pain.  She has chronic claudication but this is currently mild to moderate in severity.  This appears to have improved with Eliquis therapy.  She ran out of her cilostazol recently and has not been taking it.    Review of Systems:  Positive for claudication  Negative for exertional chest pain, dyspnea with exertion, orthopnea, PND, lower extremity edema, palpitations, lightheadedness, syncope.    PFSH:  Patient Active Problem List   Diagnosis   • Cerebral hemorrhage (CMS/HCC)   • Headache   • Slurred speech   • PAD (peripheral artery disease) (CMS/HCC)   • Essential hypertension   • Chronic anticoagulation   • Paraparesis of both lower limbs (CMS/HCC)   • Pre-syncope   • Acute-on-chronic kidney injury (CMS/HCC)   •  "Back pain   • Spell of altered cognition   • Generalized weakness   • Muscular weakness   • History of cerebral hemorrhage   • History of DVT (deep vein thrombosis)         Current Outpatient Prescriptions:   •  amLODIPine (NORVASC) 10 MG tablet, Take 0.5 tablets by mouth Daily., Disp: , Rfl:   •  apixaban (ELIQUIS) 2.5 MG tablet tablet, Take 1 tablet by mouth Every 12 (Twelve) Hours., Disp: 60 tablet, Rfl: 11  •  aspirin 81 MG EC tablet, Take 81 mg by mouth Daily., Disp: , Rfl:   •  atorvastatin (LIPITOR) 40 MG tablet, Take 40 mg by mouth Daily., Disp: , Rfl:   •  cetirizine (zyrTEC) 10 MG tablet, Take 10 mg by mouth Daily., Disp: , Rfl:   •  CloNIDine (CATAPRES) 0.1 MG tablet, Take 0.1 mg by mouth Daily., Disp: , Rfl:   •  furosemide (LASIX) 20 MG tablet, Take 20 mg by mouth Daily As Needed (weight gain)., Disp: , Rfl:   •  Magnesium 250 MG tablet, Take 250 mg by mouth Daily., Disp: , Rfl:   •  traZODone (DESYREL) 50 MG tablet, 50 mg Every Night., Disp: , Rfl:   •  losartan (COZAAR) 100 MG tablet, Take 100 mg by mouth Daily., Disp: , Rfl:   •  potassium chloride (K-DUR,KLOR-CON) 20 MEQ CR tablet, Take 20 mEq by mouth Daily., Disp: , Rfl:     Allergies   Allergen Reactions   • Lactose Intolerance (Gi) GI Bleeding   • Morphine And Related Itching   • Plavix [Clopidogrel Bisulfate] Itching        reports that she quit smoking about 10 years ago. Her smoking use included Cigarettes. She has a 25.00 pack-year smoking history. She has never used smokeless tobacco.    Family History   Problem Relation Age of Onset   • Hypertension Mother    • Hypertension Father          Objective:   Blood pressure 141/83, pulse 86, height 154.9 cm (61\"), weight 58.5 kg (128 lb 14.4 oz), not currently breastfeeding.  CONSTITUTIONAL: No acute distress, normal affect  RESPIRATORY: Normal effort. Clear to auscultation bilaterally without wheezing or rales  CARDIOVASCULAR: Left carotid bruit. Regular rate and rhythm with normal S1 and S2. " Without murmur, gallop or rub.  PERIPHERAL VASCULAR: Normal radial pulses bilaterally. There is no lower extremity edema bilaterally.    Labs:    BUN   Date Value Ref Range Status   03/08/2018 13 9 - 23 mg/dL Final     Creatinine   Date Value Ref Range Status   04/23/2018 1.20 0.60 - 1.30 mg/dL Final     Comment:     Serial Number: [id number]:  [id number]     Potassium   Date Value Ref Range Status   03/08/2018 3.9 3.5 - 5.5 mmol/L Final     ALT (SGPT)   Date Value Ref Range Status   03/02/2018 24 7 - 40 U/L Final     AST (SGOT)   Date Value Ref Range Status   03/02/2018 25 0 - 33 U/L Final       No results found for: CHOL  No results found for: TRIG  No results found for: HDL  No components found for: LDLCALC  No results found for: VLDL  No results found for: LDLHDL    Diagnostic Data:    Procedures    Carotid Duplex 3/2018  · Right internal carotid artery stenosis of 0-49%.  · Left internal carotid artery stenosis of 50-69%.    TTE 3/2018  · Left ventricular systolic function is hyperdynamic (EF > 70).  · Left ventricular diastolic dysfunction (grade I) consistent with impaired relaxation.  · Normal right ventricular cavity size, wall thickness, systolic function and septal motion noted.  · Saline test results are negative ( technically difficult and limited study ).  · There is no evidence of pericardial effusion.  · No evidence of pulmonary hypertension is present.  · No significant structural valvular abnormality demonstrated.     Stress Test 5/2018  · Left ventricular ejection fraction is normal (Calculated EF = 67%).  · Myocardial perfusion imaging indicates a medium-sized, mildly severe area of ischemia located in the inferior wall and lateral wall.  · Impressions are consistent with an intermediate risk study.  Assessment and Plan:   Chika was seen today for post-op peripheral arterial bypass.    Diagnoses and all orders for this visit:    Dyspnea on exertion  -Abnormal stress test in the low to  intermediate risk category.  -Currently asymptomatic though.  Her symptoms have improved.  -Continue aspirin, statin  -Continue amlodipine at as an antihypertensive and antianginal  -If she needs more and hypertensive therapy, would add carvedilol  -Should the patient have recurrent symptoms, would recommend a left heart catheterization.  Would need to consider the left radial artery approach given her prior aortobifemoral bypass.  Radial pulses are not brisk.  Would use ultrasound imaging prior to access.    Essential hypertension  -Continue current related medications    PAD (peripheral artery disease) (CMS/[facility])  CVA  -No difference in symptoms coming off cilostazol. Will not reorder.   -Continue current related medications including apixaban      - Return in about 6 months (around 2/15/2019).      Bobo Berrios MD, MSc, [facility]  Interventional Cardiology  [facility] at [facility]

## 2018-08-15 ENCOUNTER — OFFICE VISIT (OUTPATIENT)
Dept: CARDIOLOGY | Facility: CLINIC | Age: 73
End: 2018-08-15

## 2018-08-15 VITALS
HEART RATE: 86 BPM | SYSTOLIC BLOOD PRESSURE: 141 MMHG | BODY MASS INDEX: 24.34 KG/M2 | DIASTOLIC BLOOD PRESSURE: 83 MMHG | HEIGHT: 61 IN | WEIGHT: 128.9 LBS

## 2018-08-15 DIAGNOSIS — I10 ESSENTIAL HYPERTENSION: ICD-10-CM

## 2018-08-15 DIAGNOSIS — R06.09 DYSPNEA ON EXERTION: Primary | ICD-10-CM

## 2018-08-15 DIAGNOSIS — I73.9 PAD (PERIPHERAL ARTERY DISEASE) (HCC): ICD-10-CM

## 2018-08-15 PROCEDURE — 99214 OFFICE O/P EST MOD 30 MIN: CPT | Performed by: INTERNAL MEDICINE

## 2018-08-15 RX ORDER — MULTIVITAMIN WITH IRON
250 TABLET ORAL DAILY
Status: ON HOLD | COMMUNITY
End: 2021-01-18

## 2018-08-23 ENCOUNTER — TRANSCRIBE ORDERS (OUTPATIENT)
Dept: ADMINISTRATIVE | Facility: HOSPITAL | Age: 73
End: 2018-08-23

## 2018-08-23 DIAGNOSIS — I62.9 INTRACRANIAL HEMORRHAGE (HCC): Primary | ICD-10-CM

## 2018-09-06 ENCOUNTER — HOSPITAL ENCOUNTER (OUTPATIENT)
Dept: MRI IMAGING | Facility: HOSPITAL | Age: 73
Discharge: HOME | End: 2018-09-06
Attending: INTERNAL MEDICINE | Admitting: INTERNAL MEDICINE

## 2018-09-06 DIAGNOSIS — I62.9 INTRACRANIAL HEMORRHAGE (HCC): ICD-10-CM

## 2018-09-06 LAB — CREAT BLDA-MCNC: 1.3 MG/DL (ref 0.6–1.3)

## 2018-09-06 PROCEDURE — 0 GADOBENATE DIMEGLUMINE 529 MG/ML SOLUTION: Performed by: INTERNAL MEDICINE

## 2018-09-06 PROCEDURE — A9577 INJ MULTIHANCE: HCPCS | Performed by: INTERNAL MEDICINE

## 2018-09-06 PROCEDURE — 70553 MRI BRAIN STEM W/O & W/DYE: CPT

## 2018-09-06 PROCEDURE — 82565 ASSAY OF CREATININE: CPT

## 2018-09-06 RX ADMIN — GADOBENATE DIMEGLUMINE 10 ML: 529 INJECTION, SOLUTION INTRAVENOUS at 11:14

## 2018-09-24 ENCOUNTER — TELEPHONE (OUTPATIENT)
Dept: CARDIAC SURGERY | Facility: CLINIC | Age: 73
End: 2018-09-24

## 2018-10-25 ENCOUNTER — OFFICE VISIT (OUTPATIENT)
Dept: CARDIAC SURGERY | Facility: CLINIC | Age: 73
End: 2018-10-25

## 2018-10-25 VITALS
DIASTOLIC BLOOD PRESSURE: 58 MMHG | TEMPERATURE: 97.9 F | BODY MASS INDEX: 26.55 KG/M2 | SYSTOLIC BLOOD PRESSURE: 131 MMHG | HEART RATE: 96 BPM | HEIGHT: 61 IN | OXYGEN SATURATION: 95 % | WEIGHT: 140.6 LBS

## 2018-10-25 DIAGNOSIS — I10 ESSENTIAL HYPERTENSION: ICD-10-CM

## 2018-10-25 DIAGNOSIS — I73.9 PAD (PERIPHERAL ARTERY DISEASE) (HCC): Primary | ICD-10-CM

## 2018-10-25 DIAGNOSIS — N17.9 ACUTE RENAL FAILURE SUPERIMPOSED ON STAGE 3 CHRONIC KIDNEY DISEASE, UNSPECIFIED ACUTE RENAL FAILURE TYPE: ICD-10-CM

## 2018-10-25 DIAGNOSIS — N18.3 ACUTE RENAL FAILURE SUPERIMPOSED ON STAGE 3 CHRONIC KIDNEY DISEASE, UNSPECIFIED ACUTE RENAL FAILURE TYPE: ICD-10-CM

## 2018-10-25 DIAGNOSIS — M62.81 MUSCULAR WEAKNESS: ICD-10-CM

## 2018-10-25 PROCEDURE — 99213 OFFICE O/P EST LOW 20 MIN: CPT | Performed by: THORACIC SURGERY (CARDIOTHORACIC VASCULAR SURGERY)

## 2019-01-20 NOTE — PLAN OF CARE
Problem: Patient Care Overview (Adult)  Goal: Plan of Care Review  Outcome: Outcome(s) achieved Date Met: 02/16/18 02/16/18 0925   Coping/Psychosocial Response Interventions   Plan Of Care Reviewed With patient   Outcome Evaluation   Outcome Summary/Follow up Plan Pt presents with speech difficulties noted as expressive aphasia this date. Pt able to complete all ADL related tasks and demonstrates normal BUE strength, coordination, sensation and appropriate balance and ability to perform bed mobility, transfers and fxnl mobility. No further skilled OT needed at this time.          ukn

## 2019-02-20 ENCOUNTER — OFFICE VISIT (OUTPATIENT)
Dept: CARDIOLOGY | Facility: CLINIC | Age: 74
End: 2019-02-20

## 2019-02-20 VITALS
HEIGHT: 61 IN | SYSTOLIC BLOOD PRESSURE: 120 MMHG | HEART RATE: 97 BPM | DIASTOLIC BLOOD PRESSURE: 76 MMHG | BODY MASS INDEX: 26.06 KG/M2 | WEIGHT: 138 LBS | OXYGEN SATURATION: 94 %

## 2019-02-20 DIAGNOSIS — I65.23 BILATERAL CAROTID ARTERY STENOSIS: ICD-10-CM

## 2019-02-20 DIAGNOSIS — I73.9 CLAUDICATION (HCC): ICD-10-CM

## 2019-02-20 DIAGNOSIS — E78.2 MIXED HYPERLIPIDEMIA: ICD-10-CM

## 2019-02-20 DIAGNOSIS — R09.89 BILATERAL CAROTID BRUITS: Primary | ICD-10-CM

## 2019-02-20 DIAGNOSIS — I73.9 PAD (PERIPHERAL ARTERY DISEASE) (HCC): ICD-10-CM

## 2019-02-20 DIAGNOSIS — I10 ESSENTIAL HYPERTENSION: ICD-10-CM

## 2019-02-20 PROBLEM — R55 PRE-SYNCOPE: Status: RESOLVED | Noted: 2018-03-06 | Resolved: 2019-02-20

## 2019-02-20 PROCEDURE — 99214 OFFICE O/P EST MOD 30 MIN: CPT | Performed by: INTERNAL MEDICINE

## 2019-02-20 RX ORDER — VALSARTAN 160 MG/1
160 TABLET ORAL DAILY
Status: ON HOLD | COMMUNITY
End: 2021-01-18

## 2019-02-20 NOTE — PROGRESS NOTES
[facility] AT [address], [address]  [place], [place], [address] [phone]  [facility]           OUTPATIENT CLINIC FOLLOW UP NOTE    Patient Care Team:  Patient Care Team:  Alyssia Greenfield MD as PCP - General (Internal Medicine)  Alyssia Greenfield MD as Referring Physician (Internal Medicine)    Subjective:      Chief Complaint   Patient presents with   • Post-op Peripheral Arterial Bypass   • Shortness of Breath   • Dizziness       HPI:    Chika Posey is a 73 y.o. female.  History of Present Illness    History of spontaneous intraparenchymal hemorrhage while on Coumadin (INR was 4) 2/2018, carotid stenosis s/p remote Left CEA, bilateral DVTs (details unclear), peripheral vascular disease with previous aortobifemoral bypass status post occlusion and reopening with a TPA protocol at  4/2017 that was complicated by LUE compartment syndrome requiring fasciotomy as well as acute hypoxic respiratory failure requiring intubation and psoas muscle hematoma with significant extravasation, hypertension, and lupus. The patient presents today for follow-up.    Since her last visit she has had continued bilateral hip discomfort with exertion and lower extremity numbness and tingling.  Denies lightheadedness or vision changes to suggest symptomatic carotid stenosis.  Tolerating her medicines    Review of Systems:  Positive for claudication  Negative for exertional chest pain, dyspnea with exertion, orthopnea, PND, lower extremity edema, palpitations, lightheadedness, syncope.    PFSH:  Patient Active Problem List   Diagnosis   • Cerebral hemorrhage (CMS/HCC)   • Headache   • Slurred speech   • PAD (peripheral artery disease) (CMS/HCC)   • Essential hypertension   • Chronic anticoagulation   • Paraparesis of both lower limbs (CMS/HCC)   • Pre-syncope   • Acute-on-chronic kidney injury (CMS/HCC)   • Back pain   • Spell of altered cognition   • Generalized weakness   •  "Muscular weakness   • History of cerebral hemorrhage   • History of DVT (deep vein thrombosis)         Current Outpatient Medications:   •  amLODIPine (NORVASC) 10 MG tablet, Take 0.5 tablets by mouth Daily., Disp: , Rfl:   •  apixaban (ELIQUIS) 2.5 MG tablet tablet, Take 1 tablet by mouth Every 12 (Twelve) Hours., Disp: 60 tablet, Rfl: 11  •  aspirin 81 MG EC tablet, Take 81 mg by mouth Daily., Disp: , Rfl:   •  atorvastatin (LIPITOR) 40 MG tablet, Take 40 mg by mouth Daily., Disp: , Rfl:   •  cetirizine (zyrTEC) 10 MG tablet, Take 10 mg by mouth Daily., Disp: , Rfl:   •  CloNIDine (CATAPRES) 0.1 MG tablet, Take 0.1 mg by mouth Daily., Disp: , Rfl:   •  furosemide (LASIX) 20 MG tablet, Take 20 mg by mouth Daily As Needed (weight gain)., Disp: , Rfl:   •  Magnesium 250 MG tablet, Take 250 mg by mouth Daily., Disp: , Rfl:   •  potassium chloride (K-DUR,KLOR-CON) 20 MEQ CR tablet, Take 20 mEq by mouth Daily., Disp: , Rfl:   •  traZODone (DESYREL) 50 MG tablet, 50 mg Every Night., Disp: , Rfl:   •  valsartan (DIOVAN) 160 MG tablet, Take 160 mg by mouth Daily., Disp: , Rfl:     Allergies   Allergen Reactions   • Lactose Intolerance (Gi) GI Bleeding   • Morphine And Related Itching   • Plavix [Clopidogrel Bisulfate] Itching        reports that she quit smoking about 10 years ago. Her smoking use included cigarettes. She has a 25.00 pack-year smoking history. she has never used smokeless tobacco.    Family History   Problem Relation Age of Onset   • Hypertension Mother    • Hypertension Father          Objective:   Blood pressure 120/76, pulse 97, height 154.9 cm (61\"), weight 62.6 kg (138 lb), SpO2 94 %, not currently breastfeeding.  CONSTITUTIONAL: No acute distress, normal affect  RESPIRATORY: Normal effort. Clear to auscultation bilaterally without wheezing or rales  CARDIOVASCULAR: Left carotid bruit. Regular rate and rhythm with normal S1 and S2. Without murmur, gallop or rub.  PERIPHERAL VASCULAR: Normal radial " pulses bilaterally. There is no lower extremity edema bilaterally.  1+ DP pulses bilaterally, right side more prominent than left    Labs:    BUN   Date Value Ref Range Status   03/08/2018 13 9 - 23 mg/dL Final     Creatinine   Date Value Ref Range Status   09/06/2018 1.30 0.60 - 1.30 mg/dL Final     Comment:     Serial Number: [id number]:  [id number]     Potassium   Date Value Ref Range Status   03/08/2018 3.9 3.5 - 5.5 mmol/L Final     ALT (SGPT)   Date Value Ref Range Status   03/02/2018 24 7 - 40 U/L Final     AST (SGOT)   Date Value Ref Range Status   03/02/2018 25 0 - 33 U/L Final       No results found for: CHOL  No results found for: TRIG  No results found for: HDL  No components found for: LDLCALC  No results found for: VLDL  No results found for: LDLHDL    Diagnostic Data:    Procedures    Carotid Duplex 3/2018  · Right internal carotid artery stenosis of 0-49%.  · Left internal carotid artery stenosis of 50-69%.    TTE 3/2018  · Left ventricular systolic function is hyperdynamic (EF > 70).  · Left ventricular diastolic dysfunction (grade I) consistent with impaired relaxation.  · Normal right ventricular cavity size, wall thickness, systolic function and septal motion noted.  · Saline test results are negative ( technically difficult and limited study ).  · There is no evidence of pericardial effusion.  · No evidence of pulmonary hypertension is present.  · No significant structural valvular abnormality demonstrated.     Stress Test 5/2018  · Left ventricular ejection fraction is normal (Calculated EF = 67%).  · Myocardial perfusion imaging indicates a medium-sized, mildly severe area of ischemia located in the inferior wall and lateral wall.  · Impressions are consistent with an intermediate risk study.    Assessment and Plan:   Chika was seen today for post-op peripheral arterial bypass.    Diagnoses and all orders for this visit:    Dyspnea on exertion  -Abnormal stress test in the low to intermediate  risk category.  -Currently asymptomatic though.    -Continue aspirin, statin  -Continue amlodipine at as an antihypertensive and antianginal  -If she needs more and hypertensive therapy, would add carvedilol  -Should the patient have recurrent symptoms, would recommend a left heart catheterization.  Would need to consider the left radial artery approach given her prior aortobifemoral bypass.  Radial pulses are not brisk.  Would use ultrasound imaging prior to access.    Essential hypertension  -Continue current related medications    PAD (peripheral artery disease) (CMS/[facility])  CVA  -Repeat carotid duplex ultrasound to assess for progression.  If in the severe category, we'll consider a CTA of the neck and referral to Dr. Paredes or possible stenting  -Recommend arterial duplex ultrasound of the lower extremities bilaterally due to possible claudication  -Continue current related medications including apixaban  -Repeat the panel and LFTs at time of ultrasounds    - Return in about 6 months (around 8/20/2019).      Bobo Berrios MD, MSc, [facility]  Interventional Cardiology  [facility]

## 2019-02-26 DIAGNOSIS — I73.9 PAD (PERIPHERAL ARTERY DISEASE) (HCC): Primary | ICD-10-CM

## 2019-02-28 ENCOUNTER — HOSPITAL ENCOUNTER (OUTPATIENT)
Dept: CARDIOLOGY | Facility: HOSPITAL | Age: 74
Discharge: HOME | End: 2019-02-28

## 2019-02-28 ENCOUNTER — HOSPITAL ENCOUNTER (OUTPATIENT)
Dept: CARDIOLOGY | Facility: HOSPITAL | Age: 74
Discharge: HOME | End: 2019-02-28
Admitting: INTERNAL MEDICINE

## 2019-02-28 ENCOUNTER — HOSPITAL ENCOUNTER (OUTPATIENT)
Dept: CARDIOLOGY | Facility: HOSPITAL | Age: 74
End: 2019-02-28

## 2019-02-28 VITALS — HEIGHT: 61 IN | WEIGHT: 138.01 LBS | BODY MASS INDEX: 26.06 KG/M2

## 2019-02-28 VITALS — WEIGHT: 138.01 LBS | HEIGHT: 61 IN | BODY MASS INDEX: 26.06 KG/M2

## 2019-02-28 DIAGNOSIS — R09.89 BILATERAL CAROTID BRUITS: ICD-10-CM

## 2019-02-28 DIAGNOSIS — I73.9 PAD (PERIPHERAL ARTERY DISEASE) (HCC): ICD-10-CM

## 2019-02-28 LAB
BH CV XLRA MEAS LEFT CCA RATIO VEL: 67.7 CM/SEC
BH CV XLRA MEAS LEFT DIST CCA EDV: 23.7 CM/SEC
BH CV XLRA MEAS LEFT DIST CCA PSV: 69.1 CM/SEC
BH CV XLRA MEAS LEFT DIST ICA EDV: 31.4 CM/SEC
BH CV XLRA MEAS LEFT DIST ICA PSV: 96.4 CM/SEC
BH CV XLRA MEAS LEFT ICA RATIO VEL: 98.7 CM/SEC
BH CV XLRA MEAS LEFT ICA/CCA RATIO: 1.5
BH CV XLRA MEAS LEFT MID CCA EDV: 23.6 CM/SEC
BH CV XLRA MEAS LEFT MID CCA PSV: 68.1 CM/SEC
BH CV XLRA MEAS LEFT MID ICA EDV: 34.2 CM/SEC
BH CV XLRA MEAS LEFT MID ICA PSV: 88 CM/SEC
BH CV XLRA MEAS LEFT PROX CCA EDV: 21.8 CM/SEC
BH CV XLRA MEAS LEFT PROX CCA PSV: 58.9 CM/SEC
BH CV XLRA MEAS LEFT PROX ECA EDV: 9.4 CM/SEC
BH CV XLRA MEAS LEFT PROX ECA PSV: 55.5 CM/SEC
BH CV XLRA MEAS LEFT PROX ICA EDV: 23.6 CM/SEC
BH CV XLRA MEAS LEFT PROX ICA PSV: 99.5 CM/SEC
BH CV XLRA MEAS LEFT PROX SCLA PSV: 119 CM/SEC
BH CV XLRA MEAS LEFT VERTEBRAL A EDV: 26.2 CM/SEC
BH CV XLRA MEAS LEFT VERTEBRAL A PSV: 52.4 CM/SEC
BH CV XLRA MEAS RIGHT CCA RATIO VEL: 62.9 CM/SEC
BH CV XLRA MEAS RIGHT DIST CCA EDV: 25.1 CM/SEC
BH CV XLRA MEAS RIGHT DIST CCA PSV: 71.2 CM/SEC
BH CV XLRA MEAS RIGHT DIST ICA EDV: 19.6 CM/SEC
BH CV XLRA MEAS RIGHT DIST ICA PSV: 48.6 CM/SEC
BH CV XLRA MEAS RIGHT ICA RATIO VEL: 172 CM/SEC
BH CV XLRA MEAS RIGHT ICA/CCA RATIO: 2.8
BH CV XLRA MEAS RIGHT MID CCA EDV: 21.3 CM/SEC
BH CV XLRA MEAS RIGHT MID CCA PSV: 63.2 CM/SEC
BH CV XLRA MEAS RIGHT MID ICA EDV: 26.5 CM/SEC
BH CV XLRA MEAS RIGHT MID ICA PSV: 63.8 CM/SEC
BH CV XLRA MEAS RIGHT PROX CCA EDV: 20.1 CM/SEC
BH CV XLRA MEAS RIGHT PROX CCA PSV: 70.7 CM/SEC
BH CV XLRA MEAS RIGHT PROX ECA EDV: 12.6 CM/SEC
BH CV XLRA MEAS RIGHT PROX ECA PSV: 102.1 CM/SEC
BH CV XLRA MEAS RIGHT PROX ICA EDV: 16.8 CM/SEC
BH CV XLRA MEAS RIGHT PROX ICA PSV: 178 CM/SEC
BH CV XLRA MEAS RIGHT PROX SCLA PSV: 182 CM/SEC
BH CV XLRA MEAS RIGHT VERTEBRAL A EDV: 8.1 CM/SEC
BH CV XLRA MEAS RIGHT VERTEBRAL A PSV: 37.1 CM/SEC
LEFT ARM BP: NORMAL MMHG
RIGHT ARM BP: NORMAL MMHG

## 2019-02-28 PROCEDURE — 93925 LOWER EXTREMITY STUDY: CPT | Performed by: INTERNAL MEDICINE

## 2019-02-28 PROCEDURE — 93925 LOWER EXTREMITY STUDY: CPT

## 2019-02-28 PROCEDURE — 93880 EXTRACRANIAL BILAT STUDY: CPT | Performed by: INTERNAL MEDICINE

## 2019-02-28 PROCEDURE — 93880 EXTRACRANIAL BILAT STUDY: CPT

## 2019-03-01 LAB
BH CV LEA LEFT ANT TIBIAL A DISTAL PSV: 6.29 CM/S
BH CV LEA LEFT ANT TIBIAL A MID EDV: 7.66 CM/S
BH CV LEA LEFT ANT TIBIAL A MID PSV: 12.2 CM/S
BH CV LEA LEFT ANT TIBIAL A PROX EDV: 6.48 CM/S
BH CV LEA LEFT ANT TIBIAL A PROX PSV: 11 CM/S
BH CV LEA LEFT DFA PROX EDV: 10 CM/S
BH CV LEA LEFT DFA PROX PSV: 18.3 CM/S
BH CV LEA LEFT EXT ILIAC EDV: 15.9 CM/S
BH CV LEA LEFT EXT ILIAC PSV: 45.1 CM/S
BH CV LEA LEFT PERONEAL  MID PSV: 8.45 CM/S
BH CV LEA LEFT POPITEAL A  DISTAL EDV: 20.6 CM/S
BH CV LEA LEFT POPITEAL A  DISTAL PSV: 26.9 CM/S
BH CV LEA LEFT POPITEAL A  PROX EDV: 14.8 CM/S
BH CV LEA LEFT POPITEAL A  PROX PSV: 26.7 CM/S
BH CV LEA LEFT PTA DISTAL PSV: 10.8 CM/S
BH CV LEA LEFT PTA MID EDV: 6.09 CM/S
BH CV LEA LEFT PTA MID PSV: 9.82 CM/S
BH CV LEA LEFT PTA PROX EDV: 6.88 CM/S
BH CV LEA LEFT PTA PROX PSV: 11.2 CM/S
BH CV LEA LEFT SFA DISTAL EDV: 17.3 CM/S
BH CV LEA LEFT SFA DISTAL PSV: 22.1 CM/S
BH CV LEA RIGHT ANT TIBIAL A DISTAL PSV: 7.1 CM/S
BH CV LEA RIGHT ANT TIBIAL A PROX EDV: 6.1 CM/S
BH CV LEA RIGHT ANT TIBIAL A PROX PSV: 12.4 CM/S
BH CV LEA RIGHT CFA DISTAL EDV: 27.3 CM/S
BH CV LEA RIGHT CFA DISTAL PSV: 51.9 CM/S
BH CV LEA RIGHT DFA PROX EDV: 23.3 CM/S
BH CV LEA RIGHT DFA PROX PSV: 54.1 CM/S
BH CV LEA RIGHT EXT ILIAC EDV: 24.4 CM/S
BH CV LEA RIGHT EXT ILIAC PSV: 55.6 CM/S
BH CV LEA RIGHT PERONEAL  MID PSV: 9.04 CM/S
BH CV LEA RIGHT POPITEAL A  DISTAL EDV: 17.3 CM/S
BH CV LEA RIGHT POPITEAL A  DISTAL PSV: 26.1 CM/S
BH CV LEA RIGHT POPITEAL A  PROX EDV: 7.46 CM/S
BH CV LEA RIGHT POPITEAL A  PROX PSV: 11.6 CM/S
BH CV LEA RIGHT PTA DISTAL EDV: 4.32 CM/S
BH CV LEA RIGHT PTA DISTAL PSV: 6.48 CM/S
BH CV LEA RIGHT PTA MID EDV: 7.46 CM/S
BH CV LEA RIGHT PTA MID PSV: 11 CM/S
BH CV LEA RIGHT PTA PROX EDV: 6.48 CM/S
BH CV LEA RIGHT PTA PROX PSV: 11.8 CM/S
BH CV LEA RIGHT SFA DISTAL EDV: 13.6 CM/S
BH CV LEA RIGHT SFA DISTAL PSV: 19.8 CM/S
BH CV LEA RIGHT SFA MID EDV: 14.3 CM/S
BH CV LEA RIGHT SFA MID PSV: 23 CM/S

## 2019-05-01 NOTE — PLAN OF CARE
Problem: Patient Care Overview (Adult)  Goal: Plan of Care Review  Outcome: Ongoing (interventions implemented as appropriate)    Goal: Adult Individualization and Mutuality  Outcome: Ongoing (interventions implemented as appropriate)      Problem: Pain, Acute (Adult)  Goal: Identify Related Risk Factors and Signs and Symptoms  Outcome: Ongoing (interventions implemented as appropriate)    Goal: Acceptable Pain Control/Comfort Level  Outcome: Ongoing (interventions implemented as appropriate)         Request for referral to bone and joint clinic. I do not see a referral was placed. Please advise.

## 2019-05-07 ENCOUNTER — TELEPHONE (OUTPATIENT)
Dept: CARDIAC SURGERY | Facility: CLINIC | Age: 74
End: 2019-05-07

## 2019-05-22 ENCOUNTER — TRANSCRIBE ORDERS (OUTPATIENT)
Dept: ADMINISTRATIVE | Facility: HOSPITAL | Age: 74
End: 2019-05-22

## 2019-05-22 DIAGNOSIS — Z12.31 VISIT FOR SCREENING MAMMOGRAM: Primary | ICD-10-CM

## 2019-05-27 ENCOUNTER — APPOINTMENT (OUTPATIENT)
Dept: GENERAL RADIOLOGY | Facility: HOSPITAL | Age: 74
End: 2019-05-27

## 2019-05-27 ENCOUNTER — HOSPITAL ENCOUNTER (EMERGENCY)
Facility: HOSPITAL | Age: 74
Discharge: HOME | End: 2019-05-27
Attending: EMERGENCY MEDICINE | Admitting: EMERGENCY MEDICINE

## 2019-05-27 ENCOUNTER — APPOINTMENT (OUTPATIENT)
Dept: CT IMAGING | Facility: HOSPITAL | Age: 74
End: 2019-05-27

## 2019-05-27 VITALS
RESPIRATION RATE: 18 BRPM | DIASTOLIC BLOOD PRESSURE: 86 MMHG | BODY MASS INDEX: 25.68 KG/M2 | WEIGHT: 136 LBS | TEMPERATURE: 98.4 F | OXYGEN SATURATION: 94 % | SYSTOLIC BLOOD PRESSURE: 161 MMHG | HEART RATE: 94 BPM | HEIGHT: 61 IN

## 2019-05-27 DIAGNOSIS — R55 SYNCOPE, UNSPECIFIED SYNCOPE TYPE: Primary | ICD-10-CM

## 2019-05-27 DIAGNOSIS — N28.9 ACUTE ON CHRONIC RENAL INSUFFICIENCY: ICD-10-CM

## 2019-05-27 DIAGNOSIS — E86.0 DEHYDRATION: ICD-10-CM

## 2019-05-27 DIAGNOSIS — N18.9 ACUTE ON CHRONIC RENAL INSUFFICIENCY: ICD-10-CM

## 2019-05-27 LAB
ALBUMIN SERPL-MCNC: 4.2 G/DL (ref 3.5–5.2)
ALBUMIN/GLOB SERPL: 1 G/DL
ALP SERPL-CCNC: 105 U/L (ref 39–117)
ALT SERPL W P-5'-P-CCNC: 19 U/L (ref 1–33)
ANION GAP SERPL CALCULATED.3IONS-SCNC: 15 MMOL/L
AST SERPL-CCNC: 20 U/L (ref 1–32)
BASOPHILS # BLD AUTO: 0.04 10*3/MM3 (ref 0–0.2)
BASOPHILS NFR BLD AUTO: 0.4 % (ref 0–1.5)
BILIRUB SERPL-MCNC: 0.4 MG/DL (ref 0.2–1.2)
BUN BLD-MCNC: 15 MG/DL (ref 8–23)
BUN/CREAT SERPL: 9.4 (ref 7–25)
CALCIUM SPEC-SCNC: 9.7 MG/DL (ref 8.6–10.5)
CHLORIDE SERPL-SCNC: 100 MMOL/L (ref 98–107)
CO2 SERPL-SCNC: 21 MMOL/L (ref 22–29)
CREAT BLD-MCNC: 1.59 MG/DL (ref 0.57–1)
DEPRECATED RDW RBC AUTO: 53 FL (ref 37–54)
EOSINOPHIL # BLD AUTO: 0.09 10*3/MM3 (ref 0–0.4)
EOSINOPHIL NFR BLD AUTO: 1 % (ref 0.3–6.2)
ERYTHROCYTE [DISTWIDTH] IN BLOOD BY AUTOMATED COUNT: 18.9 % (ref 12.3–15.4)
GFR SERPL CREATININE-BSD FRML MDRD: 39 ML/MIN/1.73
GLOBULIN UR ELPH-MCNC: 4.3 GM/DL
GLUCOSE BLD-MCNC: 105 MG/DL (ref 65–99)
HCT VFR BLD AUTO: 34.7 % (ref 34–46.6)
HGB BLD-MCNC: 10.2 G/DL (ref 12–15.9)
HOLD SPECIMEN: NORMAL
HOLD SPECIMEN: NORMAL
IMM GRANULOCYTES # BLD AUTO: 0.02 10*3/MM3 (ref 0–0.05)
IMM GRANULOCYTES NFR BLD AUTO: 0.2 % (ref 0–0.5)
LYMPHOCYTES # BLD AUTO: 2.04 10*3/MM3 (ref 0.7–3.1)
LYMPHOCYTES NFR BLD AUTO: 22.7 % (ref 19.6–45.3)
MAGNESIUM SERPL-MCNC: 2.2 MG/DL (ref 1.6–2.4)
MCH RBC QN AUTO: 22.5 PG (ref 26.6–33)
MCHC RBC AUTO-ENTMCNC: 29.4 G/DL (ref 31.5–35.7)
MCV RBC AUTO: 76.6 FL (ref 79–97)
MONOCYTES # BLD AUTO: 0.69 10*3/MM3 (ref 0.1–0.9)
MONOCYTES NFR BLD AUTO: 7.7 % (ref 5–12)
NEUTROPHILS # BLD AUTO: 6.13 10*3/MM3 (ref 1.7–7)
NEUTROPHILS NFR BLD AUTO: 68.2 % (ref 42.7–76)
PLATELET # BLD AUTO: 490 10*3/MM3 (ref 140–450)
PMV BLD AUTO: 8.5 FL (ref 6–12)
POTASSIUM BLD-SCNC: 3.9 MMOL/L (ref 3.5–5.2)
PROT SERPL-MCNC: 8.5 G/DL (ref 6–8.5)
RBC # BLD AUTO: 4.53 10*6/MM3 (ref 3.77–5.28)
SODIUM BLD-SCNC: 136 MMOL/L (ref 136–145)
TROPONIN T SERPL-MCNC: <0.01 NG/ML (ref 0–0.03)
TROPONIN T SERPL-MCNC: <0.01 NG/ML (ref 0–0.03)
WBC NRBC COR # BLD: 8.99 10*3/MM3 (ref 3.4–10.8)
WHOLE BLOOD HOLD SPECIMEN: NORMAL
WHOLE BLOOD HOLD SPECIMEN: NORMAL

## 2019-05-27 PROCEDURE — 83735 ASSAY OF MAGNESIUM: CPT | Performed by: EMERGENCY MEDICINE

## 2019-05-27 PROCEDURE — 93005 ELECTROCARDIOGRAM TRACING: CPT | Performed by: EMERGENCY MEDICINE

## 2019-05-27 PROCEDURE — 70450 CT HEAD/BRAIN W/O DYE: CPT

## 2019-05-27 PROCEDURE — 80053 COMPREHEN METABOLIC PANEL: CPT | Performed by: EMERGENCY MEDICINE

## 2019-05-27 PROCEDURE — 99284 EMERGENCY DEPT VISIT MOD MDM: CPT

## 2019-05-27 PROCEDURE — 84484 ASSAY OF TROPONIN QUANT: CPT | Performed by: EMERGENCY MEDICINE

## 2019-05-27 PROCEDURE — 85025 COMPLETE CBC W/AUTO DIFF WBC: CPT | Performed by: EMERGENCY MEDICINE

## 2019-05-27 PROCEDURE — 71045 X-RAY EXAM CHEST 1 VIEW: CPT

## 2019-05-27 RX ORDER — SODIUM CHLORIDE 0.9 % (FLUSH) 0.9 %
10 SYRINGE (ML) INJECTION AS NEEDED
Status: DISCONTINUED | OUTPATIENT
Start: 2019-05-27 | End: 2019-05-28 | Stop reason: HOSPADM

## 2019-05-27 RX ADMIN — SODIUM CHLORIDE 1000 ML: 9 INJECTION, SOLUTION INTRAVENOUS at 19:24

## 2019-05-29 ENCOUNTER — APPOINTMENT (OUTPATIENT)
Dept: OTHER | Facility: HOSPITAL | Age: 74
End: 2019-05-29

## 2019-05-29 ENCOUNTER — HOSPITAL ENCOUNTER (OUTPATIENT)
Dept: MAMMOGRAPHY | Facility: HOSPITAL | Age: 74
Discharge: HOME | End: 2019-05-29
Admitting: INTERNAL MEDICINE

## 2019-05-29 DIAGNOSIS — Z92.89 H/O MAMMOGRAM: ICD-10-CM

## 2019-05-29 DIAGNOSIS — Z12.31 VISIT FOR SCREENING MAMMOGRAM: ICD-10-CM

## 2019-05-29 PROCEDURE — 77067 SCR MAMMO BI INCL CAD: CPT | Performed by: RADIOLOGY

## 2019-05-29 PROCEDURE — 77063 BREAST TOMOSYNTHESIS BI: CPT

## 2019-05-29 PROCEDURE — 77067 SCR MAMMO BI INCL CAD: CPT

## 2019-05-29 PROCEDURE — 77063 BREAST TOMOSYNTHESIS BI: CPT | Performed by: RADIOLOGY

## 2019-05-30 ENCOUNTER — OFFICE VISIT (OUTPATIENT)
Dept: CARDIAC SURGERY | Facility: CLINIC | Age: 74
End: 2019-05-30

## 2019-05-30 VITALS
DIASTOLIC BLOOD PRESSURE: 65 MMHG | HEIGHT: 61 IN | WEIGHT: 135.4 LBS | HEART RATE: 86 BPM | SYSTOLIC BLOOD PRESSURE: 125 MMHG | TEMPERATURE: 97.8 F | OXYGEN SATURATION: 97 % | BODY MASS INDEX: 25.57 KG/M2

## 2019-05-30 DIAGNOSIS — I20.9 ANGINA PECTORIS (HCC): ICD-10-CM

## 2019-05-30 DIAGNOSIS — R53.1 GENERALIZED WEAKNESS: ICD-10-CM

## 2019-05-30 DIAGNOSIS — I10 ESSENTIAL HYPERTENSION: ICD-10-CM

## 2019-05-30 DIAGNOSIS — I73.9 PAD (PERIPHERAL ARTERY DISEASE) (HCC): ICD-10-CM

## 2019-05-30 DIAGNOSIS — I61.9 CEREBRAL HEMORRHAGE (HCC): Primary | ICD-10-CM

## 2019-05-30 PROCEDURE — 99213 OFFICE O/P EST LOW 20 MIN: CPT | Performed by: THORACIC SURGERY (CARDIOTHORACIC VASCULAR SURGERY)

## 2019-05-30 NOTE — PROGRESS NOTES
05/30/2019  Patient Information  Chika Posey                                                                                          [address] [employer]  [facility] [address]   1945  'PCP/Referring Physician'  Alyssia Greenfield MD  [phone]  No ref. provider found    Chief Complaint   Patient presents with   • Follow-up     6 month follow up for PVD   • Peripheral Vascular Disease     CC: My legs feel better    History of Present Illness: 74-year-old -American female with severe peripheral vascular disease.  This patient had an aortobifemoral bypass years ago.  She developed thrombosis of the bypass and was treated at the [facility] on a lysis protocol with moderate success and reopening grafts.  Grafts then closed again the patient has been followed in this office with severe claudication and bilateral 6 months ago at which time she was instructed to walk to tolerance.  She states that she does not have rest pain.  She does get cramps cabs that awaken her in the middle of the night.  When she walks approximately 100 to 150 feet she starts to get fatigue and pain in her buttocks and her thighs.  If she presses further she will develop fatigue of her calves until she basically has to stop.  He has normal sensation on her feet normal motor function of her toes but she does have weakness in both lower extremities which seems to be more related to her chronic ischemic symptoms.  She has multiple medical comorbidities as outlined in the patient active problem list below.  These comorbidities are essentially unchanged.  Operative intervention on this patient for her peripheral vascular disease would carry a significant risk of morbidity and mortality secondary to these comorbidities.  Multiple discussions with the patient in the past in regard to surgical intervention been carried out.  The patient at this point in time would prefer to avoid surgical intervention unless it is a  limb salvage situation      Patient Active Problem List   Diagnosis   • Cerebral hemorrhage (CMS/HCC)   • Headache   • Slurred speech   • PAD (peripheral artery disease) (CMS/HCC)   • Essential hypertension   • Chronic anticoagulation   • Paraparesis of both lower limbs (CMS/HCC)   • Acute-on-chronic kidney injury (CMS/HCC)   • Back pain   • Spell of altered cognition   • Generalized weakness   • Muscular weakness   • History of cerebral hemorrhage   • History of DVT (deep vein thrombosis)   • Bilateral carotid artery stenosis     Past Medical History:   Diagnosis Date   • Compartment syndrome (CMS/HCC)    • DVT (deep venous thrombosis) (CMS/HCC)     Bilateral   • Hyperlipidemia    • Hypertension    • Peripheral vascular disease (CMS/HCC)    • Stroke (CMS/HCC)     bleed     Past Surgical History:   Procedure Laterality Date   • BREAST BIOPSY Right    • FOREARM FASCIOTOMY Left    • THROMBOLYSIS     • TUBAL ABDOMINAL LIGATION     • VASCULAR SURGERY      Bilateral Stents       Current Outpatient Medications:   •  amLODIPine (NORVASC) 10 MG tablet, Take 0.5 tablets by mouth Daily., Disp: , Rfl:   •  apixaban (ELIQUIS) 2.5 MG tablet tablet, Take 1 tablet by mouth Every 12 (Twelve) Hours., Disp: 60 tablet, Rfl: 11  •  aspirin 81 MG EC tablet, Take 81 mg by mouth Daily., Disp: , Rfl:   •  atorvastatin (LIPITOR) 40 MG tablet, Take 40 mg by mouth Daily., Disp: , Rfl:   •  cetirizine (zyrTEC) 10 MG tablet, Take 10 mg by mouth Daily., Disp: , Rfl:   •  CloNIDine (CATAPRES) 0.1 MG tablet, Take 0.1 mg by mouth Daily., Disp: , Rfl:   •  furosemide (LASIX) 20 MG tablet, Take 20 mg by mouth Daily As Needed (weight gain)., Disp: , Rfl:   •  Magnesium 250 MG tablet, Take 250 mg by mouth Daily., Disp: , Rfl:   •  traZODone (DESYREL) 50 MG tablet, 50 mg Every Night., Disp: , Rfl:   •  valsartan (DIOVAN) 160 MG tablet, Take 160 mg by mouth Daily., Disp: , Rfl:   •  potassium chloride (K-DUR,KLOR-CON) 20 MEQ CR tablet, Take 20 mEq by  mouth Daily., Disp: , Rfl:   Allergies   Allergen Reactions   • Lactose Intolerance (Gi) GI Bleeding   • Morphine And Related Itching   • Plavix [Clopidogrel Bisulfate] Itching     Social History     Socioeconomic History   • Marital status: Single     Spouse name: Not on file   • Number of children: 1   • Years of education: Not on file   • Highest education level: Not on file   Occupational History   • Occupation: Health Aid     Comment: Retired   Tobacco Use   • Smoking status: Former Smoker     Packs/day: 1.00     Years: 25.00     Pack years: 25.00     Types: Cigarettes     Last attempt to quit: 3/22/2008     Years since quittin.1   • Smokeless tobacco: Never Used   Substance and Sexual Activity   • Alcohol use: No   • Drug use: No   • Sexual activity: Defer   Social History Narrative    Patient lives alone, in [place].     Family History   Problem Relation Age of Onset   • Hypertension Mother    • Hypertension Father    • Breast cancer Neg Hx    • Ovarian cancer Neg Hx      Review of Systems   Constitution: Negative for chills, diaphoresis, fever, malaise/fatigue and weight loss.   HENT: Positive for hoarse voice. Negative for congestion, hearing loss and sore throat.    Eyes: Positive for blurred vision. Negative for double vision.   Cardiovascular: Positive for claudication (pain in both legs last about 15 minutes after walk as stopped) and syncope (seen in ER on 19 [facility]). Negative for chest pain, dyspnea on exertion, leg swelling and palpitations.   Respiratory: Positive for cough. Negative for hemoptysis, shortness of breath and wheezing.    Hematologic/Lymphatic: Bruises/bleeds easily.   Skin: Negative for itching, poor wound healing and rash.   Musculoskeletal: Positive for falls (on 19) and muscle cramps (Legs and hands). Negative for arthritis, back pain, joint pain, joint swelling, muscle weakness and neck pain.   Gastrointestinal: Positive for constipation. Negative for  "abdominal pain, diarrhea, hematemesis, nausea and vomiting.   Genitourinary: Negative for dysuria, frequency, hematuria, hesitancy, incomplete emptying and urgency.   Neurological: Positive for numbness (hands and feet). Negative for dizziness, headaches, light-headedness, loss of balance and vertigo.   Psychiatric/Behavioral: Negative for depression, memory loss and substance abuse. The patient is not nervous/anxious.    Allergic/Immunologic: Positive for environmental allergies. Negative for HIV exposure.     Vitals:    05/30/19 1158   BP: 125/65   Pulse: 86   Temp: 97.8 °F (36.6 °C)   TempSrc: Temporal   SpO2: 97%   Weight: 61.4 kg (135 lb 6.4 oz)   Height: 154.9 cm (61\")      Physical Exam   Constitutional: She is oriented to person, place, and time. She appears well-developed and well-nourished. No distress.   Elderly -American female in good spirits.  Awake alert without evidence of confusion.   HENT:   Head: Normocephalic.   Right Ear: External ear normal.   Left Ear: External ear normal.   Nose: Nose normal.   Eyes: Pupils are equal, round, and reactive to light.   Neck: Normal range of motion. Neck supple. No tracheal deviation present. No thyromegaly present.   No carotid bruits   Cardiovascular: Normal rate, normal heart sounds and intact distal pulses.   No murmur heard.  Femoral, popliteal, dorsalis pedis, and posterior tibial pulses are absent bilaterally.  They can be obtained utilizing a Doppler.  Distal flow (dorsalis pedis and posterior tibial) is monophasic.   Pulmonary/Chest: Effort normal and breath sounds normal. No respiratory distress. She has no wheezes. She has no rales. She exhibits no tenderness.   Abdominal: Soft. Bowel sounds are normal. She exhibits no distension and no mass. There is no tenderness.   Musculoskeletal: Normal range of motion. She exhibits no edema.   Capillary refill is 3 seconds on the right and 3-1/2 seconds on the left skin and nails of both lower extremities " are normal.  There is no calf tenderness and no edema.   Lymphadenopathy:     She has no cervical adenopathy.   Neurological: She is alert and oriented to person, place, and time. She has normal reflexes. No cranial nerve deficit.   Skin: Skin is warm and dry. No rash noted. No erythema.   Psychiatric: She has a normal mood and affect. Her behavior is normal. Judgment and thought content normal.       Labs/Imaging: None    Assessment: Severe peripheral vascular disease with claudication and ischemia of both lower extremity    Plan: Continue current conservative treatment measure with walking to tolerance and excellent foot care return to this clinic in 6 months.    Patient Active Problem List   Diagnosis   • Cerebral hemorrhage (CMS/HCC)   • Headache   • Slurred speech   • PAD (peripheral artery disease) (CMS/HCC)   • Essential hypertension   • Chronic anticoagulation   • Paraparesis of both lower limbs (CMS/HCC)   • Acute-on-chronic kidney injury (CMS/HCC)   • Back pain   • Spell of altered cognition   • Generalized weakness   • Muscular weakness   • History of cerebral hemorrhage   • History of DVT (deep vein thrombosis)   • Bilateral carotid artery stenosis         Bruce Ceballos MD  [facility]  05/30/19   2:04 PM

## 2019-05-31 ENCOUNTER — HOSPITAL ENCOUNTER (OUTPATIENT)
Dept: CARDIOLOGY | Facility: HOSPITAL | Age: 74
Discharge: HOME | End: 2019-05-31
Admitting: NURSE PRACTITIONER

## 2019-05-31 ENCOUNTER — OFFICE VISIT (OUTPATIENT)
Dept: CARDIOLOGY | Facility: HOSPITAL | Age: 74
End: 2019-05-31

## 2019-05-31 VITALS
SYSTOLIC BLOOD PRESSURE: 101 MMHG | WEIGHT: 134.5 LBS | HEIGHT: 61 IN | DIASTOLIC BLOOD PRESSURE: 53 MMHG | HEART RATE: 91 BPM | OXYGEN SATURATION: 95 % | BODY MASS INDEX: 25.39 KG/M2 | RESPIRATION RATE: 16 BRPM | TEMPERATURE: 97.9 F

## 2019-05-31 DIAGNOSIS — R55 SYNCOPE, UNSPECIFIED SYNCOPE TYPE: Primary | ICD-10-CM

## 2019-05-31 DIAGNOSIS — I10 ESSENTIAL HYPERTENSION: ICD-10-CM

## 2019-05-31 DIAGNOSIS — I73.9 PAD (PERIPHERAL ARTERY DISEASE) (HCC): ICD-10-CM

## 2019-05-31 DIAGNOSIS — R55 SYNCOPE, UNSPECIFIED SYNCOPE TYPE: ICD-10-CM

## 2019-05-31 DIAGNOSIS — R42 DIZZINESS: ICD-10-CM

## 2019-05-31 PROCEDURE — 0296T HC EXT ECG > 48HR TO 21 DAY RCRD W/CONECT INTL RCRD: CPT

## 2019-05-31 PROCEDURE — 99214 OFFICE O/P EST MOD 30 MIN: CPT | Performed by: NURSE PRACTITIONER

## 2019-06-24 PROCEDURE — 0298T PR EXT ECG > 48HR TO 21 DAY REVIEW AND INTERPRETATN: CPT | Performed by: INTERNAL MEDICINE

## 2019-06-28 ENCOUNTER — OFFICE VISIT (OUTPATIENT)
Dept: CARDIOLOGY | Facility: HOSPITAL | Age: 74
End: 2019-06-28

## 2019-06-28 VITALS
TEMPERATURE: 97.3 F | SYSTOLIC BLOOD PRESSURE: 97 MMHG | WEIGHT: 136 LBS | OXYGEN SATURATION: 96 % | RESPIRATION RATE: 16 BRPM | DIASTOLIC BLOOD PRESSURE: 53 MMHG | HEIGHT: 61 IN | BODY MASS INDEX: 25.68 KG/M2 | HEART RATE: 64 BPM

## 2019-06-28 DIAGNOSIS — I10 ESSENTIAL HYPERTENSION: ICD-10-CM

## 2019-06-28 DIAGNOSIS — R55 SYNCOPE, UNSPECIFIED SYNCOPE TYPE: Primary | ICD-10-CM

## 2019-06-28 DIAGNOSIS — R42 DIZZINESS: ICD-10-CM

## 2019-06-28 PROCEDURE — 99213 OFFICE O/P EST LOW 20 MIN: CPT | Performed by: NURSE PRACTITIONER

## 2019-06-28 RX ORDER — FERROUS SULFATE 325(65) MG
325 TABLET ORAL 2 TIMES DAILY
COMMUNITY
Start: 2019-06-21 | End: 2020-06-03

## 2019-06-28 NOTE — PATIENT INSTRUCTIONS
Take blood pressure twice a day over the weekend   Andie will call you Monday after I speak to Dr Berrios

## 2019-06-30 NOTE — PROGRESS NOTES
Encounter Date:06/28/2019      Patient ID: Chika Posey is a 74 y.o. female.        Subjective:     Chief Complaint: Follow-up (lightheadedness)     History of Present Illness patient presents to the office today for ongoing evaluation of her lightheadedness. She notes that she had another period of lightheadedness yesterday while she was outside in the sun. Did not check her bp. Denies chest pain, dyspnea, pedal edema.     Patient Active Problem List   Diagnosis   • Cerebral hemorrhage (CMS/HCC)   • Headache   • Slurred speech   • PAD (peripheral artery disease) (CMS/HCC)   • Essential hypertension   • Chronic anticoagulation   • Paraparesis of both lower limbs (CMS/HCC)   • Acute-on-chronic kidney injury (CMS/HCC)   • Back pain   • Spell of altered cognition   • Generalized weakness   • Muscular weakness   • History of cerebral hemorrhage   • History of DVT (deep vein thrombosis)   • Bilateral carotid artery stenosis   • Angina pectoris (CMS/HCC)       Past Surgical History:   Procedure Laterality Date   • BREAST BIOPSY Right    • FOREARM FASCIOTOMY Left    • THROMBOLYSIS     • TUBAL ABDOMINAL LIGATION     • VASCULAR SURGERY      Bilateral Stents       Allergies   Allergen Reactions   • Lactose Intolerance (Gi) GI Bleeding   • Morphine And Related Itching   • Plavix [Clopidogrel Bisulfate] Itching         Current Outpatient Medications:   •  amLODIPine (NORVASC) 10 MG tablet, Take 0.5 tablets by mouth Daily., Disp: , Rfl:   •  apixaban (ELIQUIS) 2.5 MG tablet tablet, Take 1 tablet by mouth Every 12 (Twelve) Hours., Disp: 60 tablet, Rfl: 11  •  aspirin 81 MG EC tablet, Take 81 mg by mouth Daily., Disp: , Rfl:   •  atorvastatin (LIPITOR) 40 MG tablet, Take 40 mg by mouth Daily., Disp: , Rfl:   •  cetirizine (zyrTEC) 10 MG tablet, Take 10 mg by mouth Daily., Disp: , Rfl:   •  CloNIDine (CATAPRES) 0.1 MG tablet, Take 0.1 mg by mouth Daily., Disp: , Rfl:   •  ferrous sulfate 325 (65 FE) MG tablet, Take 325 mg by  mouth 2 (Two) Times a Day., Disp: , Rfl:   •  furosemide (LASIX) 20 MG tablet, Take 20 mg by mouth Every Other Day., Disp: , Rfl:   •  Magnesium 250 MG tablet, Take 250 mg by mouth Daily., Disp: , Rfl:   •  traZODone (DESYREL) 50 MG tablet, 50 mg Every Night., Disp: , Rfl:   •  valsartan (DIOVAN) 160 MG tablet, Take 160 mg by mouth Daily., Disp: , Rfl:     The following portions of the chart were reviewed today and updated as appropriate: Allergies, current medications, past family history, social history, past medical history.     Review of Systems   Constitution: Negative for chills, decreased appetite, diaphoresis, fever, weakness, malaise/fatigue, night sweats, weight gain and weight loss.   HENT: Negative for congestion, hearing loss, hoarse voice and nosebleeds.    Eyes: Negative for blurred vision, visual disturbance and visual halos.   Cardiovascular: Positive for claudication and near-syncope. Negative for chest pain, cyanosis, dyspnea on exertion, irregular heartbeat, leg swelling, orthopnea, palpitations, paroxysmal nocturnal dyspnea and syncope.   Respiratory: Negative for cough, hemoptysis, shortness of breath, sleep disturbances due to breathing, snoring, sputum production and wheezing.    Hematologic/Lymphatic: Negative for bleeding problem. Does not bruise/bleed easily.   Skin: Negative for dry skin, itching and rash.   Musculoskeletal: Positive for muscle cramps. Negative for arthritis, falls, joint pain, joint swelling and myalgias.   Gastrointestinal: Negative for bloating, abdominal pain, constipation, diarrhea, flatus, heartburn, hematemesis, hematochezia, melena, nausea and vomiting.   Genitourinary: Negative for dysuria, frequency, hematuria, nocturia and urgency.   Neurological: Negative for excessive daytime sleepiness, dizziness, headaches, light-headedness and loss of balance.   Psychiatric/Behavioral: Negative for depression. The patient does not have insomnia and is not nervous/anxious.   "          Objective:     Vitals:    06/28/19 1300 06/28/19 1301   BP:  97/53   BP Location:  Left arm   Patient Position:  Sitting   Cuff Size:  Adult   Pulse:  64   Resp:  16   Temp:  97.3 °F (36.3 °C)   SpO2:  96%   Weight: 61.7 kg (136 lb) 61.7 kg (136 lb)   Height:  154.9 cm (60.98\")         Physical Exam   Constitutional: She is oriented to person, place, and time. She appears well-developed and well-nourished. She is active and cooperative. No distress.   HENT:   Head: Normocephalic and atraumatic.   Mouth/Throat: Oropharynx is clear and moist.   Eyes: Conjunctivae and EOM are normal. Pupils are equal, round, and reactive to light.   Neck: Normal range of motion. Neck supple. No JVD present. No tracheal deviation present. No thyromegaly present.   Cardiovascular: Normal rate, regular rhythm, normal heart sounds and intact distal pulses.   Pulmonary/Chest: Effort normal and breath sounds normal.   Abdominal: Soft. Bowel sounds are normal. She exhibits no distension. There is no tenderness.   Musculoskeletal: Normal range of motion.   Neurological: She is alert and oriented to person, place, and time.   Skin: Skin is warm, dry and intact.   Psychiatric: She has a normal mood and affect. Her behavior is normal.   Nursing note and vitals reviewed.      Lab and Diagnostic Review:    zio monitor: showed avg hr of 91 bpm,pac/pvc burden <1%  One run of atrial tach: lasting 3 beats    Assessment and Plan:         1. Syncope, unspecified syncope type  Suspect orthostatic hypotension    2. Dizziness  Encouraged good hydration    3. Essential hypertension  Under good control    It has been a pleasure to participate in the care of this patient.  Patient was instructed to call the [facility] with any questions, concerns, or worsening symptoms.        * Please note that portions of this note were completed with a voice recognition program. Efforts were made to edit the dictation but occasionally words are " transcribed.

## 2019-07-08 ENCOUNTER — TELEPHONE (OUTPATIENT)
Dept: CARDIOLOGY | Facility: HOSPITAL | Age: 74
End: 2019-07-08

## 2019-07-08 NOTE — TELEPHONE ENCOUNTER
----- Message from Chyna Barclay MA sent at 7/3/2019  8:57 AM EDT -----  Patient reports blood pressures have been runnin19 10:00pm 140/76   19 2:15pm 115/64  19 11:00am 158/83             5:15pm 160/94  No blood pressures yesterday    ----- Message -----  From: Andie Gruber APRN  Sent: 7/3/2019   8:05 AM  To: Chyna Barclay MA      Please call and review bps . thanks  ----- Message -----  From: Andie Gruber APRN  Sent: 2019   6:06 PM  To: EDMOND Steel    Check on bps  email dr cano about stress test

## 2019-08-21 ENCOUNTER — OFFICE VISIT (OUTPATIENT)
Dept: CARDIOLOGY | Facility: CLINIC | Age: 74
End: 2019-08-21

## 2019-08-21 VITALS
SYSTOLIC BLOOD PRESSURE: 122 MMHG | HEART RATE: 87 BPM | DIASTOLIC BLOOD PRESSURE: 70 MMHG | WEIGHT: 133 LBS | HEIGHT: 61 IN | BODY MASS INDEX: 25.11 KG/M2

## 2019-08-21 DIAGNOSIS — I73.9 PAD (PERIPHERAL ARTERY DISEASE) (HCC): ICD-10-CM

## 2019-08-21 DIAGNOSIS — R06.09 DYSPNEA ON EXERTION: ICD-10-CM

## 2019-08-21 DIAGNOSIS — I10 ESSENTIAL HYPERTENSION: Primary | ICD-10-CM

## 2019-08-21 PROCEDURE — 99213 OFFICE O/P EST LOW 20 MIN: CPT | Performed by: NURSE PRACTITIONER

## 2019-08-21 NOTE — PROGRESS NOTES
[facility] AT [address], [address]  [place], [place], [address] [phone]  [email or web address]           OUTPATIENT CLINIC FOLLOW UP NOTE    Patient Care Team:  Patient Care Team:  Alyssia Greenfield MD as PCP - General (Internal Medicine)  Alyssia Greenfield MD as Referring Physician (Internal Medicine)    Subjective:      Chief Complaint   Patient presents with   • Dyspnea on exertion       HPI:    Chika Posey is a 74 y.o. female.  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 The patient presents today for follow-up.    Since the patient was last seen she reports that she was evaluated for lightheadedness.  This has improved she has not had any recent recurrence.  She states that she began hydrating herself better and avoiding significant heat.  She denies any chest pain, shortness of breath, palpitations, or syncope.  She was recently seen by Dr. Ceballos for her PVD and reports she was instructed just to keep walking and stay on her medications.    Review of Systems:  Negative for exertional chest pain, dyspnea with exertion, orthopnea, PND, lower extremity edema, palpitations, lightheadedness, syncope.    PFSH:  Patient Active Problem List   Diagnosis   • Cerebral hemorrhage (CMS/HCC)   • Headache   • Slurred speech   • PAD (peripheral artery disease) (CMS/HCC)   • Essential hypertension   • Chronic anticoagulation   • Paraparesis of both lower limbs (CMS/HCC)   • Acute-on-chronic kidney  "injury (CMS/HCC)   • Back pain   • Spell of altered cognition   • Generalized weakness   • Muscular weakness   • History of cerebral hemorrhage   • History of DVT (deep vein thrombosis)   • Bilateral carotid artery stenosis   • Angina pectoris (CMS/HCC)         Current Outpatient Medications:   •  amLODIPine (NORVASC) 10 MG tablet, Take 0.5 tablets by mouth Daily., Disp: , Rfl:   •  apixaban (ELIQUIS) 2.5 MG tablet tablet, Take 1 tablet by mouth Every 12 (Twelve) Hours., Disp: 60 tablet, Rfl: 11  •  aspirin 81 MG EC tablet, Take 81 mg by mouth Daily., Disp: , Rfl:   •  atorvastatin (LIPITOR) 40 MG tablet, Take 40 mg by mouth Daily., Disp: , Rfl:   •  cetirizine (zyrTEC) 10 MG tablet, Take 10 mg by mouth Daily., Disp: , Rfl:   •  CloNIDine (CATAPRES) 0.1 MG tablet, Take 0.1 mg by mouth Daily., Disp: , Rfl:   •  ferrous sulfate 325 (65 FE) MG tablet, Take 325 mg by mouth 2 (Two) Times a Day., Disp: , Rfl:   •  furosemide (LASIX) 20 MG tablet, Take 20 mg by mouth Every Other Day., Disp: , Rfl:   •  Magnesium 250 MG tablet, Take 250 mg by mouth Daily., Disp: , Rfl:   •  traZODone (DESYREL) 50 MG tablet, 50 mg Every Night., Disp: , Rfl:   •  valsartan (DIOVAN) 160 MG tablet, Take 160 mg by mouth Daily., Disp: , Rfl:     Allergies   Allergen Reactions   • Lactose Intolerance (Gi) GI Bleeding   • Morphine And Related Itching   • Plavix [Clopidogrel Bisulfate] Itching        reports that she quit smoking about 11 years ago. Her smoking use included cigarettes. She has a 25.00 pack-year smoking history. She has never used smokeless tobacco.    Family History   Problem Relation Age of Onset   • Hypertension Mother    • Hypertension Father    • No Known Problems Sister    • Breast cancer Neg Hx    • Ovarian cancer Neg Hx          Objective:   Blood pressure 122/70, pulse 87, height 154.9 cm (61\"), weight 60.3 kg (133 lb), not currently breastfeeding.  CONSTITUTIONAL: No acute distress, normal affect  RESPIRATORY: Normal " effort. Clear to auscultation bilaterally without wheezing or rales  CARDIOVASCULAR: Left carotid bruit. Regular rate and rhythm with normal S1 and S2. Without murmur, gallop or rub.  PERIPHERAL VASCULAR: Normal radial pulses bilaterally. There is no lower extremity edema bilaterally.  1+ DP pulses bilaterally, right side more prominent than left.    Labs:    BUN   Date Value Ref Range Status   05/27/2019 15 8 - 23 mg/dL Final     Creatinine   Date Value Ref Range Status   05/27/2019 1.59 (H) 0.57 - 1.00 mg/dL Final   09/06/2018 1.30 0.60 - 1.30 mg/dL Final     Comment:     Serial Number: [id number]:  [id number]     Potassium   Date Value Ref Range Status   05/27/2019 3.9 3.5 - 5.2 mmol/L Final     ALT (SGPT)   Date Value Ref Range Status   05/27/2019 19 1 - 33 U/L Final     AST (SGOT)   Date Value Ref Range Status   05/27/2019 20 1 - 32 U/L Final       No results found for: CHOL  No results found for: TRIG  No results found for: HDL  No components found for: LDLCALC  No results found for: VLDL  No results found for: LDLHDL      Diagnostic Data:    Procedures    14-day cardiac event monitor 6/2019  -3 events all were normal sinus rhythm.  -Rare ectopy.  -No significant arrhythmia.    Arterial duplex 2/2019  · Right CFA appears occluded with a collateral from EIA to distal CFA (just above bifurcation). Right SFA proximal appears occluded. There appears to be a collateral from distal CFA(at bifurcation) to mid SFA.  · Left CFA appears to be occluded with a collateral from EIA to the profunda artery. Left proximal and mid SFA appears occluded and becomes patent by a collateral at the distal SFA.  · Flow is diminished but patent distal to these lesions bilaterally.    Carotid duplex 2/2019  · Right internal carotid artery stenosis of 50-69%.  · Proximal left internal carotid artery plaque without significant stenosis.  · Left internal carotid artery stenosis of 0-49%.  · Technically difficult study. Patient's bilateral  ICA bifurcation was high up on the neck at the level of the parotid gland. Limited visualization of ICA and ECA bilaterally.    Carotid Duplex 3/2018  · Right internal carotid artery stenosis of 0-49%.  · Left internal carotid artery stenosis of 50-69%.    TTE 3/2018  · Left ventricular systolic function is hyperdynamic (EF > 70).  · Left ventricular diastolic dysfunction (grade I) consistent with impaired relaxation.  · Normal right ventricular cavity size, wall thickness, systolic function and septal motion noted.  · Saline test results are negative ( technically difficult and limited study ).  · There is no evidence of pericardial effusion.  · No evidence of pulmonary hypertension is present.  · No significant structural valvular abnormality demonstrated.     Stress Test 5/2018  · Left ventricular ejection fraction is normal (Calculated EF = 67%).  · Myocardial perfusion imaging indicates a medium-sized, mildly severe area of ischemia located in the inferior wall and lateral wall.  · Impressions are consistent with an intermediate risk study.    Assessment and Plan:   Chika was seen today for post-op peripheral arterial bypass.    Diagnoses and all orders for this visit:    Dyspnea on exertion  -Abnormal stress test in the low to intermediate risk category.  -Remains asymptomatic.  -Continue aspirin, statin  -Continue amlodipine at as an antihypertensive and antianginal    Essential hypertension  -At goal  -Continue amlodipine, clonidine, and valsartan.    PAD (peripheral artery disease) (CMS/[facility])  CVA  -Repeat carotid duplex testing as above.  -Repeat LDL 76 on 10/2/2018  -Continue current related medications including apixaban    - Return in about 6 months (around 2/21/2020).    EDMOND Johnson  08/21/19 12:57 PM

## 2020-01-28 DIAGNOSIS — I73.9 PAD (PERIPHERAL ARTERY DISEASE) (HCC): Primary | ICD-10-CM

## 2020-02-13 ENCOUNTER — OFFICE VISIT (OUTPATIENT)
Dept: CARDIAC SURGERY | Facility: CLINIC | Age: 75
End: 2020-02-13

## 2020-02-13 VITALS
DIASTOLIC BLOOD PRESSURE: 70 MMHG | HEART RATE: 95 BPM | TEMPERATURE: 99.5 F | SYSTOLIC BLOOD PRESSURE: 118 MMHG | HEIGHT: 61 IN | OXYGEN SATURATION: 96 % | BODY MASS INDEX: 26.81 KG/M2 | WEIGHT: 142 LBS

## 2020-02-13 DIAGNOSIS — I73.9 PAD (PERIPHERAL ARTERY DISEASE) (HCC): ICD-10-CM

## 2020-02-13 DIAGNOSIS — I10 ESSENTIAL HYPERTENSION: ICD-10-CM

## 2020-02-13 DIAGNOSIS — I20.9 ANGINA PECTORIS (HCC): ICD-10-CM

## 2020-02-13 DIAGNOSIS — G82.20 PARAPARESIS OF BOTH LOWER LIMBS (HCC): ICD-10-CM

## 2020-02-13 DIAGNOSIS — I74.09 OTHER ARTERIAL EMBOLISM AND THROMBOSIS OF ABDOMINAL AORTA (HCC): ICD-10-CM

## 2020-02-13 DIAGNOSIS — I61.9 CEREBRAL HEMORRHAGE (HCC): Primary | ICD-10-CM

## 2020-02-13 PROCEDURE — 93923 UPR/LXTR ART STDY 3+ LVLS: CPT | Performed by: THORACIC SURGERY (CARDIOTHORACIC VASCULAR SURGERY)

## 2020-02-13 PROCEDURE — 99213 OFFICE O/P EST LOW 20 MIN: CPT | Performed by: THORACIC SURGERY (CARDIOTHORACIC VASCULAR SURGERY)

## 2020-02-13 NOTE — PROGRESS NOTES
02/13/2020  Patient Information  Chika Posey                                                                                          [address]  [facility] [address]   1945  'PCP/Referring Physician'  Alyssia Greenfield MD  [phone]  No ref. provider found    Chief Complaint   Patient presents with   • Follow-up     6 MO FU with PVR for PVD     CC: I am here to have my legs checked     History of Present Illness: 74-year-old -American female being seen in follow-up for evaluation and advice in regard to her peripheral vascular disease.  This patient has a complex history of multiple comorbidities and of problems with peripheral vascular disease.  This patient underwent aortobifemoral bypass several years ago.  She thrombosed this graft 2 years ago and was treated at the [facility] with a lysis protocol.  Within 6 months the graft closed again.  At that time the patient was noted to have severe claudication and stated she could only walk about 15 steps.  She was eventually seen in this clinic (9 months ago).  After evaluation it appeared that not only did she have significant inflow restriction she also had small vessel disease in the lower portion of both lower extremities.  She did not have rest pain and at that time did not have impending tissue loss.  I discussed in detail walking to tolerance and continued standard medical regimen for treatment of peripheral vascular disease.  Since that time the patient has done well.  She tells me today that she can walk approximately a block before she develops claudication.  She gets cramps occasionally at night but has never had rest pain and has never had ischemic symptoms or pain in her feet.  She is never had ulcerations that would not heal.  She does have a past history of bilateral deep venous thrombosis but this has been quiesced sent.  She continues to walk on a daily basis.  She had ABIs performed prior to this  visit.  Her right EDDIE is 0.78 her left EDDIE is 0.52      Patient Active Problem List   Diagnosis   • Cerebral hemorrhage (CMS/HCC)   • Headache   • Slurred speech   • PAD (peripheral artery disease) (CMS/HCC)   • Essential hypertension   • Chronic anticoagulation   • Paraparesis of both lower limbs (CMS/HCC)   • Acute-on-chronic kidney injury (CMS/HCC)   • Back pain   • Spell of altered cognition   • Generalized weakness   • Muscular weakness   • History of cerebral hemorrhage   • History of DVT (deep vein thrombosis)   • Bilateral carotid artery stenosis   • Angina pectoris (CMS/HCC)     Past Medical History:   Diagnosis Date   • Compartment syndrome (CMS/HCC)    • DVT (deep venous thrombosis) (CMS/HCC)     Bilateral   • Hyperlipidemia    • Hypertension    • Peripheral vascular disease (CMS/HCC)    • Stroke (CMS/HCC)     bleed     Past Surgical History:   Procedure Laterality Date   • BREAST BIOPSY Right    • FOREARM FASCIOTOMY Left    • THROMBOLYSIS     • TUBAL ABDOMINAL LIGATION     • VASCULAR SURGERY      Bilateral Stents       Current Outpatient Medications:   •  amLODIPine (NORVASC) 10 MG tablet, Take 0.5 tablets by mouth Daily., Disp: , Rfl:   •  apixaban (ELIQUIS) 2.5 MG tablet tablet, Take 1 tablet by mouth Every 12 (Twelve) Hours., Disp: 60 tablet, Rfl: 11  •  aspirin 81 MG EC tablet, Take 81 mg by mouth Daily., Disp: , Rfl:   •  atorvastatin (LIPITOR) 40 MG tablet, Take 40 mg by mouth Daily., Disp: , Rfl:   •  cetirizine (zyrTEC) 10 MG tablet, Take 10 mg by mouth Daily., Disp: , Rfl:   •  CloNIDine (CATAPRES) 0.1 MG tablet, Take 0.1 mg by mouth Daily., Disp: , Rfl:   •  furosemide (LASIX) 20 MG tablet, Take 20 mg by mouth Every Other Day., Disp: , Rfl:   •  Magnesium 250 MG tablet, Take 250 mg by mouth Daily., Disp: , Rfl:   •  traZODone (DESYREL) 50 MG tablet, 50 mg Every Night., Disp: , Rfl:   •  valsartan (DIOVAN) 160 MG tablet, Take 160 mg by mouth Daily., Disp: , Rfl:   •  ferrous sulfate 325 (65 FE)  MG tablet, Take 325 mg by mouth 2 (Two) Times a Day., Disp: , Rfl:   Allergies   Allergen Reactions   • Lactose Intolerance (Gi) GI Bleeding   • Morphine And Related Itching   • Plavix [Clopidogrel Bisulfate] Itching     Social History     Socioeconomic History   • Marital status: Single     Spouse name: Not on file   • Number of children: 1   • Years of education: Not on file   • Highest education level: Not on file   Occupational History   • Occupation: Health Aid     Comment: Retired   Tobacco Use   • Smoking status: Former Smoker     Packs/day: 1.00     Years: 25.00     Pack years: 25.00     Types: Cigarettes     Last attempt to quit: 3/22/2008     Years since quittin.9   • Smokeless tobacco: Never Used   Substance and Sexual Activity   • Alcohol use: No   • Drug use: No   • Sexual activity: Defer   Social History Narrative    Patient lives alone, in [place].    Caffeine: 0-1 cups coffee, rarely soda     Family History   Problem Relation Age of Onset   • Hypertension Mother    • Hypertension Father    • No Known Problems Sister    • Breast cancer Neg Hx    • Ovarian cancer Neg Hx      Review of Systems   Constitution: Negative for chills, fever, malaise/fatigue, night sweats and weight loss.   HENT: Negative for hearing loss, odynophagia and sore throat.    Cardiovascular: Positive for claudication. Negative for chest pain, dyspnea on exertion, leg swelling, orthopnea and palpitations.   Respiratory: Negative for cough and hemoptysis.    Endocrine: Negative for cold intolerance, heat intolerance, polydipsia, polyphagia and polyuria.   Hematologic/Lymphatic: Does not bruise/bleed easily.   Skin: Negative for itching and rash.   Musculoskeletal: Negative for joint pain, joint swelling and myalgias.   Gastrointestinal: Positive for constipation. Negative for abdominal pain, diarrhea, hematemesis, hematochezia, melena, nausea and vomiting.   Genitourinary: Negative for dysuria, frequency and hematuria.  "  Neurological: Negative for focal weakness, headaches, numbness and seizures.   Psychiatric/Behavioral: Negative for suicidal ideas.   All other systems reviewed and are negative.    Vitals:    02/13/20 1019   BP: 118/70   BP Location: Left arm   Patient Position: Sitting   Pulse: 95   Temp: 99.5 °F (37.5 °C)   SpO2: 96%   Weight: 64.4 kg (142 lb)   Height: 154.9 cm (61\")      Physical Exam   Constitutional: She is oriented to person, place, and time. She appears well-developed and well-nourished. No distress.   Pleasant elderly -American female in no acute distress.  The patient is in good spirits and is an excellent historian.   HENT:   Head: Normocephalic.   Right Ear: External ear normal.   Left Ear: External ear normal.   Nose: Nose normal.   Eyes: Pupils are equal, round, and reactive to light.   Neck: Normal range of motion. Neck supple. No tracheal deviation present. No thyromegaly present.   Cardiovascular: Normal rate.   Murmur heard.  1/6 systolic murmur left sternal border   Pulmonary/Chest: Effort normal and breath sounds normal. No respiratory distress. She has no wheezes. She has no rales. She exhibits no tenderness.   Decreased breath sounds both bases no rales, rhonchi, or wheezes.   Abdominal: Soft. Bowel sounds are normal. She exhibits no distension and no mass. There is no tenderness.   Musculoskeletal: Normal range of motion. She exhibits no edema.   Popliteal, dorsalis pedis, and posterior tibial pulses are not palpable but are present with the Doppler.  Both feet are warm.  Capillary refill is 2 seconds bilaterally.  There are no skin lesions and no ulcerations.  The patient has normal sensation and normal motor function of the toes and of the feet at the level of the ankle.  There is no evidence of chronic ischemic change.   Lymphadenopathy:     She has no cervical adenopathy.   Neurological: She is alert and oriented to person, place, and time. She has normal reflexes. No cranial " nerve deficit.   Skin: Skin is warm and dry. No rash noted. No erythema.   Psychiatric: She has a normal mood and affect. Her behavior is normal. Judgment and thought content normal.       Labs/Imaging: Bilateral ABIs reviewed as noted in the present illness.    Assessment:Peripheral vascular disease with claudication- stable    Plan: Continue current treatment plan for peripheral vascular disease.  Return to this clinic in 1 year with ABIs.  I have cautioned the patient to call or return to the clinic or the emergency room immediately should she develop problems with rest pain, sensation loss, or motor function loss.  She understands and is agreeable.  All of her comorbidities are stable and are being followed by other physicians.      Bruce Ceballos MD  [facility]  02/13/20   2:34 PM          Patient Active Problem List   Diagnosis   • Cerebral hemorrhage (CMS/HCC)   • Headache   • Slurred speech   • PAD (peripheral artery disease) (CMS/HCC)   • Essential hypertension   • Chronic anticoagulation   • Paraparesis of both lower limbs (CMS/HCC)   • Acute-on-chronic kidney injury (CMS/HCC)   • Back pain   • Spell of altered cognition   • Generalized weakness   • Muscular weakness   • History of cerebral hemorrhage   • History of DVT (deep vein thrombosis)   • Bilateral carotid artery stenosis   • Angina pectoris (CMS/HCC)

## 2020-04-22 ENCOUNTER — TRANSCRIBE ORDERS (OUTPATIENT)
Dept: ADMINISTRATIVE | Facility: HOSPITAL | Age: 75
End: 2020-04-22

## 2020-04-22 DIAGNOSIS — Z12.31 VISIT FOR SCREENING MAMMOGRAM: Primary | ICD-10-CM

## 2020-06-03 ENCOUNTER — OFFICE VISIT (OUTPATIENT)
Dept: CARDIOLOGY | Facility: CLINIC | Age: 75
End: 2020-06-03

## 2020-06-03 VITALS
SYSTOLIC BLOOD PRESSURE: 114 MMHG | OXYGEN SATURATION: 98 % | TEMPERATURE: 97.7 F | WEIGHT: 137.8 LBS | HEIGHT: 61 IN | HEART RATE: 83 BPM | BODY MASS INDEX: 26.01 KG/M2 | DIASTOLIC BLOOD PRESSURE: 74 MMHG

## 2020-06-03 DIAGNOSIS — I10 ESSENTIAL HYPERTENSION: ICD-10-CM

## 2020-06-03 DIAGNOSIS — I73.9 PAD (PERIPHERAL ARTERY DISEASE) (HCC): Primary | ICD-10-CM

## 2020-06-03 DIAGNOSIS — I65.23 BILATERAL CAROTID ARTERY STENOSIS: ICD-10-CM

## 2020-06-03 PROCEDURE — 99213 OFFICE O/P EST LOW 20 MIN: CPT | Performed by: INTERNAL MEDICINE

## 2020-06-03 NOTE — PROGRESS NOTES
[facility] [address], [address]  [place], [place], [address] [phone]  WWW[facility]           OUTPATIENT CLINIC FOLLOW UP NOTE    Patient Care Team:  Patient Care Team:  Alyssia Greenfield MD as PCP - General (Internal Medicine)  Alyssia Greenfield MD as Referring Physician (Internal Medicine)    Subjective:      Chief Complaint   Patient presents with   • Bilateral carotid bruits       HPI:    Chika Posey is a 75 y.o. female.  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     The patient presents today for follow-up.    Since the patient's last visit she has been doing well overall.  Denies chest pain, dyspnea, palpitations.  Is able to walk without significant lower extremity claudication.  Is able to carry out her errands without claudication.  Does not have rest pain, nonhealing wounds, or discoloration of her feet.    Review of Systems:  Negative for exertional chest pain, dyspnea with exertion, orthopnea, PND, lower extremity edema, palpitations, lightheadedness, syncope.    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)         Current Outpatient Medications:   •  amLODIPine (NORVASC) 10 MG tablet, Take 0.5 tablets by mouth Daily., Disp: , Rfl:   •  apixaban (ELIQUIS) 2.5 MG tablet tablet, Take 1 tablet by mouth Every 12 (Twelve) Hours., Disp: 60 tablet, Rfl: 11  •  aspirin 81 MG EC tablet, Take 81 mg by mouth Daily., Disp: , Rfl:   •  atorvastatin (LIPITOR) 40 MG tablet, Take 40 mg by mouth Daily., Disp: , Rfl:   •  CloNIDine (CATAPRES) 0.1 MG tablet, Take 0.1 mg by mouth Daily., Disp: , Rfl:   •  furosemide (LASIX) 20 MG tablet, Take 20 mg by mouth Every Other Day., Disp: , Rfl:   •  Magnesium 250 MG tablet, Take 250 mg by mouth Daily., Disp: , Rfl:   •  traZODone (DESYREL) 50 MG tablet, 50 mg Every Night., Disp: , Rfl:   •  valsartan (DIOVAN) 160 MG tablet, Take 160 mg by mouth Daily., Disp: , Rfl:     Allergies   Allergen Reactions   • Lactose Intolerance (Gi) GI Bleeding   • Morphine And Related Itching   • Plavix [Clopidogrel Bisulfate] Itching        reports that she quit smoking about 12 years ago. Her smoking use included cigarettes. She has a 25.00 pack-year smoking history. She has never used smokeless tobacco.    Family History   Problem Relation Age of Onset   • Hypertension Mother    • Hypertension Father    • No Known Problems Sister    • Breast cancer Neg Hx    • Ovarian cancer Neg Hx          Objective:   Blood pressure 114/74, pulse 83, temperature 97.7 °F (36.5 °C), height 154.9 cm (61\"), weight 62.5 kg (137 lb 12.8 oz), SpO2 98 %, not currently breastfeeding.  CONSTITUTIONAL: No acute distress, normal affect  RESPIRATORY: Normal effort. Clear to auscultation bilaterally without wheezing or rales  CARDIOVASCULAR: Soft left carotid bruit. Regular rate and rhythm with normal S1 and S2. Without murmur, " gallop or rub.  PERIPHERAL VASCULAR: Diminished right radial pulse.. There is no lower extremity edema bilaterally.      8/2019 exam: 1+ DP pulses bilaterally, right side more prominent than left.    Labs:    BUN   Date Value Ref Range Status   05/27/2019 15 8 - 23 mg/dL Final     Creatinine   Date Value Ref Range Status   05/27/2019 1.59 (H) 0.57 - 1.00 mg/dL Final   09/06/2018 1.30 0.60 - 1.30 mg/dL Final     Comment:     Serial Number: [id number]:  [id number]     Potassium   Date Value Ref Range Status   05/27/2019 3.9 3.5 - 5.2 mmol/L Final     ALT (SGPT)   Date Value Ref Range Status   05/27/2019 19 1 - 33 U/L Final     AST (SGOT)   Date Value Ref Range Status   05/27/2019 20 1 - 32 U/L Final       No results found for: CHOL  No results found for: TRIG  No results found for: HDL  No components found for: LDLCALC  No results found for: VLDL  No results found for: LDLHDL    PCP labs 3/2020: A1c 7.9%, TSH 1.29, hemoglobin 13.5, platelets 373, , TG 88, HDL 35, LDL 94, creatinine 1.43, AST 21, ALT 24  Diagnostic Data:    Procedures    14-day cardiac event monitor 6/2019  -3 events all were normal sinus rhythm.  -Rare ectopy.  -No significant arrhythmia.    Arterial duplex 2/2019  · Right CFA appears occluded with a collateral from EIA to distal CFA (just above bifurcation). Right SFA proximal appears occluded. There appears to be a collateral from distal CFA(at bifurcation) to mid SFA.  · Left CFA appears to be occluded with a collateral from EIA to the profunda artery. Left proximal and mid SFA appears occluded and becomes patent by a collateral at the distal SFA.  · Flow is diminished but patent distal to these lesions bilaterally.    Carotid duplex 2/2019  · Right internal carotid artery stenosis of 50-69%.  · Proximal left internal carotid artery plaque without significant stenosis.  · Left internal carotid artery stenosis of 0-49%.  · Technically difficult study. Patient's bilateral ICA bifurcation was  high up on the neck at the level of the parotid gland. Limited visualization of ICA and ECA bilaterally.    Carotid Duplex 3/2018  · Right internal carotid artery stenosis of 0-49%.  · Left internal carotid artery stenosis of 50-69%.    TTE 3/2018  · Left ventricular systolic function is hyperdynamic (EF > 70).  · Left ventricular diastolic dysfunction (grade I) consistent with impaired relaxation.  · Normal right ventricular cavity size, wall thickness, systolic function and septal motion noted.  · Saline test results are negative ( technically difficult and limited study ).  · There is no evidence of pericardial effusion.  · No evidence of pulmonary hypertension is present.  · No significant structural valvular abnormality demonstrated.     Stress Test 5/2018  · Left ventricular ejection fraction is normal (Calculated EF = 67%).  · Myocardial perfusion imaging indicates a medium-sized, mildly severe area of ischemia located in the inferior wall and lateral wall.  · Impressions are consistent with an intermediate risk study.    Assessment and Plan:   Chika was seen today for post-op peripheral arterial bypass.    Diagnoses and all orders for this visit:    Possible anginal equivalent  Mixed hyperlipidemia  -Abnormal stress test in the low to intermediate risk category.  -Currently asymptomatic  -Continue aspirin, statin  -Continue amlodipine at as an antihypertensive and antianginal    Essential hypertension  -At goal  -Continue current medication    PAD (peripheral artery disease) (CMS/[facility])  CVA  -We will consider repeat carotid duplex ultrasound study in 2021  -Continue current related medications including apixaban    - Return in about 1 year (around 6/3/2021) for Next scheduled follow up.    Bobo Berrios MD  06/03/20 17:42

## 2020-06-22 ENCOUNTER — HOSPITAL ENCOUNTER (OUTPATIENT)
Dept: MAMMOGRAPHY | Facility: HOSPITAL | Age: 75
Discharge: HOME | End: 2020-06-22
Admitting: INTERNAL MEDICINE

## 2020-06-22 DIAGNOSIS — Z12.31 VISIT FOR SCREENING MAMMOGRAM: ICD-10-CM

## 2020-06-22 PROCEDURE — 77063 BREAST TOMOSYNTHESIS BI: CPT | Performed by: RADIOLOGY

## 2020-06-22 PROCEDURE — 77067 SCR MAMMO BI INCL CAD: CPT

## 2020-06-22 PROCEDURE — 77063 BREAST TOMOSYNTHESIS BI: CPT

## 2020-06-22 PROCEDURE — 77067 SCR MAMMO BI INCL CAD: CPT | Performed by: RADIOLOGY

## 2021-01-01 ENCOUNTER — APPOINTMENT (OUTPATIENT)
Dept: MRI IMAGING | Facility: HOSPITAL | Age: 76
End: 2021-01-01

## 2021-01-01 ENCOUNTER — HOSPITAL ENCOUNTER (INPATIENT)
Facility: HOSPITAL | Age: 76
LOS: 2 days | Discharge: HOME | End: 2021-10-28
Attending: EMERGENCY MEDICINE | Admitting: HOSPITALIST

## 2021-01-01 ENCOUNTER — OFFICE VISIT (OUTPATIENT)
Dept: CARDIAC SURGERY | Facility: CLINIC | Age: 76
End: 2021-01-01

## 2021-01-01 ENCOUNTER — OFFICE VISIT (OUTPATIENT)
Dept: NEUROLOGY | Facility: CLINIC | Age: 76
End: 2021-01-01

## 2021-01-01 ENCOUNTER — APPOINTMENT (OUTPATIENT)
Dept: CARDIOLOGY | Facility: HOSPITAL | Age: 76
End: 2021-01-01

## 2021-01-01 ENCOUNTER — READMISSION MANAGEMENT (OUTPATIENT)
Dept: CALL CENTER | Facility: HOSPITAL | Age: 76
End: 2021-01-01

## 2021-01-01 ENCOUNTER — APPOINTMENT (OUTPATIENT)
Dept: NEUROLOGY | Facility: HOSPITAL | Age: 76
End: 2021-01-01

## 2021-01-01 ENCOUNTER — OFFICE VISIT (OUTPATIENT)
Dept: NEUROSURGERY | Facility: CLINIC | Age: 76
End: 2021-01-01

## 2021-01-01 ENCOUNTER — APPOINTMENT (OUTPATIENT)
Dept: CT IMAGING | Facility: HOSPITAL | Age: 76
End: 2021-01-01

## 2021-01-01 ENCOUNTER — TELEPHONE (OUTPATIENT)
Dept: NEUROSURGERY | Facility: CLINIC | Age: 76
End: 2021-01-01

## 2021-01-01 ENCOUNTER — TRANSCRIBE ORDERS (OUTPATIENT)
Dept: ADMINISTRATIVE | Facility: HOSPITAL | Age: 76
End: 2021-01-01

## 2021-01-01 ENCOUNTER — APPOINTMENT (OUTPATIENT)
Dept: GENERAL RADIOLOGY | Facility: HOSPITAL | Age: 76
End: 2021-01-01

## 2021-01-01 ENCOUNTER — HOSPITAL ENCOUNTER (INPATIENT)
Facility: HOSPITAL | Age: 76
LOS: 1 days | Discharge: HOME | End: 2021-10-08
Attending: EMERGENCY MEDICINE | Admitting: INTERNAL MEDICINE

## 2021-01-01 ENCOUNTER — CONSULT (OUTPATIENT)
Dept: ONCOLOGY | Facility: CLINIC | Age: 76
End: 2021-01-01

## 2021-01-01 ENCOUNTER — HOSPITAL ENCOUNTER (OUTPATIENT)
Dept: MAMMOGRAPHY | Facility: HOSPITAL | Age: 76
Discharge: HOME | End: 2021-11-18
Admitting: INTERNAL MEDICINE

## 2021-01-01 ENCOUNTER — LAB (OUTPATIENT)
Dept: LAB | Facility: HOSPITAL | Age: 76
End: 2021-01-01

## 2021-01-01 ENCOUNTER — HOSPITAL ENCOUNTER (OUTPATIENT)
Dept: CARDIOLOGY | Facility: HOSPITAL | Age: 76
Discharge: HOME | End: 2021-12-06

## 2021-01-01 ENCOUNTER — OFFICE VISIT (OUTPATIENT)
Dept: CARDIOLOGY | Facility: CLINIC | Age: 76
End: 2021-01-01

## 2021-01-01 ENCOUNTER — TELEMEDICINE (OUTPATIENT)
Dept: ONCOLOGY | Facility: CLINIC | Age: 76
End: 2021-01-01

## 2021-01-01 ENCOUNTER — TELEPHONE (OUTPATIENT)
Dept: NEUROLOGY | Facility: CLINIC | Age: 76
End: 2021-01-01

## 2021-01-01 VITALS
HEIGHT: 61 IN | SYSTOLIC BLOOD PRESSURE: 118 MMHG | HEART RATE: 98 BPM | BODY MASS INDEX: 24.73 KG/M2 | OXYGEN SATURATION: 94 % | WEIGHT: 131 LBS | DIASTOLIC BLOOD PRESSURE: 66 MMHG

## 2021-01-01 VITALS
HEIGHT: 61 IN | BODY MASS INDEX: 24.55 KG/M2 | HEART RATE: 76 BPM | TEMPERATURE: 98.9 F | OXYGEN SATURATION: 97 % | WEIGHT: 130 LBS | RESPIRATION RATE: 18 BRPM | SYSTOLIC BLOOD PRESSURE: 160 MMHG | DIASTOLIC BLOOD PRESSURE: 72 MMHG

## 2021-01-01 VITALS
WEIGHT: 127 LBS | RESPIRATION RATE: 16 BRPM | OXYGEN SATURATION: 95 % | HEART RATE: 96 BPM | HEIGHT: 59 IN | TEMPERATURE: 98.7 F | SYSTOLIC BLOOD PRESSURE: 155 MMHG | DIASTOLIC BLOOD PRESSURE: 91 MMHG | BODY MASS INDEX: 25.6 KG/M2

## 2021-01-01 VITALS
DIASTOLIC BLOOD PRESSURE: 68 MMHG | OXYGEN SATURATION: 96 % | WEIGHT: 132 LBS | BODY MASS INDEX: 24.92 KG/M2 | HEART RATE: 85 BPM | SYSTOLIC BLOOD PRESSURE: 130 MMHG | HEIGHT: 61 IN

## 2021-01-01 VITALS
BODY MASS INDEX: 26.81 KG/M2 | OXYGEN SATURATION: 96 % | DIASTOLIC BLOOD PRESSURE: 71 MMHG | WEIGHT: 133 LBS | HEART RATE: 84 BPM | HEIGHT: 59 IN | RESPIRATION RATE: 18 BRPM | TEMPERATURE: 97.6 F | SYSTOLIC BLOOD PRESSURE: 140 MMHG

## 2021-01-01 VITALS — BODY MASS INDEX: 24.97 KG/M2 | HEIGHT: 61 IN | WEIGHT: 132.28 LBS

## 2021-01-01 VITALS
SYSTOLIC BLOOD PRESSURE: 156 MMHG | BODY MASS INDEX: 24.92 KG/M2 | HEART RATE: 77 BPM | DIASTOLIC BLOOD PRESSURE: 80 MMHG | HEIGHT: 61 IN | WEIGHT: 132 LBS

## 2021-01-01 VITALS
BODY MASS INDEX: 26.73 KG/M2 | SYSTOLIC BLOOD PRESSURE: 120 MMHG | HEIGHT: 59 IN | OXYGEN SATURATION: 99 % | TEMPERATURE: 98.2 F | DIASTOLIC BLOOD PRESSURE: 65 MMHG | HEART RATE: 87 BPM | WEIGHT: 132.6 LBS

## 2021-01-01 VITALS
TEMPERATURE: 98.2 F | DIASTOLIC BLOOD PRESSURE: 74 MMHG | SYSTOLIC BLOOD PRESSURE: 128 MMHG | HEIGHT: 61 IN | WEIGHT: 132.6 LBS | BODY MASS INDEX: 25.04 KG/M2 | OXYGEN SATURATION: 99 % | HEART RATE: 96 BPM

## 2021-01-01 VITALS
WEIGHT: 131.2 LBS | OXYGEN SATURATION: 97 % | DIASTOLIC BLOOD PRESSURE: 70 MMHG | TEMPERATURE: 98.9 F | HEART RATE: 82 BPM | BODY MASS INDEX: 24.77 KG/M2 | SYSTOLIC BLOOD PRESSURE: 135 MMHG | HEIGHT: 61 IN

## 2021-01-01 DIAGNOSIS — I10 ESSENTIAL HYPERTENSION: ICD-10-CM

## 2021-01-01 DIAGNOSIS — I65.23 BILATERAL CAROTID ARTERY STENOSIS: Primary | ICD-10-CM

## 2021-01-01 DIAGNOSIS — N64.4 BREAST PAIN, LEFT: Primary | ICD-10-CM

## 2021-01-01 DIAGNOSIS — G40.909 SEIZURE DISORDER (HCC): Primary | ICD-10-CM

## 2021-01-01 DIAGNOSIS — D50.0 IRON DEFICIENCY ANEMIA DUE TO CHRONIC BLOOD LOSS: Primary | Chronic | ICD-10-CM

## 2021-01-01 DIAGNOSIS — G45.9 TIA (TRANSIENT ISCHEMIC ATTACK): Primary | ICD-10-CM

## 2021-01-01 DIAGNOSIS — Z79.01 CHRONIC ANTICOAGULATION: ICD-10-CM

## 2021-01-01 DIAGNOSIS — D50.0 IRON DEFICIENCY ANEMIA DUE TO CHRONIC BLOOD LOSS: Primary | ICD-10-CM

## 2021-01-01 DIAGNOSIS — I65.23 BILATERAL CAROTID ARTERY STENOSIS: ICD-10-CM

## 2021-01-01 DIAGNOSIS — D64.9 ANEMIA, UNSPECIFIED TYPE: ICD-10-CM

## 2021-01-01 DIAGNOSIS — Z86.73 HISTORY OF STROKE: ICD-10-CM

## 2021-01-01 DIAGNOSIS — R53.1 ACUTE RIGHT-SIDED WEAKNESS: Primary | ICD-10-CM

## 2021-01-01 DIAGNOSIS — I73.9 PAD (PERIPHERAL ARTERY DISEASE) (HCC): ICD-10-CM

## 2021-01-01 DIAGNOSIS — I73.9 PAD (PERIPHERAL ARTERY DISEASE) (HCC): Primary | ICD-10-CM

## 2021-01-01 DIAGNOSIS — N64.4 BREAST PAIN, LEFT: ICD-10-CM

## 2021-01-01 DIAGNOSIS — Z86.73 RECENT CEREBROVASCULAR ACCIDENT (CVA): ICD-10-CM

## 2021-01-01 DIAGNOSIS — Z13.6 ENCOUNTER FOR SCREENING FOR CARDIOVASCULAR DISORDERS: ICD-10-CM

## 2021-01-01 DIAGNOSIS — N18.30 TYPE 2 DIABETES MELLITUS WITH STAGE 3 CHRONIC KIDNEY DISEASE, WITHOUT LONG-TERM CURRENT USE OF INSULIN, UNSPECIFIED WHETHER STAGE 3A OR 3B CKD (HCC): ICD-10-CM

## 2021-01-01 DIAGNOSIS — I65.1 BASILAR ARTERY STENOSIS: Primary | ICD-10-CM

## 2021-01-01 DIAGNOSIS — D50.0 IRON DEFICIENCY ANEMIA DUE TO CHRONIC BLOOD LOSS: ICD-10-CM

## 2021-01-01 DIAGNOSIS — I69.30 SEQUELAE, POST-STROKE: ICD-10-CM

## 2021-01-01 DIAGNOSIS — E11.22 TYPE 2 DIABETES MELLITUS WITH STAGE 3 CHRONIC KIDNEY DISEASE, WITHOUT LONG-TERM CURRENT USE OF INSULIN, UNSPECIFIED WHETHER STAGE 3A OR 3B CKD (HCC): ICD-10-CM

## 2021-01-01 LAB
ACANTHOCYTES BLD QL SMEAR: NORMAL
ALBUMIN SERPL-MCNC: 3.7 G/DL (ref 3.5–5.2)
ALBUMIN SERPL-MCNC: 3.8 G/DL (ref 3.5–5.2)
ALBUMIN SERPL-MCNC: 3.9 G/DL (ref 3.5–5.2)
ALBUMIN SERPL-MCNC: 4.1 G/DL (ref 3.5–5.2)
ALBUMIN SERPL-MCNC: 4.2 G/DL (ref 3.5–5.2)
ALBUMIN/GLOB SERPL: 0.9 G/DL
ALBUMIN/GLOB SERPL: 1 G/DL
ALBUMIN/GLOB SERPL: 1.1 G/DL
ALBUMIN/GLOB SERPL: 1.1 G/DL
ALBUMIN/GLOB SERPL: 1.2 G/DL
ALP SERPL-CCNC: 100 U/L (ref 39–117)
ALP SERPL-CCNC: 102 U/L (ref 39–117)
ALP SERPL-CCNC: 107 U/L (ref 39–117)
ALP SERPL-CCNC: 113 U/L (ref 39–117)
ALP SERPL-CCNC: 95 U/L (ref 39–117)
ALT SERPL W P-5'-P-CCNC: 18 U/L (ref 1–33)
ALT SERPL W P-5'-P-CCNC: 19 U/L (ref 1–33)
ALT SERPL W P-5'-P-CCNC: 20 U/L (ref 1–33)
ALT SERPL W P-5'-P-CCNC: 21 U/L (ref 1–33)
ALT SERPL W P-5'-P-CCNC: 24 U/L (ref 1–33)
ANION GAP SERPL CALCULATED.3IONS-SCNC: 12 MMOL/L (ref 5–15)
ANION GAP SERPL CALCULATED.3IONS-SCNC: 13 MMOL/L (ref 5–15)
ANION GAP SERPL CALCULATED.3IONS-SCNC: 13 MMOL/L (ref 5–15)
ANION GAP SERPL CALCULATED.3IONS-SCNC: 14 MMOL/L (ref 5–15)
ANION GAP SERPL CALCULATED.3IONS-SCNC: 14 MMOL/L (ref 5–15)
ANION GAP SERPL CALCULATED.3IONS-SCNC: 9 MMOL/L (ref 5–15)
ANISOCYTOSIS BLD QL: NORMAL
APTT PPP: 192 SECONDS (ref 50–95)
APTT PPP: 20.5 SECONDS (ref 22–39)
APTT PPP: 25.1 SECONDS (ref 50–95)
APTT PPP: 31.2 SECONDS (ref 22–39)
APTT PPP: 35.9 SECONDS (ref 50–95)
APTT PPP: 56.1 SECONDS (ref 50–95)
ASCENDING AORTA: 2.7 CM
AST SERPL-CCNC: 18 U/L (ref 1–32)
AST SERPL-CCNC: 21 U/L (ref 1–32)
AST SERPL-CCNC: 21 U/L (ref 1–32)
AST SERPL-CCNC: 22 U/L (ref 1–32)
AST SERPL-CCNC: 22 U/L (ref 1–32)
AST SERPL-CCNC: 25 U/L (ref 1–32)
AST SERPL-CCNC: 32 U/L (ref 1–32)
BACTERIA UR QL AUTO: NORMAL /HPF
BASE EXCESS BLDA CALC-SCNC: 0 MMOL/L (ref -5–5)
BASOPHILS # BLD AUTO: 0.03 10*3/MM3 (ref 0–0.2)
BASOPHILS # BLD AUTO: 0.03 10*3/MM3 (ref 0–0.2)
BASOPHILS # BLD AUTO: 0.04 10*3/MM3 (ref 0–0.2)
BASOPHILS # BLD AUTO: 0.04 10*3/MM3 (ref 0–0.2)
BASOPHILS # BLD MANUAL: 0 10*3/MM3 (ref 0–0.2)
BASOPHILS NFR BLD AUTO: 0 % (ref 0–1.5)
BASOPHILS NFR BLD AUTO: 0.4 % (ref 0–1.5)
BASOPHILS NFR BLD AUTO: 0.5 % (ref 0–1.5)
BASOPHILS NFR BLD AUTO: 0.5 % (ref 0–1.5)
BASOPHILS NFR BLD AUTO: 0.8 % (ref 0–1.5)
BH CV ECHO MEAS - AO MAX PG (FULL): 3.3 MMHG
BH CV ECHO MEAS - AO MAX PG: 6.1 MMHG
BH CV ECHO MEAS - AO MEAN PG (FULL): 1.8 MMHG
BH CV ECHO MEAS - AO MEAN PG: 3.2 MMHG
BH CV ECHO MEAS - AO ROOT AREA (BSA CORRECTED): 1.7
BH CV ECHO MEAS - AO ROOT AREA: 5.4 CM^2
BH CV ECHO MEAS - AO ROOT DIAM: 2.6 CM
BH CV ECHO MEAS - AO V2 MAX: 123.5 CM/SEC
BH CV ECHO MEAS - AO V2 MEAN: 82.7 CM/SEC
BH CV ECHO MEAS - AO V2 VTI: 23.3 CM
BH CV ECHO MEAS - ASC AORTA: 2.7 CM
BH CV ECHO MEAS - AVA(I,A): 2.6 CM^2
BH CV ECHO MEAS - AVA(I,D): 2.6 CM^2
BH CV ECHO MEAS - AVA(V,A): 2.3 CM^2
BH CV ECHO MEAS - AVA(V,D): 2.3 CM^2
BH CV ECHO MEAS - BSA(HAYCOCK): 1.6 M^2
BH CV ECHO MEAS - BSA(HAYCOCK): 1.6 M^2
BH CV ECHO MEAS - BSA: 1.6 M^2
BH CV ECHO MEAS - BSA: 1.6 M^2
BH CV ECHO MEAS - BZI_BMI: 24 KILOGRAMS/M^2
BH CV ECHO MEAS - BZI_BMI: 24.6 KILOGRAMS/M^2
BH CV ECHO MEAS - BZI_METRIC_HEIGHT: 154.9 CM
BH CV ECHO MEAS - BZI_METRIC_HEIGHT: 154.9 CM
BH CV ECHO MEAS - BZI_METRIC_WEIGHT: 57.6 KG
BH CV ECHO MEAS - BZI_METRIC_WEIGHT: 59 KG
BH CV ECHO MEAS - EDV(CUBED): 42.6 ML
BH CV ECHO MEAS - EDV(MOD-SP2): 38 ML
BH CV ECHO MEAS - EDV(MOD-SP4): 33 ML
BH CV ECHO MEAS - EDV(TEICH): 50.6 ML
BH CV ECHO MEAS - EF(CUBED): 75.4 %
BH CV ECHO MEAS - EF(MOD-BP): 64 %
BH CV ECHO MEAS - EF(MOD-SP2): 60.5 %
BH CV ECHO MEAS - EF(MOD-SP4): 66.7 %
BH CV ECHO MEAS - EF(TEICH): 68.4 %
BH CV ECHO MEAS - ESV(CUBED): 10.5 ML
BH CV ECHO MEAS - ESV(MOD-SP2): 15 ML
BH CV ECHO MEAS - ESV(MOD-SP4): 11 ML
BH CV ECHO MEAS - ESV(TEICH): 16 ML
BH CV ECHO MEAS - FS: 37.3 %
BH CV ECHO MEAS - IVS/LVPW: 0.99
BH CV ECHO MEAS - IVSD: 1.2 CM
BH CV ECHO MEAS - LA DIMENSION: 3.4 CM
BH CV ECHO MEAS - LA/AO: 1.3
BH CV ECHO MEAS - LAD MAJOR: 5.9 CM
BH CV ECHO MEAS - LAT PEAK E' VEL: 5.8 CM/SEC
BH CV ECHO MEAS - LATERAL E/E' RATIO: 8.9
BH CV ECHO MEAS - LV DIASTOLIC VOL/BSA (35-75): 21 ML/M^2
BH CV ECHO MEAS - LV MASS(C)D: 132.3 GRAMS
BH CV ECHO MEAS - LV MASS(C)DI: 84.1 GRAMS/M^2
BH CV ECHO MEAS - LV MAX PG: 2.8 MMHG
BH CV ECHO MEAS - LV MEAN PG: 1.4 MMHG
BH CV ECHO MEAS - LV SYSTOLIC VOL/BSA (12-30): 7 ML/M^2
BH CV ECHO MEAS - LV V1 MAX: 83.9 CM/SEC
BH CV ECHO MEAS - LV V1 MEAN: 55.4 CM/SEC
BH CV ECHO MEAS - LV V1 VTI: 18 CM
BH CV ECHO MEAS - LVIDD: 3.5 CM
BH CV ECHO MEAS - LVIDS: 2.2 CM
BH CV ECHO MEAS - LVLD AP2: 6.6 CM
BH CV ECHO MEAS - LVLD AP4: 7 CM
BH CV ECHO MEAS - LVLS AP2: 7.1 CM
BH CV ECHO MEAS - LVLS AP4: 6.2 CM
BH CV ECHO MEAS - LVOT AREA (M): 3.5 CM^2
BH CV ECHO MEAS - LVOT AREA: 3.4 CM^2
BH CV ECHO MEAS - LVOT DIAM: 2.1 CM
BH CV ECHO MEAS - LVPWD: 1.2 CM
BH CV ECHO MEAS - MED PEAK E' VEL: 4.6 CM/SEC
BH CV ECHO MEAS - MEDIAL E/E' RATIO: 11.2
BH CV ECHO MEAS - MV A MAX VEL: 101.2 CM/SEC
BH CV ECHO MEAS - MV DEC SLOPE: 332.3 CM/SEC^2
BH CV ECHO MEAS - MV DEC TIME: 0.23 SEC
BH CV ECHO MEAS - MV E MAX VEL: 53.3 CM/SEC
BH CV ECHO MEAS - MV E/A: 0.53
BH CV ECHO MEAS - MV P1/2T MAX VEL: 69.2 CM/SEC
BH CV ECHO MEAS - MV P1/2T: 61 MSEC
BH CV ECHO MEAS - MVA P1/2T LCG: 3.2 CM^2
BH CV ECHO MEAS - MVA(P1/2T): 3.6 CM^2
BH CV ECHO MEAS - PA ACC SLOPE: 715.6 CM/SEC^2
BH CV ECHO MEAS - PA ACC TIME: 0.12 SEC
BH CV ECHO MEAS - PA PR(ACCEL): 25.1 MMHG
BH CV ECHO MEAS - SI(AO): 79.6 ML/M^2
BH CV ECHO MEAS - SI(CUBED): 20.4 ML/M^2
BH CV ECHO MEAS - SI(LVOT): 38.5 ML/M^2
BH CV ECHO MEAS - SI(MOD-SP2): 14.6 ML/M^2
BH CV ECHO MEAS - SI(MOD-SP4): 14 ML/M^2
BH CV ECHO MEAS - SI(TEICH): 22 ML/M^2
BH CV ECHO MEAS - SV(AO): 125.2 ML
BH CV ECHO MEAS - SV(CUBED): 32.1 ML
BH CV ECHO MEAS - SV(LVOT): 60.5 ML
BH CV ECHO MEAS - SV(MOD-SP2): 23 ML
BH CV ECHO MEAS - SV(MOD-SP4): 22 ML
BH CV ECHO MEAS - SV(TEICH): 34.6 ML
BH CV ECHO MEAS - TAPSE (>1.6): 2 CM
BH CV ECHO MEASUREMENTS AVERAGE E/E' RATIO: 10.25
BH CV VAS BP RIGHT ARM: NORMAL MMHG
BH CV XLRA - RV BASE: 2.7 CM
BH CV XLRA - RV LENGTH: 6.2 CM
BH CV XLRA - RV MID: 2.1 CM
BH CV XLRA - TDI S': 11.5 CM/SEC
BH CV XLRA MEAS LEFT DIST CCA EDV: 15.7 CM/SEC
BH CV XLRA MEAS LEFT DIST CCA EDV: 17.2 CM/SEC
BH CV XLRA MEAS LEFT DIST CCA PSV: 58.1 CM/SEC
BH CV XLRA MEAS LEFT DIST CCA PSV: 71.7 CM/SEC
BH CV XLRA MEAS LEFT DIST ICA EDV: 24.9 CM/SEC
BH CV XLRA MEAS LEFT DIST ICA PSV: 62.4 CM/SEC
BH CV XLRA MEAS LEFT ICA/CCA RATIO: 11.7
BH CV XLRA MEAS LEFT ICA/CCA RATIO: 3.7
BH CV XLRA MEAS LEFT MID CCA EDV: 14.9 CM/SEC
BH CV XLRA MEAS LEFT MID CCA EDV: 17.5 CM/SEC
BH CV XLRA MEAS LEFT MID CCA PSV: 55.8 CM/SEC
BH CV XLRA MEAS LEFT MID CCA PSV: 76.1 CM/SEC
BH CV XLRA MEAS LEFT MID ICA EDV: 21.2 CM/SEC
BH CV XLRA MEAS LEFT MID ICA EDV: 22.1 CM/SEC
BH CV XLRA MEAS LEFT MID ICA PSV: 115.8 CM/SEC
BH CV XLRA MEAS LEFT MID ICA PSV: 85.6 CM/SEC
BH CV XLRA MEAS LEFT PROX CCA EDV: 16.5 CM/SEC
BH CV XLRA MEAS LEFT PROX CCA EDV: 18.9 CM/SEC
BH CV XLRA MEAS LEFT PROX CCA PSV: 70.7 CM/SEC
BH CV XLRA MEAS LEFT PROX CCA PSV: 81 CM/SEC
BH CV XLRA MEAS LEFT PROX ECA EDV: 11.8 CM/SEC
BH CV XLRA MEAS LEFT PROX ECA EDV: 18.7 CM/SEC
BH CV XLRA MEAS LEFT PROX ECA PSV: 79.6 CM/SEC
BH CV XLRA MEAS LEFT PROX ECA PSV: 86.9 CM/SEC
BH CV XLRA MEAS LEFT PROX ICA EDV: 102 CM/SEC
BH CV XLRA MEAS LEFT PROX ICA EDV: 325 CM/SEC
BH CV XLRA MEAS LEFT PROX ICA PSV: 266 CM/SEC
BH CV XLRA MEAS LEFT PROX ICA PSV: 651 CM/SEC
BH CV XLRA MEAS LEFT PROX SCLA EDV: 3.1 CM/SEC
BH CV XLRA MEAS LEFT PROX SCLA PSV: 124.9 CM/SEC
BH CV XLRA MEAS LEFT PROX SCLA PSV: 179.8 CM/SEC
BH CV XLRA MEAS LEFT VERTEBRAL A EDV: 26.2 CM/SEC
BH CV XLRA MEAS LEFT VERTEBRAL A EDV: 31.4 CM/SEC
BH CV XLRA MEAS LEFT VERTEBRAL A PSV: 75.1 CM/SEC
BH CV XLRA MEAS LEFT VERTEBRAL A PSV: 75.4 CM/SEC
BH CV XLRA MEAS RIGHT CAROTID BULB EDV: 73.3 CM/SEC
BH CV XLRA MEAS RIGHT CAROTID BULB PSV: 255 CM/SEC
BH CV XLRA MEAS RIGHT DIST CCA EDV: 18.7 CM/SEC
BH CV XLRA MEAS RIGHT DIST CCA EDV: 20.4 CM/SEC
BH CV XLRA MEAS RIGHT DIST CCA PSV: 66.2 CM/SEC
BH CV XLRA MEAS RIGHT DIST CCA PSV: 73.9 CM/SEC
BH CV XLRA MEAS RIGHT DIST ICA EDV: 19.5 CM/SEC
BH CV XLRA MEAS RIGHT DIST ICA EDV: 34.9 CM/SEC
BH CV XLRA MEAS RIGHT DIST ICA PSV: 62.2 CM/SEC
BH CV XLRA MEAS RIGHT DIST ICA PSV: 99.9 CM/SEC
BH CV XLRA MEAS RIGHT ICA/CCA RATIO: 2.6
BH CV XLRA MEAS RIGHT ICA/CCA RATIO: 4
BH CV XLRA MEAS RIGHT MID CCA EDV: 15.4 CM/SEC
BH CV XLRA MEAS RIGHT MID CCA EDV: 21.5 CM/SEC
BH CV XLRA MEAS RIGHT MID CCA PSV: 59.6 CM/SEC
BH CV XLRA MEAS RIGHT MID CCA PSV: 97.6 CM/SEC
BH CV XLRA MEAS RIGHT MID ICA EDV: 27.9 CM/SEC
BH CV XLRA MEAS RIGHT MID ICA EDV: 33.7 CM/SEC
BH CV XLRA MEAS RIGHT MID ICA PSV: 100.6 CM/SEC
BH CV XLRA MEAS RIGHT MID ICA PSV: 150.4 CM/SEC
BH CV XLRA MEAS RIGHT PROX CCA EDV: 13.2 CM/SEC
BH CV XLRA MEAS RIGHT PROX CCA EDV: 19.1 CM/SEC
BH CV XLRA MEAS RIGHT PROX CCA PSV: 177.4 CM/SEC
BH CV XLRA MEAS RIGHT PROX CCA PSV: 61.8 CM/SEC
BH CV XLRA MEAS RIGHT PROX ECA EDV: 12.6 CM/SEC
BH CV XLRA MEAS RIGHT PROX ECA EDV: 20.2 CM/SEC
BH CV XLRA MEAS RIGHT PROX ECA PSV: 104.8 CM/SEC
BH CV XLRA MEAS RIGHT PROX ECA PSV: 168.4 CM/SEC
BH CV XLRA MEAS RIGHT PROX ICA EDV: 101 CM/SEC
BH CV XLRA MEAS RIGHT PROX ICA EDV: 62.9 CM/SEC
BH CV XLRA MEAS RIGHT PROX ICA PSV: 253 CM/SEC
BH CV XLRA MEAS RIGHT PROX ICA PSV: 289 CM/SEC
BH CV XLRA MEAS RIGHT PROX SCLA EDV: 25.1 CM/SEC
BH CV XLRA MEAS RIGHT PROX SCLA EDV: 3.9 CM/SEC
BH CV XLRA MEAS RIGHT PROX SCLA PSV: 182.7 CM/SEC
BH CV XLRA MEAS RIGHT PROX SCLA PSV: 242 CM/SEC
BH CV XLRA MEAS RIGHT VERTEBRAL A EDV: 10 CM/SEC
BH CV XLRA MEAS RIGHT VERTEBRAL A EDV: 8.4 CM/SEC
BH CV XLRA MEAS RIGHT VERTEBRAL A PSV: 35 CM/SEC
BH CV XLRA MEAS RIGHT VERTEBRAL A PSV: 47.6 CM/SEC
BILIRUB CONJ SERPL-MCNC: <0.2 MG/DL (ref 0–0.3)
BILIRUB SERPL-MCNC: 0.3 MG/DL (ref 0–1.2)
BILIRUB SERPL-MCNC: 0.4 MG/DL (ref 0–1.2)
BILIRUB UR QL STRIP: NEGATIVE
BUN SERPL-MCNC: 10 MG/DL (ref 8–23)
BUN SERPL-MCNC: 10 MG/DL (ref 8–23)
BUN SERPL-MCNC: 16 MG/DL (ref 8–23)
BUN SERPL-MCNC: 17 MG/DL (ref 8–23)
BUN SERPL-MCNC: 7 MG/DL (ref 8–23)
BUN SERPL-MCNC: 9 MG/DL (ref 8–23)
BUN/CREAT SERPL: 11.7 (ref 7–25)
BUN/CREAT SERPL: 12.2 (ref 7–25)
BUN/CREAT SERPL: 6.5 (ref 7–25)
BUN/CREAT SERPL: 8.3 (ref 7–25)
BUN/CREAT SERPL: 9.4 (ref 7–25)
BUN/CREAT SERPL: 9.9 (ref 7–25)
CA-I BLDA-SCNC: 1.16 MMOL/L (ref 1.2–1.32)
CALCIUM SPEC-SCNC: 8.5 MG/DL (ref 8.6–10.5)
CALCIUM SPEC-SCNC: 8.6 MG/DL (ref 8.6–10.5)
CALCIUM SPEC-SCNC: 9.1 MG/DL (ref 8.6–10.5)
CALCIUM SPEC-SCNC: 9.2 MG/DL (ref 8.6–10.5)
CALCIUM SPEC-SCNC: 9.3 MG/DL (ref 8.6–10.5)
CALCIUM SPEC-SCNC: 9.4 MG/DL (ref 8.6–10.5)
CHLORIDE SERPL-SCNC: 102 MMOL/L (ref 98–107)
CHLORIDE SERPL-SCNC: 102 MMOL/L (ref 98–107)
CHLORIDE SERPL-SCNC: 103 MMOL/L (ref 98–107)
CHLORIDE SERPL-SCNC: 104 MMOL/L (ref 98–107)
CHLORIDE SERPL-SCNC: 105 MMOL/L (ref 98–107)
CHLORIDE SERPL-SCNC: 107 MMOL/L (ref 98–107)
CHOLEST SERPL-MCNC: 116 MG/DL (ref 0–200)
CHOLEST SERPL-MCNC: 138 MG/DL (ref 0–200)
CLARITY UR: CLEAR
CLUMPED PLATELETS: PRESENT
CO2 BLDA-SCNC: 26 MMOL/L (ref 24–29)
CO2 SERPL-SCNC: 18 MMOL/L (ref 22–29)
CO2 SERPL-SCNC: 20 MMOL/L (ref 22–29)
CO2 SERPL-SCNC: 21 MMOL/L (ref 22–29)
CO2 SERPL-SCNC: 21 MMOL/L (ref 22–29)
CO2 SERPL-SCNC: 22 MMOL/L (ref 22–29)
CO2 SERPL-SCNC: 24 MMOL/L (ref 22–29)
COLOR UR: YELLOW
CREAT BLDA-MCNC: 1 MG/DL (ref 0.6–1.3)
CREAT SERPL-MCNC: 1.01 MG/DL (ref 0.57–1)
CREAT SERPL-MCNC: 1.06 MG/DL (ref 0.57–1)
CREAT SERPL-MCNC: 1.07 MG/DL (ref 0.57–1)
CREAT SERPL-MCNC: 1.08 MG/DL (ref 0.57–1)
CREAT SERPL-MCNC: 1.37 MG/DL (ref 0.57–1)
CREAT SERPL-MCNC: 1.39 MG/DL (ref 0.57–1)
DAT POLY-SP REAG RBC QL: NEGATIVE
DEPRECATED RDW RBC AUTO: 48.1 FL (ref 37–54)
DEPRECATED RDW RBC AUTO: 48.4 FL (ref 37–54)
DEPRECATED RDW RBC AUTO: 48.4 FL (ref 37–54)
DEPRECATED RDW RBC AUTO: 49.1 FL (ref 37–54)
DEPRECATED RDW RBC AUTO: 49.5 FL (ref 37–54)
DEPRECATED RDW RBC AUTO: 53.5 FL (ref 37–54)
DEPRECATED RDW RBC AUTO: 62.5 FL (ref 37–54)
ELLIPTOCYTES BLD QL SMEAR: NORMAL
EOSINOPHIL # BLD AUTO: 0.1 10*3/MM3 (ref 0–0.4)
EOSINOPHIL # BLD AUTO: 0.14 10*3/MM3 (ref 0–0.4)
EOSINOPHIL # BLD AUTO: 0.17 10*3/MM3 (ref 0–0.4)
EOSINOPHIL # BLD AUTO: 0.18 10*3/MM3 (ref 0–0.4)
EOSINOPHIL # BLD MANUAL: 0.07 10*3/MM3 (ref 0–0.4)
EOSINOPHIL NFR BLD AUTO: 1.6 % (ref 0.3–6.2)
EOSINOPHIL NFR BLD AUTO: 2.1 % (ref 0.3–6.2)
EOSINOPHIL NFR BLD AUTO: 2.4 % (ref 0.3–6.2)
EOSINOPHIL NFR BLD AUTO: 2.9 % (ref 0.3–6.2)
EOSINOPHIL NFR BLD MANUAL: 1 % (ref 0.3–6.2)
EPO SERPL-ACNC: 70 MIU/ML (ref 2.6–18.5)
ERYTHROCYTE [DISTWIDTH] IN BLOOD BY AUTOMATED COUNT: 19.1 % (ref 12.3–15.4)
ERYTHROCYTE [DISTWIDTH] IN BLOOD BY AUTOMATED COUNT: 19.1 % (ref 12.3–15.4)
ERYTHROCYTE [DISTWIDTH] IN BLOOD BY AUTOMATED COUNT: 19.3 % (ref 12.3–15.4)
ERYTHROCYTE [DISTWIDTH] IN BLOOD BY AUTOMATED COUNT: 19.5 % (ref 12.3–15.4)
ERYTHROCYTE [DISTWIDTH] IN BLOOD BY AUTOMATED COUNT: 24.3 % (ref 12.3–15.4)
FERRITIN SERPL-MCNC: 49.62 NG/ML (ref 13–150)
FLUAV SUBTYP SPEC NAA+PROBE: NOT DETECTED
FLUBV RNA ISLT QL NAA+PROBE: NOT DETECTED
FOLATE SERPL-MCNC: 5.81 NG/ML (ref 4.78–24.2)
FOLATE SERPL-MCNC: 7.48 NG/ML (ref 4.78–24.2)
GFR SERPL CREATININE-BSD FRML MDRD: 45 ML/MIN/1.73
GFR SERPL CREATININE-BSD FRML MDRD: 46 ML/MIN/1.73
GFR SERPL CREATININE-BSD FRML MDRD: 60 ML/MIN/1.73
GFR SERPL CREATININE-BSD FRML MDRD: 61 ML/MIN/1.73
GFR SERPL CREATININE-BSD FRML MDRD: 61 ML/MIN/1.73
GFR SERPL CREATININE-BSD FRML MDRD: 65 ML/MIN/1.73
GLOBULIN UR ELPH-MCNC: 3.5 GM/DL
GLOBULIN UR ELPH-MCNC: 3.7 GM/DL
GLOBULIN UR ELPH-MCNC: 3.7 GM/DL
GLOBULIN UR ELPH-MCNC: 3.8 GM/DL
GLOBULIN UR ELPH-MCNC: 4.1 GM/DL
GLUCOSE BLDC GLUCOMTR-MCNC: 101 MG/DL (ref 70–130)
GLUCOSE BLDC GLUCOMTR-MCNC: 106 MG/DL (ref 70–130)
GLUCOSE BLDC GLUCOMTR-MCNC: 106 MG/DL (ref 70–130)
GLUCOSE BLDC GLUCOMTR-MCNC: 112 MG/DL (ref 70–130)
GLUCOSE BLDC GLUCOMTR-MCNC: 120 MG/DL (ref 70–130)
GLUCOSE BLDC GLUCOMTR-MCNC: 135 MG/DL (ref 70–130)
GLUCOSE BLDC GLUCOMTR-MCNC: 138 MG/DL (ref 70–130)
GLUCOSE BLDC GLUCOMTR-MCNC: 140 MG/DL (ref 70–130)
GLUCOSE BLDC GLUCOMTR-MCNC: 151 MG/DL (ref 70–130)
GLUCOSE BLDC GLUCOMTR-MCNC: 166 MG/DL (ref 70–130)
GLUCOSE BLDC GLUCOMTR-MCNC: 88 MG/DL (ref 70–130)
GLUCOSE SERPL-MCNC: 115 MG/DL (ref 65–99)
GLUCOSE SERPL-MCNC: 127 MG/DL (ref 65–99)
GLUCOSE SERPL-MCNC: 132 MG/DL (ref 65–99)
GLUCOSE SERPL-MCNC: 167 MG/DL (ref 65–99)
GLUCOSE SERPL-MCNC: 90 MG/DL (ref 65–99)
GLUCOSE SERPL-MCNC: 94 MG/DL (ref 65–99)
GLUCOSE UR STRIP-MCNC: NEGATIVE MG/DL
HAPTOGLOB SERPL-MCNC: 69 MG/DL (ref 30–200)
HBA1C MFR BLD: 6.3 % (ref 4.8–5.6)
HBA1C MFR BLD: 6.8 % (ref 4.8–5.6)
HCO3 BLDA-SCNC: 24.4 MMOL/L (ref 22–26)
HCT VFR BLD AUTO: 28.7 % (ref 34–46.6)
HCT VFR BLD AUTO: 28.9 % (ref 34–46.6)
HCT VFR BLD AUTO: 29.3 % (ref 34–46.6)
HCT VFR BLD AUTO: 30.6 % (ref 34–46.6)
HCT VFR BLD AUTO: 31.1 % (ref 34–46.6)
HCT VFR BLD AUTO: 32.4 % (ref 34–46.6)
HCT VFR BLD AUTO: 33.5 % (ref 34–46.6)
HCT VFR BLDA CALC: 25 % (ref 38–51)
HCYS SERPL-MCNC: 19 UMOL/L (ref 0–15)
HDLC SERPL-MCNC: 36 MG/DL (ref 40–60)
HDLC SERPL-MCNC: 41 MG/DL (ref 40–60)
HGB BLD-MCNC: 8.4 G/DL (ref 12–15.9)
HGB BLD-MCNC: 8.6 G/DL (ref 12–15.9)
HGB BLD-MCNC: 8.7 G/DL (ref 12–15.9)
HGB BLD-MCNC: 9 G/DL (ref 12–15.9)
HGB BLD-MCNC: 9 G/DL (ref 12–15.9)
HGB BLD-MCNC: 9.1 G/DL (ref 12–15.9)
HGB BLD-MCNC: 9.4 G/DL (ref 12–15.9)
HGB BLDA-MCNC: 8.5 G/DL (ref 12–17)
HGB UR QL STRIP.AUTO: NEGATIVE
HOLD SPECIMEN: NORMAL
HYALINE CASTS UR QL AUTO: NORMAL /LPF
HYPOCHROMIA BLD QL: NORMAL
IMM GRANULOCYTES # BLD AUTO: 0.01 10*3/MM3 (ref 0–0.05)
IMM GRANULOCYTES # BLD AUTO: 0.02 10*3/MM3 (ref 0–0.05)
IMM GRANULOCYTES # BLD AUTO: 0.03 10*3/MM3 (ref 0–0.05)
IMM GRANULOCYTES # BLD AUTO: 0.04 10*3/MM3 (ref 0–0.05)
IMM GRANULOCYTES NFR BLD AUTO: 0.1 % (ref 0–0.5)
IMM GRANULOCYTES NFR BLD AUTO: 0.4 % (ref 0–0.5)
IMM GRANULOCYTES NFR BLD AUTO: 0.5 % (ref 0–0.5)
IMM GRANULOCYTES NFR BLD AUTO: 0.5 % (ref 0–0.5)
INR PPP: 1 (ref 0.8–1.2)
INR PPP: 1.38 (ref 0.85–1.16)
IRON 24H UR-MRATE: 17 MCG/DL (ref 37–145)
IRON 24H UR-MRATE: 20 MCG/DL (ref 37–145)
IRON 24H UR-MRATE: 333 MCG/DL (ref 37–145)
IRON SATN MFR SERPL: 3 % (ref 20–50)
IRON SATN MFR SERPL: 4 % (ref 20–50)
KETONES UR QL STRIP: NEGATIVE
LDLC SERPL CALC-MCNC: 67 MG/DL (ref 0–100)
LDLC SERPL CALC-MCNC: 84 MG/DL (ref 0–100)
LDLC/HDLC SERPL: 1.88 {RATIO}
LDLC/HDLC SERPL: 2.05 {RATIO}
LEFT ARM BP: NORMAL MMHG
LEFT ATRIUM VOLUME INDEX: 17.8 ML/M^2
LEFT ATRIUM VOLUME: 28 ML
LEUKOCYTE ESTERASE UR QL STRIP.AUTO: ABNORMAL
LV EF 2D ECHO EST: 65 %
LYMPHOCYTES # BLD AUTO: 2.12 10*3/MM3 (ref 0.7–3.1)
LYMPHOCYTES # BLD AUTO: 2.15 10*3/MM3 (ref 0.7–3.1)
LYMPHOCYTES # BLD AUTO: 2.5 10*3/MM3 (ref 0.7–3.1)
LYMPHOCYTES # BLD AUTO: 2.94 10*3/MM3 (ref 0.7–3.1)
LYMPHOCYTES # BLD MANUAL: 2.37 10*3/MM3 (ref 0.7–3.1)
LYMPHOCYTES NFR BLD AUTO: 29.8 % (ref 19.6–45.3)
LYMPHOCYTES NFR BLD AUTO: 34.5 % (ref 19.6–45.3)
LYMPHOCYTES NFR BLD AUTO: 41.2 % (ref 19.6–45.3)
LYMPHOCYTES NFR BLD AUTO: 43.7 % (ref 19.6–45.3)
LYMPHOCYTES NFR BLD MANUAL: 25 % (ref 19.6–45.3)
LYMPHOCYTES NFR BLD MANUAL: 8 % (ref 5–12)
Lab: NORMAL
MAGNESIUM SERPL-MCNC: 2.1 MG/DL (ref 1.6–2.4)
MAXIMAL PREDICTED HEART RATE: 146 BPM
MCH RBC QN AUTO: 20.6 PG (ref 26.6–33)
MCH RBC QN AUTO: 20.8 PG (ref 26.6–33)
MCH RBC QN AUTO: 20.8 PG (ref 26.6–33)
MCH RBC QN AUTO: 20.9 PG (ref 26.6–33)
MCH RBC QN AUTO: 20.9 PG (ref 26.6–33)
MCH RBC QN AUTO: 21 PG (ref 26.6–33)
MCH RBC QN AUTO: 21.9 PG (ref 26.6–33)
MCHC RBC AUTO-ENTMCNC: 27.2 G/DL (ref 31.5–35.7)
MCHC RBC AUTO-ENTMCNC: 28.9 G/DL (ref 31.5–35.7)
MCHC RBC AUTO-ENTMCNC: 29 G/DL (ref 31.5–35.7)
MCHC RBC AUTO-ENTMCNC: 29.1 G/DL (ref 31.5–35.7)
MCHC RBC AUTO-ENTMCNC: 29.4 G/DL (ref 31.5–35.7)
MCHC RBC AUTO-ENTMCNC: 29.7 G/DL (ref 31.5–35.7)
MCHC RBC AUTO-ENTMCNC: 30 G/DL (ref 31.5–35.7)
MCV RBC AUTO: 69.7 FL (ref 79–97)
MCV RBC AUTO: 69.9 FL (ref 79–97)
MCV RBC AUTO: 71.3 FL (ref 79–97)
MCV RBC AUTO: 71.5 FL (ref 79–97)
MCV RBC AUTO: 71.9 FL (ref 79–97)
MCV RBC AUTO: 74.5 FL (ref 79–97)
MCV RBC AUTO: 77.2 FL (ref 79–97)
METHYLMALONATE SERPL-SCNC: 143 NMOL/L (ref 0–378)
MICROCYTES BLD QL: NORMAL
MONOCYTES # BLD AUTO: 0.36 10*3/MM3 (ref 0.1–0.9)
MONOCYTES # BLD AUTO: 0.54 10*3/MM3 (ref 0.1–0.9)
MONOCYTES # BLD AUTO: 0.62 10*3/MM3 (ref 0.1–0.9)
MONOCYTES # BLD AUTO: 0.79 10*3/MM3 (ref 0.1–0.9)
MONOCYTES # BLD AUTO: 0.83 10*3/MM3 (ref 0.1–0.9)
MONOCYTES NFR BLD AUTO: 11.1 % (ref 5–12)
MONOCYTES NFR BLD AUTO: 7.4 % (ref 5–12)
MONOCYTES NFR BLD AUTO: 9.9 % (ref 5–12)
MONOCYTES NFR BLD AUTO: 9.9 % (ref 5–12)
NEUTROPHILS # BLD AUTO: 3.79 10*3/MM3 (ref 1.7–7)
NEUTROPHILS NFR BLD AUTO: 2.17 10*3/MM3 (ref 1.7–7)
NEUTROPHILS NFR BLD AUTO: 3.19 10*3/MM3 (ref 1.7–7)
NEUTROPHILS NFR BLD AUTO: 3.31 10*3/MM3 (ref 1.7–7)
NEUTROPHILS NFR BLD AUTO: 4.81 10*3/MM3 (ref 1.7–7)
NEUTROPHILS NFR BLD AUTO: 44.8 % (ref 42.7–76)
NEUTROPHILS NFR BLD AUTO: 44.8 % (ref 42.7–76)
NEUTROPHILS NFR BLD AUTO: 53 % (ref 42.7–76)
NEUTROPHILS NFR BLD AUTO: 57.2 % (ref 42.7–76)
NEUTROPHILS NFR BLD MANUAL: 56 % (ref 42.7–76)
NITRITE UR QL STRIP: NEGATIVE
NRBC BLD AUTO-RTO: 0 /100 WBC (ref 0–0.2)
OVALOCYTES BLD QL SMEAR: NORMAL
PA ADP PRP-ACNC: 147 PRU
PCO2 BLDA: 37.4 MM HG (ref 35–45)
PH BLDA: 7.42 PH UNITS (ref 7.35–7.6)
PH UR STRIP.AUTO: 8 [PH] (ref 5–8)
PLAT MORPH BLD: NORMAL
PLAT MORPH BLD: NORMAL
PLATELET # BLD AUTO: 482 10*3/MM3 (ref 140–450)
PLATELET # BLD AUTO: 500 10*3/MM3 (ref 140–450)
PLATELET # BLD AUTO: 501 10*3/MM3 (ref 140–450)
PLATELET # BLD AUTO: 504 10*3/MM3 (ref 140–450)
PLATELET # BLD AUTO: 524 10*3/MM3 (ref 140–450)
PLATELET # BLD AUTO: 530 10*3/MM3 (ref 140–450)
PLATELET # BLD AUTO: 587 10*3/MM3 (ref 140–450)
PMV BLD AUTO: 8.7 FL (ref 6–12)
PMV BLD AUTO: 8.9 FL (ref 6–12)
PMV BLD AUTO: 8.9 FL (ref 6–12)
PMV BLD AUTO: 9 FL (ref 6–12)
PMV BLD AUTO: 9.3 FL (ref 6–12)
PO2 BLDA: 34 MMHG (ref 80–105)
POTASSIUM BLDA-SCNC: 4.2 MMOL/L (ref 3.5–4.9)
POTASSIUM SERPL-SCNC: 3.4 MMOL/L (ref 3.5–5.2)
POTASSIUM SERPL-SCNC: 3.6 MMOL/L (ref 3.5–5.2)
POTASSIUM SERPL-SCNC: 3.7 MMOL/L (ref 3.5–5.2)
POTASSIUM SERPL-SCNC: 3.7 MMOL/L (ref 3.5–5.2)
POTASSIUM SERPL-SCNC: 3.8 MMOL/L (ref 3.5–5.2)
POTASSIUM SERPL-SCNC: 3.8 MMOL/L (ref 3.5–5.2)
PROT SERPL-MCNC: 7.6 G/DL (ref 6–8.5)
PROT SERPL-MCNC: 7.8 G/DL (ref 6–8.5)
PROT SERPL-MCNC: 7.9 G/DL (ref 6–8.5)
PROT UR QL STRIP: NEGATIVE
PROTHROMBIN TIME: 12.3 SECONDS (ref 12.8–15.2)
PROTHROMBIN TIME: 16.5 SECONDS (ref 11.4–14.4)
QT INTERVAL: 400 MS
QT INTERVAL: 406 MS
QTC INTERVAL: 471 MS
QTC INTERVAL: 497 MS
RBC # BLD AUTO: 4.02 10*6/MM3 (ref 3.77–5.28)
RBC # BLD AUTO: 4.11 10*6/MM3 (ref 3.77–5.28)
RBC # BLD AUTO: 4.12 10*6/MM3 (ref 3.77–5.28)
RBC # BLD AUTO: 4.19 10*6/MM3 (ref 3.77–5.28)
RBC # BLD AUTO: 4.34 10*6/MM3 (ref 3.77–5.28)
RBC # BLD AUTO: 4.36 10*6/MM3 (ref 3.77–5.28)
RBC # BLD AUTO: 4.53 10*6/MM3 (ref 3.77–5.28)
RBC # UR: NORMAL /HPF
RBC MORPH BLD: NORMAL
REF LAB TEST METHOD: NORMAL
RETICS # AUTO: 0.06 10*6/MM3 (ref 0.02–0.13)
RETICS/RBC NFR AUTO: 1.53 % (ref 0.7–1.9)
RIGHT ARM BP: NORMAL MMHG
RIGHT ARM BP: NORMAL MMHG
SAO2 % BLDA: 67 % (ref 95–98)
SARS-COV-2 RNA PNL SPEC NAA+PROBE: NOT DETECTED
SARS-COV-2 RNA RESP QL NAA+PROBE: NOT DETECTED
SMALL PLATELETS BLD QL SMEAR: NORMAL
SODIUM BLD-SCNC: 144 MMOL/L (ref 138–146)
SODIUM SERPL-SCNC: 134 MMOL/L (ref 136–145)
SODIUM SERPL-SCNC: 136 MMOL/L (ref 136–145)
SODIUM SERPL-SCNC: 137 MMOL/L (ref 136–145)
SODIUM SERPL-SCNC: 139 MMOL/L (ref 136–145)
SP GR UR STRIP: 1.02 (ref 1–1.03)
SQUAMOUS #/AREA URNS HPF: NORMAL /HPF
STRESS TARGET HR: 124 BPM
TIBC SERPL-MCNC: 453 MCG/DL (ref 298–536)
TIBC SERPL-MCNC: 522 MCG/DL (ref 298–536)
TRANSFERRIN SERPL-MCNC: 304 MG/DL (ref 200–360)
TRANSFERRIN SERPL-MCNC: 350 MG/DL (ref 200–360)
TRIGL SERPL-MCNC: 61 MG/DL (ref 0–150)
TRIGL SERPL-MCNC: 65 MG/DL (ref 0–150)
TROPONIN T SERPL-MCNC: <0.01 NG/ML (ref 0–0.03)
TROPONIN T SERPL-MCNC: <0.01 NG/ML (ref 0–0.03)
UFH PPP CHRO-ACNC: >1.1 IU/ML (ref 0.3–0.7)
UROBILINOGEN UR QL STRIP: ABNORMAL
VARIANT LYMPHS NFR BLD MANUAL: 10 % (ref 0–5)
VIT B12 BLD-MCNC: 659 PG/ML (ref 211–946)
VIT B12 BLD-MCNC: 676 PG/ML (ref 211–946)
VLDLC SERPL-MCNC: 13 MG/DL (ref 5–40)
VLDLC SERPL-MCNC: 13 MG/DL (ref 5–40)
WBC # BLD AUTO: 4.85 10*3/MM3 (ref 3.4–10.8)
WBC # BLD AUTO: 6.24 10*3/MM3 (ref 3.4–10.8)
WBC # BLD AUTO: 6.77 10*3/MM3 (ref 3.4–10.8)
WBC # BLD AUTO: 7.13 10*3/MM3 (ref 3.4–10.8)
WBC # BLD AUTO: 7.23 10*3/MM3 (ref 3.4–10.8)
WBC # BLD AUTO: 7.53 10*3/MM3 (ref 3.4–10.8)
WBC # BLD AUTO: 8.4 10*3/MM3 (ref 3.4–10.8)
WBC MORPH BLD: NORMAL
WBC UR QL AUTO: NORMAL /HPF
WHOLE BLOOD HOLD SPECIMEN: NORMAL

## 2021-01-01 PROCEDURE — 70496 CT ANGIOGRAPHY HEAD: CPT

## 2021-01-01 PROCEDURE — 93306 TTE W/DOPPLER COMPLETE: CPT

## 2021-01-01 PROCEDURE — 80053 COMPREHEN METABOLIC PANEL: CPT | Performed by: HOSPITALIST

## 2021-01-01 PROCEDURE — 82947 ASSAY GLUCOSE BLOOD QUANT: CPT

## 2021-01-01 PROCEDURE — 25010000002 NA FERRIC GLUC CPLX PER 12.5 MG: Performed by: INTERNAL MEDICINE

## 2021-01-01 PROCEDURE — G0378 HOSPITAL OBSERVATION PER HR: HCPCS

## 2021-01-01 PROCEDURE — 77066 DX MAMMO INCL CAD BI: CPT

## 2021-01-01 PROCEDURE — 71045 X-RAY EXAM CHEST 1 VIEW: CPT

## 2021-01-01 PROCEDURE — 77066 DX MAMMO INCL CAD BI: CPT | Performed by: RADIOLOGY

## 2021-01-01 PROCEDURE — 85730 THROMBOPLASTIN TIME PARTIAL: CPT | Performed by: EMERGENCY MEDICINE

## 2021-01-01 PROCEDURE — 84460 ALANINE AMINO (ALT) (SGPT): CPT | Performed by: EMERGENCY MEDICINE

## 2021-01-01 PROCEDURE — 99239 HOSP IP/OBS DSCHRG MGMT >30: CPT | Performed by: INTERNAL MEDICINE

## 2021-01-01 PROCEDURE — 70498 CT ANGIOGRAPHY NECK: CPT

## 2021-01-01 PROCEDURE — 83735 ASSAY OF MAGNESIUM: CPT | Performed by: INTERNAL MEDICINE

## 2021-01-01 PROCEDURE — 70450 CT HEAD/BRAIN W/O DYE: CPT

## 2021-01-01 PROCEDURE — 93005 ELECTROCARDIOGRAM TRACING: CPT | Performed by: EMERGENCY MEDICINE

## 2021-01-01 PROCEDURE — 99232 SBSQ HOSP IP/OBS MODERATE 35: CPT | Performed by: PSYCHIATRY & NEUROLOGY

## 2021-01-01 PROCEDURE — 80061 LIPID PANEL: CPT | Performed by: NURSE PRACTITIONER

## 2021-01-01 PROCEDURE — 36415 COLL VENOUS BLD VENIPUNCTURE: CPT

## 2021-01-01 PROCEDURE — 80053 COMPREHEN METABOLIC PANEL: CPT | Performed by: NURSE PRACTITIONER

## 2021-01-01 PROCEDURE — 85576 BLOOD PLATELET AGGREGATION: CPT | Performed by: PSYCHIATRY & NEUROLOGY

## 2021-01-01 PROCEDURE — 99284 EMERGENCY DEPT VISIT MOD MDM: CPT

## 2021-01-01 PROCEDURE — 99214 OFFICE O/P EST MOD 30 MIN: CPT | Performed by: NEUROLOGICAL SURGERY

## 2021-01-01 PROCEDURE — 85025 COMPLETE CBC W/AUTO DIFF WBC: CPT | Performed by: EMERGENCY MEDICINE

## 2021-01-01 PROCEDURE — 97161 PT EVAL LOW COMPLEX 20 MIN: CPT

## 2021-01-01 PROCEDURE — 93306 TTE W/DOPPLER COMPLETE: CPT | Performed by: INTERNAL MEDICINE

## 2021-01-01 PROCEDURE — 85730 THROMBOPLASTIN TIME PARTIAL: CPT

## 2021-01-01 PROCEDURE — 97162 PT EVAL MOD COMPLEX 30 MIN: CPT

## 2021-01-01 PROCEDURE — 25010000002 HEPARIN (PORCINE) 25000-0.45 UT/250ML-% SOLUTION: Performed by: CLINICAL NURSE SPECIALIST

## 2021-01-01 PROCEDURE — 82330 ASSAY OF CALCIUM: CPT

## 2021-01-01 PROCEDURE — 82962 GLUCOSE BLOOD TEST: CPT

## 2021-01-01 PROCEDURE — 80053 COMPREHEN METABOLIC PANEL: CPT | Performed by: INTERNAL MEDICINE

## 2021-01-01 PROCEDURE — 82668 ASSAY OF ERYTHROPOIETIN: CPT

## 2021-01-01 PROCEDURE — 99221 1ST HOSP IP/OBS SF/LOW 40: CPT | Performed by: NEUROLOGICAL SURGERY

## 2021-01-01 PROCEDURE — 85007 BL SMEAR W/DIFF WBC COUNT: CPT | Performed by: EMERGENCY MEDICINE

## 2021-01-01 PROCEDURE — 93880 EXTRACRANIAL BILAT STUDY: CPT | Performed by: INTERNAL MEDICINE

## 2021-01-01 PROCEDURE — 97166 OT EVAL MOD COMPLEX 45 MIN: CPT

## 2021-01-01 PROCEDURE — 82746 ASSAY OF FOLIC ACID SERUM: CPT | Performed by: NURSE PRACTITIONER

## 2021-01-01 PROCEDURE — 99233 SBSQ HOSP IP/OBS HIGH 50: CPT | Performed by: HOSPITALIST

## 2021-01-01 PROCEDURE — 83090 ASSAY OF HOMOCYSTEINE: CPT

## 2021-01-01 PROCEDURE — 83921 ORGANIC ACID SINGLE QUANT: CPT

## 2021-01-01 PROCEDURE — 82803 BLOOD GASES ANY COMBINATION: CPT

## 2021-01-01 PROCEDURE — 99214 OFFICE O/P EST MOD 30 MIN: CPT | Performed by: INTERNAL MEDICINE

## 2021-01-01 PROCEDURE — 83036 HEMOGLOBIN GLYCOSYLATED A1C: CPT | Performed by: NURSE PRACTITIONER

## 2021-01-01 PROCEDURE — 85014 HEMATOCRIT: CPT

## 2021-01-01 PROCEDURE — 84484 ASSAY OF TROPONIN QUANT: CPT | Performed by: EMERGENCY MEDICINE

## 2021-01-01 PROCEDURE — 80053 COMPREHEN METABOLIC PANEL: CPT

## 2021-01-01 PROCEDURE — G0279 TOMOSYNTHESIS, MAMMO: HCPCS

## 2021-01-01 PROCEDURE — 99222 1ST HOSP IP/OBS MODERATE 55: CPT | Performed by: CLINICAL NURSE SPECIALIST

## 2021-01-01 PROCEDURE — 83540 ASSAY OF IRON: CPT | Performed by: NURSE PRACTITIONER

## 2021-01-01 PROCEDURE — 85027 COMPLETE CBC AUTOMATED: CPT | Performed by: NURSE PRACTITIONER

## 2021-01-01 PROCEDURE — 92523 SPEECH SOUND LANG COMPREHEN: CPT | Performed by: SPEECH-LANGUAGE PATHOLOGIST

## 2021-01-01 PROCEDURE — 95816 EEG AWAKE AND DROWSY: CPT

## 2021-01-01 PROCEDURE — 92523 SPEECH SOUND LANG COMPREHEN: CPT

## 2021-01-01 PROCEDURE — 63710000001 INSULIN LISPRO (HUMAN) PER 5 UNITS: Performed by: NURSE PRACTITIONER

## 2021-01-01 PROCEDURE — 97530 THERAPEUTIC ACTIVITIES: CPT

## 2021-01-01 PROCEDURE — 85007 BL SMEAR W/DIFF WBC COUNT: CPT

## 2021-01-01 PROCEDURE — 82248 BILIRUBIN DIRECT: CPT

## 2021-01-01 PROCEDURE — 99231 SBSQ HOSP IP/OBS SF/LOW 25: CPT | Performed by: PHYSICIAN ASSISTANT

## 2021-01-01 PROCEDURE — 93880 EXTRACRANIAL BILAT STUDY: CPT

## 2021-01-01 PROCEDURE — 80048 BASIC METABOLIC PNL TOTAL CA: CPT | Performed by: NURSE PRACTITIONER

## 2021-01-01 PROCEDURE — 83540 ASSAY OF IRON: CPT

## 2021-01-01 PROCEDURE — 0 IOPAMIDOL PER 1 ML: Performed by: EMERGENCY MEDICINE

## 2021-01-01 PROCEDURE — 70547 MR ANGIOGRAPHY NECK W/O DYE: CPT

## 2021-01-01 PROCEDURE — 85045 AUTOMATED RETICULOCYTE COUNT: CPT

## 2021-01-01 PROCEDURE — 82607 VITAMIN B-12: CPT

## 2021-01-01 PROCEDURE — G0279 TOMOSYNTHESIS, MAMMO: HCPCS | Performed by: RADIOLOGY

## 2021-01-01 PROCEDURE — 84295 ASSAY OF SERUM SODIUM: CPT

## 2021-01-01 PROCEDURE — 86880 COOMBS TEST DIRECT: CPT

## 2021-01-01 PROCEDURE — 97116 GAIT TRAINING THERAPY: CPT

## 2021-01-01 PROCEDURE — 85610 PROTHROMBIN TIME: CPT

## 2021-01-01 PROCEDURE — 85610 PROTHROMBIN TIME: CPT | Performed by: CLINICAL NURSE SPECIALIST

## 2021-01-01 PROCEDURE — 83540 ASSAY OF IRON: CPT | Performed by: HOSPITALIST

## 2021-01-01 PROCEDURE — 99285 EMERGENCY DEPT VISIT HI MDM: CPT

## 2021-01-01 PROCEDURE — 99223 1ST HOSP IP/OBS HIGH 75: CPT | Performed by: INTERNAL MEDICINE

## 2021-01-01 PROCEDURE — 99215 OFFICE O/P EST HI 40 MIN: CPT | Performed by: PSYCHIATRY & NEUROLOGY

## 2021-01-01 PROCEDURE — 85027 COMPLETE CBC AUTOMATED: CPT | Performed by: HOSPITALIST

## 2021-01-01 PROCEDURE — 82728 ASSAY OF FERRITIN: CPT

## 2021-01-01 PROCEDURE — 81001 URINALYSIS AUTO W/SCOPE: CPT | Performed by: EMERGENCY MEDICINE

## 2021-01-01 PROCEDURE — 93660 TILT TABLE EVALUATION: CPT | Performed by: INTERNAL MEDICINE

## 2021-01-01 PROCEDURE — 99205 OFFICE O/P NEW HI 60 MIN: CPT | Performed by: INTERNAL MEDICINE

## 2021-01-01 PROCEDURE — 84466 ASSAY OF TRANSFERRIN: CPT

## 2021-01-01 PROCEDURE — 83010 ASSAY OF HAPTOGLOBIN QUANT: CPT

## 2021-01-01 PROCEDURE — 85520 HEPARIN ASSAY: CPT | Performed by: CLINICAL NURSE SPECIALIST

## 2021-01-01 PROCEDURE — 70551 MRI BRAIN STEM W/O DYE: CPT

## 2021-01-01 PROCEDURE — 82746 ASSAY OF FOLIC ACID SERUM: CPT

## 2021-01-01 PROCEDURE — 84132 ASSAY OF SERUM POTASSIUM: CPT

## 2021-01-01 PROCEDURE — U0003 INFECTIOUS AGENT DETECTION BY NUCLEIC ACID (DNA OR RNA); SEVERE ACUTE RESPIRATORY SYNDROME CORONAVIRUS 2 (SARS-COV-2) (CORONAVIRUS DISEASE [COVID-19]), AMPLIFIED PROBE TECHNIQUE, MAKING USE OF HIGH THROUGHPUT TECHNOLOGIES AS DESCRIBED BY CMS-2020-01-R: HCPCS | Performed by: EMERGENCY MEDICINE

## 2021-01-01 PROCEDURE — 84466 ASSAY OF TRANSFERRIN: CPT | Performed by: NURSE PRACTITIONER

## 2021-01-01 PROCEDURE — 85025 COMPLETE CBC W/AUTO DIFF WBC: CPT

## 2021-01-01 PROCEDURE — 99215 OFFICE O/P EST HI 40 MIN: CPT | Performed by: NEUROLOGICAL SURGERY

## 2021-01-01 PROCEDURE — 99239 HOSP IP/OBS DSCHRG MGMT >30: CPT | Performed by: HOSPITALIST

## 2021-01-01 PROCEDURE — 70544 MR ANGIOGRAPHY HEAD W/O DYE: CPT

## 2021-01-01 PROCEDURE — 93660 TILT TABLE EVALUATION: CPT

## 2021-01-01 PROCEDURE — 25010000002 HEPARIN (PORCINE) 25000-0.45 UT/250ML-% SOLUTION

## 2021-01-01 PROCEDURE — 85025 COMPLETE CBC W/AUTO DIFF WBC: CPT | Performed by: INTERNAL MEDICINE

## 2021-01-01 PROCEDURE — 87636 SARSCOV2 & INF A&B AMP PRB: CPT | Performed by: INTERNAL MEDICINE

## 2021-01-01 PROCEDURE — 84450 TRANSFERASE (AST) (SGOT): CPT | Performed by: EMERGENCY MEDICINE

## 2021-01-01 PROCEDURE — 83036 HEMOGLOBIN GLYCOSYLATED A1C: CPT | Performed by: CLINICAL NURSE SPECIALIST

## 2021-01-01 PROCEDURE — 85025 COMPLETE CBC W/AUTO DIFF WBC: CPT | Performed by: NURSE PRACTITIONER

## 2021-01-01 PROCEDURE — 82607 VITAMIN B-12: CPT | Performed by: NURSE PRACTITIONER

## 2021-01-01 PROCEDURE — 82565 ASSAY OF CREATININE: CPT

## 2021-01-01 PROCEDURE — 80061 LIPID PANEL: CPT | Performed by: CLINICAL NURSE SPECIALIST

## 2021-01-01 PROCEDURE — 97165 OT EVAL LOW COMPLEX 30 MIN: CPT

## 2021-01-01 PROCEDURE — 99213 OFFICE O/P EST LOW 20 MIN: CPT | Performed by: THORACIC SURGERY (CARDIOTHORACIC VASCULAR SURGERY)

## 2021-01-01 RX ORDER — CLOPIDOGREL BISULFATE 75 MG/1
75 TABLET ORAL DAILY
Status: DISCONTINUED | OUTPATIENT
Start: 2021-01-01 | End: 2021-01-01 | Stop reason: HOSPADM

## 2021-01-01 RX ORDER — BISACODYL 10 MG
10 SUPPOSITORY, RECTAL RECTAL DAILY PRN
Status: DISCONTINUED | OUTPATIENT
Start: 2021-01-01 | End: 2021-01-01 | Stop reason: HOSPADM

## 2021-01-01 RX ORDER — SODIUM CHLORIDE 9 MG/ML
75 INJECTION, SOLUTION INTRAVENOUS CONTINUOUS
Status: ACTIVE | OUTPATIENT
Start: 2021-01-01 | End: 2021-01-01

## 2021-01-01 RX ORDER — LEVETIRACETAM 750 MG/1
750 TABLET ORAL 2 TIMES DAILY
Qty: 60 TABLET | Refills: 5 | Status: SHIPPED | OUTPATIENT
Start: 2021-01-01 | End: 2022-01-01 | Stop reason: DRUGHIGH

## 2021-01-01 RX ORDER — LOSARTAN POTASSIUM 50 MG/1
100 TABLET ORAL
Status: DISCONTINUED | OUTPATIENT
Start: 2021-01-01 | End: 2021-01-01 | Stop reason: HOSPADM

## 2021-01-01 RX ORDER — SODIUM CHLORIDE 0.9 % (FLUSH) 0.9 %
10 SYRINGE (ML) INJECTION EVERY 12 HOURS SCHEDULED
Status: DISCONTINUED | OUTPATIENT
Start: 2021-01-01 | End: 2021-01-01 | Stop reason: SDUPTHER

## 2021-01-01 RX ORDER — LEVETIRACETAM 500 MG/1
500 TABLET ORAL 2 TIMES DAILY
Status: DISCONTINUED | OUTPATIENT
Start: 2021-01-01 | End: 2021-01-01 | Stop reason: HOSPADM

## 2021-01-01 RX ORDER — ATORVASTATIN CALCIUM 40 MG/1
80 TABLET, FILM COATED ORAL NIGHTLY
Status: DISCONTINUED | OUTPATIENT
Start: 2021-01-01 | End: 2021-01-01 | Stop reason: HOSPADM

## 2021-01-01 RX ORDER — AMLODIPINE BESYLATE 5 MG/1
5 TABLET ORAL
Status: DISCONTINUED | OUTPATIENT
Start: 2021-01-01 | End: 2021-01-01 | Stop reason: HOSPADM

## 2021-01-01 RX ORDER — BISACODYL 5 MG/1
5 TABLET, DELAYED RELEASE ORAL DAILY PRN
Status: DISCONTINUED | OUTPATIENT
Start: 2021-01-01 | End: 2021-01-01 | Stop reason: HOSPADM

## 2021-01-01 RX ORDER — ASPIRIN 81 MG/1
81 TABLET, CHEWABLE ORAL DAILY
Status: DISCONTINUED | OUTPATIENT
Start: 2021-01-01 | End: 2021-01-01

## 2021-01-01 RX ORDER — SODIUM CHLORIDE 0.9 % (FLUSH) 0.9 %
10 SYRINGE (ML) INJECTION EVERY 12 HOURS SCHEDULED
Status: DISCONTINUED | OUTPATIENT
Start: 2021-01-01 | End: 2021-01-01 | Stop reason: HOSPADM

## 2021-01-01 RX ORDER — HEPARIN SODIUM 10000 [USP'U]/100ML
9 INJECTION, SOLUTION INTRAVENOUS
Status: DISCONTINUED | OUTPATIENT
Start: 2021-01-01 | End: 2021-01-01

## 2021-01-01 RX ORDER — ACETAMINOPHEN 325 MG/1
650 TABLET ORAL EVERY 4 HOURS PRN
Status: DISCONTINUED | OUTPATIENT
Start: 2021-01-01 | End: 2021-01-01 | Stop reason: HOSPADM

## 2021-01-01 RX ORDER — SODIUM CHLORIDE 0.9 % (FLUSH) 0.9 %
10 SYRINGE (ML) INJECTION AS NEEDED
Status: DISCONTINUED | OUTPATIENT
Start: 2021-01-01 | End: 2021-01-01

## 2021-01-01 RX ORDER — DEXTROSE MONOHYDRATE 25 G/50ML
25 INJECTION, SOLUTION INTRAVENOUS
Status: DISCONTINUED | OUTPATIENT
Start: 2021-01-01 | End: 2021-01-01 | Stop reason: HOSPADM

## 2021-01-01 RX ORDER — AMLODIPINE BESYLATE 10 MG/1
10 TABLET ORAL DAILY
COMMUNITY
Start: 2021-01-01

## 2021-01-01 RX ORDER — ONDANSETRON 2 MG/ML
4 INJECTION INTRAMUSCULAR; INTRAVENOUS EVERY 6 HOURS PRN
Status: DISCONTINUED | OUTPATIENT
Start: 2021-01-01 | End: 2021-01-01 | Stop reason: HOSPADM

## 2021-01-01 RX ORDER — SODIUM CHLORIDE 0.9 % (FLUSH) 0.9 %
10 SYRINGE (ML) INJECTION AS NEEDED
Status: DISCONTINUED | OUTPATIENT
Start: 2021-01-01 | End: 2021-01-01 | Stop reason: SDUPTHER

## 2021-01-01 RX ORDER — NICOTINE POLACRILEX 4 MG
15 LOZENGE BUCCAL
Status: DISCONTINUED | OUTPATIENT
Start: 2021-01-01 | End: 2021-01-01 | Stop reason: HOSPADM

## 2021-01-01 RX ORDER — ONDANSETRON 4 MG/1
4 TABLET, FILM COATED ORAL EVERY 6 HOURS PRN
Status: DISCONTINUED | OUTPATIENT
Start: 2021-01-01 | End: 2021-01-01 | Stop reason: HOSPADM

## 2021-01-01 RX ORDER — ACETAMINOPHEN 160 MG/5ML
650 SOLUTION ORAL EVERY 4 HOURS PRN
Status: DISCONTINUED | OUTPATIENT
Start: 2021-01-01 | End: 2021-01-01 | Stop reason: HOSPADM

## 2021-01-01 RX ORDER — FERROUS SULFATE 325(65) MG
TABLET ORAL
Qty: 30 TABLET | Refills: 11 | Status: SHIPPED | OUTPATIENT
Start: 2021-01-01 | End: 2022-01-01

## 2021-01-01 RX ORDER — HYDROXYZINE PAMOATE 25 MG/1
25 CAPSULE ORAL NIGHTLY
COMMUNITY
Start: 2021-01-01

## 2021-01-01 RX ORDER — SODIUM CHLORIDE 0.9 % (FLUSH) 0.9 %
10 SYRINGE (ML) INJECTION AS NEEDED
Status: DISCONTINUED | OUTPATIENT
Start: 2021-01-01 | End: 2021-01-01 | Stop reason: HOSPADM

## 2021-01-01 RX ORDER — LOSARTAN POTASSIUM 100 MG/1
100 TABLET ORAL DAILY
COMMUNITY

## 2021-01-01 RX ORDER — HEPARIN SODIUM 10000 [USP'U]/100ML
12 INJECTION, SOLUTION INTRAVENOUS
Status: DISCONTINUED | OUTPATIENT
Start: 2021-01-01 | End: 2021-01-01

## 2021-01-01 RX ORDER — POLYETHYLENE GLYCOL 3350 17 G/17G
17 POWDER, FOR SOLUTION ORAL DAILY PRN
Status: DISCONTINUED | OUTPATIENT
Start: 2021-01-01 | End: 2021-01-01 | Stop reason: HOSPADM

## 2021-01-01 RX ORDER — AMOXICILLIN 250 MG
2 CAPSULE ORAL 2 TIMES DAILY
Status: DISCONTINUED | OUTPATIENT
Start: 2021-01-01 | End: 2021-01-01 | Stop reason: HOSPADM

## 2021-01-01 RX ORDER — SODIUM CHLORIDE 9 MG/ML
100 INJECTION, SOLUTION INTRAVENOUS CONTINUOUS
Status: DISCONTINUED | OUTPATIENT
Start: 2021-01-01 | End: 2021-01-01

## 2021-01-01 RX ORDER — ACETAMINOPHEN 650 MG/1
650 SUPPOSITORY RECTAL EVERY 4 HOURS PRN
Status: DISCONTINUED | OUTPATIENT
Start: 2021-01-01 | End: 2021-01-01 | Stop reason: HOSPADM

## 2021-01-01 RX ADMIN — SODIUM CHLORIDE, PRESERVATIVE FREE 10 ML: 5 INJECTION INTRAVENOUS at 08:57

## 2021-01-01 RX ADMIN — SODIUM CHLORIDE, PRESERVATIVE FREE 10 ML: 5 INJECTION INTRAVENOUS at 09:00

## 2021-01-01 RX ADMIN — INSULIN LISPRO 2 UNITS: 100 INJECTION, SOLUTION INTRAVENOUS; SUBCUTANEOUS at 12:21

## 2021-01-01 RX ADMIN — CLOPIDOGREL BISULFATE 75 MG: 75 TABLET ORAL at 09:00

## 2021-01-01 RX ADMIN — SODIUM CHLORIDE 75 ML/HR: 9 INJECTION, SOLUTION INTRAVENOUS at 23:20

## 2021-01-01 RX ADMIN — LEVETIRACETAM 500 MG: 500 TABLET, FILM COATED ORAL at 22:32

## 2021-01-01 RX ADMIN — LEVETIRACETAM 500 MG: 500 TABLET, FILM COATED ORAL at 09:00

## 2021-01-01 RX ADMIN — SODIUM CHLORIDE, PRESERVATIVE FREE 10 ML: 5 INJECTION INTRAVENOUS at 08:16

## 2021-01-01 RX ADMIN — APIXABAN 2.5 MG: 2.5 TABLET, FILM COATED ORAL at 08:12

## 2021-01-01 RX ADMIN — ATORVASTATIN CALCIUM 80 MG: 40 TABLET, FILM COATED ORAL at 20:01

## 2021-01-01 RX ADMIN — SODIUM CHLORIDE, PRESERVATIVE FREE 10 ML: 5 INJECTION INTRAVENOUS at 20:01

## 2021-01-01 RX ADMIN — CLOPIDOGREL BISULFATE 75 MG: 75 TABLET ORAL at 16:44

## 2021-01-01 RX ADMIN — DOCUSATE SODIUM 50MG AND SENNOSIDES 8.6MG 2 TABLET: 8.6; 5 TABLET, FILM COATED ORAL at 20:01

## 2021-01-01 RX ADMIN — HEPARIN SODIUM 12 UNITS/KG/HR: 10000 INJECTION, SOLUTION INTRAVENOUS at 18:03

## 2021-01-01 RX ADMIN — SODIUM CHLORIDE, PRESERVATIVE FREE 10 ML: 5 INJECTION INTRAVENOUS at 20:13

## 2021-01-01 RX ADMIN — ASPIRIN 81 MG CHEWABLE TABLET 81 MG: 81 TABLET CHEWABLE at 08:11

## 2021-01-01 RX ADMIN — SODIUM CHLORIDE, PRESERVATIVE FREE 10 ML: 5 INJECTION INTRAVENOUS at 05:45

## 2021-01-01 RX ADMIN — LEVETIRACETAM 500 MG: 500 TABLET, FILM COATED ORAL at 08:57

## 2021-01-01 RX ADMIN — CLOPIDOGREL BISULFATE 75 MG: 75 TABLET ORAL at 08:11

## 2021-01-01 RX ADMIN — IOPAMIDOL 95 ML: 755 INJECTION, SOLUTION INTRAVENOUS at 15:14

## 2021-01-01 RX ADMIN — LEVETIRACETAM 500 MG: 500 TABLET, FILM COATED ORAL at 20:13

## 2021-01-01 RX ADMIN — SODIUM CHLORIDE 125 MG: 9 INJECTION, SOLUTION INTRAVENOUS at 09:40

## 2021-01-01 RX ADMIN — INSULIN LISPRO 2 UNITS: 100 INJECTION, SOLUTION INTRAVENOUS; SUBCUTANEOUS at 12:28

## 2021-01-01 RX ADMIN — DOCUSATE SODIUM 50MG AND SENNOSIDES 8.6MG 2 TABLET: 8.6; 5 TABLET, FILM COATED ORAL at 20:13

## 2021-01-01 RX ADMIN — APIXABAN 2.5 MG: 2.5 TABLET, FILM COATED ORAL at 09:00

## 2021-01-01 RX ADMIN — DOCUSATE SODIUM 50MG AND SENNOSIDES 8.6MG 2 TABLET: 8.6; 5 TABLET, FILM COATED ORAL at 09:00

## 2021-01-01 RX ADMIN — LEVETIRACETAM 500 MG: 500 TABLET, FILM COATED ORAL at 20:01

## 2021-01-01 RX ADMIN — HEPARIN SODIUM 9 UNITS/KG/HR: 10000 INJECTION, SOLUTION INTRAVENOUS at 10:57

## 2021-01-01 RX ADMIN — HEPARIN SODIUM 12 UNITS/KG/HR: 10000 INJECTION, SOLUTION INTRAVENOUS at 01:05

## 2021-01-01 RX ADMIN — SODIUM CHLORIDE 100 ML/HR: 9 INJECTION, SOLUTION INTRAVENOUS at 16:40

## 2021-01-01 RX ADMIN — DOCUSATE SODIUM 50MG AND SENNOSIDES 8.6MG 2 TABLET: 8.6; 5 TABLET, FILM COATED ORAL at 08:57

## 2021-01-01 RX ADMIN — SODIUM CHLORIDE 125 MG: 9 INJECTION, SOLUTION INTRAVENOUS at 00:58

## 2021-01-01 RX ADMIN — LEVETIRACETAM 500 MG: 500 TABLET, FILM COATED ORAL at 08:11

## 2021-01-01 RX ADMIN — ATORVASTATIN CALCIUM 80 MG: 40 TABLET, FILM COATED ORAL at 20:13

## 2021-01-01 RX ADMIN — ATORVASTATIN CALCIUM 80 MG: 40 TABLET, FILM COATED ORAL at 22:31

## 2021-01-01 RX ADMIN — APIXABAN 2.5 MG: 2.5 TABLET, FILM COATED ORAL at 20:01

## 2021-01-01 RX ADMIN — APIXABAN 2.5 MG: 2.5 TABLET, FILM COATED ORAL at 22:32

## 2021-01-01 RX ADMIN — SODIUM CHLORIDE 100 ML/HR: 9 INJECTION, SOLUTION INTRAVENOUS at 03:25

## 2021-01-14 ENCOUNTER — APPOINTMENT (OUTPATIENT)
Dept: NEUROLOGY | Facility: HOSPITAL | Age: 76
End: 2021-01-14

## 2021-01-14 ENCOUNTER — APPOINTMENT (OUTPATIENT)
Dept: GENERAL RADIOLOGY | Facility: HOSPITAL | Age: 76
End: 2021-01-14

## 2021-01-14 ENCOUNTER — HOSPITAL ENCOUNTER (INPATIENT)
Facility: HOSPITAL | Age: 76
LOS: 4 days | Discharge: HOME HEALTH | End: 2021-01-18
Attending: EMERGENCY MEDICINE | Admitting: INTERNAL MEDICINE

## 2021-01-14 ENCOUNTER — APPOINTMENT (OUTPATIENT)
Dept: CT IMAGING | Facility: HOSPITAL | Age: 76
End: 2021-01-14

## 2021-01-14 ENCOUNTER — APPOINTMENT (OUTPATIENT)
Dept: MRI IMAGING | Facility: HOSPITAL | Age: 76
End: 2021-01-14

## 2021-01-14 DIAGNOSIS — I67.4 HYPERTENSIVE ENCEPHALOPATHY: Primary | ICD-10-CM

## 2021-01-14 DIAGNOSIS — R41.841 COGNITIVE COMMUNICATION DEFICIT: ICD-10-CM

## 2021-01-14 DIAGNOSIS — R56.9 SEIZURE-LIKE ACTIVITY (HCC): ICD-10-CM

## 2021-01-14 DIAGNOSIS — E87.6 HYPOKALEMIA: ICD-10-CM

## 2021-01-14 DIAGNOSIS — R13.10 DYSPHAGIA, UNSPECIFIED TYPE: ICD-10-CM

## 2021-01-14 PROBLEM — R41.82 ALTERED MENTAL STATUS: Status: ACTIVE | Noted: 2021-01-14

## 2021-01-14 LAB
ALBUMIN SERPL-MCNC: 4.4 G/DL (ref 3.5–5.2)
ALBUMIN/GLOB SERPL: 1 G/DL
ALP SERPL-CCNC: 136 U/L (ref 39–117)
ALT SERPL W P-5'-P-CCNC: 36 U/L (ref 1–33)
AMPHET+METHAMPHET UR QL: NEGATIVE
AMPHETAMINES UR QL: NEGATIVE
ANION GAP SERPL CALCULATED.3IONS-SCNC: 11 MMOL/L (ref 5–15)
APTT PPP: 25.9 SECONDS (ref 24–37)
ARTERIAL PATENCY WRIST A: ABNORMAL
AST SERPL-CCNC: 28 U/L (ref 1–32)
ATMOSPHERIC PRESS: ABNORMAL MM[HG]
BARBITURATES UR QL SCN: NEGATIVE
BASE EXCESS BLDA CALC-SCNC: -0.2 MMOL/L (ref 0–2)
BASOPHILS # BLD AUTO: 0.07 10*3/MM3 (ref 0–0.2)
BASOPHILS NFR BLD AUTO: 0.6 % (ref 0–1.5)
BDY SITE: ABNORMAL
BENZODIAZ UR QL SCN: NEGATIVE
BILIRUB SERPL-MCNC: 0.2 MG/DL (ref 0–1.2)
BILIRUB UR QL STRIP: NEGATIVE
BODY TEMPERATURE: 37 C
BUN SERPL-MCNC: 13 MG/DL (ref 8–23)
BUN/CREAT SERPL: 9.4 (ref 7–25)
BUPRENORPHINE SERPL-MCNC: NEGATIVE NG/ML
CA-I SERPL ISE-MCNC: 1.36 MMOL/L (ref 1.12–1.32)
CALCIUM SPEC-SCNC: 9.1 MG/DL (ref 8.6–10.5)
CANNABINOIDS SERPL QL: NEGATIVE
CHLORIDE SERPL-SCNC: 99 MMOL/L (ref 98–107)
CK SERPL-CCNC: 134 U/L (ref 20–180)
CLARITY UR: CLEAR
CO2 BLDA-SCNC: 30.8 MMOL/L (ref 22–33)
CO2 SERPL-SCNC: 27 MMOL/L (ref 22–29)
COCAINE UR QL: NEGATIVE
COHGB MFR BLD: 0.5 % (ref 0–2)
COLOR UR: YELLOW
CREAT BLDA-MCNC: 1.6 MG/DL (ref 0.6–1.3)
CREAT SERPL-MCNC: 1.39 MG/DL (ref 0.57–1)
D-LACTATE SERPL-SCNC: 2 MMOL/L (ref 0.5–2)
DEPRECATED RDW RBC AUTO: 45.9 FL (ref 37–54)
EOSINOPHIL # BLD AUTO: 0.22 10*3/MM3 (ref 0–0.4)
EOSINOPHIL NFR BLD AUTO: 2 % (ref 0.3–6.2)
EPAP: 0
ERYTHROCYTE [DISTWIDTH] IN BLOOD BY AUTOMATED COUNT: 13.5 % (ref 12.3–15.4)
FLUAV RNA RESP QL NAA+PROBE: NOT DETECTED
FLUBV RNA RESP QL NAA+PROBE: NOT DETECTED
GFR SERPL CREATININE-BSD FRML MDRD: 45 ML/MIN/1.73
GLOBULIN UR ELPH-MCNC: 4.3 GM/DL
GLUCOSE SERPL-MCNC: 271 MG/DL (ref 65–99)
GLUCOSE UR STRIP-MCNC: ABNORMAL MG/DL
HBA1C MFR BLD: 7.7 % (ref 4.8–5.6)
HCO3 BLDA-SCNC: 28.8 MMOL/L (ref 20–26)
HCT VFR BLD AUTO: 42.9 % (ref 34–46.6)
HCT VFR BLD CALC: 41.7 %
HGB BLD-MCNC: 13.2 G/DL (ref 12–15.9)
HGB BLDA-MCNC: 13.6 G/DL (ref 14–18)
HGB UR QL STRIP.AUTO: NEGATIVE
HOLD SPECIMEN: NORMAL
HOLD SPECIMEN: NORMAL
IMM GRANULOCYTES # BLD AUTO: 0.15 10*3/MM3 (ref 0–0.05)
IMM GRANULOCYTES NFR BLD AUTO: 1.4 % (ref 0–0.5)
INHALED O2 CONCENTRATION: 40 %
INR PPP: 1.1 (ref 0.85–1.16)
INR PPP: 1.2 (ref 0.8–1.2)
INR PPP: 1.2 (ref 0.9–1.1)
IPAP: 0
KETONES UR QL STRIP: NEGATIVE
LEUKOCYTE ESTERASE UR QL STRIP.AUTO: NEGATIVE
LIPASE SERPL-CCNC: 44 U/L (ref 13–60)
LYMPHOCYTES # BLD AUTO: 3.93 10*3/MM3 (ref 0.7–3.1)
LYMPHOCYTES NFR BLD AUTO: 35.6 % (ref 19.6–45.3)
MAGNESIUM SERPL-MCNC: 2.3 MG/DL (ref 1.6–2.4)
MCH RBC QN AUTO: 28.6 PG (ref 26.6–33)
MCHC RBC AUTO-ENTMCNC: 30.8 G/DL (ref 31.5–35.7)
MCV RBC AUTO: 93.1 FL (ref 79–97)
METHADONE UR QL SCN: NEGATIVE
METHGB BLD QL: 0.5 % (ref 0–1.5)
MODALITY: ABNORMAL
MONOCYTES # BLD AUTO: 1.01 10*3/MM3 (ref 0.1–0.9)
MONOCYTES NFR BLD AUTO: 9.1 % (ref 5–12)
NEUTROPHILS NFR BLD AUTO: 5.66 10*3/MM3 (ref 1.7–7)
NEUTROPHILS NFR BLD AUTO: 51.3 % (ref 42.7–76)
NITRITE UR QL STRIP: NEGATIVE
NOTE: ABNORMAL
NRBC BLD AUTO-RTO: 0 /100 WBC (ref 0–0.2)
NT-PROBNP SERPL-MCNC: 23.5 PG/ML (ref 0–900)
OPIATES UR QL: NEGATIVE
OXYCODONE UR QL SCN: NEGATIVE
OXYHGB MFR BLDV: 95 % (ref 94–99)
PAW @ PEAK INSP FLOW SETTING VENT: 0 CMH2O
PCO2 BLDA: 66.5 MM HG (ref 35–45)
PCO2 TEMP ADJ BLD: 66.5 MM HG (ref 35–45)
PCP UR QL SCN: NEGATIVE
PH BLDA: 7.25 PH UNITS (ref 7.35–7.45)
PH UR STRIP.AUTO: 8 [PH] (ref 5–8)
PH, TEMP CORRECTED: 7.25 PH UNITS
PHOSPHATE SERPL-MCNC: 4 MG/DL (ref 2.5–4.5)
PLATELET # BLD AUTO: 413 10*3/MM3 (ref 140–450)
PMV BLD AUTO: 9.1 FL (ref 6–12)
PO2 BLDA: 101 MM HG (ref 83–108)
PO2 TEMP ADJ BLD: 101 MM HG (ref 83–108)
POTASSIUM SERPL-SCNC: 3 MMOL/L (ref 3.5–5.2)
PROCALCITONIN SERPL-MCNC: 0.04 NG/ML (ref 0–0.25)
PROPOXYPH UR QL: NEGATIVE
PROT SERPL-MCNC: 8.7 G/DL (ref 6–8.5)
PROT UR QL STRIP: NEGATIVE
PROTHROMBIN TIME: 13.9 SECONDS (ref 11.5–14)
PROTHROMBIN TIME: 14.3 SECONDS
PROTHROMBIN TIME: 14.3 SECONDS (ref 12.8–15.2)
QT INTERVAL: 326 MS
QTC INTERVAL: 472 MS
RBC # BLD AUTO: 4.61 10*6/MM3 (ref 3.77–5.28)
SARS-COV-2 RNA RESP QL NAA+PROBE: NOT DETECTED
SODIUM SERPL-SCNC: 137 MMOL/L (ref 136–145)
SP GR UR STRIP: 1.02 (ref 1–1.03)
T4 FREE SERPL-MCNC: 1.06 NG/DL (ref 0.93–1.7)
TOTAL RATE: 0 BREATHS/MINUTE
TRICYCLICS UR QL SCN: NEGATIVE
TROPONIN T SERPL-MCNC: <0.01 NG/ML (ref 0–0.03)
TSH SERPL DL<=0.05 MIU/L-ACNC: 2.89 UIU/ML (ref 0.27–4.2)
UROBILINOGEN UR QL STRIP: ABNORMAL
WBC # BLD AUTO: 11.04 10*3/MM3 (ref 3.4–10.8)
WHOLE BLOOD HOLD SPECIMEN: NORMAL
WHOLE BLOOD HOLD SPECIMEN: NORMAL

## 2021-01-14 PROCEDURE — 82330 ASSAY OF CALCIUM: CPT | Performed by: NURSE PRACTITIONER

## 2021-01-14 PROCEDURE — 70450 CT HEAD/BRAIN W/O DYE: CPT

## 2021-01-14 PROCEDURE — 85025 COMPLETE CBC W/AUTO DIFF WBC: CPT

## 2021-01-14 PROCEDURE — 0 IOPAMIDOL PER 1 ML: Performed by: EMERGENCY MEDICINE

## 2021-01-14 PROCEDURE — 0042T HC CT CEREBRAL PERFUSION W/WO CONTRAST: CPT

## 2021-01-14 PROCEDURE — 82550 ASSAY OF CK (CPK): CPT | Performed by: NURSE PRACTITIONER

## 2021-01-14 PROCEDURE — 25010000002 LORAZEPAM PER 2 MG: Performed by: NURSE PRACTITIONER

## 2021-01-14 PROCEDURE — 71045 X-RAY EXAM CHEST 1 VIEW: CPT

## 2021-01-14 PROCEDURE — 85610 PROTHROMBIN TIME: CPT | Performed by: NURSE PRACTITIONER

## 2021-01-14 PROCEDURE — 82805 BLOOD GASES W/O2 SATURATION: CPT

## 2021-01-14 PROCEDURE — 87040 BLOOD CULTURE FOR BACTERIA: CPT | Performed by: NURSE PRACTITIONER

## 2021-01-14 PROCEDURE — 84100 ASSAY OF PHOSPHORUS: CPT | Performed by: NURSE PRACTITIONER

## 2021-01-14 PROCEDURE — 83735 ASSAY OF MAGNESIUM: CPT | Performed by: EMERGENCY MEDICINE

## 2021-01-14 PROCEDURE — 83050 HGB METHEMOGLOBIN QUAN: CPT

## 2021-01-14 PROCEDURE — 74018 RADEX ABDOMEN 1 VIEW: CPT

## 2021-01-14 PROCEDURE — 70498 CT ANGIOGRAPHY NECK: CPT

## 2021-01-14 PROCEDURE — 82565 ASSAY OF CREATININE: CPT

## 2021-01-14 PROCEDURE — 83605 ASSAY OF LACTIC ACID: CPT | Performed by: NURSE PRACTITIONER

## 2021-01-14 PROCEDURE — 84484 ASSAY OF TROPONIN QUANT: CPT

## 2021-01-14 PROCEDURE — 93005 ELECTROCARDIOGRAM TRACING: CPT

## 2021-01-14 PROCEDURE — 83036 HEMOGLOBIN GLYCOSYLATED A1C: CPT | Performed by: NURSE PRACTITIONER

## 2021-01-14 PROCEDURE — 70496 CT ANGIOGRAPHY HEAD: CPT

## 2021-01-14 PROCEDURE — 25010000002 HEPARIN (PORCINE) PER 1000 UNITS: Performed by: NURSE PRACTITIONER

## 2021-01-14 PROCEDURE — 83690 ASSAY OF LIPASE: CPT | Performed by: NURSE PRACTITIONER

## 2021-01-14 PROCEDURE — 83880 ASSAY OF NATRIURETIC PEPTIDE: CPT | Performed by: NURSE PRACTITIONER

## 2021-01-14 PROCEDURE — 25010000003 LEVETIRACETAM IN NACL 0.75% 1000 MG/100ML SOLUTION: Performed by: EMERGENCY MEDICINE

## 2021-01-14 PROCEDURE — 84145 PROCALCITONIN (PCT): CPT | Performed by: NURSE PRACTITIONER

## 2021-01-14 PROCEDURE — 84439 ASSAY OF FREE THYROXINE: CPT | Performed by: EMERGENCY MEDICINE

## 2021-01-14 PROCEDURE — 99223 1ST HOSP IP/OBS HIGH 75: CPT | Performed by: NURSE PRACTITIONER

## 2021-01-14 PROCEDURE — 85610 PROTHROMBIN TIME: CPT

## 2021-01-14 PROCEDURE — 84443 ASSAY THYROID STIM HORMONE: CPT | Performed by: EMERGENCY MEDICINE

## 2021-01-14 PROCEDURE — 85730 THROMBOPLASTIN TIME PARTIAL: CPT

## 2021-01-14 PROCEDURE — 80053 COMPREHEN METABOLIC PANEL: CPT | Performed by: NURSE PRACTITIONER

## 2021-01-14 PROCEDURE — 99285 EMERGENCY DEPT VISIT HI MDM: CPT

## 2021-01-14 PROCEDURE — 87636 SARSCOV2 & INF A&B AMP PRB: CPT | Performed by: EMERGENCY MEDICINE

## 2021-01-14 PROCEDURE — 99291 CRITICAL CARE FIRST HOUR: CPT | Performed by: INTERNAL MEDICINE

## 2021-01-14 PROCEDURE — 36600 WITHDRAWAL OF ARTERIAL BLOOD: CPT

## 2021-01-14 PROCEDURE — 82375 ASSAY CARBOXYHB QUANT: CPT

## 2021-01-14 PROCEDURE — 80306 DRUG TEST PRSMV INSTRMNT: CPT | Performed by: NURSE PRACTITIONER

## 2021-01-14 PROCEDURE — 95714 VEEG EA 12-26 HR UNMNTR: CPT

## 2021-01-14 PROCEDURE — 94660 CPAP INITIATION&MGMT: CPT

## 2021-01-14 PROCEDURE — 81003 URINALYSIS AUTO W/O SCOPE: CPT | Performed by: NURSE PRACTITIONER

## 2021-01-14 RX ORDER — HEPARIN SODIUM 5000 [USP'U]/ML
5000 INJECTION, SOLUTION INTRAVENOUS; SUBCUTANEOUS EVERY 8 HOURS SCHEDULED
Status: DISCONTINUED | OUTPATIENT
Start: 2021-01-14 | End: 2021-01-16

## 2021-01-14 RX ORDER — ATORVASTATIN CALCIUM 40 MG/1
80 TABLET, FILM COATED ORAL NIGHTLY
Status: DISCONTINUED | OUTPATIENT
Start: 2021-01-15 | End: 2021-01-18 | Stop reason: HOSPADM

## 2021-01-14 RX ORDER — LORAZEPAM 2 MG/ML
2 INJECTION INTRAMUSCULAR ONCE
Status: COMPLETED | OUTPATIENT
Start: 2021-01-14 | End: 2021-01-14

## 2021-01-14 RX ORDER — SODIUM CHLORIDE 0.9 % (FLUSH) 0.9 %
10 SYRINGE (ML) INJECTION EVERY 12 HOURS SCHEDULED
Status: DISCONTINUED | OUTPATIENT
Start: 2021-01-14 | End: 2021-01-15

## 2021-01-14 RX ORDER — ASPIRIN 300 MG/1
300 SUPPOSITORY RECTAL DAILY
Status: DISCONTINUED | OUTPATIENT
Start: 2021-01-15 | End: 2021-01-18 | Stop reason: HOSPADM

## 2021-01-14 RX ORDER — ASPIRIN 81 MG/1
81 TABLET, CHEWABLE ORAL DAILY
Status: DISCONTINUED | OUTPATIENT
Start: 2021-01-15 | End: 2021-01-18 | Stop reason: HOSPADM

## 2021-01-14 RX ORDER — LORAZEPAM 2 MG/ML
INJECTION INTRAMUSCULAR
Status: DISPENSED
Start: 2021-01-14 | End: 2021-01-15

## 2021-01-14 RX ORDER — SODIUM CHLORIDE 0.9 % (FLUSH) 0.9 %
10 SYRINGE (ML) INJECTION EVERY 12 HOURS SCHEDULED
Status: DISCONTINUED | OUTPATIENT
Start: 2021-01-14 | End: 2021-01-18 | Stop reason: HOSPADM

## 2021-01-14 RX ORDER — SODIUM CHLORIDE 0.9 % (FLUSH) 0.9 %
10 SYRINGE (ML) INJECTION AS NEEDED
Status: DISCONTINUED | OUTPATIENT
Start: 2021-01-14 | End: 2021-01-18 | Stop reason: HOSPADM

## 2021-01-14 RX ORDER — ATORVASTATIN CALCIUM 40 MG/1
80 TABLET, FILM COATED ORAL NIGHTLY
Status: DISCONTINUED | OUTPATIENT
Start: 2021-01-14 | End: 2021-01-14

## 2021-01-14 RX ORDER — FAMOTIDINE 10 MG/ML
20 INJECTION, SOLUTION INTRAVENOUS EVERY 12 HOURS SCHEDULED
Status: DISCONTINUED | OUTPATIENT
Start: 2021-01-14 | End: 2021-01-16

## 2021-01-14 RX ORDER — LEVETIRACETAM 10 MG/ML
1000 INJECTION INTRAVASCULAR ONCE
Status: COMPLETED | OUTPATIENT
Start: 2021-01-14 | End: 2021-01-14

## 2021-01-14 RX ORDER — SODIUM CHLORIDE 0.9 % (FLUSH) 0.9 %
10 SYRINGE (ML) INJECTION AS NEEDED
Status: DISCONTINUED | OUTPATIENT
Start: 2021-01-14 | End: 2021-01-15

## 2021-01-14 RX ORDER — LEVETIRACETAM 5 MG/ML
500 INJECTION INTRAVASCULAR EVERY 12 HOURS SCHEDULED
Status: DISCONTINUED | OUTPATIENT
Start: 2021-01-15 | End: 2021-01-15

## 2021-01-14 RX ORDER — ASPIRIN 300 MG/1
300 SUPPOSITORY RECTAL DAILY
Status: DISCONTINUED | OUTPATIENT
Start: 2021-01-15 | End: 2021-01-14

## 2021-01-14 RX ORDER — ASPIRIN 81 MG/1
81 TABLET, CHEWABLE ORAL DAILY
Status: DISCONTINUED | OUTPATIENT
Start: 2021-01-15 | End: 2021-01-14

## 2021-01-14 RX ADMIN — SODIUM CHLORIDE, PRESERVATIVE FREE 10 ML: 5 INJECTION INTRAVENOUS at 20:12

## 2021-01-14 RX ADMIN — FAMOTIDINE 20 MG: 10 INJECTION, SOLUTION INTRAVENOUS at 20:37

## 2021-01-14 RX ADMIN — NICARDIPINE HYDROCHLORIDE 5 MG/HR: 0.1 INJECTION, SOLUTION INTRAVENOUS at 19:42

## 2021-01-14 RX ADMIN — LORAZEPAM 2 MG: 2 INJECTION INTRAMUSCULAR; INTRAVENOUS at 20:47

## 2021-01-14 RX ADMIN — HEPARIN SODIUM 5000 UNITS: 5000 INJECTION, SOLUTION INTRAVENOUS; SUBCUTANEOUS at 23:11

## 2021-01-14 RX ADMIN — IOPAMIDOL 115 ML: 755 INJECTION, SOLUTION INTRAVENOUS at 19:14

## 2021-01-14 RX ADMIN — LEVETIRACETAM INJECTION 1000 MG: 10 INJECTION INTRAVENOUS at 19:27

## 2021-01-15 ENCOUNTER — APPOINTMENT (OUTPATIENT)
Dept: CARDIOLOGY | Facility: HOSPITAL | Age: 76
End: 2021-01-15

## 2021-01-15 ENCOUNTER — APPOINTMENT (OUTPATIENT)
Dept: MRI IMAGING | Facility: HOSPITAL | Age: 76
End: 2021-01-15

## 2021-01-15 LAB
ALBUMIN SERPL-MCNC: 4.1 G/DL (ref 3.5–5.2)
ALBUMIN/GLOB SERPL: 1 G/DL
ALP SERPL-CCNC: 114 U/L (ref 39–117)
ALT SERPL W P-5'-P-CCNC: 33 U/L (ref 1–33)
ANION GAP SERPL CALCULATED.3IONS-SCNC: 13 MMOL/L (ref 5–15)
ARTERIAL PATENCY WRIST A: ABNORMAL
ASCENDING AORTA: 2.6 CM
AST SERPL-CCNC: 26 U/L (ref 1–32)
ATMOSPHERIC PRESS: ABNORMAL MM[HG]
BASE EXCESS BLDA CALC-SCNC: -0.2 MMOL/L (ref 0–2)
BASOPHILS # BLD MANUAL: 0.27 10*3/MM3 (ref 0–0.2)
BASOPHILS NFR BLD AUTO: 2 % (ref 0–1.5)
BDY SITE: ABNORMAL
BH CV DISTAL LEFT ICA HIDDEN LRR: 1 CM
BH CV ECHO MEAS - AO MAX PG (FULL): 4.3 MMHG
BH CV ECHO MEAS - AO MAX PG: 5.8 MMHG
BH CV ECHO MEAS - AO MEAN PG (FULL): 1.7 MMHG
BH CV ECHO MEAS - AO MEAN PG: 2.3 MMHG
BH CV ECHO MEAS - AO V2 MAX: 120.2 CM/SEC
BH CV ECHO MEAS - AO V2 MEAN: 66.7 CM/SEC
BH CV ECHO MEAS - AO V2 VTI: 20.3 CM
BH CV ECHO MEAS - ASC AORTA: 2.6 CM
BH CV ECHO MEAS - BSA(HAYCOCK): 1.7 M^2
BH CV ECHO MEAS - BSA: 1.6 M^2
BH CV ECHO MEAS - BZI_BMI: 27.4 KILOGRAMS/M^2
BH CV ECHO MEAS - BZI_METRIC_HEIGHT: 154.9 CM
BH CV ECHO MEAS - BZI_METRIC_WEIGHT: 65.8 KG
BH CV ECHO MEAS - DIST REN A PSV LEFT: 146 CM/S
BH CV ECHO MEAS - EDV(CUBED): 46.7 ML
BH CV ECHO MEAS - EDV(MOD-SP2): 103 ML
BH CV ECHO MEAS - EDV(MOD-SP4): 125 ML
BH CV ECHO MEAS - EDV(TEICH): 54.5 ML
BH CV ECHO MEAS - EF(CUBED): 78.4 %
BH CV ECHO MEAS - EF(MOD-BP): 55 %
BH CV ECHO MEAS - EF(MOD-SP2): 51.5 %
BH CV ECHO MEAS - EF(MOD-SP4): 61.6 %
BH CV ECHO MEAS - EF(TEICH): 71.5 %
BH CV ECHO MEAS - ESV(CUBED): 10.1 ML
BH CV ECHO MEAS - ESV(MOD-SP2): 50 ML
BH CV ECHO MEAS - ESV(MOD-SP4): 48 ML
BH CV ECHO MEAS - ESV(TEICH): 15.5 ML
BH CV ECHO MEAS - FS: 40 %
BH CV ECHO MEAS - IVS/LVPW: 1.1
BH CV ECHO MEAS - IVSD: 1.1 CM
BH CV ECHO MEAS - LA DIMENSION: 3.1 CM
BH CV ECHO MEAS - LAD MAJOR: 5 CM
BH CV ECHO MEAS - LAT PEAK E' VEL: 5.6 CM/SEC
BH CV ECHO MEAS - LATERAL E/E' RATIO: 9.3
BH CV ECHO MEAS - LV DIASTOLIC VOL/BSA (35-75): 75.9 ML/M^2
BH CV ECHO MEAS - LV IVRT: 0.11 SEC
BH CV ECHO MEAS - LV MASS(C)D: 116.9 GRAMS
BH CV ECHO MEAS - LV MASS(C)DI: 71 GRAMS/M^2
BH CV ECHO MEAS - LV MAX PG: 1.5 MMHG
BH CV ECHO MEAS - LV MEAN PG: 0.61 MMHG
BH CV ECHO MEAS - LV SYSTOLIC VOL/BSA (12-30): 29.1 ML/M^2
BH CV ECHO MEAS - LV V1 MAX: 60.2 CM/SEC
BH CV ECHO MEAS - LV V1 MEAN: 33.7 CM/SEC
BH CV ECHO MEAS - LV V1 VTI: 7.3 CM
BH CV ECHO MEAS - LVIDD: 3.6 CM
BH CV ECHO MEAS - LVIDS: 2.2 CM
BH CV ECHO MEAS - LVLD AP2: 7.3 CM
BH CV ECHO MEAS - LVLD AP4: 8 CM
BH CV ECHO MEAS - LVLS AP2: 6.5 CM
BH CV ECHO MEAS - LVLS AP4: 7.4 CM
BH CV ECHO MEAS - LVPWD: 1 CM
BH CV ECHO MEAS - MED PEAK E' VEL: 6.7 CM/SEC
BH CV ECHO MEAS - MEDIAL E/E' RATIO: 7.8
BH CV ECHO MEAS - MID REN A PSV LEFT: 84 CM/S
BH CV ECHO MEAS - MV A MAX VEL: 92.3 CM/SEC
BH CV ECHO MEAS - MV DEC TIME: 0.11 SEC
BH CV ECHO MEAS - MV E MAX VEL: 53.3 CM/SEC
BH CV ECHO MEAS - MV E/A: 0.58
BH CV ECHO MEAS - PA ACC SLOPE: 784.3 CM/SEC^2
BH CV ECHO MEAS - PA ACC TIME: 0.11 SEC
BH CV ECHO MEAS - PA MAX PG: 3.7 MMHG
BH CV ECHO MEAS - PA PR(ACCEL): 30.7 MMHG
BH CV ECHO MEAS - PA V2 MAX: 96.4 CM/SEC
BH CV ECHO MEAS - PROX REN A PSV LEFT: 148 CM/S
BH CV ECHO MEAS - RAP SYSTOLE: 3 MMHG
BH CV ECHO MEAS - RVSP: 27 MMHG
BH CV ECHO MEAS - SI(CUBED): 22.2 ML/M^2
BH CV ECHO MEAS - SI(MOD-SP2): 32.2 ML/M^2
BH CV ECHO MEAS - SI(MOD-SP4): 46.7 ML/M^2
BH CV ECHO MEAS - SI(TEICH): 23.7 ML/M^2
BH CV ECHO MEAS - SV(CUBED): 36.6 ML
BH CV ECHO MEAS - SV(MOD-SP2): 53 ML
BH CV ECHO MEAS - SV(MOD-SP4): 77 ML
BH CV ECHO MEAS - SV(TEICH): 39 ML
BH CV ECHO MEAS - TAPSE (>1.6): 1.4 CM
BH CV ECHO MEAS - TR MAX PG: 24 MMHG
BH CV ECHO MEAS - TR MAX VEL: 247 CM/SEC
BH CV ECHO MEASUREMENTS AVERAGE E/E' RATIO: 8.67
BH CV VAS BP RIGHT ARM: NORMAL MMHG
BH CV VAS RENAL AORTIC MID EDV: 15.6 CM/S
BH CV VAS RENAL AORTIC MID PSV: 65.9 CM/S
BH CV VAS RENAL HILUM LEFT EDV: 13.3 CM/S
BH CV VAS RENAL HILUM LEFT PSV: 62.2 CM/S
BH CV VAS RENAL HILUM RIGHT EDV: 19.7 CM/S
BH CV VAS RENAL HILUM RIGHT PSV: 107 CM/S
BH CV XLRA - RV BASE: 3.2 CM
BH CV XLRA - RV LENGTH: 5.9 CM
BH CV XLRA - RV MID: 2 CM
BH CV XLRA - TDI S': 19.6 CM/SEC
BH CV XLRA MEAS - KID L LEFT: 9.3 CM
BH CV XLRA MEAS DIST REN A EDV RIGHT: 28.8 CM/S
BH CV XLRA MEAS DIST REN A PSV RIGHT: 94 CM/S
BH CV XLRA MEAS KID L RIGHT: 9.4 CM
BH CV XLRA MEAS LEFT CCA RATIO VEL: 78.6 CM/SEC
BH CV XLRA MEAS LEFT DIST CCA EDV: 21.2 CM/SEC
BH CV XLRA MEAS LEFT DIST CCA PSV: 97.4 CM/SEC
BH CV XLRA MEAS LEFT DIST ICA EDV: 16.1 CM/SEC
BH CV XLRA MEAS LEFT DIST ICA PSV: 75.4 CM/SEC
BH CV XLRA MEAS LEFT ICA RATIO VEL: 281 CM/SEC
BH CV XLRA MEAS LEFT ICA/CCA RATIO: 3.6
BH CV XLRA MEAS LEFT MID CCA EDV: 14.9 CM/SEC
BH CV XLRA MEAS LEFT MID CCA PSV: 79.4 CM/SEC
BH CV XLRA MEAS LEFT MID ICA EDV: 29.2 CM/SEC
BH CV XLRA MEAS LEFT MID ICA PSV: 136.9 CM/SEC
BH CV XLRA MEAS LEFT PROX CCA EDV: 18.7 CM/SEC
BH CV XLRA MEAS LEFT PROX CCA PSV: 103.1 CM/SEC
BH CV XLRA MEAS LEFT PROX ECA EDV: 20.1 CM/SEC
BH CV XLRA MEAS LEFT PROX ECA PSV: 199.9 CM/SEC
BH CV XLRA MEAS LEFT PROX ICA EDV: 84.2 CM/SEC
BH CV XLRA MEAS LEFT PROX ICA PSV: 281 CM/SEC
BH CV XLRA MEAS LEFT PROX SCLA PSV: 187 CM/SEC
BH CV XLRA MEAS MID REN A EDV RIGHT: 31.8 CM/S
BH CV XLRA MEAS MID REN A PSV RIGHT: 95.9 CM/S
BH CV XLRA MEAS RAR LEFT: 2.2
BH CV XLRA MEAS RAR RIGHT: 2.8
BH CV XLRA MEAS RENAL A ORG EDV RIGHT: 17.6 CM/S
BH CV XLRA MEAS RENAL A ORG PSV RIGHT: 186 CM/S
BH CV XLRA MEAS RIGHT BULB EDV: 11.2 CM/SEC
BH CV XLRA MEAS RIGHT BULB PSV: 197.6 CM/SEC
BH CV XLRA MEAS RIGHT CCA RATIO VEL: 76.1 CM/SEC
BH CV XLRA MEAS RIGHT DIST CCA EDV: 13.3 CM/SEC
BH CV XLRA MEAS RIGHT DIST CCA PSV: 80.5 CM/SEC
BH CV XLRA MEAS RIGHT DIST ICA EDV: 11.3 CM/SEC
BH CV XLRA MEAS RIGHT DIST ICA PSV: 58.4 CM/SEC
BH CV XLRA MEAS RIGHT ICA RATIO VEL: 102 CM/SEC
BH CV XLRA MEAS RIGHT ICA/CCA RATIO: 2.4
BH CV XLRA MEAS RIGHT MID CCA EDV: 15.2 CM/SEC
BH CV XLRA MEAS RIGHT MID CCA PSV: 76.6 CM/SEC
BH CV XLRA MEAS RIGHT MID ICA EDV: 20.6 CM/SEC
BH CV XLRA MEAS RIGHT MID ICA PSV: 91.3 CM/SEC
BH CV XLRA MEAS RIGHT PROX CCA EDV: 10.3 CM/SEC
BH CV XLRA MEAS RIGHT PROX CCA PSV: 73.2 CM/SEC
BH CV XLRA MEAS RIGHT PROX ECA PSV: 113 CM/SEC
BH CV XLRA MEAS RIGHT PROX ICA EDV: 19.6 CM/SEC
BH CV XLRA MEAS RIGHT PROX ICA PSV: 196 CM/SEC
BH CV XLRA MEAS RIGHT PROX SCLA EDV: 10.1 CM/SEC
BH CV XLRA MEAS RIGHT PROX SCLA PSV: 288 CM/SEC
BH CV XLRA MEAS RIGHT VERTEBRAL A EDV: 8 CM/SEC
BH CV XLRA MEAS RIGHT VERTEBRAL A PSV: 43.6 CM/SEC
BH CVPROX RIGHT ICA HIDDEN LRR: 1 CM
BILIRUB SERPL-MCNC: 0.3 MG/DL (ref 0–1.2)
BODY TEMPERATURE: 37 C
BUN SERPL-MCNC: 13 MG/DL (ref 8–23)
BUN/CREAT SERPL: 10.3 (ref 7–25)
CALCIUM SPEC-SCNC: 9.4 MG/DL (ref 8.6–10.5)
CHLORIDE SERPL-SCNC: 102 MMOL/L (ref 98–107)
CHOLEST SERPL-MCNC: 151 MG/DL (ref 0–200)
CO2 BLDA-SCNC: 26.1 MMOL/L (ref 22–33)
CO2 SERPL-SCNC: 23 MMOL/L (ref 22–29)
COHGB MFR BLD: 0.6 % (ref 0–2)
CREAT SERPL-MCNC: 1.26 MG/DL (ref 0.57–1)
DEPRECATED RDW RBC AUTO: 48.7 FL (ref 37–54)
EOSINOPHIL # BLD MANUAL: 0 10*3/MM3 (ref 0–0.4)
EOSINOPHIL NFR BLD MANUAL: 0 % (ref 0.3–6.2)
EPAP: 0
ERYTHROCYTE [DISTWIDTH] IN BLOOD BY AUTOMATED COUNT: 13.8 % (ref 12.3–15.4)
GFR SERPL CREATININE-BSD FRML MDRD: 50 ML/MIN/1.73
GLOBULIN UR ELPH-MCNC: 4.2 GM/DL
GLUCOSE BLDC GLUCOMTR-MCNC: 117 MG/DL (ref 70–130)
GLUCOSE BLDC GLUCOMTR-MCNC: 131 MG/DL (ref 70–130)
GLUCOSE BLDC GLUCOMTR-MCNC: 143 MG/DL (ref 70–130)
GLUCOSE BLDC GLUCOMTR-MCNC: 194 MG/DL (ref 70–130)
GLUCOSE BLDC GLUCOMTR-MCNC: 229 MG/DL (ref 70–130)
GLUCOSE SERPL-MCNC: 193 MG/DL (ref 65–99)
HCO3 BLDA-SCNC: 24.8 MMOL/L (ref 20–26)
HCT VFR BLD AUTO: 43.9 % (ref 34–46.6)
HCT VFR BLD CALC: 39.2 %
HDLC SERPL-MCNC: 44 MG/DL (ref 40–60)
HGB BLD-MCNC: 12.7 G/DL (ref 12–15.9)
HGB BLDA-MCNC: 12.8 G/DL (ref 14–18)
INHALED O2 CONCENTRATION: 30 %
IPAP: 0
IVRT: 106 MSEC
LDLC SERPL CALC-MCNC: 95 MG/DL (ref 0–100)
LDLC/HDLC SERPL: 2.17 {RATIO}
LEFT ATRIUM VOLUME INDEX: 22.5 ML/M^2
LEFT ATRIUM VOLUME: 37 ML
LEFT RENAL UPPER PARENCHYMA MAX: 60 CM/S
LEFT RENAL UPPER PARENCHYMA MIN: 12 CM/S
LEFT RENAL UPPER PARENCHYMA RI: 0.8
LYMPHOCYTES # BLD MANUAL: 2.04 10*3/MM3 (ref 0.7–3.1)
LYMPHOCYTES NFR BLD MANUAL: 15 % (ref 19.6–45.3)
LYMPHOCYTES NFR BLD MANUAL: 3 % (ref 5–12)
MAGNESIUM SERPL-MCNC: 2.3 MG/DL (ref 1.6–2.4)
MCH RBC QN AUTO: 27.9 PG (ref 26.6–33)
MCHC RBC AUTO-ENTMCNC: 28.9 G/DL (ref 31.5–35.7)
MCV RBC AUTO: 96.3 FL (ref 79–97)
METAMYELOCYTES NFR BLD MANUAL: 1 % (ref 0–0)
METHGB BLD QL: 0.7 % (ref 0–1.5)
MODALITY: ABNORMAL
MONOCYTES # BLD AUTO: 0.41 10*3/MM3 (ref 0.1–0.9)
NEUTROPHILS # BLD AUTO: 10.59 10*3/MM3 (ref 1.7–7)
NEUTROPHILS NFR BLD MANUAL: 78 % (ref 42.7–76)
NOTE: ABNORMAL
OXYHGB MFR BLDV: 93 % (ref 94–99)
PAW @ PEAK INSP FLOW SETTING VENT: 0 CMH2O
PCO2 BLDA: 40.8 MM HG (ref 35–45)
PCO2 TEMP ADJ BLD: 40.8 MM HG (ref 35–45)
PH BLDA: 7.39 PH UNITS (ref 7.35–7.45)
PH, TEMP CORRECTED: 7.39 PH UNITS
PHOSPHATE SERPL-MCNC: 3.1 MG/DL (ref 2.5–4.5)
PLAT MORPH BLD: NORMAL
PLATELET # BLD AUTO: 307 10*3/MM3 (ref 140–450)
PMV BLD AUTO: 9.8 FL (ref 6–12)
PO2 BLDA: 72 MM HG (ref 83–108)
PO2 TEMP ADJ BLD: 72 MM HG (ref 83–108)
POTASSIUM SERPL-SCNC: 4.9 MMOL/L (ref 3.5–5.2)
PROT SERPL-MCNC: 8.3 G/DL (ref 6–8.5)
RBC # BLD AUTO: 4.56 10*6/MM3 (ref 3.77–5.28)
RBC MORPH BLD: NORMAL
RIGHT ARM BP: NORMAL MMHG
RIGHT RENAL UPPER PARENCHYMA MAX: 53.2 CM/S
RIGHT RENAL UPPER PARENCHYMA MIN: 14.7 CM/S
RIGHT RENAL UPPER PARENCHYMA RI: 0.72
SODIUM SERPL-SCNC: 138 MMOL/L (ref 136–145)
TOTAL RATE: 0 BREATHS/MINUTE
TRIGL SERPL-MCNC: 57 MG/DL (ref 0–150)
VARIANT LYMPHS NFR BLD MANUAL: 1 % (ref 0–5)
VLDLC SERPL-MCNC: 12 MG/DL (ref 5–40)
WBC # BLD AUTO: 13.58 10*3/MM3 (ref 3.4–10.8)
WBC MORPH BLD: NORMAL

## 2021-01-15 PROCEDURE — 82805 BLOOD GASES W/O2 SATURATION: CPT

## 2021-01-15 PROCEDURE — 93975 VASCULAR STUDY: CPT

## 2021-01-15 PROCEDURE — 25010000002 HEPARIN (PORCINE) PER 1000 UNITS: Performed by: NURSE PRACTITIONER

## 2021-01-15 PROCEDURE — 82962 GLUCOSE BLOOD TEST: CPT

## 2021-01-15 PROCEDURE — 84100 ASSAY OF PHOSPHORUS: CPT | Performed by: INTERNAL MEDICINE

## 2021-01-15 PROCEDURE — 80061 LIPID PANEL: CPT | Performed by: NURSE PRACTITIONER

## 2021-01-15 PROCEDURE — 93306 TTE W/DOPPLER COMPLETE: CPT

## 2021-01-15 PROCEDURE — 82375 ASSAY CARBOXYHB QUANT: CPT

## 2021-01-15 PROCEDURE — 85007 BL SMEAR W/DIFF WBC COUNT: CPT | Performed by: INTERNAL MEDICINE

## 2021-01-15 PROCEDURE — 25010000003 LEVETIRACETAM IN NACL 0.75% 1000 MG/100ML SOLUTION: Performed by: INTERNAL MEDICINE

## 2021-01-15 PROCEDURE — 93306 TTE W/DOPPLER COMPLETE: CPT | Performed by: INTERNAL MEDICINE

## 2021-01-15 PROCEDURE — 25010000002 LORAZEPAM PER 2 MG: Performed by: STUDENT IN AN ORGANIZED HEALTH CARE EDUCATION/TRAINING PROGRAM

## 2021-01-15 PROCEDURE — 83050 HGB METHEMOGLOBIN QUAN: CPT

## 2021-01-15 PROCEDURE — 92610 EVALUATE SWALLOWING FUNCTION: CPT | Performed by: SPEECH-LANGUAGE PATHOLOGIST

## 2021-01-15 PROCEDURE — 83735 ASSAY OF MAGNESIUM: CPT | Performed by: INTERNAL MEDICINE

## 2021-01-15 PROCEDURE — 80053 COMPREHEN METABOLIC PANEL: CPT | Performed by: INTERNAL MEDICINE

## 2021-01-15 PROCEDURE — 93975 VASCULAR STUDY: CPT | Performed by: INTERNAL MEDICINE

## 2021-01-15 PROCEDURE — 25010000002 HALOPERIDOL LACTATE PER 5 MG: Performed by: PSYCHIATRY & NEUROLOGY

## 2021-01-15 PROCEDURE — 0 GADOBENATE DIMEGLUMINE 529 MG/ML SOLUTION: Performed by: INTERNAL MEDICINE

## 2021-01-15 PROCEDURE — 85025 COMPLETE CBC W/AUTO DIFF WBC: CPT | Performed by: INTERNAL MEDICINE

## 2021-01-15 PROCEDURE — 93880 EXTRACRANIAL BILAT STUDY: CPT

## 2021-01-15 PROCEDURE — 25010000002 FOSPHENYTOIN 500 MG PE/10ML SOLUTION 10 ML VIAL: Performed by: NURSE PRACTITIONER

## 2021-01-15 PROCEDURE — A9577 INJ MULTIHANCE: HCPCS | Performed by: INTERNAL MEDICINE

## 2021-01-15 PROCEDURE — 25010000002 HALOPERIDOL LACTATE PER 5 MG: Performed by: NURSE PRACTITIONER

## 2021-01-15 PROCEDURE — 99233 SBSQ HOSP IP/OBS HIGH 50: CPT | Performed by: PSYCHIATRY & NEUROLOGY

## 2021-01-15 PROCEDURE — 93979 VASCULAR STUDY: CPT

## 2021-01-15 PROCEDURE — 63710000001 INSULIN REGULAR HUMAN PER 5 UNITS: Performed by: INTERNAL MEDICINE

## 2021-01-15 PROCEDURE — 70553 MRI BRAIN STEM W/O & W/DYE: CPT

## 2021-01-15 PROCEDURE — 99233 SBSQ HOSP IP/OBS HIGH 50: CPT | Performed by: INTERNAL MEDICINE

## 2021-01-15 PROCEDURE — 36600 WITHDRAWAL OF ARTERIAL BLOOD: CPT

## 2021-01-15 PROCEDURE — 93880 EXTRACRANIAL BILAT STUDY: CPT | Performed by: INTERNAL MEDICINE

## 2021-01-15 PROCEDURE — 92523 SPEECH SOUND LANG COMPREHEN: CPT | Performed by: SPEECH-LANGUAGE PATHOLOGIST

## 2021-01-15 RX ORDER — DEXTROSE MONOHYDRATE 25 G/50ML
25 INJECTION, SOLUTION INTRAVENOUS
Status: DISCONTINUED | OUTPATIENT
Start: 2021-01-15 | End: 2021-01-18 | Stop reason: HOSPADM

## 2021-01-15 RX ORDER — LEVETIRACETAM 10 MG/ML
1000 INJECTION INTRAVASCULAR EVERY 12 HOURS SCHEDULED
Status: DISCONTINUED | OUTPATIENT
Start: 2021-01-15 | End: 2021-01-16

## 2021-01-15 RX ORDER — HALOPERIDOL 5 MG/ML
1 INJECTION INTRAMUSCULAR ONCE AS NEEDED
Status: COMPLETED | OUTPATIENT
Start: 2021-01-15 | End: 2021-01-15

## 2021-01-15 RX ORDER — POTASSIUM CHLORIDE 1.5 G/1.77G
40 POWDER, FOR SOLUTION ORAL AS NEEDED
Status: DISCONTINUED | OUTPATIENT
Start: 2021-01-15 | End: 2021-01-18 | Stop reason: HOSPADM

## 2021-01-15 RX ORDER — NICOTINE POLACRILEX 4 MG
15 LOZENGE BUCCAL
Status: DISCONTINUED | OUTPATIENT
Start: 2021-01-15 | End: 2021-01-15

## 2021-01-15 RX ORDER — LORAZEPAM 2 MG/ML
1 INJECTION INTRAMUSCULAR ONCE
Status: COMPLETED | OUTPATIENT
Start: 2021-01-15 | End: 2021-01-15

## 2021-01-15 RX ORDER — POTASSIUM CHLORIDE 750 MG/1
40 CAPSULE, EXTENDED RELEASE ORAL AS NEEDED
Status: DISCONTINUED | OUTPATIENT
Start: 2021-01-15 | End: 2021-01-18 | Stop reason: HOSPADM

## 2021-01-15 RX ORDER — MAGNESIUM SULFATE HEPTAHYDRATE 40 MG/ML
2 INJECTION, SOLUTION INTRAVENOUS AS NEEDED
Status: DISCONTINUED | OUTPATIENT
Start: 2021-01-15 | End: 2021-01-18 | Stop reason: HOSPADM

## 2021-01-15 RX ORDER — POTASSIUM CHLORIDE 7.45 MG/ML
10 INJECTION INTRAVENOUS
Status: DISCONTINUED | OUTPATIENT
Start: 2021-01-15 | End: 2021-01-18 | Stop reason: HOSPADM

## 2021-01-15 RX ORDER — HALOPERIDOL 5 MG/ML
4 INJECTION INTRAMUSCULAR ONCE
Status: COMPLETED | OUTPATIENT
Start: 2021-01-15 | End: 2021-01-15

## 2021-01-15 RX ORDER — MAGNESIUM SULFATE HEPTAHYDRATE 40 MG/ML
4 INJECTION, SOLUTION INTRAVENOUS AS NEEDED
Status: DISCONTINUED | OUTPATIENT
Start: 2021-01-15 | End: 2021-01-18 | Stop reason: HOSPADM

## 2021-01-15 RX ORDER — SODIUM CHLORIDE, SODIUM LACTATE, POTASSIUM CHLORIDE, CALCIUM CHLORIDE 600; 310; 30; 20 MG/100ML; MG/100ML; MG/100ML; MG/100ML
100 INJECTION, SOLUTION INTRAVENOUS CONTINUOUS
Status: ACTIVE | OUTPATIENT
Start: 2021-01-15 | End: 2021-01-15

## 2021-01-15 RX ADMIN — INSULIN HUMAN 2 UNITS: 100 INJECTION, SOLUTION PARENTERAL at 05:59

## 2021-01-15 RX ADMIN — LEVETIRACETAM INJECTION 1000 MG: 10 INJECTION INTRAVENOUS at 20:52

## 2021-01-15 RX ADMIN — HEPARIN SODIUM 5000 UNITS: 5000 INJECTION, SOLUTION INTRAVENOUS; SUBCUTANEOUS at 05:59

## 2021-01-15 RX ADMIN — HEPARIN SODIUM 5000 UNITS: 5000 INJECTION, SOLUTION INTRAVENOUS; SUBCUTANEOUS at 20:52

## 2021-01-15 RX ADMIN — GADOBENATE DIMEGLUMINE 13 ML: 529 INJECTION, SOLUTION INTRAVENOUS at 15:00

## 2021-01-15 RX ADMIN — POTASSIUM CHLORIDE 40 MEQ: 1.5 POWDER, FOR SOLUTION ORAL at 05:59

## 2021-01-15 RX ADMIN — ASPIRIN 81 MG: 81 TABLET, CHEWABLE ORAL at 11:06

## 2021-01-15 RX ADMIN — SODIUM CHLORIDE, POTASSIUM CHLORIDE, SODIUM LACTATE AND CALCIUM CHLORIDE 100 ML/HR: 600; 310; 30; 20 INJECTION, SOLUTION INTRAVENOUS at 02:05

## 2021-01-15 RX ADMIN — FAMOTIDINE 20 MG: 10 INJECTION, SOLUTION INTRAVENOUS at 11:07

## 2021-01-15 RX ADMIN — LORAZEPAM 1 MG: 2 INJECTION INTRAMUSCULAR; INTRAVENOUS at 12:34

## 2021-01-15 RX ADMIN — HALOPERIDOL LACTATE 4 MG: 5 INJECTION, SOLUTION INTRAMUSCULAR at 13:17

## 2021-01-15 RX ADMIN — FAMOTIDINE 20 MG: 10 INJECTION, SOLUTION INTRAVENOUS at 20:53

## 2021-01-15 RX ADMIN — LEVETIRACETAM INJECTION 1000 MG: 10 INJECTION INTRAVENOUS at 11:06

## 2021-01-15 RX ADMIN — ATORVASTATIN CALCIUM 80 MG: 40 TABLET, FILM COATED ORAL at 20:52

## 2021-01-15 RX ADMIN — SODIUM CHLORIDE 1000 MG PE: 9 INJECTION, SOLUTION INTRAVENOUS at 01:15

## 2021-01-15 RX ADMIN — POTASSIUM CHLORIDE 40 MEQ: 1.5 POWDER, FOR SOLUTION ORAL at 02:03

## 2021-01-15 RX ADMIN — HALOPERIDOL LACTATE 1 MG: 5 INJECTION, SOLUTION INTRAMUSCULAR at 12:50

## 2021-01-15 RX ADMIN — HEPARIN SODIUM 5000 UNITS: 5000 INJECTION, SOLUTION INTRAVENOUS; SUBCUTANEOUS at 18:00

## 2021-01-16 ENCOUNTER — APPOINTMENT (OUTPATIENT)
Dept: NEUROLOGY | Facility: HOSPITAL | Age: 76
End: 2021-01-16

## 2021-01-16 LAB
ABDOMINAL PROX AORTA TRANS: 15.6 CM
ABDOMINAL PROX AORTA VEL: 65.9 CM/S
ANION GAP SERPL CALCULATED.3IONS-SCNC: 13 MMOL/L (ref 5–15)
BASOPHILS # BLD AUTO: 0.08 10*3/MM3 (ref 0–0.2)
BASOPHILS NFR BLD AUTO: 0.7 % (ref 0–1.5)
BH CV ECHO MEAS - CELIAC A MEAN V: 254 CM/SEC
BH CV ECHO MEAS - MID SMA MEAN V: 277 CM/SEC
BH CV VAS SMA 1ST PP TIME: 15 MIN
BH CV VAS SMA 2ND PP TIME: 30 MIN
BH CV VAS SMA 3RD PP TIME: 45 MIN
BUN SERPL-MCNC: 16 MG/DL (ref 8–23)
BUN/CREAT SERPL: 15.7 (ref 7–25)
CALCIUM SPEC-SCNC: 9.2 MG/DL (ref 8.6–10.5)
CHLORIDE SERPL-SCNC: 98 MMOL/L (ref 98–107)
CO2 SERPL-SCNC: 20 MMOL/L (ref 22–29)
CREAT SERPL-MCNC: 1.02 MG/DL (ref 0.57–1)
DEPRECATED RDW RBC AUTO: 49.1 FL (ref 37–54)
EOSINOPHIL # BLD AUTO: 0.04 10*3/MM3 (ref 0–0.4)
EOSINOPHIL NFR BLD AUTO: 0.3 % (ref 0.3–6.2)
ERYTHROCYTE [DISTWIDTH] IN BLOOD BY AUTOMATED COUNT: 13.6 % (ref 12.3–15.4)
GFR SERPL CREATININE-BSD FRML MDRD: 64 ML/MIN/1.73
GLUCOSE BLDC GLUCOMTR-MCNC: 130 MG/DL (ref 70–130)
GLUCOSE BLDC GLUCOMTR-MCNC: 135 MG/DL (ref 70–130)
GLUCOSE BLDC GLUCOMTR-MCNC: 152 MG/DL (ref 70–130)
GLUCOSE SERPL-MCNC: 130 MG/DL (ref 65–99)
HCT VFR BLD AUTO: 51.9 % (ref 34–46.6)
HGB BLD-MCNC: 15.3 G/DL (ref 12–15.9)
IMM GRANULOCYTES # BLD AUTO: 0.04 10*3/MM3 (ref 0–0.05)
IMM GRANULOCYTES NFR BLD AUTO: 0.3 % (ref 0–0.5)
LARGE PLATELETS: NORMAL
LYMPHOCYTES # BLD AUTO: 2.77 10*3/MM3 (ref 0.7–3.1)
LYMPHOCYTES NFR BLD AUTO: 23.3 % (ref 19.6–45.3)
MCH RBC QN AUTO: 28.7 PG (ref 26.6–33)
MCHC RBC AUTO-ENTMCNC: 29.5 G/DL (ref 31.5–35.7)
MCV RBC AUTO: 97.2 FL (ref 79–97)
MONOCYTES # BLD AUTO: 0.97 10*3/MM3 (ref 0.1–0.9)
MONOCYTES NFR BLD AUTO: 8.2 % (ref 5–12)
NEUTROPHILS NFR BLD AUTO: 67.2 % (ref 42.7–76)
NEUTROPHILS NFR BLD AUTO: 7.98 10*3/MM3 (ref 1.7–7)
NRBC BLD AUTO-RTO: 0 /100 WBC (ref 0–0.2)
PLATELET # BLD AUTO: 386 10*3/MM3 (ref 140–450)
PMV BLD AUTO: 9.4 FL (ref 6–12)
POTASSIUM SERPL-SCNC: 4 MMOL/L (ref 3.5–5.2)
RBC # BLD AUTO: 5.34 10*6/MM3 (ref 3.77–5.28)
RBC MORPH BLD: NORMAL
SODIUM SERPL-SCNC: 131 MMOL/L (ref 136–145)
WBC # BLD AUTO: 11.88 10*3/MM3 (ref 3.4–10.8)
WBC MORPH BLD: NORMAL

## 2021-01-16 PROCEDURE — 97116 GAIT TRAINING THERAPY: CPT

## 2021-01-16 PROCEDURE — 80048 BASIC METABOLIC PNL TOTAL CA: CPT | Performed by: INTERNAL MEDICINE

## 2021-01-16 PROCEDURE — 82962 GLUCOSE BLOOD TEST: CPT

## 2021-01-16 PROCEDURE — 92610 EVALUATE SWALLOWING FUNCTION: CPT

## 2021-01-16 PROCEDURE — 85007 BL SMEAR W/DIFF WBC COUNT: CPT | Performed by: INTERNAL MEDICINE

## 2021-01-16 PROCEDURE — 25010000002 HEPARIN (PORCINE) PER 1000 UNITS: Performed by: NURSE PRACTITIONER

## 2021-01-16 PROCEDURE — 97165 OT EVAL LOW COMPLEX 30 MIN: CPT

## 2021-01-16 PROCEDURE — 97162 PT EVAL MOD COMPLEX 30 MIN: CPT

## 2021-01-16 PROCEDURE — 92507 TX SP LANG VOICE COMM INDIV: CPT

## 2021-01-16 PROCEDURE — 95711 VEEG 2-12 HR UNMONITORED: CPT

## 2021-01-16 PROCEDURE — 99232 SBSQ HOSP IP/OBS MODERATE 35: CPT | Performed by: INTERNAL MEDICINE

## 2021-01-16 PROCEDURE — 63710000001 INSULIN LISPRO (HUMAN) PER 5 UNITS

## 2021-01-16 PROCEDURE — 85025 COMPLETE CBC W/AUTO DIFF WBC: CPT | Performed by: INTERNAL MEDICINE

## 2021-01-16 PROCEDURE — 99232 SBSQ HOSP IP/OBS MODERATE 35: CPT | Performed by: PSYCHIATRY & NEUROLOGY

## 2021-01-16 RX ORDER — LEVETIRACETAM 500 MG/1
1000 TABLET ORAL EVERY 12 HOURS SCHEDULED
Status: DISCONTINUED | OUTPATIENT
Start: 2021-01-16 | End: 2021-01-18 | Stop reason: HOSPADM

## 2021-01-16 RX ORDER — FAMOTIDINE 20 MG/1
20 TABLET, FILM COATED ORAL
Status: DISCONTINUED | OUTPATIENT
Start: 2021-01-17 | End: 2021-01-18 | Stop reason: HOSPADM

## 2021-01-16 RX ORDER — LEVETIRACETAM 500 MG/1
1000 TABLET ORAL ONCE
Status: COMPLETED | OUTPATIENT
Start: 2021-01-16 | End: 2021-01-16

## 2021-01-16 RX ADMIN — LEVETIRACETAM 1000 MG: 500 TABLET, FILM COATED ORAL at 12:11

## 2021-01-16 RX ADMIN — INSULIN LISPRO 2 UNITS: 100 INJECTION, SOLUTION INTRAVENOUS; SUBCUTANEOUS at 18:39

## 2021-01-16 RX ADMIN — APIXABAN 2.5 MG: 2.5 TABLET, FILM COATED ORAL at 20:47

## 2021-01-16 RX ADMIN — FAMOTIDINE 20 MG: 10 INJECTION, SOLUTION INTRAVENOUS at 08:35

## 2021-01-16 RX ADMIN — ASPIRIN 81 MG: 81 TABLET, CHEWABLE ORAL at 08:35

## 2021-01-16 RX ADMIN — ATORVASTATIN CALCIUM 80 MG: 40 TABLET, FILM COATED ORAL at 20:47

## 2021-01-16 RX ADMIN — LEVETIRACETAM 1000 MG: 500 TABLET, FILM COATED ORAL at 20:47

## 2021-01-16 RX ADMIN — HEPARIN SODIUM 5000 UNITS: 5000 INJECTION, SOLUTION INTRAVENOUS; SUBCUTANEOUS at 06:33

## 2021-01-17 LAB
GLUCOSE BLDC GLUCOMTR-MCNC: 103 MG/DL (ref 70–130)
GLUCOSE BLDC GLUCOMTR-MCNC: 125 MG/DL (ref 70–130)
GLUCOSE BLDC GLUCOMTR-MCNC: 133 MG/DL (ref 70–130)
GLUCOSE BLDC GLUCOMTR-MCNC: 158 MG/DL (ref 70–130)
GLUCOSE BLDC GLUCOMTR-MCNC: 250 MG/DL (ref 70–130)
GLUCOSE BLDC GLUCOMTR-MCNC: 65 MG/DL (ref 70–130)
GLUCOSE BLDC GLUCOMTR-MCNC: 88 MG/DL (ref 70–130)

## 2021-01-17 PROCEDURE — 94799 UNLISTED PULMONARY SVC/PX: CPT

## 2021-01-17 PROCEDURE — 99232 SBSQ HOSP IP/OBS MODERATE 35: CPT | Performed by: INTERNAL MEDICINE

## 2021-01-17 PROCEDURE — 63710000001 INSULIN LISPRO (HUMAN) PER 5 UNITS

## 2021-01-17 PROCEDURE — 99232 SBSQ HOSP IP/OBS MODERATE 35: CPT | Performed by: PSYCHIATRY & NEUROLOGY

## 2021-01-17 PROCEDURE — 82962 GLUCOSE BLOOD TEST: CPT

## 2021-01-17 PROCEDURE — 94660 CPAP INITIATION&MGMT: CPT

## 2021-01-17 RX ORDER — CLONIDINE HYDROCHLORIDE 0.1 MG/1
0.1 TABLET ORAL EVERY 12 HOURS SCHEDULED
Status: DISCONTINUED | OUTPATIENT
Start: 2021-01-17 | End: 2021-01-18 | Stop reason: HOSPADM

## 2021-01-17 RX ADMIN — CLONIDINE HYDROCHLORIDE 0.1 MG: 0.1 TABLET ORAL at 21:20

## 2021-01-17 RX ADMIN — CLONIDINE HYDROCHLORIDE 0.1 MG: 0.1 TABLET ORAL at 11:55

## 2021-01-17 RX ADMIN — FAMOTIDINE 20 MG: 20 TABLET, FILM COATED ORAL at 08:14

## 2021-01-17 RX ADMIN — ATORVASTATIN CALCIUM 80 MG: 40 TABLET, FILM COATED ORAL at 21:20

## 2021-01-17 RX ADMIN — LEVETIRACETAM 1000 MG: 500 TABLET, FILM COATED ORAL at 21:20

## 2021-01-17 RX ADMIN — LEVETIRACETAM 1000 MG: 500 TABLET, FILM COATED ORAL at 08:14

## 2021-01-17 RX ADMIN — INSULIN LISPRO 4 UNITS: 100 INJECTION, SOLUTION INTRAVENOUS; SUBCUTANEOUS at 18:41

## 2021-01-17 RX ADMIN — ASPIRIN 81 MG: 81 TABLET, CHEWABLE ORAL at 08:14

## 2021-01-17 RX ADMIN — APIXABAN 2.5 MG: 2.5 TABLET, FILM COATED ORAL at 08:13

## 2021-01-17 RX ADMIN — APIXABAN 2.5 MG: 2.5 TABLET, FILM COATED ORAL at 21:20

## 2021-01-17 RX ADMIN — FAMOTIDINE 20 MG: 20 TABLET, FILM COATED ORAL at 18:41

## 2021-01-18 VITALS
TEMPERATURE: 97.3 F | HEART RATE: 93 BPM | OXYGEN SATURATION: 94 % | RESPIRATION RATE: 18 BRPM | BODY MASS INDEX: 29.23 KG/M2 | SYSTOLIC BLOOD PRESSURE: 140 MMHG | WEIGHT: 145 LBS | HEIGHT: 59 IN | DIASTOLIC BLOOD PRESSURE: 79 MMHG

## 2021-01-18 LAB
GLUCOSE BLDC GLUCOMTR-MCNC: 110 MG/DL (ref 70–130)
GLUCOSE BLDC GLUCOMTR-MCNC: 120 MG/DL (ref 70–130)

## 2021-01-18 PROCEDURE — 99231 SBSQ HOSP IP/OBS SF/LOW 25: CPT | Performed by: PSYCHIATRY & NEUROLOGY

## 2021-01-18 PROCEDURE — 97530 THERAPEUTIC ACTIVITIES: CPT

## 2021-01-18 PROCEDURE — 97110 THERAPEUTIC EXERCISES: CPT

## 2021-01-18 PROCEDURE — 99239 HOSP IP/OBS DSCHRG MGMT >30: CPT | Performed by: INTERNAL MEDICINE

## 2021-01-18 PROCEDURE — G0108 DIAB MANAGE TRN  PER INDIV: HCPCS | Performed by: REGISTERED NURSE

## 2021-01-18 PROCEDURE — 82962 GLUCOSE BLOOD TEST: CPT

## 2021-01-18 RX ORDER — LEVETIRACETAM 1000 MG/1
1000 TABLET ORAL 2 TIMES DAILY
Qty: 60 TABLET | Refills: 2 | Status: SHIPPED | OUTPATIENT
Start: 2021-01-18 | End: 2021-01-01

## 2021-01-18 RX ORDER — ATORVASTATIN CALCIUM 80 MG/1
80 TABLET, FILM COATED ORAL NIGHTLY
Qty: 30 TABLET | Refills: 2 | Status: SHIPPED | OUTPATIENT
Start: 2021-01-18

## 2021-01-18 RX ORDER — LANCETS
EACH MISCELLANEOUS
Qty: 120 EACH | Refills: 12 | Status: ON HOLD | OUTPATIENT
Start: 2021-01-18 | End: 2021-05-20

## 2021-01-18 RX ORDER — CLONIDINE HYDROCHLORIDE 0.1 MG/1
0.1 TABLET ORAL EVERY 12 HOURS SCHEDULED
Qty: 60 TABLET | Refills: 2 | Status: SHIPPED | OUTPATIENT
Start: 2021-01-18

## 2021-01-18 RX ORDER — DIAPER,BRIEF,INFANT-TODD,DISP
EACH MISCELLANEOUS EVERY 12 HOURS SCHEDULED
Status: DISCONTINUED | OUTPATIENT
Start: 2021-01-18 | End: 2021-01-18 | Stop reason: HOSPADM

## 2021-01-18 RX ORDER — ASPIRIN 81 MG/1
81 TABLET, CHEWABLE ORAL DAILY
Qty: 30 TABLET | Refills: 2 | Status: SHIPPED | OUTPATIENT
Start: 2021-01-19 | End: 2021-01-01 | Stop reason: HOSPADM

## 2021-01-18 RX ADMIN — FAMOTIDINE 20 MG: 20 TABLET, FILM COATED ORAL at 08:03

## 2021-01-18 RX ADMIN — ASPIRIN 81 MG: 81 TABLET, CHEWABLE ORAL at 08:03

## 2021-01-18 RX ADMIN — CLONIDINE HYDROCHLORIDE 0.1 MG: 0.1 TABLET ORAL at 08:03

## 2021-01-18 RX ADMIN — HYDROCORTISONE: 1 CREAM TOPICAL at 10:49

## 2021-01-18 RX ADMIN — LEVETIRACETAM 1000 MG: 500 TABLET, FILM COATED ORAL at 08:03

## 2021-01-18 RX ADMIN — APIXABAN 2.5 MG: 2.5 TABLET, FILM COATED ORAL at 08:03

## 2021-01-19 ENCOUNTER — READMISSION MANAGEMENT (OUTPATIENT)
Dept: CALL CENTER | Facility: HOSPITAL | Age: 76
End: 2021-01-19

## 2021-01-19 LAB
BACTERIA SPEC AEROBE CULT: NORMAL
BACTERIA SPEC AEROBE CULT: NORMAL

## 2021-01-19 NOTE — OUTREACH NOTE
Prep Survey      Responses   [facility] patient discharged from?  [place]   Is LACE score < 7 ?  No   Emergency Room discharge w/ pulse ox?  No   Eligibility  Readm Mgmt   Discharge diagnosis  altered mental status,  hypertensive encephalopathy,  bilateral carotid artery stenosis,  CKD,  chronic anticoagulation,  PAD   Does the patient have one of the following disease processes/diagnoses(primary or secondary)?  Other   Does the patient have Home health ordered?  Yes   What is the Home health agency?   [facility]   Is there a DME ordered?  No   Comments regarding appointments  see AVS   Medication alerts for this patient  see AVS   Prep survey completed?  Yes          Rebeca Bryant RN

## 2021-01-20 ENCOUNTER — READMISSION MANAGEMENT (OUTPATIENT)
Dept: CALL CENTER | Facility: HOSPITAL | Age: 76
End: 2021-01-20

## 2021-01-20 NOTE — OUTREACH NOTE
Medical Week 1 Survey      Responses   [facility] patient discharged from?  [place]   Does the patient have one of the following disease processes/diagnoses(primary or secondary)?  Other   Week 1 attempt successful?  Yes   Call start time  1026   Rescheduled  Rescheduled-pt requested [Home Health with patient at time of call, rescheduled.]   Discharge diagnosis  altered mental status,  hypertensive encephalopathy,  bilateral carotid artery stenosis,  CKD,  chronic anticoagulation,  PAD   Is patient permission given to speak with other caregiver?  Yes   List who call center can speak with  Isa, sister          Gaye Trimble RN

## 2021-01-21 ENCOUNTER — READMISSION MANAGEMENT (OUTPATIENT)
Dept: CALL CENTER | Facility: HOSPITAL | Age: 76
End: 2021-01-21

## 2021-01-21 NOTE — OUTREACH NOTE
Medical Week 1 Survey      Responses   [facility] patient discharged from?  [place]   Does the patient have one of the following disease processes/diagnoses(primary or secondary)?  Other   Week 1 attempt successful?  Yes   Call start time  1352   Call end time  1356   Medication alerts for this patient  medication reviewed with the patient   Meds reviewed with patient/caregiver?  Yes   Is the patient having any side effects they believe may be caused by any medication additions or changes?  No   Is the patient taking all medications as directed (includes completed medication regime)?  Yes   Comments regarding appointments  Saw Per Conklin  yesterday   Does the patient have a primary care provider?   Yes   Does the patient have an appointment with their PCP within 7 days of discharge?  Yes   Has the patient kept scheduled appointments due by today?  Yes   What is the Home health agency?   [facility]   Has home health visited the patient within 72 hours of discharge?  Yes   Has all DME been delivered?  No   Psychosocial issues?  No   Did the patient receive a copy of their discharge instructions?  Yes   Nursing interventions  Reviewed instructions with patient   What is the patient's perception of their health status since discharge?  Improving   Is the patient/caregiver able to teach back signs and symptoms related to disease process for when to call PCP?  Yes   Is the patient/caregiver able to teach back signs and symptoms related to disease process for when to call 911?  Yes   Is the patient/caregiver able to teach back the hierarchy of who to call/visit for symptoms/problems? PCP, Specialist, Home health nurse, Urgent Care, ED, 911  Yes   If the patient is a current smoker, are they able to teach back resources for cessation?  Not a smoker   Week 1 call completed?  Yes   Wrap up additional comments  has no complaints. has kept her f/u . unusre of BS but BP was 150/80 today          Shruthi Mcleod RN

## 2021-01-27 ENCOUNTER — READMISSION MANAGEMENT (OUTPATIENT)
Dept: CALL CENTER | Facility: HOSPITAL | Age: 76
End: 2021-01-27

## 2021-01-27 NOTE — OUTREACH NOTE
Medical Week 2 Survey      Responses   [facility] patient discharged from?  [place]   Does the patient have one of the following disease processes/diagnoses(primary or secondary)?  Other   Week 2 attempt successful?  No   Unsuccessful attempts  Attempt 1          Cesar Gomez RN

## 2021-01-28 ENCOUNTER — READMISSION MANAGEMENT (OUTPATIENT)
Dept: CALL CENTER | Facility: HOSPITAL | Age: 76
End: 2021-01-28

## 2021-01-28 NOTE — OUTREACH NOTE
Medical Week 2 Survey      Responses   [facility] patient discharged from?  [place]   Does the patient have one of the following disease processes/diagnoses(primary or secondary)?  Other   Week 2 attempt successful?  Yes   Call start time  0814   Discharge diagnosis  altered mental status,  hypertensive encephalopathy,  bilateral carotid artery stenosis,  CKD,  chronic anticoagulation,  PAD   Call end time  0840   Is patient permission given to speak with other caregiver?  No   Medication alerts for this patient  medication reviewed with the patient   Meds reviewed with patient/caregiver?  Yes   Is the patient having any side effects they believe may be caused by any medication additions or changes?  No   Is the patient taking all medications as directed (includes completed medication regime)?  Yes   Does the patient have a primary care provider?   Yes   Does the patient have an appointment with their PCP within 7 days of discharge?  Yes   Has the patient kept scheduled appointments due by today?  Yes   Comments  Cannot drive for 90 days.    What is the Home health agency?   [facility]   Has home health visited the patient within 72 hours of discharge?  Yes   Psychosocial issues?  No   Did the patient receive a copy of their discharge instructions?  Yes   Nursing interventions  Reviewed instructions with patient   What is the patient's perception of their health status since discharge?  Improving [She feel slike she is getting better. ]   Is the patient/caregiver able to teach back signs and symptoms related to disease process for when to call PCP?  Yes   Is the patient/caregiver able to teach back signs and symptoms related to disease process for when to call 911?  Yes   Is the patient/caregiver able to teach back the hierarchy of who to call/visit for symptoms/problems? PCP, Specialist, Home health nurse, Urgent Care, ED, 911  Yes   If the patient is a current smoker, are they able to teach back resources for  cessation?  Not a smoker   Week 2 Call Completed?  Yes   Wrap up additional comments  She is not driving doing well.           Bre Vang RN

## 2021-02-01 ENCOUNTER — LAB REQUISITION (OUTPATIENT)
Dept: LAB | Facility: HOSPITAL | Age: 76
End: 2021-02-01

## 2021-02-01 DIAGNOSIS — I67.4 HYPERTENSIVE ENCEPHALOPATHY: ICD-10-CM

## 2021-02-01 LAB
ALBUMIN SERPL-MCNC: 3.7 G/DL (ref 3.5–5.2)
ALBUMIN/GLOB SERPL: 1 G/DL
ALP SERPL-CCNC: 80 U/L (ref 39–117)
ALT SERPL W P-5'-P-CCNC: 27 U/L (ref 1–33)
ANION GAP SERPL CALCULATED.3IONS-SCNC: 9 MMOL/L (ref 5–15)
AST SERPL-CCNC: 25 U/L (ref 1–32)
BILIRUB SERPL-MCNC: 0.3 MG/DL (ref 0–1.2)
BUN SERPL-MCNC: 12 MG/DL (ref 8–23)
BUN/CREAT SERPL: 9.7 (ref 7–25)
CALCIUM SPEC-SCNC: 9.2 MG/DL (ref 8.6–10.5)
CHLORIDE SERPL-SCNC: 104 MMOL/L (ref 98–107)
CO2 SERPL-SCNC: 26 MMOL/L (ref 22–29)
CREAT SERPL-MCNC: 1.24 MG/DL (ref 0.57–1)
DEPRECATED RDW RBC AUTO: 44.2 FL (ref 37–54)
ERYTHROCYTE [DISTWIDTH] IN BLOOD BY AUTOMATED COUNT: 13.6 % (ref 12.3–15.4)
GFR SERPL CREATININE-BSD FRML MDRD: 51 ML/MIN/1.73
GLOBULIN UR ELPH-MCNC: 3.6 GM/DL
GLUCOSE SERPL-MCNC: 123 MG/DL (ref 65–99)
HBA1C MFR BLD: 7.2 % (ref 4.8–5.6)
HCT VFR BLD AUTO: 37.7 % (ref 34–46.6)
HGB BLD-MCNC: 11.9 G/DL (ref 12–15.9)
MCH RBC QN AUTO: 28.4 PG (ref 26.6–33)
MCHC RBC AUTO-ENTMCNC: 31.6 G/DL (ref 31.5–35.7)
MCV RBC AUTO: 90 FL (ref 79–97)
PLATELET # BLD AUTO: 397 10*3/MM3 (ref 140–450)
PMV BLD AUTO: 9.9 FL (ref 6–12)
POTASSIUM SERPL-SCNC: 4.2 MMOL/L (ref 3.5–5.2)
PROT SERPL-MCNC: 7.3 G/DL (ref 6–8.5)
RBC # BLD AUTO: 4.19 10*6/MM3 (ref 3.77–5.28)
SODIUM SERPL-SCNC: 139 MMOL/L (ref 136–145)
WBC # BLD AUTO: 4.58 10*3/MM3 (ref 3.4–10.8)

## 2021-02-01 PROCEDURE — 85027 COMPLETE CBC AUTOMATED: CPT | Performed by: INTERNAL MEDICINE

## 2021-02-01 PROCEDURE — 80053 COMPREHEN METABOLIC PANEL: CPT | Performed by: INTERNAL MEDICINE

## 2021-02-01 PROCEDURE — 83036 HEMOGLOBIN GLYCOSYLATED A1C: CPT | Performed by: INTERNAL MEDICINE

## 2021-02-03 ENCOUNTER — READMISSION MANAGEMENT (OUTPATIENT)
Dept: CALL CENTER | Facility: HOSPITAL | Age: 76
End: 2021-02-03

## 2021-02-03 NOTE — OUTREACH NOTE
Medical Week 3 Survey      Responses   [facility] patient discharged from?  [facility]   Does the patient have one of the following disease processes/diagnoses(primary or secondary)?  Other   Week 3 attempt successful?  Yes   Call start time  1614   Call end time  1618   Meds reviewed with patient/caregiver?  Yes   Is the patient having any side effects they believe may be caused by any medication additions or changes?  Yes   Side effects comments   loose stools   Is the patient taking all medications as directed (includes completed medication regime)?  Yes   Comments regarding PCP  Neuro appt 2/22 and PCP f/u has to make this appt.    Does the patient have an appointment with their PCP within 7 days of discharge?  No   What is preventing the patient from scheduling follow up appointments within 7 days of discharge?  Haven't had time   Nursing Interventions  Educated patient on importance of making appointment   Has the patient kept scheduled appointments due by today?  N/A   What is the Home health agency?   [facility]   Has home health visited the patient within 72 hours of discharge?  Yes   Psychosocial issues?  No   Comments  Pt denies any issues with aphasia, or swallowing/eating/drinking, denies coughing or clearing throat. She is c/o loose stools.    What is the patient's perception of their health status since discharge?  Improving   Is the patient/caregiver able to teach back signs and symptoms related to disease process for when to call PCP?  Yes   Week 3 Call Completed?  Yes   Is the patient interested in additional calls from an ambulatory ?  NOTE:  applies to high risk patients requiring additional follow-up.  No   Revoked  No further contact(revokes)-requires comment   Graduated/Revoked comments  goals met          Chanel Nair RN

## 2021-02-22 ENCOUNTER — TELEPHONE (OUTPATIENT)
Dept: NEUROSURGERY | Facility: CLINIC | Age: 76
End: 2021-02-22

## 2021-02-25 ENCOUNTER — TELEPHONE (OUTPATIENT)
Dept: CARDIAC SURGERY | Facility: CLINIC | Age: 76
End: 2021-02-25

## 2021-02-25 DIAGNOSIS — I73.9 PAD (PERIPHERAL ARTERY DISEASE) (HCC): Primary | ICD-10-CM

## 2021-02-26 ENCOUNTER — IMMUNIZATION (OUTPATIENT)
Dept: VACCINE CLINIC | Facility: HOSPITAL | Age: 76
End: 2021-02-26

## 2021-02-26 DIAGNOSIS — I73.9 PAD (PERIPHERAL ARTERY DISEASE) (HCC): Primary | ICD-10-CM

## 2021-02-26 PROCEDURE — 0011A: CPT | Performed by: INTERNAL MEDICINE

## 2021-02-26 PROCEDURE — 91301 HC SARSCO02 VAC 100MCG/0.5ML IM: CPT | Performed by: INTERNAL MEDICINE

## 2021-03-01 ENCOUNTER — OFFICE VISIT (OUTPATIENT)
Dept: NEUROSURGERY | Facility: CLINIC | Age: 76
End: 2021-03-01

## 2021-03-01 VITALS
TEMPERATURE: 97.6 F | WEIGHT: 129 LBS | BODY MASS INDEX: 26 KG/M2 | HEIGHT: 59 IN | DIASTOLIC BLOOD PRESSURE: 64 MMHG | SYSTOLIC BLOOD PRESSURE: 134 MMHG

## 2021-03-01 DIAGNOSIS — I65.03 STENOSIS OF BOTH VERTEBRAL ARTERIES: ICD-10-CM

## 2021-03-01 DIAGNOSIS — I65.23 BILATERAL CAROTID ARTERY STENOSIS: Primary | ICD-10-CM

## 2021-03-01 DIAGNOSIS — I65.1 BASILAR ARTERY STENOSIS: ICD-10-CM

## 2021-03-01 PROCEDURE — 99214 OFFICE O/P EST MOD 30 MIN: CPT | Performed by: PHYSICIAN ASSISTANT

## 2021-03-01 NOTE — PROGRESS NOTES
NAME: KATHI NELSON   DOS: 3/2/2021  : 1945  PCP: Alyssia Greenfield MD    Chief Complaint: Carotid stenosis, basilar stenosis    History of Present Illness: Ms. Nelson is a 75 y.o. female who is seen today due to carotid and basilar stenosis.  Patient recently was admitted to [facility] and underwent an ischemic stroke work-up.  At that time, she presented with new onset aphasia.  During her hospital stay, CTA demonstrated critical bilateral proximal cervical ICA stenosis as well as vertebral and focal basilar artery stenosis.  She was also questionable for possible seizure and has been placed on Keppra.    Today, she is seen today in follow-up for further evaluation.  She has significant past medical history of DVT with chronic anticoagulation on Eliquis, hyperlipidemia, hypertension, PVD status post aortobifemoral bypass in 2017, status post left CEA, and hemorrhagic stroke.  Since hospital discharge, she denies any new TIA or strokelike symptoms.  She states she is returned to her neurological baseline.  She has remained on Eliquis 2.5 mg twice daily and aspirin 81 mg and high-dose statin.  Denies any neck radiation.  Does have history of left CEA in early .  Is seen today in follow-up.      Past Medical History:   Diagnosis Date   • Compartment syndrome (CMS/HCC)    • DVT (deep venous thrombosis) (CMS/HCC)     Bilateral   • Hyperlipidemia    • Hypertension    • Peripheral vascular disease (CMS/HCC)    • Stroke (CMS/HCC)     bleed       Past Surgical History:   Procedure Laterality Date   • BREAST BIOPSY Right    • FOREARM FASCIOTOMY Left    • THROMBOLYSIS     • TUBAL ABDOMINAL LIGATION     • VASCULAR SURGERY      Bilateral Stents             Review of Systems   Neurological: Positive for seizures, syncope, speech difficulty, light-headedness and numbness.   All other systems reviewed and are negative.           Medications:    Current Outpatient Medications:   •  apixaban  (ELIQUIS) 2.5 MG tablet tablet, Take 1 tablet by mouth Every 12 (Twelve) Hours., Disp: 60 tablet, Rfl: 11  •  aspirin 81 MG chewable tablet, Chew 1 tablet Daily., Disp: 30 tablet, Rfl: 2  •  atorvastatin (LIPITOR) 80 MG tablet, Take 1 tablet by mouth Every Night., Disp: 30 tablet, Rfl: 2  •  cloNIDine (CATAPRES) 0.1 MG tablet, Take 1 tablet by mouth Every 12 (Twelve) Hours., Disp: 60 tablet, Rfl: 2  •  glucose blood test strip, Use as instructed, Disp: 120 each, Rfl: 12  •  influenza vac split quad (FLUZONE,FLUARIX,AFLURIA,FLULAVAL) 0.5 ML suspension prefilled syringe injection, Inject 0.5 mL into the appropriate muscle as directed by prescriber During Hospitalization (Influenza prophylaxis) for up to 1 dose., Disp: 0.5 mL, Rfl: 0  •  insulin lispro (humaLOG, ADMELOG) 100 UNIT/ML injection, Inject 0-7 Units under the skin into the appropriate area as directed 3 (Three) Times a Day Before Meals., Disp: 10 mL, Rfl: 12  •  Lancets (accu-chek multiclix) lancets, Use to assess blood glucose before meals and at night time, Disp: 120 each, Rfl: 12  •  levETIRAcetam (KEPPRA) 1000 MG tablet, Take 1 tablet by mouth 2 (Two) Times a Day., Disp: 60 tablet, Rfl: 2  •  metFORMIN (Glucophage) 500 MG tablet, Take 1 tablet by mouth Daily With Breakfast., Disp: 30 tablet, Rfl: 1    Allergies:  Allergies   Allergen Reactions   • Lactose Intolerance (Gi) GI Bleeding   • Morphine And Related Itching   • Plavix [Clopidogrel Bisulfate] Itching       Social History     Tobacco Use   • Smoking status: Former Smoker     Packs/day: 1.00     Years: 25.00     Pack years: 25.00     Types: Cigarettes     Quit date: 3/22/2008     Years since quittin.9   • Smokeless tobacco: Never Used   Substance Use Topics   • Alcohol use: No   • Drug use: No       Family History   Problem Relation Age of Onset   • Hypertension Mother    • Hypertension Father    • No Known Problems Sister    • Breast cancer Neg Hx    • Ovarian cancer Neg Hx        Review of  Imaging:  CTA of the head and neck was reviewed with Dr. Hitchcock along with the corresponding radiology report.  Study demonstrates calcific atherosclerotic stenosis involving the bilateral proximal cervical internal carotid artery.  Evidence of multifocal narrowing in the vertebral arteries and focal stenosis in the basilar artery.    Carotid duplex was reviewed with Dr. Hitchcock along with the corresponding radiology report.  Study demonstrates peak velocities of the right vasculature as 196/20 cm/second with an ICA/CCA ratio of 2.4.  Peak velocities of the left vasculature as 281/84 cm/second with an ICA/CCA ratio of 3.6.  To note, this was stated as a technically difficult study due to high bifurcations and limited patient participation due to AMS.    Vitals:    03/01/21 1527   BP: 134/64   Temp: 97.6 °F (36.4 °C)     Body mass index is 26 kg/m².    Physical Exam  Constitutional:       Appearance: She is normal weight.   HENT:      Head: Normocephalic and atraumatic.      Mouth/Throat:      Mouth: Mucous membranes are moist.      Pharynx: Oropharynx is clear.   Eyes:      Extraocular Movements: Extraocular movements intact.      Conjunctiva/sclera: Conjunctivae normal.   Pulmonary:      Effort: Pulmonary effort is normal. No respiratory distress.   Neurological:      General: No focal deficit present.      Mental Status: She is alert and oriented to person, place, and time. Mental status is at baseline.      Coordination: Finger-Nose-Finger Test normal.      Gait: Gait is intact.      Deep Tendon Reflexes: Strength normal.   Psychiatric:         Speech: Speech normal.       Neurologic Exam     Mental Status   Oriented to person, place, and time.   Speech: speech is normal     Cranial Nerves   Cranial nerves II through XII intact.     Motor Exam   Muscle bulk: normal  Overall muscle tone: normal  Right arm pronator drift: absent  Left arm pronator drift: absent    Strength   Strength 5/5 throughout.     Sensory Exam    Light touch normal.     Gait, Coordination, and Reflexes     Gait  Gait: normal    Coordination   Finger to nose coordination: normal      Diagnoses/Plan:    Ms. Posey is a 75 y.o. female seen today due to stenosis involving the carotid, vertebral and basilar arteries.  After reviewing the studies, patient has evidence of extensive atherosclerotic involvement throughout her carotid, vertebral, and basilar arteries.  Therefore, we have advised patient to undergo a diagnostic angiogram for further evaluation to help determine appropriate treatment recommendations.  We will schedule this in the upcoming weeks with Dr. Hitchcock to allow us to provide better treatment advice.  Risks and benefits of the procedure were discussed at length with the patient, including pseudoaneurysm, dissection, stroke, etc., patient noted understanding and willing to proceed.  Signs and symptoms, specifically TIA/strokelike symptoms, reviewed with patient that would warrant to call our clinic and/or 911.  Patient was stated she needs to remain on Eliquis and aspirin at this time.  Patient noted understanding of this plan and willing to proceed.      Patient's Body mass index is 26 kg/m². BMI is above normal parameters. Recommendations include: educational material.        Sobia Thorpe PA-C

## 2021-03-03 DIAGNOSIS — I65.03 STENOSIS OF BOTH VERTEBRAL ARTERIES: ICD-10-CM

## 2021-03-03 DIAGNOSIS — I65.1 BASILAR ARTERY STENOSIS: ICD-10-CM

## 2021-03-03 DIAGNOSIS — I65.23 BILATERAL CAROTID ARTERY STENOSIS: Primary | ICD-10-CM

## 2021-03-03 RX ORDER — SODIUM CHLORIDE 0.9 % (FLUSH) 0.9 %
3 SYRINGE (ML) INJECTION EVERY 12 HOURS SCHEDULED
Status: CANCELLED | OUTPATIENT
Start: 2021-03-03

## 2021-03-03 RX ORDER — SODIUM CHLORIDE 0.9 % (FLUSH) 0.9 %
1-10 SYRINGE (ML) INJECTION AS NEEDED
Status: CANCELLED | OUTPATIENT
Start: 2021-03-03

## 2021-03-03 RX ORDER — SODIUM CHLORIDE 9 MG/ML
100 INJECTION, SOLUTION INTRAVENOUS CONTINUOUS
Status: CANCELLED | OUTPATIENT
Start: 2021-03-03

## 2021-03-04 ENCOUNTER — TELEPHONE (OUTPATIENT)
Dept: NEUROSURGERY | Facility: CLINIC | Age: 76
End: 2021-03-04

## 2021-03-04 NOTE — TELEPHONE ENCOUNTER
Pt called and was told that it will be ok for them to have both on the same day. Indy was thankful for the call back.

## 2021-03-26 ENCOUNTER — HOSPITAL ENCOUNTER (OUTPATIENT)
Dept: CARDIOLOGY | Facility: HOSPITAL | Age: 76
Discharge: HOME | End: 2021-03-26

## 2021-03-26 ENCOUNTER — IMMUNIZATION (OUTPATIENT)
Dept: VACCINE CLINIC | Facility: HOSPITAL | Age: 76
End: 2021-03-26

## 2021-03-26 DIAGNOSIS — I73.9 PAD (PERIPHERAL ARTERY DISEASE) (HCC): ICD-10-CM

## 2021-03-26 LAB
BH CV LOWER ARTERIAL LEFT ABI RATIO: 0.41
BH CV LOWER ARTERIAL LEFT CALF RATIO: 0.45
BH CV LOWER ARTERIAL LEFT DORSALIS PEDIS SYS MAX: 78 MMHG
BH CV LOWER ARTERIAL LEFT GREAT TOE SYS MAX: 46 MMHG
BH CV LOWER ARTERIAL LEFT HIGH THIGH SYS MAX: 114 MMHG
BH CV LOWER ARTERIAL LEFT LOW THIGH SYS MAX: 107 MMHG
BH CV LOWER ARTERIAL LEFT POPLITEAL SYS MAX: 92 MMHG
BH CV LOWER ARTERIAL LEFT POST TIBIAL SYS MAX: 85 MMHG
BH CV LOWER ARTERIAL LEFT TBI RATIO: 0.22
BH CV LOWER ARTERIAL RIGHT ABI RATIO: 0.44
BH CV LOWER ARTERIAL RIGHT CALF RATIO: 0.47
BH CV LOWER ARTERIAL RIGHT DORSALIS PEDIS SYS MAX: 41 MMHG
BH CV LOWER ARTERIAL RIGHT GREAT TOE SYS MAX: 14 MMHG
BH CV LOWER ARTERIAL RIGHT HIGH THIGH SYS MAX: 116 MMHG
BH CV LOWER ARTERIAL RIGHT LOW THIGH SYS MAX: 125 MMHG
BH CV LOWER ARTERIAL RIGHT POPLITEAL SYS MAX: 96 MMHG
BH CV LOWER ARTERIAL RIGHT POST TIBIAL SYS MAX: 90 MMHG
BH CV LOWER ARTERIAL RIGHT TBI RATIO: 0.07
UPPER ARTERIAL LEFT ARM BRACHIAL SYS MAX: 200 MMHG
UPPER ARTERIAL RIGHT ARM BRACHIAL SYS MAX: 206 MMHG

## 2021-03-26 PROCEDURE — 0012A: CPT | Performed by: INTERNAL MEDICINE

## 2021-03-26 PROCEDURE — 91301 HC SARSCO02 VAC 100MCG/0.5ML IM: CPT | Performed by: INTERNAL MEDICINE

## 2021-03-26 PROCEDURE — 93923 UPR/LXTR ART STDY 3+ LVLS: CPT | Performed by: INTERNAL MEDICINE

## 2021-03-26 PROCEDURE — 93923 UPR/LXTR ART STDY 3+ LVLS: CPT

## 2021-04-01 ENCOUNTER — OFFICE VISIT (OUTPATIENT)
Dept: CARDIAC SURGERY | Facility: CLINIC | Age: 76
End: 2021-04-01

## 2021-04-01 VITALS
HEIGHT: 61 IN | TEMPERATURE: 98.7 F | OXYGEN SATURATION: 98 % | SYSTOLIC BLOOD PRESSURE: 142 MMHG | HEART RATE: 79 BPM | WEIGHT: 124 LBS | BODY MASS INDEX: 23.41 KG/M2 | DIASTOLIC BLOOD PRESSURE: 78 MMHG

## 2021-04-01 DIAGNOSIS — E11.22 TYPE 2 DIABETES MELLITUS WITH STAGE 3 CHRONIC KIDNEY DISEASE, WITHOUT LONG-TERM CURRENT USE OF INSULIN, UNSPECIFIED WHETHER STAGE 3A OR 3B CKD (HCC): ICD-10-CM

## 2021-04-01 DIAGNOSIS — I74.09 OTHER ARTERIAL EMBOLISM AND THROMBOSIS OF ABDOMINAL AORTA (HCC): ICD-10-CM

## 2021-04-01 DIAGNOSIS — I73.9 PAD (PERIPHERAL ARTERY DISEASE) (HCC): ICD-10-CM

## 2021-04-01 DIAGNOSIS — M32.8 OTHER FORMS OF SYSTEMIC LUPUS ERYTHEMATOSUS, UNSPECIFIED ORGAN INVOLVEMENT STATUS (HCC): ICD-10-CM

## 2021-04-01 DIAGNOSIS — G82.20 PARAPARESIS OF BOTH LOWER LIMBS (HCC): ICD-10-CM

## 2021-04-01 DIAGNOSIS — N18.30 TYPE 2 DIABETES MELLITUS WITH STAGE 3 CHRONIC KIDNEY DISEASE, WITHOUT LONG-TERM CURRENT USE OF INSULIN, UNSPECIFIED WHETHER STAGE 3A OR 3B CKD (HCC): ICD-10-CM

## 2021-04-01 PROCEDURE — 93923 UPR/LXTR ART STDY 3+ LVLS: CPT | Performed by: THORACIC SURGERY (CARDIOTHORACIC VASCULAR SURGERY)

## 2021-04-01 PROCEDURE — 99213 OFFICE O/P EST LOW 20 MIN: CPT | Performed by: THORACIC SURGERY (CARDIOTHORACIC VASCULAR SURGERY)

## 2021-04-01 NOTE — PROGRESS NOTES
04/01/2021  Patient Information  Chika Posey                                                                                          [address]  [facility] [id number]   1945  'PCP/Referring Physician'  Alyssia Greenfield MD  No ref. provider found  Chief Complaint   Patient presents with   • Follow-up     1 year follow up with EDDIE for PVD. Pt states that she is feeling very well with no compliants       CC: I am here for my checkup    History of Present Illness: 75-year-old -American female with a long and significant history of atherosclerotic peripheral vascular disease.  This patient has previously undergone aorto bifemoral bypass done at the [facility].  Within 6 months of the initial placement of the graft the patient developed thrombosis of the graft.  She was then rehospitalized and had TPA thrombolysis.  This was successful however within a couple of years she again thrombosed the graft.  She was subsequently seen at this hospital both as an inpatient and as an outpatient.  She currently is being followed for peripheral vascular disease.  When last seen her ABIs bilaterally were greater than 0.5.  At that time she had primarily numbness and tingling in her feet.  She denied rest pain.  She had not had ulcerations on her feet and had no episodes of gangrene or tissue loss on her toes.  She had full normal motor function and full sensation of her toes on exam 1 year ago.  Today she states that her calf pain when she walks is probably better than it was a year ago.  She has not had rest pain but does have significant numbness on the dorsum of both feet and over the dorsal aspect of the toes.  She believes that she can walk approximately 100 feet before she has to stop and rest for a few minutes.  She has multiple medical comorbidities.  All of these issues have remained stable over the past year.  She underwent ABIs in the office today.  Her right EDDIE was 0.69  her left EDDIE was 0.73.  I note that she had ABIs done in the hospital approximately 1 week ago.  At that time she had significant hypertension and her ABIs were decreased as compared to today.      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)   • Altered mental status   • Hypertensive encephalopathy     Past Medical History:   Diagnosis Date   • Compartment syndrome (CMS/HCC)    • DVT (deep venous thrombosis) (CMS/HCC)     Bilateral   • Hyperlipidemia    • Hypertension    • Peripheral vascular disease (CMS/HCC)    • Stroke (CMS/HCC)     bleed     Past Surgical History:   Procedure Laterality Date   • BREAST BIOPSY Right    • FOREARM FASCIOTOMY Left    • THROMBOLYSIS     • TUBAL ABDOMINAL LIGATION     • US ARTERIAL DOPPLER LOWER EXTREMITY  UNILATERAL     • VASCULAR SURGERY      Bilateral Stents       Current Outpatient Medications:   •  apixaban (ELIQUIS) 2.5 MG tablet tablet, Take 1 tablet by mouth Every 12 (Twelve) Hours., Disp: 60 tablet, Rfl: 11  •  aspirin 81 MG chewable tablet, Chew 1 tablet Daily., Disp: 30 tablet, Rfl: 2  •  atorvastatin (LIPITOR) 80 MG tablet, Take 1 tablet by mouth Every Night., Disp: 30 tablet, Rfl: 2  •  cloNIDine (CATAPRES) 0.1 MG tablet, Take 1 tablet by mouth Every 12 (Twelve) Hours., Disp: 60 tablet, Rfl: 2  •  glucose blood test strip, Use as instructed, Disp: 120 each, Rfl: 12  •  influenza vac split quad  (FLUZONE,FLUARIX,AFLURIA,FLULAVAL) 0.5 ML suspension prefilled syringe injection, Inject 0.5 mL into the appropriate muscle as directed by prescriber During Hospitalization (Influenza prophylaxis) for up to 1 dose., Disp: 0.5 mL, Rfl: 0  •  insulin lispro (humaLOG, ADMELOG) 100 UNIT/ML injection, Inject 0-7 Units under the skin into the appropriate area as directed 3 (Three) Times a Day Before Meals., Disp: 10 mL, Rfl: 12  •  Lancets (accu-chek multiclix) lancets, Use to assess blood glucose before meals and at night time, Disp: 120 each, Rfl: 12  •  levETIRAcetam (KEPPRA) 1000 MG tablet, Take 1 tablet by mouth 2 (Two) Times a Day., Disp: 60 tablet, Rfl: 2  •  metFORMIN (Glucophage) 500 MG tablet, Take 1 tablet by mouth Daily With Breakfast., Disp: 30 tablet, Rfl: 1  Allergies   Allergen Reactions   • Lactose Intolerance (Gi) GI Bleeding   • Morphine And Related Itching   • Plavix [Clopidogrel Bisulfate] Itching     Social History     Socioeconomic History   • Marital status: Single     Spouse name: Not on file   • Number of children: 1   • Years of education: Not on file   • Highest education level: Not on file   Tobacco Use   • Smoking status: Former Smoker     Packs/day: 1.00     Years: 25.00     Pack years: 25.00     Types: Cigarettes     Quit date: 3/22/2008     Years since quittin.0   • Smokeless tobacco: Never Used   Vaping Use   • Vaping Use: Never used   Substance and Sexual Activity   • Alcohol use: No   • Drug use: No   • Sexual activity: Defer     Family History   Problem Relation Age of Onset   • Hypertension Mother    • Hypertension Father    • No Known Problems Sister    • Breast cancer Neg Hx    • Ovarian cancer Neg Hx        ROS, past medical history, surgical history, family history, social history and vitals  reviewed by me and corrected as needed.        Review of Systems   Constitutional: Negative for chills, fever, malaise/fatigue, night sweats and weight loss.   HENT: Negative for  "congestion, hearing loss, nosebleeds and odynophagia.    Cardiovascular: Negative for chest pain, claudication, dyspnea on exertion, leg swelling, orthopnea, palpitations and syncope.   Respiratory: Negative for cough, hemoptysis, shortness of breath and wheezing.    Endocrine: Negative for cold intolerance, heat intolerance, polydipsia, polyphagia and polyuria.   Hematologic/Lymphatic: Does not bruise/bleed easily.   Skin: Negative for itching, poor wound healing and rash.   Musculoskeletal: Negative for arthritis, back pain, joint pain, joint swelling and myalgias.   Gastrointestinal: Positive for diarrhea (from time to time). Negative for abdominal pain, constipation, hematemesis, melena, nausea and vomiting.   Genitourinary: Negative for dysuria, frequency, hematuria, nocturia and urgency.   Neurological: Positive for numbness (some slight numbnesss toes and finger tips). Negative for dizziness, light-headedness and loss of balance.   Psychiatric/Behavioral: Negative for depression and suicidal ideas. The patient is not nervous/anxious.    Allergic/Immunologic: Positive for environmental allergies. Negative for HIV exposure.       Vitals:    04/01/21 0940   BP: 142/78   Pulse: 79   Temp: 98.7 °F (37.1 °C)   SpO2: 98%   Weight: 56.2 kg (124 lb)   Height: 154.9 cm (61\")        Physical Exam  Vitals and nursing note reviewed.   Constitutional:       General: She is not in acute distress.     Appearance: Normal appearance. She is normal weight.      Comments: Elderly -American female in no acute distress.   HENT:      Head: Normocephalic and atraumatic.      Right Ear: External ear normal.      Left Ear: External ear normal.      Nose: Nose normal.      Mouth/Throat:      Mouth: Mucous membranes are moist.   Eyes:      Extraocular Movements: Extraocular movements intact.      Pupils: Pupils are equal, round, and reactive to light.   Neck:      Vascular: No carotid bruit.   Cardiovascular:      Rate and " Rhythm: Normal rate and regular rhythm.      Heart sounds: Normal heart sounds. No murmur heard.        Comments: Dorsalis pedis and posterior tibial pulses are not palpable but are present with the Doppler.  Capillary refill is 2 seconds.  Skin and nails are normal and not suggestive of chronic ischemia.  Pulmonary:      Effort: Pulmonary effort is normal. No respiratory distress.      Breath sounds: No wheezing, rhonchi or rales.   Abdominal:      General: Abdomen is flat. Bowel sounds are normal.      Palpations: Abdomen is soft. There is no mass.      Tenderness: There is no abdominal tenderness. There is no guarding.   Musculoskeletal:         General: No swelling or tenderness.      Cervical back: Normal range of motion. No rigidity. No muscular tenderness.      Right lower leg: No edema.      Left lower leg: No edema.      Comments: Capillary refill is 2 seconds bilaterally.  Pulses are present with the Doppler.  Skin and nails are normal without changes suggestive of chronic ischemia.   Lymphadenopathy:      Cervical: No cervical adenopathy.   Skin:     General: Skin is warm and dry.      Capillary Refill: Capillary refill takes less than 2 seconds.      Coloration: Skin is not jaundiced.      Findings: No erythema.   Neurological:      General: No focal deficit present.      Mental Status: She is alert and oriented to person, place, and time. Mental status is at baseline.   Psychiatric:         Mood and Affect: Mood normal.         Behavior: Behavior normal.         Thought Content: Thought content normal.         Judgment: Judgment normal.         Labs: None    Imaging: ABIs reviewed and compared to ABIs done 1 week ago and ABIs done 1 year ago.  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    Plan: The patient's symptoms are basically stable.  Considering the fact that she does not have any eminent tissue loss  and her symptoms are only moderate and are acceptable to her surgical intervention is not indicated at this point in time.  She will return to this clinic in 1 year with ABIs.      Bruce Ceballos MD  CTSurgery  04/02/21   11:30 EDT

## 2021-04-02 PROBLEM — N18.30 TYPE 2 DIABETES MELLITUS WITH STAGE 3 CHRONIC KIDNEY DISEASE, WITHOUT LONG-TERM CURRENT USE OF INSULIN (HCC): Status: ACTIVE | Noted: 2021-04-02

## 2021-04-02 PROBLEM — E11.22 TYPE 2 DIABETES MELLITUS WITH STAGE 3 CHRONIC KIDNEY DISEASE, WITHOUT LONG-TERM CURRENT USE OF INSULIN (HCC): Status: ACTIVE | Noted: 2021-04-02

## 2021-04-02 PROBLEM — M32.8 OTHER FORMS OF SYSTEMIC LUPUS ERYTHEMATOSUS (HCC): Status: ACTIVE | Noted: 2021-04-02

## 2021-04-05 RX ORDER — ATORVASTATIN CALCIUM 80 MG/1
TABLET, FILM COATED ORAL
Qty: 30 TABLET | Refills: 1 | OUTPATIENT
Start: 2021-04-05

## 2021-04-14 RX ORDER — CLONIDINE HYDROCHLORIDE 0.1 MG/1
TABLET ORAL
Qty: 60 TABLET | Refills: 1 | OUTPATIENT
Start: 2021-04-14

## 2021-04-14 RX ORDER — LEVETIRACETAM 1000 MG/1
TABLET ORAL
Qty: 60 TABLET | Refills: 1 | OUTPATIENT
Start: 2021-04-14

## 2021-04-16 RX ORDER — ASPIRIN 81 MG/1
TABLET, CHEWABLE ORAL
Qty: 30 TABLET | Refills: 1 | OUTPATIENT
Start: 2021-04-16

## 2021-04-26 RX ORDER — CLONIDINE HYDROCHLORIDE 0.1 MG/1
TABLET ORAL
Qty: 60 TABLET | Refills: 1 | OUTPATIENT
Start: 2021-04-26

## 2021-04-26 RX ORDER — LEVETIRACETAM 1000 MG/1
TABLET ORAL
Qty: 60 TABLET | Refills: 1 | OUTPATIENT
Start: 2021-04-26

## 2021-04-26 RX ORDER — ASPIRIN 81 MG/1
TABLET, CHEWABLE ORAL
Qty: 30 TABLET | Refills: 1 | OUTPATIENT
Start: 2021-04-26

## 2021-04-26 RX ORDER — ATORVASTATIN CALCIUM 80 MG/1
TABLET, FILM COATED ORAL
Qty: 30 TABLET | Refills: 1 | OUTPATIENT
Start: 2021-04-26

## 2021-05-06 PROBLEM — I65.1 BASILAR ARTERY STENOSIS: Status: ACTIVE | Noted: 2021-05-06

## 2021-05-06 PROBLEM — I65.03 STENOSIS OF BOTH VERTEBRAL ARTERIES: Status: ACTIVE | Noted: 2021-05-06

## 2021-05-15 NOTE — SIGNIFICANT NOTE
03/06/18 1627   SLP Deferred Reason   SLP Deferred Reason (Spoke to RN who reports not needed to be seen in ER, may also be d/cing. Agrees and wishes for SLP to check back tomorrow for ? in any needs, if still here. Thanks )      HPI:  5/15/21,   Time: 12:53 AM EDT       Jodie Wyatt. is a 46 y.o. male presenting to the ED for psychiatric evaluation, beginning 1 day ago. The complaint has been persistent, moderate in severity, and worsened by nothing. The patient states that he feels as if people are out to hurt him. He states that he is having auditory and visual hallucinations and feels as though they are making fun of him and telling him to think the wrong way. He denies any SI or HI. States he did take his shot but has not been taking his pills. Denies any chest pain shortness of breath. No nausea vomiting diarrhea. No abdominal pain. No recent illnesses. Review of Systems:   Pertinent positives and negatives are stated within HPI, all other systems reviewed and are negative.          --------------------------------------------- PAST HISTORY ---------------------------------------------  Past Medical History:  has a past medical history of Depression and Schizoaffective disorder ([facility] [address] Past Surgical History:  has a past surgical history that includes eye surgery (19 years ago). Social History:  reports that he has been smoking cigars and cigarettes. He has a 12.00 pack-year smoking history. He has never used smokeless tobacco. He reports current drug use. Drugs: Marijuana and Cocaine. He reports that he does not drink alcohol. Family History: family history includes Mental Illness in his mother; No Known Problems in his father; Substance Abuse in his mother. The patients home medications have been reviewed.     Allergies: Chlorpheniramine-phenylephrine, Penicillins, and Rondec-d [chlophedianol-pseudoephedrine]        ---------------------------------------------------PHYSICAL EXAM--------------------------------------    Constitutional/General: Alert and oriented x3, well appearing, non toxic in NAD  Head: Normocephalic and atraumatic  Eyes: PERRL, EOMI, conjunctive normal, sclera non icteric  Mouth: Oropharynx clear, handling secretions, no trismus, no asymmetry of the posterior oropharynx or uvular edema  Neck: Supple, full ROM, non tender to palpation in the midline, no stridor, no crepitus, no meningeal signs  Respiratory: Lungs clear to auscultation bilaterally, no wheezes, rales, or rhonchi. Not in respiratory distress  Cardiovascular:  Regular rate. Regular rhythm. No murmurs, gallops, or rubs. 2+ distal pulses  GI:  Abdomen Soft, Non tender, Non distended. +BS. No organomegaly, no palpable masses,  No rebound, guarding, or rigidity. Musculoskeletal: Moves all extremities x 4. Warm and well perfused, no clubbing, cyanosis, or edema. Capillary refill <3 seconds  Integument: skin warm and dry. No rashes. Neurologic: GCS 15, no focal deficits, symmetric strength 5/5 in the upper and lower extremities bilaterally  Psychiatric: Flat affect, poor eye contact, auditory and visual hallucinations    -------------------------------------------------- RESULTS -------------------------------------------------  I have personally reviewed all laboratory and imaging results for this patient. Results are listed below.      LABS:  Results for orders placed or performed during the hospital encounter of 05/15/21   COVID-19, Rapid    Specimen: Nasopharyngeal Swab   Result Value Ref Range    SARS-CoV-2, NAAT Not Detected Not Detected   CBC Auto Differential   Result Value Ref Range    WBC 5.1 4.5 - 11.5 E9/L    RBC 4.51 3.80 - 5.80 E12/L    Hemoglobin 13.4 12.5 - 16.5 g/dL    Hematocrit 40.8 37.0 - 54.0 %    MCV 90.5 80.0 - 99.9 fL    MCH 29.7 26.0 - 35.0 pg    MCHC 32.8 32.0 - 34.5 %    RDW 13.7 11.5 - 15.0 fL    Platelets 808 815 - 543 E9/L    MPV 9.9 7.0 - 12.0 fL    Neutrophils % 57.5 43.0 - 80.0 %    Immature Granulocytes % 0.2 0.0 - 5.0 %    Lymphocytes % 30.1 20.0 - 42.0 %    Monocytes % 10.0 2.0 - 12.0 %    Eosinophils % 1.2 0.0 - 6.0 %    Basophils % 1.0 0.0 - 2.0 %    Neutrophils Absolute 2.93 1.80 - 7.30 E9/L Immature Granulocytes # 0.01 E9/L    Lymphocytes Absolute 1.53 1.50 - 4.00 E9/L    Monocytes Absolute 0.51 0.10 - 0.95 E9/L    Eosinophils Absolute 0.06 0.05 - 0.50 E9/L    Basophils Absolute 0.05 0.00 - 0.20 E9/L   Comprehensive Metabolic Panel w/ Reflex to MG   Result Value Ref Range    Sodium 138 132 - 146 mmol/L    Potassium reflex Magnesium 3.9 3.5 - 5.0 mmol/L    Chloride 100 98 - 107 mmol/L    CO2 28 22 - 29 mmol/L    Anion Gap 10 7 - 16 mmol/L    Glucose 85 74 - 99 mg/dL    BUN 11 6 - 20 mg/dL    CREATININE 1.0 0.7 - 1.2 mg/dL    GFR Non-African American >60 >=60 mL/min/1.73    GFR African American >60     Calcium 9.2 8.6 - 10.2 mg/dL    Total Protein 7.1 6.4 - 8.3 g/dL    Albumin 4.2 3.5 - 5.2 g/dL    Total Bilirubin 0.5 0.0 - 1.2 mg/dL    Alkaline Phosphatase 45 40 - 129 U/L    ALT 13 0 - 40 U/L    AST 24 0 - 39 U/L   Serum Drug Screen   Result Value Ref Range    Ethanol Lvl <10 mg/dL    Acetaminophen Level <5.0 (L) 10.0 - 34.6 mcg/mL    Salicylate, Serum <2.7 0.0 - 30.0 mg/dL    TCA Scrn NEGATIVE Cutoff:300 ng/mL   EKG 12 Lead   Result Value Ref Range    Ventricular Rate 73 BPM    Atrial Rate 73 BPM    P-R Interval 188 ms    QRS Duration 92 ms    Q-T Interval 388 ms    QTc Calculation (Bazett) 427 ms    P Axis 76 degrees    R Axis 78 degrees    T Axis 62 degrees       RADIOLOGY:  Interpreted by Radiologist.  No orders to display       EKG: This EKG is signed and interpreted by the EP. EKG shows normal sinus rhythm with sinus arrhythmia at 73 bpm.  Possible left atrial enlargement. Nonspecific ST-T wave changes noted. No STEMI.    ------------------------- NURSING NOTES AND VITALS REVIEWED ---------------------------   The nursing notes within the ED encounter and vital signs as below have been reviewed by myself.   /83   Pulse 86   Temp 97.1 °F (36.2 °C)   Resp 16   SpO2 96%   Oxygen Saturation Interpretation: Normal    The patients available past medical records and past encounters were reviewed. ------------------------------ ED COURSE/MEDICAL DECISION MAKING----------------------  Medications - No data to display      ED COURSE:       Medical Decision Making: This is a 47-year-old male presented to the ED for psychiatric evaluation. Patient underwent laboratory work-up which showed a normal CBC. Normal chemistry. Negative Covid test.  Serum drug screen negative. EKG showed no ischemic findings. Patient medically cleared  consulted. Disposition pending  evaluation. I, Dr. Matthew López, am the primary provider for this encounter    This patient's ED course included: a personal history and physicial examination and re-evaluation prior to disposition    This patient has remained hemodynamically stable during their ED course. Re-Evaluations:             Re-evaluation. Patients symptoms show no change      Counseling: The emergency provider has spoken with the patient and discussed todays results, in addition to providing specific details for the plan of care and counseling regarding the diagnosis and prognosis. Questions are answered at this time and they are agreeable with the plan.       --------------------------------- IMPRESSION AND DISPOSITION ---------------------------------    IMPRESSION  1. Hallucinations        DISPOSITION  Disposition: Per   Patient condition is stable    NOTE: This report was transcribed using voice recognition software.  Every effort was made to ensure accuracy; however, inadvertent computerized transcription errors may be present        Loretta Blanco DO  05/15/21 9100

## 2021-05-17 ENCOUNTER — APPOINTMENT (OUTPATIENT)
Dept: PREADMISSION TESTING | Facility: HOSPITAL | Age: 76
End: 2021-05-17

## 2021-05-17 LAB — SARS-COV-2 RNA PNL SPEC NAA+PROBE: NOT DETECTED

## 2021-05-17 PROCEDURE — C9803 HOPD COVID-19 SPEC COLLECT: HCPCS

## 2021-05-17 PROCEDURE — U0004 COV-19 TEST NON-CDC HGH THRU: HCPCS

## 2021-05-20 ENCOUNTER — HOSPITAL ENCOUNTER (OUTPATIENT)
Facility: HOSPITAL | Age: 76
Setting detail: HOSPITAL OUTPATIENT SURGERY
Discharge: HOME | End: 2021-05-20
Attending: NEUROLOGICAL SURGERY | Admitting: NEUROLOGICAL SURGERY

## 2021-05-20 VITALS
BODY MASS INDEX: 23.64 KG/M2 | HEART RATE: 93 BPM | WEIGHT: 125.22 LBS | TEMPERATURE: 97.5 F | SYSTOLIC BLOOD PRESSURE: 141 MMHG | HEIGHT: 61 IN | OXYGEN SATURATION: 90 % | RESPIRATION RATE: 16 BRPM | DIASTOLIC BLOOD PRESSURE: 70 MMHG

## 2021-05-20 DIAGNOSIS — I65.23 BILATERAL CAROTID ARTERY STENOSIS: Primary | ICD-10-CM

## 2021-05-20 DIAGNOSIS — I65.1 BASILAR ARTERY STENOSIS: ICD-10-CM

## 2021-05-20 DIAGNOSIS — I65.03 STENOSIS OF BOTH VERTEBRAL ARTERIES: ICD-10-CM

## 2021-05-20 DIAGNOSIS — I65.23 BILATERAL CAROTID ARTERY STENOSIS: ICD-10-CM

## 2021-05-20 LAB
ANION GAP SERPL CALCULATED.3IONS-SCNC: 8 MMOL/L (ref 5–15)
BUN SERPL-MCNC: 18 MG/DL (ref 8–23)
BUN/CREAT SERPL: 15.4 (ref 7–25)
CALCIUM SPEC-SCNC: 9.3 MG/DL (ref 8.6–10.5)
CHLORIDE SERPL-SCNC: 106 MMOL/L (ref 98–107)
CO2 SERPL-SCNC: 27 MMOL/L (ref 22–29)
CREAT SERPL-MCNC: 1.17 MG/DL (ref 0.57–1)
DEPRECATED RDW RBC AUTO: 46.5 FL (ref 37–54)
ERYTHROCYTE [DISTWIDTH] IN BLOOD BY AUTOMATED COUNT: 15.2 % (ref 12.3–15.4)
GFR SERPL CREATININE-BSD FRML MDRD: 55 ML/MIN/1.73
GLUCOSE BLDC GLUCOMTR-MCNC: 127 MG/DL (ref 70–130)
GLUCOSE SERPL-MCNC: 123 MG/DL (ref 65–99)
HCT VFR BLD AUTO: 31.3 % (ref 34–46.6)
HGB BLD-MCNC: 9.3 G/DL (ref 12–15.9)
MCH RBC QN AUTO: 24.7 PG (ref 26.6–33)
MCHC RBC AUTO-ENTMCNC: 29.7 G/DL (ref 31.5–35.7)
MCV RBC AUTO: 83 FL (ref 79–97)
PLATELET # BLD AUTO: 455 10*3/MM3 (ref 140–450)
PMV BLD AUTO: 9.6 FL (ref 6–12)
POTASSIUM SERPL-SCNC: 4 MMOL/L (ref 3.5–5.2)
RBC # BLD AUTO: 3.77 10*6/MM3 (ref 3.77–5.28)
SODIUM SERPL-SCNC: 141 MMOL/L (ref 136–145)
WBC # BLD AUTO: 5.88 10*3/MM3 (ref 3.4–10.8)

## 2021-05-20 PROCEDURE — 36223 PLACE CATH CAROTID/INOM ART: CPT | Performed by: NEUROLOGICAL SURGERY

## 2021-05-20 PROCEDURE — 25010000002 MIDAZOLAM PER 1 MG: Performed by: NEUROLOGICAL SURGERY

## 2021-05-20 PROCEDURE — C1894 INTRO/SHEATH, NON-LASER: HCPCS | Performed by: NEUROLOGICAL SURGERY

## 2021-05-20 PROCEDURE — C1769 GUIDE WIRE: HCPCS | Performed by: NEUROLOGICAL SURGERY

## 2021-05-20 PROCEDURE — S0260 H&P FOR SURGERY: HCPCS | Performed by: PHYSICIAN ASSISTANT

## 2021-05-20 PROCEDURE — 80048 BASIC METABOLIC PNL TOTAL CA: CPT

## 2021-05-20 PROCEDURE — 99153 MOD SED SAME PHYS/QHP EA: CPT | Performed by: NEUROLOGICAL SURGERY

## 2021-05-20 PROCEDURE — 0 IODIXANOL PER 1 ML: Performed by: NEUROLOGICAL SURGERY

## 2021-05-20 PROCEDURE — 36225 PLACE CATH SUBCLAVIAN ART: CPT | Performed by: NEUROLOGICAL SURGERY

## 2021-05-20 PROCEDURE — 82962 GLUCOSE BLOOD TEST: CPT

## 2021-05-20 PROCEDURE — 25010000002 FENTANYL CITRATE (PF) 50 MCG/ML SOLUTION: Performed by: NEUROLOGICAL SURGERY

## 2021-05-20 PROCEDURE — 99152 MOD SED SAME PHYS/QHP 5/>YRS: CPT | Performed by: NEUROLOGICAL SURGERY

## 2021-05-20 PROCEDURE — 36226 PLACE CATH VERTEBRAL ART: CPT | Performed by: NEUROLOGICAL SURGERY

## 2021-05-20 PROCEDURE — 25010000002 ONDANSETRON PER 1 MG: Performed by: PHYSICIAN ASSISTANT

## 2021-05-20 PROCEDURE — 85027 COMPLETE CBC AUTOMATED: CPT

## 2021-05-20 RX ORDER — SODIUM CHLORIDE 0.9 % (FLUSH) 0.9 %
3 SYRINGE (ML) INJECTION EVERY 12 HOURS SCHEDULED
Status: DISCONTINUED | OUTPATIENT
Start: 2021-05-20 | End: 2021-05-20 | Stop reason: HOSPADM

## 2021-05-20 RX ORDER — SODIUM CHLORIDE 0.9 % (FLUSH) 0.9 %
10 SYRINGE (ML) INJECTION AS NEEDED
Status: DISCONTINUED | OUTPATIENT
Start: 2021-05-20 | End: 2021-05-20 | Stop reason: HOSPADM

## 2021-05-20 RX ORDER — CLOPIDOGREL BISULFATE 75 MG/1
75 TABLET ORAL DAILY
Qty: 30 TABLET | Refills: 2 | Status: SHIPPED | OUTPATIENT
Start: 2021-05-20 | End: 2021-08-16

## 2021-05-20 RX ORDER — IODIXANOL 320 MG/ML
INJECTION, SOLUTION INTRAVASCULAR AS NEEDED
Status: DISCONTINUED | OUTPATIENT
Start: 2021-05-20 | End: 2021-05-20 | Stop reason: HOSPADM

## 2021-05-20 RX ORDER — SODIUM CHLORIDE 9 MG/ML
100 INJECTION, SOLUTION INTRAVENOUS CONTINUOUS
Status: DISCONTINUED | OUTPATIENT
Start: 2021-05-20 | End: 2021-05-20 | Stop reason: HOSPADM

## 2021-05-20 RX ORDER — MIDAZOLAM HYDROCHLORIDE 1 MG/ML
INJECTION INTRAMUSCULAR; INTRAVENOUS AS NEEDED
Status: DISCONTINUED | OUTPATIENT
Start: 2021-05-20 | End: 2021-05-20 | Stop reason: HOSPADM

## 2021-05-20 RX ORDER — LIDOCAINE HYDROCHLORIDE 10 MG/ML
INJECTION, SOLUTION EPIDURAL; INFILTRATION; INTRACAUDAL; PERINEURAL AS NEEDED
Status: DISCONTINUED | OUTPATIENT
Start: 2021-05-20 | End: 2021-05-20 | Stop reason: HOSPADM

## 2021-05-20 RX ORDER — ONDANSETRON 2 MG/ML
4 INJECTION INTRAMUSCULAR; INTRAVENOUS EVERY 6 HOURS PRN
Status: DISCONTINUED | OUTPATIENT
Start: 2021-05-20 | End: 2021-05-20 | Stop reason: HOSPADM

## 2021-05-20 RX ORDER — SODIUM CHLORIDE 9 MG/ML
75 INJECTION, SOLUTION INTRAVENOUS CONTINUOUS
Status: ACTIVE | OUTPATIENT
Start: 2021-05-20 | End: 2021-05-20

## 2021-05-20 RX ORDER — ACETAMINOPHEN 500 MG
1000 TABLET ORAL EVERY 8 HOURS PRN
Status: DISCONTINUED | OUTPATIENT
Start: 2021-05-20 | End: 2021-05-20 | Stop reason: HOSPADM

## 2021-05-20 RX ORDER — SODIUM CHLORIDE 0.9 % (FLUSH) 0.9 %
1-10 SYRINGE (ML) INJECTION AS NEEDED
Status: DISCONTINUED | OUTPATIENT
Start: 2021-05-20 | End: 2021-05-20 | Stop reason: HOSPADM

## 2021-05-20 RX ORDER — FENTANYL CITRATE 50 UG/ML
INJECTION, SOLUTION INTRAMUSCULAR; INTRAVENOUS AS NEEDED
Status: DISCONTINUED | OUTPATIENT
Start: 2021-05-20 | End: 2021-05-20 | Stop reason: HOSPADM

## 2021-05-20 RX ADMIN — NICARDIPINE HYDROCHLORIDE 5 MG/HR: 0.1 INJECTION, SOLUTION INTRAVENOUS at 16:51

## 2021-05-20 RX ADMIN — ACETAMINOPHEN 1000 MG: 500 TABLET ORAL at 19:06

## 2021-05-20 RX ADMIN — ONDANSETRON 4 MG: 2 INJECTION INTRAMUSCULAR; INTRAVENOUS at 19:07

## 2021-06-03 ENCOUNTER — HOSPITAL ENCOUNTER (OUTPATIENT)
Dept: CARDIOLOGY | Facility: HOSPITAL | Age: 76
Discharge: HOME | End: 2021-06-03
Admitting: PHYSICIAN ASSISTANT

## 2021-06-03 VITALS — WEIGHT: 125 LBS | BODY MASS INDEX: 23.6 KG/M2 | HEIGHT: 61 IN

## 2021-06-03 DIAGNOSIS — I65.23 BILATERAL CAROTID ARTERY STENOSIS: ICD-10-CM

## 2021-06-03 PROCEDURE — 93880 EXTRACRANIAL BILAT STUDY: CPT | Performed by: INTERNAL MEDICINE

## 2021-06-03 PROCEDURE — 93880 EXTRACRANIAL BILAT STUDY: CPT

## 2021-06-04 ENCOUNTER — OFFICE VISIT (OUTPATIENT)
Dept: NEUROSURGERY | Facility: CLINIC | Age: 76
End: 2021-06-04

## 2021-06-04 VITALS
TEMPERATURE: 97.3 F | SYSTOLIC BLOOD PRESSURE: 118 MMHG | BODY MASS INDEX: 24.09 KG/M2 | RESPIRATION RATE: 18 BRPM | WEIGHT: 127.6 LBS | HEIGHT: 61 IN | HEART RATE: 70 BPM | OXYGEN SATURATION: 98 % | DIASTOLIC BLOOD PRESSURE: 60 MMHG

## 2021-06-04 DIAGNOSIS — I73.9 PAD (PERIPHERAL ARTERY DISEASE) (HCC): ICD-10-CM

## 2021-06-04 DIAGNOSIS — Z86.718 HISTORY OF DVT (DEEP VEIN THROMBOSIS): ICD-10-CM

## 2021-06-04 DIAGNOSIS — Z79.01 CHRONIC ANTICOAGULATION: ICD-10-CM

## 2021-06-04 DIAGNOSIS — I65.23 BILATERAL CAROTID ARTERY STENOSIS: Primary | ICD-10-CM

## 2021-06-04 LAB
BH CV ECHO MEAS - BSA(HAYCOCK): 1.6 M^2
BH CV ECHO MEAS - BSA: 1.5 M^2
BH CV ECHO MEAS - BZI_BMI: 23.6 KILOGRAMS/M^2
BH CV ECHO MEAS - BZI_METRIC_HEIGHT: 154.9 CM
BH CV ECHO MEAS - BZI_METRIC_WEIGHT: 56.7 KG
BH CV XLRA MEAS LEFT DIST CCA EDV: 23.1 CM/SEC
BH CV XLRA MEAS LEFT DIST CCA PSV: 82 CM/SEC
BH CV XLRA MEAS LEFT DIST ICA EDV: 28.5 CM/SEC
BH CV XLRA MEAS LEFT DIST ICA PSV: 73.2 CM/SEC
BH CV XLRA MEAS LEFT ICA/CCA RATIO: 4.16
BH CV XLRA MEAS LEFT MID CCA EDV: 22.6 CM/SEC
BH CV XLRA MEAS LEFT MID CCA PSV: 78.1 CM/SEC
BH CV XLRA MEAS LEFT MID ICA EDV: 36.9 CM/SEC
BH CV XLRA MEAS LEFT MID ICA PSV: 104 CM/SEC
BH CV XLRA MEAS LEFT PROX CCA EDV: 18.2 CM/SEC
BH CV XLRA MEAS LEFT PROX CCA PSV: 67.8 CM/SEC
BH CV XLRA MEAS LEFT PROX ECA PSV: 86.4 CM/SEC
BH CV XLRA MEAS LEFT PROX ICA EDV: 138 CM/SEC
BH CV XLRA MEAS LEFT PROX ICA PSV: 325 CM/SEC
BH CV XLRA MEAS LEFT PROX SCLA PSV: 214 CM/SEC
BH CV XLRA MEAS LEFT VERTEBRAL A EDV: 26.7 CM/SEC
BH CV XLRA MEAS LEFT VERTEBRAL A PSV: 57.7 CM/SEC
BH CV XLRA MEAS RIGHT DIST CCA EDV: 30.3 CM/SEC
BH CV XLRA MEAS RIGHT DIST CCA PSV: 101 CM/SEC
BH CV XLRA MEAS RIGHT DIST ICA EDV: 15 CM/SEC
BH CV XLRA MEAS RIGHT DIST ICA PSV: 41.6 CM/SEC
BH CV XLRA MEAS RIGHT ICA/CCA RATIO: 1.72
BH CV XLRA MEAS RIGHT MID CCA EDV: 23.6 CM/SEC
BH CV XLRA MEAS RIGHT MID CCA PSV: 85 CM/SEC
BH CV XLRA MEAS RIGHT MID ICA EDV: 21 CM/SEC
BH CV XLRA MEAS RIGHT MID ICA PSV: 81 CM/SEC
BH CV XLRA MEAS RIGHT PROX CCA EDV: 15.7 CM/SEC
BH CV XLRA MEAS RIGHT PROX CCA PSV: 63.3 CM/SEC
BH CV XLRA MEAS RIGHT PROX ECA PSV: 140 CM/SEC
BH CV XLRA MEAS RIGHT PROX ICA EDV: 45.2 CM/SEC
BH CV XLRA MEAS RIGHT PROX ICA PSV: 146 CM/SEC
BH CV XLRA MEAS RIGHT PROX SCLA PSV: 174 CM/SEC
BH CV XLRA MEAS RIGHT VERTEBRAL A EDV: 9.04 CM/SEC
BH CV XLRA MEAS RIGHT VERTEBRAL A PSV: 30.2 CM/SEC
LEFT ARM BP: NORMAL MMHG
MAXIMAL PREDICTED HEART RATE: 146 BPM
RIGHT ARM BP: NORMAL MMHG
STRESS TARGET HR: 124 BPM

## 2021-06-04 PROCEDURE — 99214 OFFICE O/P EST MOD 30 MIN: CPT | Performed by: NEUROLOGICAL SURGERY

## 2021-06-04 RX ORDER — LEVETIRACETAM 500 MG/1
500 TABLET ORAL 2 TIMES DAILY
COMMUNITY
Start: 2021-05-19 | End: 2021-01-01

## 2021-06-04 RX ORDER — AMLODIPINE BESYLATE 5 MG/1
5 TABLET ORAL DAILY
COMMUNITY
Start: 2021-05-19 | End: 2021-01-01

## 2021-06-04 NOTE — PROGRESS NOTES
Subjective     Chief Complaint: Bilateral carotid stenosis    Patient ID: Chika Posey is a 76 y.o. female is here today for follow-up.    History of Present Illness    This is a 76-year-old woman who I was consulted on for bilateral carotid stenosis.  I performed a diagnostic angiogram on her on May 20.  She presents today to discuss the results of this angiogram and to discuss the results of the surveillance ultrasound that I ordered.  She denies any numbness, tingling, weakness, strokelike symptoms, vision loss, or TIAs since I saw her on May 20.  She is still taking Eliquis and Plavix.  She is on Eliquis for DVTs and occlusion of her aortofemoral bypass grafts.    The following portions of the patient's history were reviewed and updated as appropriate: allergies, current medications, past family history, past medical history, past social history, past surgical history and problem list.    Family history:   Family History   Problem Relation Age of Onset   • Hypertension Mother    • Hypertension Father    • No Known Problems Sister    • Breast cancer Neg Hx    • Ovarian cancer Neg Hx        Social history:   Social History     Socioeconomic History   • Marital status: Single     Spouse name: Not on file   • Number of children: 1   • Years of education: Not on file   • Highest education level: Not on file   Tobacco Use   • Smoking status: Former Smoker     Packs/day: 1.00     Years: 25.00     Pack years: 25.00     Types: Cigarettes     Quit date: 3/22/2008     Years since quittin.2   • Smokeless tobacco: Never Used   Vaping Use   • Vaping Use: Never used   Substance and Sexual Activity   • Alcohol use: No   • Drug use: No   • Sexual activity: Defer       Review of Systems   Constitutional: Negative for activity change, appetite change, chills, diaphoresis, fatigue, fever and unexpected weight change.   HENT: Negative for congestion, dental problem, drooling, ear discharge, ear pain, facial swelling,  "hearing loss, mouth sores, nosebleeds, postnasal drip, rhinorrhea, sinus pressure, sinus pain, sneezing, sore throat, tinnitus, trouble swallowing and voice change.    Eyes: Negative for photophobia, pain, discharge, redness, itching and visual disturbance.   Respiratory: Negative for apnea, cough, choking, chest tightness, shortness of breath, wheezing and stridor.    Cardiovascular: Negative for chest pain, palpitations and leg swelling.   Gastrointestinal: Negative for abdominal distention, abdominal pain, anal bleeding, blood in stool, constipation, diarrhea, nausea, rectal pain and vomiting.   Endocrine: Negative for cold intolerance, heat intolerance, polydipsia, polyphagia and polyuria.   Genitourinary: Negative for decreased urine volume, difficulty urinating, dysuria, enuresis, flank pain, frequency, genital sores, hematuria and urgency.   Musculoskeletal: Negative for arthralgias, back pain, gait problem, joint swelling, myalgias, neck pain and neck stiffness.   Skin: Negative for color change, pallor, rash and wound.   Allergic/Immunologic: Negative for environmental allergies, food allergies and immunocompromised state.   Neurological: Positive for seizures, syncope, speech difficulty, light-headedness and numbness. Negative for dizziness, tremors, facial asymmetry, weakness and headaches.   Hematological: Negative for adenopathy. Does not bruise/bleed easily.   Psychiatric/Behavioral: Negative for agitation, behavioral problems, confusion, decreased concentration, dysphoric mood, hallucinations, self-injury, sleep disturbance and suicidal ideas. The patient is not nervous/anxious and is not hyperactive.    All other systems reviewed and are negative.      Objective   Blood pressure 118/60, pulse 70, temperature 97.3 °F (36.3 °C), resp. rate 18, height 154.9 cm (61\"), weight 57.9 kg (127 lb 9.6 oz), SpO2 98 %, not currently breastfeeding.  Body mass index is 24.11 kg/m².    Physical Exam  Constitutional: "       Appearance: She is normal weight.   HENT:      Head: Normocephalic and atraumatic.      Mouth/Throat:      Mouth: Mucous membranes are moist.      Pharynx: Oropharynx is clear.   Eyes:      Extraocular Movements: Extraocular movements intact.      Conjunctiva/sclera: Conjunctivae normal.   Pulmonary:      Effort: Pulmonary effort is normal. No respiratory distress.   Neurological:      General: No focal deficit present.      Mental Status: She is alert and oriented to person, place, and time. Mental status is at baseline.      Coordination: Finger-Nose-Finger Test normal.      Gait: Gait is intact.   Psychiatric:         Speech: Speech normal.         Assessment/Plan     Independent Review of Radiographic Studies:      Available for my review is a carotid ultrasound which was performed yesterday Mariel 3, 2021.  She has velocities of 146 cm/s with a ratio of 1.72 on the right.  On the left side, which is the site of her prior carotid endarterectomy, she has velocities of 325 cm/s, end-diastolic 138 cm/s, and a ratio of 4.16.    Medical Decision Making:      This is a 76-year-old woman with moderate bordering on severe bilateral asymptomatic carotid stenosis.  She has a history of a left sided CVA.  She has occluded femoral arteries on both sides.  She has an occluded right radial artery.  She has a history of a fasciotomy on her left forearm.  She has very limited vascular access options and is at high risk for complications for carotid stenting.  She is at high risk for carotid endarterectomy based on anatomic criteria bilaterally.  History of CVA on the left, and a high bifurcation on the right.  I like some time to more deeply consider her options and discuss her case with my colleagues.  For the time being I think she is adequately protected from stroke on her regimen of Eliquis and Plavix.  I will contact her in the next few weeks to discuss further management options.    Diagnoses and all orders for this  visit:    1. Bilateral carotid artery stenosis (Primary)    2. PAD (peripheral artery disease) (CMS/HCC)    3. History of DVT (deep vein thrombosis)    4. Chronic anticoagulation        No follow-ups on file.           This document signed by GINGER Hitchcock MD June 4, 2021 10:50 EDT

## 2021-07-02 DIAGNOSIS — Z79.01 CHRONIC ANTICOAGULATION: ICD-10-CM

## 2021-07-02 DIAGNOSIS — I73.9 PAD (PERIPHERAL ARTERY DISEASE) (HCC): ICD-10-CM

## 2021-07-02 DIAGNOSIS — I65.03 STENOSIS OF BOTH VERTEBRAL ARTERIES: ICD-10-CM

## 2021-07-02 DIAGNOSIS — Z86.718 HISTORY OF DVT (DEEP VEIN THROMBOSIS): ICD-10-CM

## 2021-07-02 DIAGNOSIS — I65.23 BILATERAL CAROTID ARTERY STENOSIS: Primary | ICD-10-CM

## 2021-07-02 DIAGNOSIS — I65.1 BASILAR ARTERY STENOSIS: ICD-10-CM

## 2021-08-16 RX ORDER — CLOPIDOGREL BISULFATE 75 MG/1
TABLET ORAL
Qty: 30 TABLET | Refills: 2 | Status: SHIPPED | OUTPATIENT
Start: 2021-08-16 | End: 2022-01-01 | Stop reason: HOSPADM

## 2021-08-16 NOTE — TELEPHONE ENCOUNTER
Provider:  Dr. Hitchcock  Caller: Automated refill  Time of call:   --  Phone #:  [phone]  Last visit:   Office Visit with Silvino Hitchcock MD (06/04/2021)    Next visit: Appointment with Silvino Hitchcock MD (12/06/2021)      MARKUS:         Reason for call:         Requested Prescriptions     Pending Prescriptions Disp Refills   • clopidogrel (PLAVIX) 75 MG tablet [Pharmacy Med Name: CLOPIDOGREL 75 MG TABLET] 30 tablet 2     Sig: TAKE ONE TABLET BY MOUTH DAILY

## 2021-10-07 NOTE — PROGRESS NOTES
"10/07/2021  Patient Information  Chika Posey                                                                                          [address]  [facility] [address]   1945  'PCP/Referring Physician'  Alyssia Greenfield MD  No ref. provider found  Chief Complaint   Patient presents with   • Follow-up     6 month follow up - EDDIE in office. Pt states that she is still having pain lower leg and foot pain. Jayant horses very often and pain radiates from the center of the foot at the arch of both feet. Pt states that she also has pain in the shins of her legs. Pt also had a seizure like episode on Monday  but she was seen by the fire dept because that was the closest place to get to. B/p was checked and blood sugar pt did not  f/u with PCP b/c she \"got to feeling better\" .        CC: I feel pretty good      History of Present Illness: 76-year-old -American female being seen in follow-up for peripheral vascular disease.  This patient has multiple morbidities.  She has type 2 diabetes, chronic hypertension, history of dyslipidemia, and a past history of cigarette smoking.  In this setting for the past several years she has had pain in her legs when she walks.  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  She did well initially following that procedure but subsequently thrombosed the graft.  She was admitted to the [facility] at that point in time and underwent a thrombolytic therapy to open the graft.  She then again did well for a period of time before she again chronically occluded the graft.  Since that time she has had persistent claudication in both lower extremities.  Over the past 2 or 3 years she has been followed in this clinic with fairly severe claudication bilaterally (left greater than right).  She now states that her legs are slightly improved and she feels like she can walk 50 to 100 yards " without stopping secondary to pain.  She does not have rest pain she has not had ulcerations of the legs or feet that have not healed.  She does get frequent charley horses.  These go away with massage and there or at some time she has to stand up and try to walk off.          Patient Active Problem List   Diagnosis   • Cerebral hemorrhage (HCC)   • Headache   • Slurred speech   • PAD (peripheral artery disease) (HCC)   • Essential hypertension   • Chronic anticoagulation   • Paraparesis of both lower limbs (HCC)   • Acute-on-chronic kidney injury (HCC)   • Back pain   • Spell of altered cognition   • Generalized weakness   • Muscular weakness   • History of cerebral hemorrhage   • History of DVT (deep vein thrombosis)   • Bilateral carotid artery stenosis   • Angina pectoris (HCC)   • 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 (HCC)   • Altered mental status   • Hypertensive encephalopathy   • Other forms of systemic lupus erythematosus (HCC)   • Type 2 diabetes mellitus with stage 3 chronic kidney disease, without long-term current use of insulin (HCC)   • Basilar artery stenosis   • Stenosis of both vertebral arteries     Past Medical History:   Diagnosis Date   • Back pain    • Carotid artery occlusion    • Cerebral hemorrhage (HCC)    • Chronic kidney disease     MATT   • Compartment syndrome (HCC)    • DVT (deep venous thrombosis) (HCC)     Bilateral   • Generalized weakness    • Hyperlipidemia    • Hypertension    • Hypertensive encephalopathy    • Muscle weakness    • Paraparesis of both lower limbs (HCC)    • Peripheral vascular disease (HCC)    • Stroke (HCC)     bleed     Past Surgical History:   Procedure Laterality Date   • AORTA BIFEMORAL BYPASS     • BREAST BIOPSY Right    • CAROTID ENDARTERECTOMY Left    • FOREARM FASCIOTOMY Left    • INTERVENTIONAL RADIOLOGY PROCEDURE Bilateral 5/20/2021    Procedure: Carotid Cerebral Angiogram;  Surgeon: Silvino Hitchcock MD;  Location: [facility]  CATH INVASIVE LOCATION;  Service: Interventional Radiology;  Laterality: Bilateral;   • THROMBOLYSIS     • TUBAL ABDOMINAL LIGATION     • US ARTERIAL DOPPLER LOWER EXTREMITY  UNILATERAL     • VASCULAR SURGERY      Bilateral Stents       Current Outpatient Medications:   •  amLODIPine (NORVASC) 5 MG tablet, , Disp: , Rfl:   •  apixaban (ELIQUIS) 2.5 MG tablet tablet, Take 1 tablet by mouth Every 12 (Twelve) Hours., Disp: 60 tablet, Rfl: 11  •  aspirin 81 MG chewable tablet, Chew 1 tablet Daily., Disp: 30 tablet, Rfl: 2  •  atorvastatin (LIPITOR) 80 MG tablet, Take 1 tablet by mouth Every Night., Disp: 30 tablet, Rfl: 2  •  cloNIDine (CATAPRES) 0.1 MG tablet, Take 1 tablet by mouth Every 12 (Twelve) Hours., Disp: 60 tablet, Rfl: 2  •  clopidogrel (PLAVIX) 75 MG tablet, TAKE ONE TABLET BY MOUTH DAILY, Disp: 30 tablet, Rfl: 2  •  levETIRAcetam (KEPPRA) 1000 MG tablet, Take 1 tablet by mouth 2 (Two) Times a Day., Disp: 60 tablet, Rfl: 2  •  levETIRAcetam (KEPPRA) 500 MG tablet, , Disp: , Rfl:   Allergies   Allergen Reactions   • Lactose Intolerance (Gi) GI Bleeding   • Morphine And Related Itching   • Plavix [Clopidogrel Bisulfate] Itching     Social History     Socioeconomic History   • Marital status: Single     Spouse name: Not on file   • Number of children: 1   • Years of education: Not on file   • Highest education level: Not on file   Tobacco Use   • Smoking status: Former Smoker     Packs/day: 1.00     Years: 25.00     Pack years: 25.00     Types: Cigarettes     Quit date: 3/22/2008     Years since quittin.5   • Smokeless tobacco: Never Used   Vaping Use   • Vaping Use: Never used   Substance and Sexual Activity   • Alcohol use: No   • Drug use: No   • Sexual activity: Defer     Family History   Problem Relation Age of Onset   • Hypertension Mother    • Hypertension Father    • No Known Problems Sister    • Breast cancer Neg Hx    • Ovarian cancer Neg Hx        ROS, past medical history, surgical history,  "family history, social history and vitals  reviewed by me and corrected as needed.        Review of Systems   Constitutional: Negative for chills, fever, malaise/fatigue, night sweats and weight loss.   HENT: Negative for congestion, hearing loss, nosebleeds and odynophagia.    Cardiovascular: Positive for dyspnea on exertion (from time to time). Negative for chest pain, claudication, leg swelling, orthopnea, palpitations and syncope.   Respiratory: Negative for cough, hemoptysis, shortness of breath and wheezing.    Endocrine: Negative for cold intolerance, heat intolerance, polydipsia, polyphagia and polyuria.   Hematologic/Lymphatic: Does not bruise/bleed easily.   Skin: Negative for itching, poor wound healing and rash.   Musculoskeletal: Positive for muscle cramps (lower legs bilateral). Negative for arthritis, back pain, joint pain, joint swelling and myalgias.   Gastrointestinal: Negative for abdominal pain, constipation, diarrhea, hematemesis, melena, nausea and vomiting.   Genitourinary: Negative for dysuria, frequency, hematuria, nocturia and urgency.   Neurological: Positive for seizures (pt states that she had a seizure like episode on Monday ). Negative for dizziness, light-headedness, loss of balance and numbness.   Psychiatric/Behavioral: Negative for depression and suicidal ideas. The patient is not nervous/anxious.    Allergic/Immunologic: Positive for environmental allergies. Negative for HIV exposure.       Vitals:    10/07/21 1145   BP: 128/74   Pulse: 96   Temp: 98.2 °F (36.8 °C)   SpO2: 99%   Weight: 60.1 kg (132 lb 9.6 oz)   Height: 154.9 cm (61\")        Physical Exam  Vitals and nursing note reviewed.   Constitutional:       General: She is not in acute distress.     Appearance: Normal appearance. She is normal weight.      Comments: Elderly  female in no acute distress.  The patient is in good spirits she is an excellent historian.   HENT:      Head: Normocephalic and atraumatic.    "   Right Ear: External ear normal.      Left Ear: External ear normal.      Nose: Nose normal.      Mouth/Throat:      Mouth: Mucous membranes are moist.   Eyes:      Extraocular Movements: Extraocular movements intact.      Pupils: Pupils are equal, round, and reactive to light.   Neck:      Vascular: No carotid bruit.   Cardiovascular:      Rate and Rhythm: Normal rate and regular rhythm.      Pulses: Normal pulses.      Heart sounds: Murmur heard.        Comments: 1/6 systolic murmur left sternal border  Pulmonary:      Effort: Pulmonary effort is normal. No respiratory distress.      Breath sounds: No wheezing, rhonchi or rales.   Abdominal:      General: Abdomen is flat. Bowel sounds are normal.      Palpations: Abdomen is soft. There is no mass.      Tenderness: There is no abdominal tenderness. There is no guarding.   Musculoskeletal:         General: No swelling or tenderness.      Cervical back: Normal range of motion. No rigidity. No muscular tenderness.      Right lower leg: No edema.      Left lower leg: No edema.      Comments: Pedal pulses are nonpalpable.  Skin and nails of both lower extremities are normal.  Capillary refill is 2 seconds on the left and 3 seconds on the right.  Dorsalis pedis and posterior tibial pulses are not palpable but are faintly obtainable with the Doppler.  There are no ulcerations.  Femoral pulses are absent there are no femoral bruits popliteal pulses are absent   Lymphadenopathy:      Cervical: No cervical adenopathy.   Skin:     General: Skin is warm and dry.      Capillary Refill: Capillary refill takes less than 2 seconds.      Coloration: Skin is not jaundiced.      Findings: No erythema.   Neurological:      General: No focal deficit present.      Mental Status: She is alert and oriented to person, place, and time. Mental status is at baseline.   Psychiatric:         Mood and Affect: Mood normal.         Behavior: Behavior normal.         Thought Content: Thought  content normal.         Judgment: Judgment normal.         Labs: None    Imaging: ABIs were reviewed.  The patient's ABIs are markedly lower than they have been in the past and really are not consistent with the patient's current physical examination.    Assessment: Peripheral vascular disease with chronic claudication.  Stable.    Plan: Return to clinic in 1 year with ABIs.    Electronically signed by Bruce Ceballos MD, 10/07/21, 1:32 PM EDT.

## 2021-10-07 NOTE — CONSULTS
Stroke Consult Note    Patient Name: Chika Posey   MRN: [id number]  Age: 76 y.o.  Sex: female  : 1945    Primary Care Physician: Alyssia Greenfield MD  Referring Physician:  No ref. provider found        Handedness: right  Race: AA    Chief Complaint/Reason for Consultation: speech disturbance/right arm weakness     Subjective .  HPI: 76 year old right handed black female with known medical diagnoses of HTN, compartment syndrome, HLD, ICH in 2018?, PVD with left CEA and aortofemoral bypass, Covid in 2020 who presents with a new onset of speech disturbance and right arm weakness that resolved prior to arrival at [facility].  She is followed by Dr. Hitchcock for known bilateral carotid stenosis.  Per his last office note she has known occluded bilateral femoral arteries, occluded right radial artery and therefore has limited vascular access for carotid stenting. At that time she was to be continued on Eliquis and Plavix.  Her home med list has triple therapy. She tells me that someone (possibly her PCP) told her to stop taking the ASA and plavix and therefore she has only been taking Eliquis for some time now, she is unable to tell me how long-she is a poor historian.     Of note, she presented to [facility] in 2021 and was noted to have new onset seizure and was started on Keppra BID and urged to follow up with outpatient neurologist which she is unsure if she has done.  She tells me she takes all prescribed meds.    Last Known Normal Date/Time: 1400  EST     Review of Systems   HENT: Negative.    Respiratory: Negative.    Cardiovascular: Negative.    Gastrointestinal: Negative.    Genitourinary: Negative.    Musculoskeletal: Negative.    Neurological: Positive for speech difficulty and weakness.      Past Medical History:   Diagnosis Date   • Back pain    • Carotid artery occlusion    • Cerebral hemorrhage (HCC)    • Chronic kidney disease     MATT   • Compartment syndrome (HCC)    • DVT (deep venous  thrombosis) (HCC)     Bilateral   • Generalized weakness    • Hyperlipidemia    • Hypertension    • Hypertensive encephalopathy    • Muscle weakness    • Paraparesis of both lower limbs (HCC)    • Peripheral vascular disease (HCC)    • Stroke (HCC)     bleed   • T2DM (type 2 diabetes mellitus) (HCC)      Past Surgical History:   Procedure Laterality Date   • AORTA BIFEMORAL BYPASS     • BREAST BIOPSY Right    • CAROTID ENDARTERECTOMY Left    • FOREARM FASCIOTOMY Left    • INTERVENTIONAL RADIOLOGY PROCEDURE Bilateral 2021    Procedure: Carotid Cerebral Angiogram;  Surgeon: Silvino Hitchcock MD;  Location: [facility] INVASIVE LOCATION;  Service: Interventional Radiology;  Laterality: Bilateral;   • THROMBOLYSIS     • TUBAL ABDOMINAL LIGATION     • US ARTERIAL DOPPLER LOWER EXTREMITY  UNILATERAL     • VASCULAR SURGERY      Bilateral Stents     Family History   Problem Relation Age of Onset   • Hypertension Mother    • Stroke Mother    • Diabetes Mother    • Hypertension Father    • Stroke Father    • No Known Problems Sister    • No Known Problems Son    • Breast cancer Neg Hx    • Ovarian cancer Neg Hx      Social History     Socioeconomic History   • Marital status: Single     Spouse name: Not on file   • Number of children: 1   • Years of education: Not on file   • Highest education level: Not on file   Tobacco Use   • Smoking status: Former Smoker     Packs/day: 1.00     Years: 25.00     Pack years: 25.00     Types: Cigarettes     Quit date: 3/22/2008     Years since quittin.5   • Smokeless tobacco: Never Used   Vaping Use   • Vaping Use: Never used   Substance and Sexual Activity   • Alcohol use: Yes     Comment: occassional   • Drug use: No   • Sexual activity: Defer     Allergies   Allergen Reactions   • Lactose Intolerance (Gi) GI Bleeding   • Morphine And Related Itching   • Plavix [Clopidogrel Bisulfate] Itching     Prior to Admission medications    Medication Sig Start Date End Date  Taking? Authorizing Provider   amLODIPine (NORVASC) 5 MG tablet Take 5 mg by mouth Daily. 5/19/21  Yes Ryan Estrada MD   apixaban (ELIQUIS) 2.5 MG tablet tablet Take 1 tablet by mouth Every 12 (Twelve) Hours. 5/2/18  Yes Bobo Berrios MD   aspirin 81 MG chewable tablet Chew 1 tablet Daily. 1/19/21  Yes Jie Gracia APRN   atorvastatin (LIPITOR) 80 MG tablet Take 1 tablet by mouth Every Night. 1/18/21  Yes Jie Gracia APRN   cloNIDine (CATAPRES) 0.1 MG tablet Take 1 tablet by mouth Every 12 (Twelve) Hours. 1/18/21  Yes Jie Gracia APRN   clopidogrel (PLAVIX) 75 MG tablet TAKE ONE TABLET BY MOUTH DAILY 8/16/21  Yes Yoseph Busby PA-C   levETIRAcetam (KEPPRA) 500 MG tablet Take 500 mg by mouth 2 (Two) Times a Day. 5/19/21  Yes Ryan Estrada MD   losartan (COZAAR) 100 MG tablet Take 100 mg by mouth Daily.   Yes Ryan Estrada MD   levETIRAcetam (KEPPRA) 1000 MG tablet Take 1 tablet by mouth 2 (Two) Times a Day. 1/18/21 10/7/21  Jie Gracia APRN             Objective     Temp:  [98.2 °F (36.8 °C)] 98.2 °F (36.8 °C)  Heart Rate:  [83-96] 83  Resp:  [18] 18  BP: (128-148)/(74-88) 148/88  Neurological Exam  Mental Status  Awake, alert and oriented to person, place and time.Alert. Speech is normal. Language is fluent with no aphasia.    Cranial Nerves  CN II: Visual fields full to confrontation.  CN III, IV, VI: Extraocular movements intact bilaterally.  CN V: Facial sensation is normal.  CN VII: Full and symmetric facial movement.  CN VIII: Hearing appears intact.  CN IX, X: Palate elevates symmetrically  CN XI: Shoulder shrug strength is normal.  CN XII: Tongue midline without atrophy or fasciculations.    Motor   Strength is 5/5 throughout all four extremities.    Sensory  Sensation is intact to light touch, pinprick, vibration and proprioception in all four extremities.    Coordination  No obvious dysmetria .    Gait  Not observed .      Physical Exam  Vitals reviewed.    Constitutional:       Appearance: Normal appearance.   HENT:      Head: Normocephalic.      Mouth/Throat:      Mouth: Mucous membranes are moist.   Eyes:      Extraocular Movements: Extraocular movements intact.   Pulmonary:      Effort: Pulmonary effort is normal.   Skin:     General: Skin is warm.   Neurological:      General: No focal deficit present.      Mental Status: She is alert.      Deep Tendon Reflexes: Strength normal.   Psychiatric:         Mood and Affect: Mood normal.         Speech: Speech normal.         Acute Stroke Data    Alteplase (tPA) Inclusion / Exclusion Criteria    Time: 17:07 EDT  Person Administering Scale: EDMOND Alcantara    Inclusion Criteria  [x]   18 years of age or greater   [x]   Onset of symptoms < 4.5 hours before beginning treatment (stroke onset = time patient was last seen well or without symptoms).   []   Diagnosis of acute ischemic stroke causing measurable disabling deficit (Complete Hemianopia, Any Aphasia, Visual or Sensory Extinction, Any weakness limiting sustained effort against gravity)   [x]   Any remaining deficit considered potentially disabling in view of patient and practitioner   Exclusion criteria (Do not proceed with Alteplase if any are checked under exclusion criteria)  []   Onset unknown or GREATER than 4.5 hours   []   ICH on CT/MRI   []   CT demonstrates hypodensity representing acute or subacute infarct   []   Significant head trauma or prior stroke in the previous 3 months   []   Symptoms suggestive of subarachnoid hemorrhage   []   History of un-ruptured intracranial aneurysm GREATER than 10 mm   []   Recent intracranial or intraspinal surgery within the last 3 months   []   Arterial puncture at a non-compressible site in the previous 7 days   []   Active internal bleeding   []   Acute bleeding tendency   []   Platelet count LESS than 100,000 for known hematological diseases such as leukemia, thrombocytopenia or chronic cirrhosis   []   Current  use of anticoagulant with INR GREATER than 1.7 or PT GREATER than 15 seconds, aPTT GREATER than 40 seconds   []   Heparin received within 48 hours, resulting in abnormally elevated aPTT GREATER than upper limit of normal   []   Current use of direct thrombin inhibitors or direct factor Xa inhibitors in the past 48 hours   []   Elevated blood pressure refractory to treatment (systolic GREATER than 185 mm/Hg or diastolic  GREATER than 110 mm/Hg   []   Suspected infective endocarditis and aortic arch dissection   []   Current use of therapeutic treatment dose of low-molecular-weight heparin (LMWH) within the previous 24 hours   []   Structural GI malignancy or bleed   Relative exclusion for all patients  []   Only minor non-disabling symptoms   []   Pregnancy   []   Seizure at onset with postictal residual neurological impairments   []   Major surgery or previous trauma within past 14 days   []   History of previous spontaneous ICH, intracranial neoplasm, or AV malformation   []   Postpartum (within previous 14 days)   []   Recent GI or urinary tract hemorrhage (within previous 21 days)   []   Recent acute MI (within previous 3 months)   []   History of un-ruptured intracranial aneurysm LESS than 10 mm   []   History of ruptured intracranial aneurysm   []   Blood glucose LESS than 50 mg/dL (2.7 mmol/L)   []   Dural puncture within the last 7 days   []   Known GREATER than 10 cerebral microbleeds   Additional exclusions for patients with symptoms onset between 3 and 4.5 hours.  []   Age > 80.   []   On any anticoagulants regardless of INR  >>> Warfarin (Coumadin), Heparin, Enoxaparin (Lovenox), fondaparinux (Arixtra), bivalirudin (Angiomax), Argatroban, dabigatran (Pradaxa), rivaroxaban (Xarelto), or apixaban (Eliquis)   []   Severe stroke (NIHSS > 25).   []   History of BOTH diabetes and previous ischemic stroke.   []   The risks and benefits have been discussed with the patient or family related to the administration  of IV Alteplase for stroke symptoms.   []   I have discussed and reviewed the patient's case and imaging with the attending prior to IV Alteplase.   n/a Time Alteplase administered       Hospital Meds:  Scheduled- apixaban, 2.5 mg, Oral, Q12H  aspirin, 81 mg, Oral, Daily  atorvastatin, 80 mg, Oral, Nightly  clopidogrel, 75 mg, Oral, Daily  levETIRAcetam, 500 mg, Oral, BID  sodium chloride, 10 mL, Intravenous, Q12H      Infusions- sodium chloride, 75 mL/hr       PRNs- sodium chloride  •  sodium chloride    Functional Status Prior to Current Stroke/Leroy Score: 0    NIH Stroke Scale  Time: 17:07 EDT  Person Administering Scale: EDMOND Alcantara    1a  Level of consciousness: 0=alert; keenly responsive   1b. LOC questions:  0=Performs both tasks correctly   1c. LOC commands: 0=Performs both tasks correctly   2.  Best Gaze: 0=normal   3.  Visual: 0=No visual loss   4. Facial Palsy: 0=Normal symmetric movement   5a.  Motor left arm: 0=No drift, limb holds 90 (or 45) degrees for full 10 seconds   5b.  Motor right arm: 0=No drift, limb holds 90 (or 45) degrees for full 10 seconds   6a. motor left le=No drift, limb holds 90 (or 45) degrees for full 10 seconds   6b  Motor right le=No drift, limb holds 90 (or 45) degrees for full 10 seconds   7. Limb Ataxia: 0=Absent   8.  Sensory: 0=Normal; no sensory loss   9. Best Language:  0=No aphasia, normal   10. Dysarthria: 0=Normal   11. Extinction and Inattention: 0=No abnormality    Total:   0       Results Reviewed:  I have personally reviewed current lab, radiology, and data and agree with results.    Results for orders placed during the hospital encounter of 21    Adult Transthoracic Echo Complete W/ Cont if Necessary Per Protocol (With Agitated Saline)    Interpretation Summary  · Left ventricular ejection fraction appears to be 66 - 70%. Left ventricular systolic function is normal.  · Left ventricular diastolic function is consistent with (grade Ia  w/high LAP) impaired relaxation.  · Left ventricular wall thickness is consistent with borderline concentric hypertrophy.    CT head nothing acute  CTA head and neck 85% right and 75% left ICA narrowing moderate to severe atherosclerosis throughout       MRI pending  Assessment/Plan:      76 year old with severe ICAD, bilateral carotid stenosis, femoral artery and radial artery occlusions and seizure disorder who presented with transient episode of speech disturbance and right arm weakness.  Symptoms resolved.       TIA/seizure-patient's symptoms could be a TIA or focal seizure.  She certainly has numerous vascular risk factors. Will initiate stroke order set without thrombolytic therapy. I see her triple anti thrombotic therapy has been resumed and I agree with that at this time. Home dose of Keppra 500mg BID. Will get MRI brain w/o. Will check routine EEG in the am.     Normal blood pressure goals.     Activity as tolerated.       Discussed plan with patient and ED physician, Dr. Carver. Stroke neurology will continue to follow. Thank you for allowing us to participate in this patient's care.         Carrie Miller, APRN  October 7, 2021  17:07 EDT    .

## 2021-10-07 NOTE — H&P
[facility] Medicine Services  HISTORY AND PHYSICAL    Patient Name: Chika Posey  : 1945  MRN: [id number]  Primary Care Physician: Alyssia Greenfield MD  Date of admission: 10/7/2021    Subjective   Subjective     Chief Complaint:  Spell, unable to get out of care    HPI:  Chika Posey is a 76 y.o. female who was in her normal state of today.  She had an appointment with Dr. Ceballos that went well.  Afterward, she went to her sister's house and was unable to get out of the car.  She denies extremity weakness, speech changes, n/v or changes in vision or hearing.  States she was just unable to move any of her extremities to get out of the car.  Symptoms lasted approximately 5 minutes before returning to baseline.  She reports she had a similar episode on Monday, symptoms only lasted a short period of time and she did not seek treatment at that time.  She statesShe is prescribed plavix and eliquis for carotid disease and history of DVT.  She states she has been taking the eliquis but unclear if she has been taking the plavix.  She states it just fell of her list, but her pharmacy confirms a prescription was picked up 18days ago.        COVID Details:    Symptoms:    [x] NONE [] Fever []  Cough [] Shortness of breath [] Change in taste/smell      The patient has a COVID HM Topic on their chart, and they are fully vaccinated.      Review of Systems   Constitutional: Negative for chills, diaphoresis and fever.   HENT: Negative for hearing loss.    Eyes: Positive for visual disturbance.   Respiratory: Negative for cough and shortness of breath.    Cardiovascular: Negative for chest pain.   Gastrointestinal: Negative for abdominal pain, diarrhea, nausea and vomiting.   Genitourinary: Negative for dysuria and hematuria.   Musculoskeletal: Negative for back pain.   Skin: Negative for wound.   Neurological: Positive for weakness. Negative for dizziness.   Psychiatric/Behavioral:  Negative for confusion.        All other systems reviewed and are negative.     Personal History     Past Medical History:   Diagnosis Date   • Back pain    • Carotid artery occlusion    • Cerebral hemorrhage (HCC)    • Chronic kidney disease     MATT   • Compartment syndrome (HCC)    • DVT (deep venous thrombosis) (HCC)     Bilateral   • Generalized weakness    • Hyperlipidemia    • Hypertension    • Hypertensive encephalopathy    • Muscle weakness    • Paraparesis of both lower limbs (HCC)    • Peripheral vascular disease (HCC)    • Stroke (HCC)     bleed   • T2DM (type 2 diabetes mellitus) (HCC)        Past Surgical History:   Procedure Laterality Date   • AORTA BIFEMORAL BYPASS     • BREAST BIOPSY Right    • CAROTID ENDARTERECTOMY Left    • FOREARM FASCIOTOMY Left    • INTERVENTIONAL RADIOLOGY PROCEDURE Bilateral 5/20/2021    Procedure: Carotid Cerebral Angiogram;  Surgeon: Silvino Hitchcock MD;  Location: [facility] INVASIVE LOCATION;  Service: Interventional Radiology;  Laterality: Bilateral;   • THROMBOLYSIS     • TUBAL ABDOMINAL LIGATION     • US ARTERIAL DOPPLER LOWER EXTREMITY  UNILATERAL     • VASCULAR SURGERY      Bilateral Stents       Family History: family history includes Diabetes in her mother; Hypertension in her father and mother; No Known Problems in her sister and son; Stroke in her father and mother. Otherwise pertinent FHx was reviewed and unremarkable.     Social History:  reports that she quit smoking about 13 years ago. Her smoking use included cigarettes. She has a 25.00 pack-year smoking history. She has never used smokeless tobacco. She reports current alcohol use. She reports that she does not use drugs.  Social History     Social History Narrative    Patient lives alone, in [place].    Retired nursing assistant    Caffeine: 0-1 cups coffee, rarely soda       Medications:  amLODIPine, apixaban, aspirin, atorvastatin, cloNIDine, clopidogrel, levETIRAcetam, and  losartan    Allergies   Allergen Reactions   • Lactose Intolerance (Gi) GI Bleeding   • Morphine And Related Itching   • Plavix [Clopidogrel Bisulfate] Itching       Objective   Objective     Vital Signs:   Temp:  [98.2 °F (36.8 °C)] 98.2 °F (36.8 °C)  Heart Rate:  [83-96] 83  Resp:  [18] 18  BP: (128-148)/(74-88) 148/88    Physical Exam   Constitutional: Awake, alert  Eyes: PERRLA, sclerae anicteric, no conjunctival injection  HENT: NCAT, mucous membranes moist  Neck: Supple, no thyromegaly, no lymphadenopathy, trachea midline  Respiratory: Clear to auscultation bilaterally, nonlabored respirations   Cardiovascular: RRR, no murmurs, rubs, or gallops, palpable pedal pulses bilaterally  Gastrointestinal: Positive bowel sounds, soft, nontender, nondistended  Musculoskeletal: No bilateral ankle edema, no clubbing or cyanosis to extremities  Psychiatric: Appropriate affect, cooperative  Neurologic: Oriented x 3, DELEON, equal strength, speech clear  Skin: No rashes noted      Results Reviewed:  I have personally reviewed most recent indicated data and agree with findings including:  [x]  Laboratory  [x]  Radiology  []  EKG/Telemetry  []  Pathology  []  Cardiac/Vascular Studies  []  Old records  []  Other:  Most pertinent findings include:      LAB RESULTS:      Lab 10/07/21  1601 10/07/21  1516 10/07/21  1511   WBC 6.24  --   --    HEMOGLOBIN 9.4*  --   --    HEMOGLOBIN, POC  --  8.5*  --    HEMATOCRIT 32.4*  --   --    HEMATOCRIT POC  --  25*  --    PLATELETS 587*  --   --    NEUTROS ABS 3.31  --   --    IMMATURE GRANS (ABS) 0.03  --   --    LYMPHS ABS 2.15  --   --    MONOS ABS 0.62  --   --    EOS ABS 0.10  --   --    MCV 71.5*  --   --    PROTIME  --   --  12.3*   APTT 20.5*  --   --          Lab 10/07/21  1512   CREATININE 1.00         Lab 10/07/21  1601   ALT (SGPT) 24   AST (SGOT) 32         Lab 10/07/21  1601 10/07/21  1511   TROPONIN T <0.010  --    PROTIME  --  12.3*   INR  --  1.0                 Lab  10/07/21  1516   PH, ARTERIAL 7.42     Brief Urine Lab Results  (Last result in the past 365 days)      Color   Clarity   Blood   Leuk Est   Nitrite   Protein   CREAT   Urine HCG        10/07/21 1538 Yellow Clear Negative Small (1+) Negative Negative             Microbiology Results (last 10 days)     Procedure Component Value - Date/Time    COVID PRE-OP / PRE-PROCEDURE SCREENING ORDER (NO ISOLATION) - Swab, Nasopharynx [[id number]]  (Normal) Collected: 10/07/21 1538    Lab Status: Final result Specimen: Swab from Nasopharynx Updated: 10/07/21 1640    Narrative:      The following orders were created for panel order COVID PRE-OP / PRE-PROCEDURE SCREENING ORDER (NO ISOLATION) - Swab, Nasopharynx.  Procedure                               Abnormality         Status                     ---------                               -----------         ------                     COVID-19,CEPHEID/STEF,LE...[[id number]]  Normal              Final result                 Please view results for these tests on the individual orders.    COVID-19,CEPHEID/STEF,GRETCHEN IN-HOUSE(OR EMERGENT/ADD-ON),NP SWAB IN TRANSPORT MEDIA 3-4 HR TAT - Swab, Nasopharynx [[id number]]  (Normal) Collected: 10/07/21 1538    Lab Status: Final result Specimen: Swab from Nasopharynx Updated: 10/07/21 1640     COVID19 Not Detected    Narrative:      Fact sheet for providers: https://www.fda.gov/media/885669/download     Fact sheet for patients: https://www.fda.gov/media/236566/download  Fact sheet for providers: https://www.fda.gov/media/271273/download     Fact sheet for patients: https://www.fda.gov/media/709317/download          CT Head Without Contrast    Result Date: 10/7/2021  EXAMINATION: CT HEAD WO CONTRAST - 10/07/2021  INDICATION: Follow up stroke.  TECHNIQUE: CT head without intervenous contrast  The radiation dose reduction device was turned on for each scan per the ALARA (As Low as Reasonably Achievable) protocol.  COMPARISON: None  FINDINGS: Midline  structures are symmetric without evidence of mass, mass effect or midline shift. Ventricles and sulci within normal limits. No intra-axial hemorrhage or extra-axial fluid collection with moderate to severe low-attenuation in the periventricular and deep white matter of chronic small vessel ischemic disease. Globes and orbits unremarkable. Paranasal sinuses and mastoids are grossly clear and well-pneumatized. Calvarium intact.      Impression: No acute intracranial findings specifically, no acute intracranial hemorrhage.  Scan performed on 10/07/2021 at 1455 hours. Scan report given to ER physician and team in person at scanner by Dr. Beckford on 10/07/2021 1500 hours.  DICTATED:   10/07/2021 EDITED/ls :   10/07/2021       CT Angiogram Neck    Result Date: 10/7/2021  EXAMINATION: CT ANGIOGRAM HEAD W AI ANALYSIS OF LVO-, CT ANGIOGRAM NECK-10/07/2021:  INDICATION: Neuro deficit, acute, stroke suspected.  TECHNIQUE: CT angiogram head and neck with and without intravenous contrast administration. 2-D and 3-D reconstructions performed.  The radiation dose reduction device was turned on for each scan per the ALARA (As Low as Reasonably Achievable) protocol.  AI analysis of LVO was utilized.  COMPARISON: CT head, noncontrast, stroke protocol, performed concurrently.  FINDINGS:  CTA NECK: Normal 3-vessel arch with patent great vessel origins demonstrating calcific disease greatest in the left subclavian takeoff with up to moderate stenosis, however, no high-grade stenosis evident with proximal subclavian arteries grossly patent. The vertebral arteries demonstrate a left-sided dominance of caliber with calcifications in the V1 and V2 segments with up to moderate-to-severe stenosis right vertebral, series 4-image 149, of interest in the foraminal portion along with moderate stenosis of the left vertebral from calcific plaque formation. Patency through the distal vertebral intradural segments detailed further below. The carotids  demonstrate grossly normal branching pattern with partially retropharyngeal course, somewhat medialized bilaterally, demonstrating calcified plaque formations at the ICA origins/carotid bifurcations producing 85% right and 75% left luminal narrowing as measured by NASCET criteria with patency of the distal internal carotid arteries through the intracranial portions where there are calcific findings as detailed below. Cervical soft tissue is grossly unremarkable for acute findings with the thyroid homogeneous. Lung apices are grossly clear.  CTA HEAD: Distal internal carotid arteries demonstrate calcific involvement, mild-to-moderate atherosclerotic calcific disease, without focal severe stenosis, aneurysm or occlusion. Anterior cerebral arteries are patent without hemodynamically significant stenosis, aneurysm or occlusion. Middle cerebral arteries are patent without hemodynamically significant stenosis, aneurysm or occlusion. Vertebrobasilar system and posterior cerebral arteries demonstrate calcific disease in the distal vertebral arteries intradural segments, moderate-severe right and moderate left involvement, without distinct occlusion. Posterior cerebral arteries are patent without hemodynamically significant stenosis, aneurysm or occlusion. Fetal origin of the left posterior cerebral artery. The venous structures are unremarkable on partial imaging of the venous structures.      Impression: 1. CTA neck demonstrates calcified plaque formations at the ICA origins/carotid bifurcations producing 85% right and 75% left luminal narrowing as measured by NASCET criteria.  2. Vertebral arteries demonstrate a left-sided dominance of caliber with calcifications in the V1 and V2 segments with up to moderate-to-severe stenosis right vertebral, series 4-image 149, of interest in foraminal portion along with moderate stenosis left vertebral from calcific plaque formation.  3. CTA head demonstrates mild-moderate calcific  disease in the distal internal carotid arteries as well as moderate-severe right and moderate left involvement of the intradural segments distal vertebral arteries without focal severe stenosis, aneurysm or occlusion otherwise.  D:  10/07/2021 E:  10/07/2021         XR Chest 1 View    Result Date: 10/7/2021   EXAMINATION: XR CHEST 1 VW - 10/07/2021  INDICATION: Stroke protocol.  COMPARISON: Chest x-ray 01/14/2021  FINDINGS: Chronic changes without focal consolidation or effusion. Cardiac size enlarged.      Impression: No acute findings.  DICTATED:   10/07/2021 EDITED/ls :   10/07/2021        CT Angiogram Head w AI Analysis of LVO    Result Date: 10/7/2021  EXAMINATION: CT ANGIOGRAM HEAD W AI ANALYSIS OF LVO-, CT ANGIOGRAM NECK-10/07/2021:  INDICATION: Neuro deficit, acute, stroke suspected.  TECHNIQUE: CT angiogram head and neck with and without intravenous contrast administration. 2-D and 3-D reconstructions performed.  The radiation dose reduction device was turned on for each scan per the ALARA (As Low as Reasonably Achievable) protocol.  AI analysis of LVO was utilized.  COMPARISON: CT head, noncontrast, stroke protocol, performed concurrently.  FINDINGS:  CTA NECK: Normal 3-vessel arch with patent great vessel origins demonstrating calcific disease greatest in the left subclavian takeoff with up to moderate stenosis, however, no high-grade stenosis evident with proximal subclavian arteries grossly patent. The vertebral arteries demonstrate a left-sided dominance of caliber with calcifications in the V1 and V2 segments with up to moderate-to-severe stenosis right vertebral, series 4-image 149, of interest in the foraminal portion along with moderate stenosis of the left vertebral from calcific plaque formation. Patency through the distal vertebral intradural segments detailed further below. The carotids demonstrate grossly normal branching pattern with partially retropharyngeal course, somewhat medialized  bilaterally, demonstrating calcified plaque formations at the ICA origins/carotid bifurcations producing 85% right and 75% left luminal narrowing as measured by NASCET criteria with patency of the distal internal carotid arteries through the intracranial portions where there are calcific findings as detailed below. Cervical soft tissue is grossly unremarkable for acute findings with the thyroid homogeneous. Lung apices are grossly clear.  CTA HEAD: Distal internal carotid arteries demonstrate calcific involvement, mild-to-moderate atherosclerotic calcific disease, without focal severe stenosis, aneurysm or occlusion. Anterior cerebral arteries are patent without hemodynamically significant stenosis, aneurysm or occlusion. Middle cerebral arteries are patent without hemodynamically significant stenosis, aneurysm or occlusion. Vertebrobasilar system and posterior cerebral arteries demonstrate calcific disease in the distal vertebral arteries intradural segments, moderate-severe right and moderate left involvement, without distinct occlusion. Posterior cerebral arteries are patent without hemodynamically significant stenosis, aneurysm or occlusion. Fetal origin of the left posterior cerebral artery. The venous structures are unremarkable on partial imaging of the venous structures.      Impression: 1. CTA neck demonstrates calcified plaque formations at the ICA origins/carotid bifurcations producing 85% right and 75% left luminal narrowing as measured by NASCET criteria.  2. Vertebral arteries demonstrate a left-sided dominance of caliber with calcifications in the V1 and V2 segments with up to moderate-to-severe stenosis right vertebral, series 4-image 149, of interest in foraminal portion along with moderate stenosis left vertebral from calcific plaque formation.  3. CTA head demonstrates mild-moderate calcific disease in the distal internal carotid arteries as well as moderate-severe right and moderate left  involvement of the intradural segments distal vertebral arteries without focal severe stenosis, aneurysm or occlusion otherwise.  D:  10/07/2021 E:  10/07/2021           Results for orders placed during the hospital encounter of 01/14/21    Adult Transthoracic Echo Complete W/ Cont if Necessary Per Protocol (With Agitated Saline)    Interpretation Summary  · Left ventricular ejection fraction appears to be 66 - 70%. Left ventricular systolic function is normal.  · Left ventricular diastolic function is consistent with (grade Ia w/high LAP) impaired relaxation.  · Left ventricular wall thickness is consistent with borderline concentric hypertrophy.      Assessment/Plan   Assessment & Plan       Ataxia    PAD (peripheral artery disease) ([facility])    Essential hypertension    History of cerebral hemorrhage    History of DVT (deep vein thrombosis)    Bilateral carotid artery stenosis    Type 2 diabetes mellitus with stage 3 chronic kidney disease, without long-term current use of insulin ([facility])    Chronic kidney disease    76 year old woman, former smoker, with long history of severe PAD, DM, HTN, also DVT and cerebral hemorrhage, currently on Plavix, Eliquis, and statin.  She had several minutes of RUE/RLE motor changes today which were either weakness or ataxia.  She has returned to baseline.     TIA w R side weakness, resolved.   Bilateral carotid disease 75-85% stenosis  --stroke consult pending  --continue plavix/eliquis, ASA and statin.   --follows with Dr. Hitchcock for carotid disease    T2DM per EMR   --A1C pending  --not on any home meds; glucoses controlled thus far     CKD per EMR   - creatinine 1.0  --unknown baseline    HTN  --allow for permissive hypertension then restart home meds as able    DVT prophylaxis:  anticoagulated    CODE STATUS:    Limited Support to NOT Include: Intubation  Code Status: No CPR  Medical Interventions (Level of Support Prior to Arrest): Limited      This note has been completed as part  of a split-shared workflow.     Signature: Electronically signed by EDMOND Lynch, 10/09/21, 7:40 AM EDT      Attending   Admission Attestation       I have seen and examined the patient, performing an independent face-to-face diagnostic evaluation with plan of care reviewed and developed with the advanced practice clinician (APC).      Brief Summary Statement:   Chika Posey is a 76 y.o. 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  Was getting out of hte car today w family when she realized she could not move her RUE or RLE as she expected.  She denies confusion or speech deficit or LOC.  The episode lasted a few minutes. She is now back to baseline.  She has no headache and no focal numbness or weakness.  CT head in ED shows no cerebral hemorrhage.     Remainder of detailed HPI is as noted by APC and has been reviewed and/or edited by me for completeness.    Attending Physical Exam:  Constitutional: Awake, alert woman in bed, NAD and very pleasant.  Good historian   Eyes: PER, sclerae anicteric, no conjunctival injection  HENT: NCAT, mucous membranes moist  Neck: Supple, trachea midline  Respiratory: Clear to auscultation bilaterally, nonlabored respirations   Cardiovascular: RRR, no murmurs, feet a bit cool.   Gastrointestinal: Positive bowel sounds, soft, nontender, nondistended  Musculoskeletal: No bilateral ankle edema, no clubbing or cyanosis to extremities  Psychiatric: Appropriate affect, cooperative  Neurologic: Oriented x 3, strength symmetric in all extremities, Cranial Nerves grossly intact to confrontation, speech clear  Skin: No rashes    Brief Assessment/Plan :  See detailed assessment and plan developed with APC which I have reviewed and/or edited for completeness.        Admission Status: I believe that this patient meets  OBSERVATION status, however if further evaluation or treatment plans warrant, status may change.  Based upon current information, I predict patient's care encounter to be less than or equal to 2 midnights.        Nichol Gr MD  10/07/21

## 2021-10-07 NOTE — PROGRESS NOTES
`     PERIPHERAL VASCULAR STUDY          Name: Chika Posey                                               : 1945               Right and left ABIs were obtained utilizing segmental blood pressures.  Right brachial artery pressure was 130 left brachial artery pressure was 126 lower extremity pressures on both sides were exceedingly diminished.  Waveforms were virtually flat.  Right EDDIE was 0.24 left EDDIE was 0.33 reviewing these numbers it is felt that this exam was not adequate to assess the lower extremity vascular system in this patient.      Electronically signed by Bruce Ceballos MD, 10/07/21, 1:36 PM EDT.

## 2021-10-08 PROBLEM — I63.9 STROKE (HCC): Status: ACTIVE | Noted: 2021-01-01

## 2021-10-08 PROBLEM — I63.9 STROKE (HCC): Status: RESOLVED | Noted: 2021-01-01 | Resolved: 2021-01-01

## 2021-10-08 PROBLEM — G45.9 TIA (TRANSIENT ISCHEMIC ATTACK): Status: ACTIVE | Noted: 2021-01-01

## 2021-10-08 NOTE — TELEPHONE ENCOUNTER
Caller:JANETTE      New or established patient?  [x] New  [] Established    Date of discharge:10.07.21    Facility discharged from: [facility]     Diagnosis/Symptoms: STROKE     Length of stay (If applicable): 1 DAY     Specialty Only: Did you see a [employer] health provider?    [x] Yes  [] No  If so, who? [facility] I WAS NOT ABLE TO GET IN WITHIN TIME LISTED . IF NEEDS TO BE MOVED CAN YOU SEE IF OFFICE HAS A SOONER APPT.     PLEASE ADVISE

## 2021-10-08 NOTE — ED PROVIDER NOTES
"Subjective   76-year-old female presents for evaluation of \"possible stroke.\"  Of note, the patient has a history of prior stroke with no significant residual deficits.  She went to a doctor's appointment earlier today and was on her way home.  Approximately 10 minutes before calling EMS, the patient began experiencing acute onset aphasia, slurred speech, and right-sided weakness.  EMS was called and the patient was brought directly to our facility.  However, by the time that she arrived her symptoms had resolved and she was back to baseline.  Of note, the patient is anticoagulated.  A code stroke was called prehospital.  Glucose normal per EMS personnel.          Review of Systems   Neurological: Positive for speech difficulty and weakness.   All other systems reviewed and are negative.      Past Medical History:   Diagnosis Date   • Back pain    • Carotid artery occlusion    • Cerebral hemorrhage (HCC)    • Chronic kidney disease     MATT   • Compartment syndrome (HCC)    • DVT (deep venous thrombosis) (HCC)     Bilateral   • Generalized weakness    • Hyperlipidemia    • Hypertension    • Hypertensive encephalopathy    • Muscle weakness    • Paraparesis of both lower limbs (HCC)    • Peripheral vascular disease (HCC)    • Stroke (HCC)     bleed   • T2DM (type 2 diabetes mellitus) ([facility])        Allergies   Allergen Reactions   • Lactose Intolerance (Gi) GI Bleeding   • Morphine And Related Itching   • Plavix [Clopidogrel Bisulfate] Itching       Past Surgical History:   Procedure Laterality Date   • AORTA BIFEMORAL BYPASS     • BREAST BIOPSY Right    • CAROTID ENDARTERECTOMY Left    • FOREARM FASCIOTOMY Left    • INTERVENTIONAL RADIOLOGY PROCEDURE Bilateral 5/20/2021    Procedure: Carotid Cerebral Angiogram;  Surgeon: Silvino Hitchcock MD;  Location: [facility] INVASIVE LOCATION;  Service: Interventional Radiology;  Laterality: Bilateral;   • THROMBOLYSIS     • TUBAL ABDOMINAL LIGATION     • US ARTERIAL " DOPPLER LOWER EXTREMITY  UNILATERAL     • VASCULAR SURGERY      Bilateral Stents       Family History   Problem Relation Age of Onset   • Hypertension Mother    • Stroke Mother    • Diabetes Mother    • Hypertension Father    • Stroke Father    • No Known Problems Sister    • No Known Problems Son    • Breast cancer Neg Hx    • Ovarian cancer Neg Hx        Social History     Socioeconomic History   • Marital status: Single     Spouse name: Not on file   • Number of children: 1   • Years of education: Not on file   • Highest education level: Not on file   Tobacco Use   • Smoking status: Former Smoker     Packs/day: 1.00     Years: 25.00     Pack years: 25.00     Types: Cigarettes     Quit date: 3/22/2008     Years since quittin.5   • Smokeless tobacco: Never Used   Vaping Use   • Vaping Use: Never used   Substance and Sexual Activity   • Alcohol use: Yes     Comment: occassional   • Drug use: No   • Sexual activity: Defer           Objective   Physical Exam  Vitals and nursing note reviewed.   Constitutional:       General: She is not in acute distress.     Appearance: She is well-developed. She is not diaphoretic.      Comments: Nontoxic-appearing female   HENT:      Head: Normocephalic and atraumatic.   Eyes:      Pupils: Pupils are equal, round, and reactive to light.   Cardiovascular:      Rate and Rhythm: Normal rate and regular rhythm.      Heart sounds: Normal heart sounds. No murmur heard.   No friction rub. No gallop.    Pulmonary:      Effort: Pulmonary effort is normal. No respiratory distress.      Breath sounds: Normal breath sounds. No wheezing or rales.   Abdominal:      General: Bowel sounds are normal. There is no distension.      Palpations: Abdomen is soft. There is no mass.      Tenderness: There is no abdominal tenderness. There is no guarding or rebound.   Musculoskeletal:         General: Normal range of motion.      Cervical back: Neck supple.   Skin:     General: Skin is warm and dry.     "  Findings: No erythema or rash.   Neurological:      General: No focal deficit present.      Mental Status: She is alert and oriented to person, place, and time.      Comments: Awake, alert, and oriented x3, clear and fluent speech, no ataxia or dysmetria, normal gait, neurovascularly intact distally in all fours with bounding distal pulses and normal sensation, 5 out of 5 strength in all fours, no cranial nerve deficits noted with cranial nerves II through XII grossly intact   Psychiatric:         Mood and Affect: Mood normal.         Thought Content: Thought content normal.         Judgment: Judgment normal.         Procedures           ED Course  ED Course as of Oct 07 2054   Thu Oct 07, 2021   1537 76-year-old female presents for evaluation of \"possible stroke.\"  Of note, the patient has a history of prior stroke with no residual deficits.  She went to a doctor's appointment earlier this afternoon and was on her way home approximately 10 minutes before calling EMS when she had acute onset aphasia, slurred speech, and right-sided weakness.  EMS was called and the patient was brought to our facility for evaluation.  On arrival, the patient is back to baseline.  No neurological deficits noted at this time.  NIH stroke scale of 0.  Our stroke navigator, Carrie, met me in the CT scanner to evaluate the patient as well.  CT head negative.  Advanced imaging negative.  Stroke team following.  The patient will be admitted for TIA.  Awaiting, callback from the hospitalist at this time.    [DD]   7544 I discussed the patient's case with Dr. Gr, and the patient will be admitted under her care for further evaluation and treatment.  She is aware/agreeable with the plan at this time.    [DD]      ED Course User Index  [DD] Ranjan Carver MD                                 Recent Results (from the past 24 hour(s))   POC Protime / INR    Collection Time: 10/07/21  3:11 PM    Specimen: Blood   Result Value Ref Range    " Protime 12.3 (L) 12.8 - 15.2 seconds    INR 1.0 0.8 - 1.2   POC Creatinine    Collection Time: 10/07/21  3:12 PM    Specimen: Blood   Result Value Ref Range    Creatinine 1.00 0.60 - 1.30 mg/dL   POC Surgery Labs    Collection Time: 10/07/21  3:16 PM    Specimen: Blood   Result Value Ref Range    Ionized Calcium 1.16 (L) 1.20 - 1.32 mmol/L    POC Potassium 4.2 3.5 - 4.9 mmol/L    Sodium 144 138 - 146 mmol/L    Total CO2 26 24 - 29 mmol/L    Hemoglobin 8.5 (L) 12.0 - 17.0 g/dL    Hematocrit 25 (L) 38 - 51 %    pCO2, Arterial 37.4 35 - 45 mm Hg    pO2, Arterial 34 (L) 80 - 105 mmHg    Base Excess 0.0000 -5 - 5 mmol/L    O2 Saturation, Arterial 67 (L) 95 - 98 %    pH, Arterial 7.42 7.35 - 7.6 pH units    HCO3, Arterial 24.4 22 - 26 mmol/L    Glucose 106 70 - 130 mg/dL   ECG 12 Lead    Collection Time: 10/07/21  3:24 PM   Result Value Ref Range    QT Interval 400 ms    QTC Interval 497 ms   Urinalysis With Microscopic If Indicated (No Culture) - Urine, Clean Catch    Collection Time: 10/07/21  3:38 PM    Specimen: Urine, Clean Catch   Result Value Ref Range    Color, UA Yellow Yellow, Straw    Appearance, UA Clear Clear    pH, UA 8.0 5.0 - 8.0    Specific Gravity, UA 1.016 1.001 - 1.030    Glucose, UA Negative Negative    Ketones, UA Negative Negative    Bilirubin, UA Negative Negative    Blood, UA Negative Negative    Protein, UA Negative Negative    Leuk Esterase, UA Small (1+) (A) Negative    Nitrite, UA Negative Negative    Urobilinogen, UA 0.2 E.U./dL 0.2 - 1.0 E.U./dL   COVID-19,CEPHEID/STEF,GRETCHEN IN-HOUSE(OR EMERGENT/ADD-ON),NP SWAB IN TRANSPORT MEDIA 3-4 HR TAT - Swab, Nasopharynx    Collection Time: 10/07/21  3:38 PM    Specimen: Nasopharynx; Swab   Result Value Ref Range    COVID19 Not Detected Not Detected - Ref. Range   Urinalysis, Microscopic Only - Urine, Clean Catch    Collection Time: 10/07/21  3:38 PM    Specimen: Urine, Clean Catch   Result Value Ref Range    RBC, UA 0-2 None Seen, 0-2 /HPF    WBC, UA  0-2 None Seen, 0-2 /HPF    Bacteria, UA None Seen None Seen, Trace /HPF    Squamous Epithelial Cells, UA 0-2 None Seen, 0-2 /HPF    Hyaline Casts, UA None Seen 0 - 6 /LPF    Methodology Automated Microscopy    Troponin    Collection Time: 10/07/21  4:01 PM    Specimen: Blood   Result Value Ref Range    Troponin T <0.010 0.000 - 0.030 ng/mL   aPTT    Collection Time: 10/07/21  4:01 PM    Specimen: Blood   Result Value Ref Range    PTT 20.5 (L) 22.0 - 39.0 seconds   AST    Collection Time: 10/07/21  4:01 PM    Specimen: Blood   Result Value Ref Range    AST (SGOT) 32 1 - 32 U/L   ALT    Collection Time: 10/07/21  4:01 PM    Specimen: Blood   Result Value Ref Range    ALT (SGPT) 24 1 - 33 U/L   Green Top (Gel)    Collection Time: 10/07/21  4:01 PM   Result Value Ref Range    Extra Tube Hold for add-ons.    Lavender Top    Collection Time: 10/07/21  4:01 PM   Result Value Ref Range    Extra Tube hold for add-on    Gold Top - SST    Collection Time: 10/07/21  4:01 PM   Result Value Ref Range    Extra Tube Hold for add-ons.    Gray Top    Collection Time: 10/07/21  4:01 PM   Result Value Ref Range    Extra Tube Hold for add-ons.    Light Blue Top    Collection Time: 10/07/21  4:01 PM   Result Value Ref Range    Extra Tube hold for add-on    CBC Auto Differential    Collection Time: 10/07/21  4:01 PM    Specimen: Blood   Result Value Ref Range    WBC 6.24 3.40 - 10.80 10*3/mm3    RBC 4.53 3.77 - 5.28 10*6/mm3    Hemoglobin 9.4 (L) 12.0 - 15.9 g/dL    Hematocrit 32.4 (L) 34.0 - 46.6 %    MCV 71.5 (L) 79.0 - 97.0 fL    MCH 20.8 (L) 26.6 - 33.0 pg    MCHC 29.0 (L) 31.5 - 35.7 g/dL    RDW 19.1 (H) 12.3 - 15.4 %    RDW-SD 48.1 37.0 - 54.0 fl    MPV 9.3 6.0 - 12.0 fL    Platelets 587 (H) 140 - 450 10*3/mm3    Neutrophil % 53.0 42.7 - 76.0 %    Lymphocyte % 34.5 19.6 - 45.3 %    Monocyte % 9.9 5.0 - 12.0 %    Eosinophil % 1.6 0.3 - 6.2 %    Basophil % 0.5 0.0 - 1.5 %    Immature Grans % 0.5 0.0 - 0.5 %    Neutrophils, Absolute  3.31 1.70 - 7.00 10*3/mm3    Lymphocytes, Absolute 2.15 0.70 - 3.10 10*3/mm3    Monocytes, Absolute 0.62 0.10 - 0.90 10*3/mm3    Eosinophils, Absolute 0.10 0.00 - 0.40 10*3/mm3    Basophils, Absolute 0.03 0.00 - 0.20 10*3/mm3    Immature Grans, Absolute 0.03 0.00 - 0.05 10*3/mm3    nRBC 0.0 0.0 - 0.2 /100 WBC   Scan Slide    Collection Time: 10/07/21  4:01 PM    Specimen: Blood   Result Value Ref Range    Elliptocytes Slight/1+ None Seen    Hypochromia Mod/2+ None Seen    Microcytes Slight/1+ None Seen    Ovalocytes Slight/1+ None Seen    WBC Morphology Normal Normal    Platelet Estimate Increased Normal    Clumped Platelets Present None Seen   Hemoglobin A1c    Collection Time: 10/07/21  4:01 PM    Specimen: Blood   Result Value Ref Range    Hemoglobin A1C 6.80 (H) 4.80 - 5.60 %   POC Glucose Once    Collection Time: 10/07/21  5:59 PM    Specimen: Blood   Result Value Ref Range    Glucose 106 70 - 130 mg/dL     Note: In addition to lab results from this visit, the labs listed above may include labs taken at another facility or during a different encounter within the last 24 hours. Please correlate lab times with ED admission and discharge times for further clarification of the services performed during this visit.    XR Chest 1 View   Final Result   No acute findings.       DICTATED:   10/07/2021   EDITED/ls :   10/07/2021       This report was finalized on 10/7/2021 5:00 PM by Dr. Tyler Beckford.          CT Angiogram Neck   Final Result   1. CTA neck demonstrates calcified plaque formations at the ICA   origins/carotid bifurcations producing 85% right and 75% left luminal   narrowing as measured by NASCET criteria.       2. Vertebral arteries demonstrate a left-sided dominance of caliber with   calcifications in the V1 and V2 segments with up to moderate-to-severe   stenosis right vertebral, series 4-image 149, of interest in foraminal   portion along with moderate stenosis left vertebral from calcific plaque    formation.       3. CTA head demonstrates mild-moderate calcific disease in the distal   internal carotid arteries as well as moderate-severe right and moderate   left involvement of the intradural segments distal vertebral arteries   without focal severe stenosis, aneurysm or occlusion otherwise.       D:  10/07/2021   E:  10/07/2021               This report was finalized on 10/7/2021 5:00 PM by Dr. Tyler Beckford.          CT Angiogram Head w AI Analysis of LVO   Final Result   1. CTA neck demonstrates calcified plaque formations at the ICA   origins/carotid bifurcations producing 85% right and 75% left luminal   narrowing as measured by NASCET criteria.       2. Vertebral arteries demonstrate a left-sided dominance of caliber with   calcifications in the V1 and V2 segments with up to moderate-to-severe   stenosis right vertebral, series 4-image 149, of interest in foraminal   portion along with moderate stenosis left vertebral from calcific plaque   formation.       3. CTA head demonstrates mild-moderate calcific disease in the distal   internal carotid arteries as well as moderate-severe right and moderate   left involvement of the intradural segments distal vertebral arteries   without focal severe stenosis, aneurysm or occlusion otherwise.       D:  10/07/2021   E:  10/07/2021               This report was finalized on 10/7/2021 5:00 PM by Dr. Tyler Beckford.          CT Head Without Contrast   Final Result   No acute intracranial findings specifically, no acute   intracranial hemorrhage.       Scan performed on 10/07/2021 at 1455 hours. Scan report given to ER   physician and team in person at scanner by Dr. Beckford on 10/07/2021 1500   hours.       DICTATED:   10/07/2021   EDITED/ls :   10/07/2021           This report was finalized on 10/7/2021 5:00 PM by Dr. Tyler Beckford.          MRI Brain Without Contrast    (Results Pending)     Vitals:    10/07/21 1616 10/07/21 1629 10/07/21 1900 10/07/21 1920   BP:  151/85   156/83   BP Location:  Right arm  Right arm   Patient Position:  Lying  Lying   Pulse: 83 84 82 92   Resp:  18 18 18   Temp:  98.6 °F (37 °C)  98.7 °F (37.1 °C)   TempSrc:  Oral  Oral   SpO2: 91% 98% 98% 97%   Weight:       Height:         Medications   sodium chloride 0.9 % flush 10 mL (has no administration in time range)   apixaban (ELIQUIS) tablet 2.5 mg (has no administration in time range)   clopidogrel (PLAVIX) tablet 75 mg (has no administration in time range)   atorvastatin (LIPITOR) tablet 80 mg (has no administration in time range)   aspirin chewable tablet 81 mg (has no administration in time range)   levETIRAcetam (KEPPRA) tablet 500 mg (has no administration in time range)   sodium chloride 0.9 % flush 10 mL (has no administration in time range)   sodium chloride 0.9 % flush 10 mL (has no administration in time range)   sodium chloride 0.9 % infusion (has no administration in time range)   influenza vac split quad (FLUZONE,FLUARIX,AFLURIA,FLULAVAL) injection 0.5 mL (has no administration in time range)   iopamidol (ISOVUE-370) 76 % injection 100 mL (95 mL Intravenous Given 10/7/21 1514)     ECG/EMG Results (last 24 hours)     Procedure Component Value Units Date/Time    ECG 12 Lead [[id number]] Collected: 10/07/21 1524     Updated: 10/07/21 1552     QT Interval 400 ms      QTC Interval 497 ms     Narrative:      Test Reason : Acute Stroke Protocol (onset < 12 hrs)  Blood Pressure :   */*   mmHG  Vent. Rate :  93 BPM     Atrial Rate :  93 BPM     P-R Int : 172 ms          QRS Dur :  82 ms      QT Int : 400 ms       P-R-T Axes :  74  41  98 degrees     QTc Int : 497 ms    Normal sinus rhythm  Nonspecific T wave abnormality  Abnormal ECG  Confirmed by MD Mehul, Bobo ([id number]) on 10/7/2021 3:51:47 PM    Referred By: SUAD           Confirmed By: Bobo Carver MD        ECG 12 Lead   Final Result   Test Reason : Acute Stroke Protocol (onset < 12 hrs)   Blood Pressure :   */*   mmHG   Vent. Rate :  93 BPM      Atrial Rate :  93 BPM      P-R Int : 172 ms          QRS Dur :  82 ms       QT Int : 400 ms       P-R-T Axes :  74  41  98 degrees      QTc Int : 497 ms      Normal sinus rhythm   Nonspecific T wave abnormality   Abnormal ECG   Confirmed by MD Carver Michael ([id number]) on 10/7/2021 3:51:47 PM      Referred By: EDMD           Confirmed By: Bobo Carver MD                    MDM    Final diagnoses:   TIA (transient ischemic attack)   History of stroke   Anemia, unspecified type       ED Disposition  ED Disposition     ED Disposition Condition Comment    Decision to Admit  Level of Care: Telemetry [5]   Diagnosis: TIA (transient ischemic attack) [157991]            No follow-up provider specified.       Medication List      ASK your doctor about these medications    levETIRAcetam 500 MG tablet  Commonly known as: KEPPRA  Ask about: Which instructions should I use?             Ranjan Carver MD  10/07/21 3925

## 2021-10-08 NOTE — THERAPY EVALUATION
Acute Care - Speech Language Pathology Initial Evaluation  [facility]   Cognitive-Communication Evaluation       Patient Name: Chika Posey  : 1945  MRN: [id number]  Today's Date: 10/8/2021               Admit Date: 10/7/2021     Visit Dx:    ICD-10-CM ICD-9-CM   1. TIA (transient ischemic attack)  G45.9 435.9   2. History of stroke  Z86.73 V12.54   3. Anemia, unspecified type  D64.9 285.9     Patient Active Problem List   Diagnosis   • Cerebral hemorrhage ([facility])   • Headache   • Slurred speech   • PAD (peripheral artery disease) ([facility])   • Essential hypertension   • Chronic anticoagulation   • Paraparesis of both lower limbs ([facility])   • Acute-on-chronic kidney injury ([facility])   • Back pain   • Spell of altered cognition   • Generalized weakness   • Muscular weakness   • History of cerebral hemorrhage   • History of DVT (deep vein thrombosis)   • Bilateral carotid artery stenosis   • Angina pectoris ([facility])   • 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 ([facility])   • Altered mental status   • Hypertensive encephalopathy   • Other forms of systemic lupus erythematosus ([facility])   • Type 2 diabetes mellitus with stage 3 chronic kidney disease, without long-term current use of insulin ([facility])   • Basilar artery stenosis   • Stenosis of both vertebral arteries   • Chronic kidney disease   • Ataxia     Past Medical History:   Diagnosis Date   • Back pain    • Carotid artery occlusion    • Cerebral hemorrhage ([facility])    • Chronic kidney disease     MATT   • Compartment syndrome ([facility])    • DVT (deep venous thrombosis) ([facility])     Bilateral   • Generalized weakness    • Hyperlipidemia    • Hypertension    • Hypertensive encephalopathy    • Muscle weakness    • Paraparesis of both lower limbs ([facility])    • Peripheral vascular disease ([facility])    • Stroke ([facility])     bleed   • T2DM (type 2 diabetes mellitus) ([facility])      Past Surgical History:   Procedure Laterality Date   • AORTA BIFEMORAL BYPASS     • BREAST BIOPSY Right    •  CAROTID ENDARTERECTOMY Left    • FOREARM FASCIOTOMY Left    • INTERVENTIONAL RADIOLOGY PROCEDURE Bilateral 5/20/2021    Procedure: Carotid Cerebral Angiogram;  Surgeon: Silvino Hitchcock MD;  Location: [facility] CATH INVASIVE LOCATION;  Service: Interventional Radiology;  Laterality: Bilateral;   • THROMBOLYSIS     • TUBAL ABDOMINAL LIGATION     • US ARTERIAL DOPPLER LOWER EXTREMITY  UNILATERAL     • VASCULAR SURGERY      Bilateral Stents       SLP Recommendation and Plan  SLP Diagnosis: Per this evaluation Mrs. Posey presents w/ wfl speech, language/cognitive skills w/ no observed deficits at this time. Will sign off and no further f/u warranted (10/08/21 0830)        SLC Criteria for Skilled Therapy Interventions Met: no problems identified which require skilled intervention, baseline status (10/08/21 0830)  Anticipated Discharge Disposition (SLP): home (10/08/21 0830)              Plan for Continued Treatment (SLP): SLC completed, no further f/u warranted (10/08/21 0830)       Plan of Care Reviewed With: patient (10/08/21 1139)  Progress:  (initial eval) (10/08/21 1139)         SLP EVALUATION (last 72 hours)      SLP SLC Evaluation     Row Name 10/08/21 0830       Communication Assessment/Intervention    Document Type  discharge evaluation/summary  -CJ    Subjective Information  no complaints  -CJ    Patient Observations  alert;cooperative  -CJ    Patient/Family/Caregiver Comments/Observations  no family present  -CJ    Care Plan Review  evaluation/treatment results reviewed;care plan/treatment goals reviewed  -CJ    Patient Effort  excellent  -CJ    Symptoms Noted During/After Treatment  none  -CJ       General Information    Patient Profile Reviewed  yes  -CJ    Pertinent History Of Current Problem  Pt adm w/ ataxia on stroke pathway; has sig h/o PAD, HTN, cerebral hemorrhage, DVT, BL carotid artery stenosis, diabetes, CKD. Pt passed her RN dysphagia screen, NIH Is 0  -CJ    Precautions/Limitations,  Vision  other (see comments) glasses not available  -CJ    Precautions/Limitations, Hearing  WFL;for purposes of eval  -CJ    Prior Level of Function-Communication  WFL  -    Plans/Goals Discussed with  patient;agreed upon  -    Barriers to Rehab  none identified  -CJ    Patient's Goals for Discharge  return to home  -CJ       Pain    Additional Documentation  Pain Scale: FACES Pre/Post-Treatment (Group)  -       Pain Scale: FACES Pre/Post-Treatment    Pain: FACES Scale, Pretreatment  0-->no hurt  -CJ    Posttreatment Pain Rating  0-->no hurt  -CJ       Comprehension Assessment/Intervention    Comprehension Assessment/Intervention  Auditory Comprehension;Reading Comprehension  -CJ       Auditory Comprehension Assessment/Intervention    Auditory Comprehension (Communication)  WFL  -CJ       Reading Comprehension Assessment/Intervention    Reading Comprehension (Communication)  WFL  -CJ       Expression Assessment/Intervention    Expression Assessment/Intervention  verbal expression;graphic expression  -       Verbal Expression Assessment/Intervention    Verbal Expression  WFL  -CJ       Graphic Expression Assessment/Intervention    Graphic Expression  WFL  -CJ       Oral Motor Structure and Function    Oral Motor Structure and Function  WF  -    Dentition Assessment  natural, present and adequate  -    Mucosal Quality  moist, healthy  -       Oral Musculature and Cranial Nerve Assessment    Oral Motor General Assessment  WFL  -CJ       Motor Speech Assessment/Intervention    Motor Speech Function  WFL  -CJ       Cognitive Assessment Intervention- SLP    Cognitive Function (Cognition)  WF  -       SLP Clinical Impressions    SLP Diagnosis  Per this evaluation Mrs. Posey presents w/ wfl speech, language/cognitive skills w/ no observed deficits at this time. Will sign off and no further f/u warranted  -CJ    Rehab Potential/Prognosis  good  -CJ    SLC Criteria for Skilled Therapy Interventions Met   no problems identified which require skilled intervention;baseline status  -CJ    Functional Impact  no impact on function  -CJ    Plan for Continued Treatment (SLP)  SLC completed, no further f/u warranted  -CJ       Recommendations    Therapy Frequency (SLP SLC)  evaluation only  -CJ    Anticipated Discharge Disposition (SLP)  home  -CJ      User Key  (r) = Recorded By, (t) = Taken By, (c) = Cosigned By    Initials Name Effective Dates    Sakina Howard MS CCC-SLP 06/16/21 -              EDUCATION  The patient has been educated in the following areas:     Cognitive Impairment Communication Impairment.                      Time Calculation:     Time Calculation- SLP     Row Name 10/08/21 1140             Time Calculation- SLP    SLP Start Time  0830  -CJ      SLP Received On  10/08/21  -         Untimed Charges    SLP Eval/Re-eval   ST Eval Speech and Production w/ Language - 37797  -CJ      48279-OO Eval Speech and Production w/ Language Minutes  40  -CJ         Total Minutes    Untimed Charges Total Minutes  40  -CJ       Total Minutes  40  -CJ        User Key  (r) = Recorded By, (t) = Taken By, (c) = Cosigned By    Initials Name Provider Type    Sakina Howard MS CCC-SLP Speech and Language Pathologist          Therapy Charges for Today     Code Description Service Date Service Provider Modifiers Qty    27814630606 HC ST EVAL SPEECH AND PROD W LANG  3 10/8/2021 Sakina Paredes MS CCC-SLP GN 1            Patient was not wearing a face mask and did exhibit coughing during this therapy encounter.  Procedure performed was aerosolizing, involved close contact (within 6 feet for at least 15 minutes or longer), and did not involve contact with infectious secretions or specimens.  Therapist used appropriate personal protective equipment including gloves, standard procedure mask and eye protection.  Appropriate PPE was worn during the entire therapy session.  Hand hygiene was completed before and after  therapy session.            Sakina Paredes MS CCC-SLP  10/8/2021

## 2021-10-08 NOTE — THERAPY DISCHARGE NOTE
Patient Name: Chika Posey  : 1945    MRN: [id number]                              Today's Date: 10/8/2021       Admit Date: 10/7/2021    Visit Dx:     ICD-10-CM ICD-9-CM   1. TIA (transient ischemic attack)  G45.9 435.9   2. History of stroke  Z86.73 V12.54   3. Anemia, unspecified type  D64.9 285.9     Patient Active Problem List   Diagnosis   • Cerebral hemorrhage ([facility])   • Headache   • Slurred speech   • PAD (peripheral artery disease) ([facility])   • Essential hypertension   • Chronic anticoagulation   • Paraparesis of both lower limbs ([facility])   • Acute-on-chronic kidney injury ([facility])   • Back pain   • Spell of altered cognition   • Generalized weakness   • Muscular weakness   • History of cerebral hemorrhage   • History of DVT (deep vein thrombosis)   • Bilateral carotid artery stenosis   • Angina pectoris (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)   • Altered mental status   • Hypertensive encephalopathy   • Other forms of systemic lupus erythematosus ([facility])   • Type 2 diabetes mellitus with stage 3 chronic kidney disease, without long-term current use of insulin ([facility])   • Basilar artery stenosis   • Stenosis of both vertebral arteries   • Chronic kidney disease   • Ataxia     Past Medical History:   Diagnosis Date   • Back pain    • Carotid artery occlusion    • Cerebral hemorrhage ([facility])    • Chronic kidney disease     MATT   • Compartment syndrome ([facility])    • DVT (deep venous thrombosis) ([facility])     Bilateral   • Generalized weakness    • Hyperlipidemia    • Hypertension    • Hypertensive encephalopathy    • Muscle weakness    • Paraparesis of both lower limbs ([facility])    • Peripheral vascular disease ([facility])    • Stroke ([facility])     bleed   • T2DM (type 2 diabetes mellitus) ([facility])      Past Surgical History:   Procedure Laterality Date   • AORTA BIFEMORAL BYPASS     • BREAST BIOPSY Right    • CAROTID ENDARTERECTOMY Left    • FOREARM FASCIOTOMY Left    • INTERVENTIONAL RADIOLOGY  PROCEDURE Bilateral 5/20/2021    Procedure: Carotid Cerebral Angiogram;  Surgeon: Silvino Hitchcock MD;  Location: [facility] INVASIVE LOCATION;  Service: Interventional Radiology;  Laterality: Bilateral;   • THROMBOLYSIS     • TUBAL ABDOMINAL LIGATION     • US ARTERIAL DOPPLER LOWER EXTREMITY  UNILATERAL     • VASCULAR SURGERY      Bilateral Stents     General Information     Row Name 10/08/21 1007          Physical Therapy Time and Intention    Document Type  discharge evaluation/summary  -LO     Mode of Treatment  individual therapy;physical therapy  -LO     Row Name 10/08/21 1007          General Information    Patient Profile Reviewed  yes  -LO     Prior Level of Function  independent:;all household mobility;gait;community mobility  -LO     Existing Precautions/Restrictions  no known precautions/restrictions  -LO     Barriers to Rehab  none identified  -LO     Row Name 10/08/21 1007          Living Environment    Lives With  alone  -LO     Row Name 10/08/21 1007          Home Main Entrance    Number of Stairs, Main Entrance  none  -LO     Row Name 10/08/21 1007          Stairs Within Home, Primary    Number of Stairs, Within Home, Primary  none  -LO     Row Name 10/08/21 1007          Cognition    Orientation Status (Cognition)  oriented x 4  -LO     Row Name 10/08/21 1007          Safety Issues, Functional Mobility    Safety Issues Affecting Function (Mobility)  other (see comments) none  -LO     Comment, Safety Issues/Impairments (Mobility)  no safety impairments noted  -LO       User Key  (r) = Recorded By, (t) = Taken By, (c) = Cosigned By    Initials Name Provider Type    LO Randi Avalos, PT Physical Therapist        Mobility     Row Name 10/08/21 1009          Bed Mobility    Bed Mobility  bed mobility (all) activities  -LO     All Activities, [place] (Bed Mobility)  independent  -LO     Comment (Bed Mobility)  no assist required, independent  -LO     Row Name 10/08/21 1009           Transfers    Comment (Transfers)  independent  -LO     Row Name 10/08/21 1009          Bed-Chair Transfer    Bed-Chair [place] (Transfers)  independent  -LO     Assistive Device (Bed-Chair Transfers)  other (see comments) no AD  -LO     Row Name 10/08/21 1009          Sit-Stand Transfer    Sit-Stand [place] (Transfers)  independent  -LO     Assistive Device (Sit-Stand Transfers)  other (see comments) no AD  -LO     Row Name 10/08/21 1009          Gait/Stairs (Locomotion)    [place] Level (Gait)  standby assist  -LO     Distance in Feet (Gait)  200  -LO     Deviations/Abnormal Patterns (Gait)  bilateral deviations;vy decreased  -LO     Comment (Gait/Stairs)  Patient able to ambulate x 200' without AD without loss of balance. Gait deviations are baseline.  -LO     Row Name 10/08/21 1009          Mobility    Extremity Weight-bearing Status  -- no restrictions noted  -       User Key  (r) = Recorded By, (t) = Taken By, (c) = Cosigned By    Initials Name Provider Type    LO Randi Avalos PT Physical Therapist        Obj/Interventions     Row Name 10/08/21 1010          Range of Motion Comprehensive    General Range of Motion  no range of motion deficits identified  -     Row Name 10/08/21 1010          Strength Comprehensive (MMT)    General Manual Muscle Testing (MMT) Assessment  no strength deficits identified  -LO     Comment, General Manual Muscle Testing (MMT) Assessment  BLE strength symmetrical and grossly 4+/5  -LO     Row Name 10/08/21 1010          Balance    Balance Assessment  sitting static balance;sitting dynamic balance;standing static balance;standing dynamic balance  -LO     Static Sitting Balance  WFL;unsupported;sitting, edge of bed  -LO     Dynamic Sitting Balance  WFL;unsupported;sitting, edge of bed  -LO     Static Standing Balance  WFL;unsupported;standing  -LO     Dynamic Standing Balance  WFL;unsupported;standing  -LO     Comment, Balance  no restrictions noted  -LO      Row Name 10/08/21 1010          Sensory Assessment (Somatosensory)    Sensory Assessment (Somatosensory)  sensation intact  -LO       User Key  (r) = Recorded By, (t) = Taken By, (c) = Cosigned By    Initials Name Provider Type    Randi Rothman, PT Physical Therapist        Goals/Plan    No documentation.       Clinical Impression     Row Name 10/08/21 1012          Pain    Additional Documentation  Pain Scale: FACES Pre/Post-Treatment (Group)  -LO     Row Name 10/08/21 1012          Pain Scale: FACES Pre/Post-Treatment    Pain: FACES Scale, Pretreatment  0-->no hurt  -LO     Posttreatment Pain Rating  0-->no hurt  -LO     Row Name 10/08/21 1012          Plan of Care Review    Plan of Care Reviewed With  patient  -LO     Progress  improving  -LO     Outcome Summary  PT eval completed. Patient alert and oriented x4. Demonstrates BLE symmetrical strength, independence with bed mobility, transfers, and ambulation x 200' without AD. No losses of balance noted. All vitals stable throughout. As patient presents back to baseline, no further skilled IP PT services warranted at this time. Recommend home at [place].  -LO     Row Name 10/08/21 1012          Therapy Assessment/Plan (PT)    Patient/Family Therapy Goals Statement (PT)  go home  -LO     Rehab Potential (PT)  other (see comments) dc from PT  -LO     Criteria for Skilled Interventions Met (PT)  no problems identified which require skilled intervention;no  -LO     Row Name 10/08/21 1012          Vital Signs    Pre Systolic BP Rehab  150  -LO     Pre Treatment Diastolic BP  73  -LO     Post Systolic BP Rehab  152  -LO     Post Treatment Diastolic BP  82  -LO     Pretreatment Heart Rate (beats/min)  82  -LO     Intratreatment Heart Rate (beats/min)  111  -LO     Posttreatment Heart Rate (beats/min)  105  -LO     Pre SpO2 (%)  96  -LO     O2 Delivery Pre Treatment  room air  -LO     O2 Delivery Intra Treatment  room air  -LO     Post SpO2 (%)  98  -LO     O2 Delivery Post  Treatment  room air  -LO     Pre Patient Position  Supine  -LO     Intra Patient Position  Standing  -LO     Post Patient Position  Supine  -LO     Row Name 10/08/21 1012          Positioning and Restraints    Pre-Treatment Position  in bed  -LO     Post Treatment Position  bed  -LO     In Bed  notified nsg;supine;exit alarm on;fowlers;call light within reach;encouraged to call for assist;with other staff  -       User Key  (r) = Recorded By, (t) = Taken By, (c) = Cosigned By    Initials Name Provider Type    Randi Rothman, LEAH Physical Therapist        Outcome Measures     Row Name 10/08/21 1015          How much help from another person do you currently need...    Turning from your back to your side while in flat bed without using bedrails?  4  -LO     Moving from lying on back to sitting on the side of a flat bed without bedrails?  4  -LO     Moving to and from a bed to a chair (including a wheelchair)?  4  -LO     Standing up from a chair using your arms (e.g., wheelchair, bedside chair)?  4  -LO     Climbing 3-5 steps with a railing?  4  -LO     To walk in hospital room?  4  -LO     AM-PAC 6 Clicks Score (PT)  24  -     Row Name 10/08/21 1015          Modified [place]mer Scale    Pre-Stroke Modified Sara Scale  0 - No Symptoms at all.  -LO     Modified [place] Scale  0 - No Symptoms at all.  -     Row Name 10/08/21 1015          Functional Assessment    Outcome Measure Options  AM-PAC 6 Clicks Basic Mobility (PT);Modified Sara  -       User Key  (r) = Recorded By, (t) = Taken By, (c) = Cosigned By    Initials Name Provider Type    Randi Rothman, LEAH Physical Therapist        Physical Therapy Education                 Title: PT OT SLP Therapies (In Progress)     Topic: Physical Therapy (Done)     Point: Mobility training (Done)     Learning Progress Summary           Patient Acceptance, E VU by JUNE at 10/8/2021 7956    Comment: Patient education regarding POC and safety with movement transitions.                    Point: Home exercise program (Done)     Learning Progress Summary           Patient Acceptance, E, VU by  at 10/8/2021 0939    Comment: Patient education regarding POC and safety with movement transitions.                   Point: Body mechanics (Done)     Learning Progress Summary           Patient Acceptance, E, VU by  at 10/8/2021 0939    Comment: Patient education regarding POC and safety with movement transitions.                   Point: Precautions (Done)     Learning Progress Summary           Patient Acceptance, E, VU by  at 10/8/2021 0939    Comment: Patient education regarding POC and safety with movement transitions.                               User Key     Initials Effective Dates Name Provider Type [facility] 06/16/21 -  Randi Avalos, PT Physical Therapist PT              PT Recommendation and Plan     Plan of Care Reviewed With: patient  Progress: improving  Outcome Summary: PT eval completed. Patient alert and oriented x4. Demonstrates BLE symmetrical strength, independence with bed mobility, transfers, and ambulation x 200' without AD. No losses of balance noted. All vitals stable throughout. As patient presents back to baseline, no further skilled IP PT services warranted at this time. Recommend home at [place].     Time Calculation:   PT Charges     Row Name 10/08/21 0939             Time Calculation    Start Time  0939  -LO      PT Received On  10/08/21  -LO      PT Goal Re-Cert Due Date  10/18/21  -LO         Timed Charges    24372 - Gait Training Minutes   8  -LO      97015 - PT Therapeutic Activity Minutes  18  -LO         Untimed Charges    PT Eval/Re-eval Minutes  35  -LO         Total Minutes    Timed Charges Total Minutes  26  -LO      Untimed Charges Total Minutes  35  -LO       Total Minutes  61  -LO        User Key  (r) = Recorded By, (t) = Taken By, (c) = Cosigned By    Initials Name Provider Type    Randi Rothman, PT Physical Therapist        Therapy Charges for  Today     Code Description Service Date Service Provider Modifiers Qty    85011386608 HC GAIT TRAINING EA 15 MIN 10/8/2021 Rnadi Avalos, PT GP 1    21732787139 HC PT THERAPEUTIC ACT EA 15 MIN 10/8/2021 Randi Avalos, PT GP 1    83245003233 HC PT EVAL LOW COMPLEXITY 3 10/8/2021 Randi Avalos, PT GP 1          PT G-Codes  Outcome Measure Options: AM-PAC 6 Clicks Basic Mobility (PT), Modified [place]  AM-PAC 6 Clicks Score (PT): 24  Modified [place] Scale: 0 - No Symptoms at all.    PT Discharge Summary  Anticipated Discharge Disposition (PT): home    Randi Avalos, PT  10/8/2021

## 2021-10-08 NOTE — DISCHARGE SUMMARY
[facility] Medicine Services  DISCHARGE SUMMARY    Patient Name: Chika Posey  : 1945  MRN: [id number]    Date of Admission: 10/7/2021  3:10 PM  Date of Discharge:  10/8/2021  Primary Care Physician: Alyssia Greenfield MD    Consults     Date and Time Order Name Status Description    10/7/2021  5:00 PM Inpatient Neurology Consult Stroke Completed     10/7/2021  3:07 PM Inpatient Neurology Consult Stroke            Hospital Course     Presenting Problem:   TIA (transient ischemic attack) [G45.9]  Stroke (HCC) [I63.9]    Active Hospital Problems    Diagnosis  POA   • Chronic kidney disease [N18.9]  Yes   • Type 2 diabetes mellitus with stage 3 chronic kidney disease, without long-term current use of insulin (HCC) [E11.22, N18.30]  Yes   • Bilateral carotid artery stenosis [I65.23]  Yes   • History of DVT (deep vein thrombosis) [Z86.718]  Not Applicable   • History of cerebral hemorrhage [Z86.79]  Not Applicable   • Essential hypertension [I10]  Yes   • PAD (peripheral artery disease) (HCC) [I73.9]  Yes      Resolved Hospital Problems    Diagnosis Date Resolved POA   • **Ataxia [R27.0] 10/08/2021 Yes   • Stroke (HCC) [I63.9] 10/08/2021 Yes        Hospital Course:  Chika Posey is a 76 y.o. 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  Was getting out of hte car today w family when she realized she could not move her RUE or RLE as she expected.     TIA w R side weakness, resolved.   Bilateral carotid disease 75-85% stenosis  --Stroke neurology followed throughout stay. Her MRI showed a small left frontal stroke. Her CTAs showed stable carotid disease. Neurology recommended lifetime plavix/eliquis. F/U in 4-6 weeks. I have d/w her son Holger.    Discharge Follow Up Recommendations for outpatient labs/diagnostics:   PCP  in 1-2 weeks   Stroke neurology in 4-6 weeks.    Day of Discharge     HPI: Up in bed. Says she is doing very well. Has no concerns.    Review of Systems  Gen- No fevers, chills  CV- No chest pain, palpitations  Resp- No cough, dyspnea  GI- No N/V/D, abd pain    Vital Signs:   Temp:  [98 °F (36.7 °C)-99.1 °F (37.3 °C)] 98.9 °F (37.2 °C)  Heart Rate:  [] 76  Resp:  [18-20] 18  BP: (141-197)/(72-88) 160/72     Physical Exam:  Constitutional: No acute distress, awake, alert  HENT: NCAT, mucous membranes moist  Respiratory: Clear to auscultation bilaterally, respiratory effort normal   Cardiovascular: RRR, no murmurs, rubs, or gallops  Gastrointestinal: Positive bowel sounds, soft, nontender, nondistended  Musculoskeletal: No bilateral ankle edema  Psychiatric: Appropriate affect, cooperative  Neurologic: Oriented x 3, strength symmetric in all extremities, Cranial Nerves grossly intact to confrontation, speech clear  Skin: No rashes      Pertinent  and/or Most Recent Results     LAB RESULTS:      Lab 10/08/21  0641 10/07/21  1601 10/07/21  1516 10/07/21  1511   WBC 7.53 6.24  --   --    HEMOGLOBIN 9.1* 9.4*  --   --    HEMOGLOBIN, POC  --   --  8.5*  --    HEMATOCRIT 33.5* 32.4*  --   --    HEMATOCRIT POC  --   --  25*  --    PLATELETS 501* 587*  --   --    NEUTROS ABS  --  3.31  --   --    IMMATURE GRANS (ABS)  --  0.03  --   --    LYMPHS ABS  --  2.15  --   --    MONOS ABS  --  0.62  --   --    EOS ABS  --  0.10  --   --    MCV 77.2* 71.5*  --   --    PROTIME  --   --   --  12.3*   APTT  --  20.5*  --   --          Lab 10/08/21  0641 10/07/21  1601 10/07/21  1512   SODIUM 134*  --   --    POTASSIUM 3.8  --   --    CHLORIDE 102  --   --    CO2 18.0*  --   --    ANION GAP 14.0  --   --    BUN 7*  --   --    CREATININE 1.07*  --  1.00   GLUCOSE 127*  --   --    CALCIUM 8.6  --   --    HEMOGLOBIN A1C  --  6.80*  --          Lab 10/08/21  0641 10/07/21  1601   TOTAL PROTEIN 7.8  --    ALBUMIN 3.70  --    GLOBULIN 4.1   --    ALT (SGPT) 20 24   AST (SGOT) 25 32   BILIRUBIN 0.4  --    ALK PHOS 113  --          Lab 10/07/21  1601 10/07/21  1511   TROPONIN T <0.010  --    PROTIME  --  12.3*   INR  --  1.0         Lab 10/08/21  0641   CHOLESTEROL 138   LDL CHOL 84   HDL CHOL 41   TRIGLYCERIDES 65             Lab 10/07/21  1516   PH, ARTERIAL 7.42     Brief Urine Lab Results  (Last result in the past 365 days)      Color   Clarity   Blood   Leuk Est   Nitrite   Protein   CREAT   Urine HCG        10/07/21 1538 Yellow Clear Negative Small (1+) Negative Negative             Microbiology Results (last 10 days)     Procedure Component Value - Date/Time    COVID PRE-OP / PRE-PROCEDURE SCREENING ORDER (NO ISOLATION) - Swab, Nasopharynx [[id number]]  (Normal) Collected: 10/07/21 1538    Lab Status: Final result Specimen: Swab from Nasopharynx Updated: 10/07/21 1640    Narrative:      The following orders were created for panel order COVID PRE-OP / PRE-PROCEDURE SCREENING ORDER (NO ISOLATION) - Swab, Nasopharynx.  Procedure                               Abnormality         Status                     ---------                               -----------         ------                     COVID-19,CEPHEID/STEF,LE...[[id number]]  Normal              Final result                 Please view results for these tests on the individual orders.    COVID-19,CEPHEID/STEF,GRETCHEN IN-HOUSE(OR EMERGENT/ADD-ON),NP SWAB IN TRANSPORT MEDIA 3-4 HR TAT - Swab, Nasopharynx [[id number]]  (Normal) Collected: 10/07/21 1538    Lab Status: Final result Specimen: Swab from Nasopharynx Updated: 10/07/21 1640     COVID19 Not Detected    Narrative:      Fact sheet for providers: https://www.fda.gov/media/932653/download     Fact sheet for patients: https://www.fda.gov/media/948934/download  Fact sheet for providers: https://www.fda.gov/media/845790/download     Fact sheet for patients: https://www.fda.gov/media/493856/download          EEG    Result Date: 10/8/2021  Reason for  referral: 76 y.o.female with seizure-like activity Technical Summary:  A 19 channel digital EEG was performed using the international 10-20 placement system, including eye leads and EKG leads. Duration: 20 minutes Video: On Findings: The awake tracing shows diffuse low to medium amplitude 6-7 Hz theta present symmetrically over both hemispheres.  Low amplitude EMG artifact is seen anteriorly.  At times a poorly regulated 7 Hz posterior rhythm is seen symmetrically over the occipital leads.  Stage II sleep is not seen.  Hyperventilation is not performed.  Photic stimulation does not change the background.  No focal features or epileptiform activity are seen. Technical quality: Excellent EKG: Regular, 70-80 bpm SUMMARY: Mild generalized slow No focal features or epileptiform activity are seen     The study shows evidence for diffuse cerebral dysfunction which is nonspecific and exceedingly mild No evidence for epilepsy is seen This report is transcribed using the Dragon dictation system.      CT Head Without Contrast    Result Date: 10/7/2021  EXAMINATION: CT HEAD WO CONTRAST - 10/07/2021  INDICATION: Follow up stroke.  TECHNIQUE: CT head without intervenous contrast  The radiation dose reduction device was turned on for each scan per the ALARA (As Low as Reasonably Achievable) protocol.  COMPARISON: None  FINDINGS: Midline structures are symmetric without evidence of mass, mass effect or midline shift. Ventricles and sulci within normal limits. No intra-axial hemorrhage or extra-axial fluid collection with moderate to severe low-attenuation in the periventricular and deep white matter of chronic small vessel ischemic disease. Globes and orbits unremarkable. Paranasal sinuses and mastoids are grossly clear and well-pneumatized. Calvarium intact.      No acute intracranial findings specifically, no acute intracranial hemorrhage.  Scan performed on 10/07/2021 at 1455 hours. Scan report given to ER physician and team in  person at scanner by Dr. Beckford on 10/07/2021 1500 hours.  DICTATED:   10/07/2021 EDITED/ls :   10/07/2021   This report was finalized on 10/7/2021 5:00 PM by Dr. Tyler Beckford.      CT Angiogram Neck    Result Date: 10/7/2021  EXAMINATION: CT ANGIOGRAM HEAD W AI ANALYSIS OF LVO-, CT ANGIOGRAM NECK-10/07/2021:  INDICATION: Neuro deficit, acute, stroke suspected.  TECHNIQUE: CT angiogram head and neck with and without intravenous contrast administration. 2-D and 3-D reconstructions performed.  The radiation dose reduction device was turned on for each scan per the ALARA (As Low as Reasonably Achievable) protocol.  AI analysis of LVO was utilized.  COMPARISON: CT head, noncontrast, stroke protocol, performed concurrently.  FINDINGS:  CTA NECK: Normal 3-vessel arch with patent great vessel origins demonstrating calcific disease greatest in the left subclavian takeoff with up to moderate stenosis, however, no high-grade stenosis evident with proximal subclavian arteries grossly patent. The vertebral arteries demonstrate a left-sided dominance of caliber with calcifications in the V1 and V2 segments with up to moderate-to-severe stenosis right vertebral, series 4-image 149, of interest in the foraminal portion along with moderate stenosis of the left vertebral from calcific plaque formation. Patency through the distal vertebral intradural segments detailed further below. The carotids demonstrate grossly normal branching pattern with partially retropharyngeal course, somewhat medialized bilaterally, demonstrating calcified plaque formations at the ICA origins/carotid bifurcations producing 85% right and 75% left luminal narrowing as measured by NASCET criteria with patency of the distal internal carotid arteries through the intracranial portions where there are calcific findings as detailed below. Cervical soft tissue is grossly unremarkable for acute findings with the thyroid homogeneous. Lung apices are grossly clear.  CTA  HEAD: Distal internal carotid arteries demonstrate calcific involvement, mild-to-moderate atherosclerotic calcific disease, without focal severe stenosis, aneurysm or occlusion. Anterior cerebral arteries are patent without hemodynamically significant stenosis, aneurysm or occlusion. Middle cerebral arteries are patent without hemodynamically significant stenosis, aneurysm or occlusion. Vertebrobasilar system and posterior cerebral arteries demonstrate calcific disease in the distal vertebral arteries intradural segments, moderate-severe right and moderate left involvement, without distinct occlusion. Posterior cerebral arteries are patent without hemodynamically significant stenosis, aneurysm or occlusion. Fetal origin of the left posterior cerebral artery. The venous structures are unremarkable on partial imaging of the venous structures.      1. CTA neck demonstrates calcified plaque formations at the ICA origins/carotid bifurcations producing 85% right and 75% left luminal narrowing as measured by NASCET criteria.  2. Vertebral arteries demonstrate a left-sided dominance of caliber with calcifications in the V1 and V2 segments with up to moderate-to-severe stenosis right vertebral, series 4-image 149, of interest in foraminal portion along with moderate stenosis left vertebral from calcific plaque formation.  3. CTA head demonstrates mild-moderate calcific disease in the distal internal carotid arteries as well as moderate-severe right and moderate left involvement of the intradural segments distal vertebral arteries without focal severe stenosis, aneurysm or occlusion otherwise.  D:  10/07/2021 E:  10/07/2021    This report was finalized on 10/7/2021 5:00 PM by Dr. Tyler Beckford.      MRI Brain Without Contrast    Result Date: 10/8/2021  EXAMINATION: MRI BRAIN WO CONTRAST- 10/07/2021  INDICATION: Neuro deficit, acute, stroke suspected; G45.9-Transient cerebral ischemic attack, unspecified; Z86.73-Personal  history of transient ischemic attack (TIA), and cerebral infarction without residual deficits; D64.9-Anemia, unspecified; generalized weakness  TECHNIQUE: Routine multiplanar MR imaging was obtained of the brain without the administration of gadolinium contrast.  COMPARISON: NONE  FINDINGS: There is a tiny area of restricted diffusion identified within the left frontal lobe measuring 3 to 4 mm in size. Findings may represent an acute lacunar infarct. There is chronic small vessel ischemic changes seen throughout the periventricular and subcortical white matter. There is no hemorrhage or hydrocephalus. There is no mass, mass effect or midline shift. No abnormal extra-axial fluid collection is identified. Globes and orbits are intact. Visualized paranasal sinuses are grossly clear. The mastoid air cells are patent.      Tiny acute lacunar infarct identified within the left frontal lobe measuring 3 to 4 mm in size.   D: 10/087/2021 E: 10/08/2021      XR Chest 1 View    Result Date: 10/7/2021   EXAMINATION: XR CHEST 1 VW - 10/07/2021  INDICATION: Stroke protocol.  COMPARISON: Chest x-ray 01/14/2021  FINDINGS: Chronic changes without focal consolidation or effusion. Cardiac size enlarged.      No acute findings.  DICTATED:   10/07/2021 EDITED/ls :   10/07/2021  This report was finalized on 10/7/2021 5:00 PM by Dr. Tyler Beckford.      CT Angiogram Head w AI Analysis of LVO    Result Date: 10/7/2021  EXAMINATION: CT ANGIOGRAM HEAD W AI ANALYSIS OF LVO-, CT ANGIOGRAM NECK-10/07/2021:  INDICATION: Neuro deficit, acute, stroke suspected.  TECHNIQUE: CT angiogram head and neck with and without intravenous contrast administration. 2-D and 3-D reconstructions performed.  The radiation dose reduction device was turned on for each scan per the ALARA (As Low as Reasonably Achievable) protocol.  AI analysis of LVO was utilized.  COMPARISON: CT head, noncontrast, stroke protocol, performed concurrently.  FINDINGS:  CTA NECK: Normal  3-vessel arch with patent great vessel origins demonstrating calcific disease greatest in the left subclavian takeoff with up to moderate stenosis, however, no high-grade stenosis evident with proximal subclavian arteries grossly patent. The vertebral arteries demonstrate a left-sided dominance of caliber with calcifications in the V1 and V2 segments with up to moderate-to-severe stenosis right vertebral, series 4-image 149, of interest in the foraminal portion along with moderate stenosis of the left vertebral from calcific plaque formation. Patency through the distal vertebral intradural segments detailed further below. The carotids demonstrate grossly normal branching pattern with partially retropharyngeal course, somewhat medialized bilaterally, demonstrating calcified plaque formations at the ICA origins/carotid bifurcations producing 85% right and 75% left luminal narrowing as measured by NASCET criteria with patency of the distal internal carotid arteries through the intracranial portions where there are calcific findings as detailed below. Cervical soft tissue is grossly unremarkable for acute findings with the thyroid homogeneous. Lung apices are grossly clear.  CTA HEAD: Distal internal carotid arteries demonstrate calcific involvement, mild-to-moderate atherosclerotic calcific disease, without focal severe stenosis, aneurysm or occlusion. Anterior cerebral arteries are patent without hemodynamically significant stenosis, aneurysm or occlusion. Middle cerebral arteries are patent without hemodynamically significant stenosis, aneurysm or occlusion. Vertebrobasilar system and posterior cerebral arteries demonstrate calcific disease in the distal vertebral arteries intradural segments, moderate-severe right and moderate left involvement, without distinct occlusion. Posterior cerebral arteries are patent without hemodynamically significant stenosis, aneurysm or occlusion. Fetal origin of the left posterior  cerebral artery. The venous structures are unremarkable on partial imaging of the venous structures.      1. CTA neck demonstrates calcified plaque formations at the ICA origins/carotid bifurcations producing 85% right and 75% left luminal narrowing as measured by NASCET criteria.  2. Vertebral arteries demonstrate a left-sided dominance of caliber with calcifications in the V1 and V2 segments with up to moderate-to-severe stenosis right vertebral, series 4-image 149, of interest in foraminal portion along with moderate stenosis left vertebral from calcific plaque formation.  3. CTA head demonstrates mild-moderate calcific disease in the distal internal carotid arteries as well as moderate-severe right and moderate left involvement of the intradural segments distal vertebral arteries without focal severe stenosis, aneurysm or occlusion otherwise.  D:  10/07/2021 E:  10/07/2021    This report was finalized on 10/7/2021 5:00 PM by Dr. Tyler Beckford.        Results for orders placed during the hospital encounter of 06/03/21    Duplex Carotid Ultrasound CAR    Interpretation Summary  · Right internal carotid artery stenosis of 50-69%.  · Left internal carotid artery stenosis of >70%.  · There is antegrade flow in the vertebral arteries bilaterally.      Results for orders placed during the hospital encounter of 06/03/21    Duplex Carotid Ultrasound CAR    Interpretation Summary  · Right internal carotid artery stenosis of 50-69%.  · Left internal carotid artery stenosis of >70%.  · There is antegrade flow in the vertebral arteries bilaterally.      Results for orders placed during the hospital encounter of 01/14/21    Adult Transthoracic Echo Complete W/ Cont if Necessary Per Protocol (With Agitated Saline)    Interpretation Summary  · Left ventricular ejection fraction appears to be 66 - 70%. Left ventricular systolic function is normal.  · Left ventricular diastolic function is consistent with (grade Ia w/high LAP)  impaired relaxation.  · Left ventricular wall thickness is consistent with borderline concentric hypertrophy.      Plan for Follow-up of Pending Labs/Results:     Discharge Details        Discharge Medications      Continue These Medications      Instructions Start Date   amLODIPine 5 MG tablet  Commonly known as: NORVASC   5 mg, Oral, Daily      apixaban 2.5 MG tablet tablet  Commonly known as: ELIQUIS   2.5 mg, Oral, Every 12 Hours Scheduled      atorvastatin 80 MG tablet  Commonly known as: LIPITOR   80 mg, Oral, Nightly      cloNIDine 0.1 MG tablet  Commonly known as: CATAPRES   0.1 mg, Oral, Every 12 Hours Scheduled      clopidogrel 75 MG tablet  Commonly known as: PLAVIX   TAKE ONE TABLET BY MOUTH DAILY      levETIRAcetam 500 MG tablet  Commonly known as: KEPPRA   500 mg, Oral, 2 Times Daily      losartan 100 MG tablet  Commonly known as: COZAAR   100 mg, Oral, Daily         Stop These Medications    aspirin 81 MG chewable tablet            Allergies   Allergen Reactions   • Lactose Intolerance (Gi) GI Bleeding   • Morphine And Related Itching   • Plavix [Clopidogrel Bisulfate] Itching         Discharge Disposition:  Home or Self Care    Diet:  Hospital:  Diet Order   Procedures   • Diet Regular; Cardiac, Consistent Carbohydrate       CODE STATUS:    Code Status and Medical Interventions:   Ordered at: 10/07/21 0836     Limited Support to NOT Include:    Intubation     Code Status:    No CPR     Medical Interventions (Level of Support Prior to Arrest):    Limited       Future Appointments   Date Time Provider Department Center   12/6/2021 10:00 AM [facility] E[facility]VASC [facility]   12/6/2021 11:45 AM Silvino Hitchcock MD [facility]   10/6/2022 11:00 AM Bruce Ceballos MD [facility]       Additional Instructions for the Follow-ups that You Need to Schedule     Discharge Follow-up with PCP   As directed       Currently Documented PCP:    Alyssia Greenfield MD    PCP Phone Number:     [phone]     Follow Up Details: 1 week hospital follow up         Discharge Follow-up with Specialty: Stroke Neurology; 6 Weeks   As directed      Specialty: Stroke Neurology    Follow Up: 6 Weeks                     Andie Randall II,   10/08/21      Time Spent on Discharge:  I spent  34  minutes on this discharge activity which included: face-to-face encounter with the patient, reviewing the data in the system, coordination of the care with the nursing staff as well as consultants, documentation, and entering orders.

## 2021-10-08 NOTE — PLAN OF CARE
Goal Outcome Evaluation:  Plan of Care Reviewed With: patient        Progress:  (initial eval)   SLP evaluation completed. Will sign-off for SLC, no observed deficits. Please see note for further details and recommendations.

## 2021-10-08 NOTE — PLAN OF CARE
Problem: Adult Inpatient Plan of Care  Goal: Plan of Care Review  Recent Flowsheet Documentation  Taken 10/8/2021 1259 by Harsha Katz, OT  Progress: improving  Plan of Care Reviewed With:   patient   family  Outcome Summary:   VSS   Pt presents at fxl baseline/independent with ADL performance, fxl mobility for ADLs. No AE/DME needs identified. Recommend home at discharge.   Goal Outcome Evaluation:  Plan of Care Reviewed With: patient, family        Progress: improving  Outcome Summary: VSS; Pt presents at fxl baseline/independent with ADL performance, fxl mobility for ADLs. No AE/DME needs identified. Recommend home at discharge.

## 2021-10-08 NOTE — PLAN OF CARE
Goal Outcome Evaluation:  Plan of Care Reviewed With: patient        Progress: improving  Outcome Summary: PT eval completed. Patient alert and oriented x4. Demonstrates BLE symmetrical strength, independence with bed mobility, transfers, and ambulation x 200' without AD. No losses of balance noted. All vitals stable throughout. As patient presents back to baseline, no further skilled IP PT services warranted at this time. Recommend home at dc.

## 2021-10-08 NOTE — CONSULTS
Patient resting quietly upon entry to room. Patient alert and conversing appropriately. Reviewed her recent glucose screening and discussed her most recent  A1C  Patient shares that she is not on any medications at this time. She shares that she is glad to know that she is managing well and appreciates the visit. She denies any questions or concerns at this time regarding her self management plan.

## 2021-10-08 NOTE — OUTREACH NOTE
Prep Survey      Responses   [facility] facility patient discharged from?  [facility]   Is LACE score < 7 ?  No   Emergency Room discharge w/ pulse ox?  No   Eligibility  Readm Mgmt   Discharge diagnosis  TIA w/ rt side weakness   Does the patient have one of the following disease processes/diagnoses(primary or secondary)?  Stroke (TIA)   Does the patient have Home health ordered?  No   Is there a DME ordered?  No   Prep survey completed?  Yes          Negrita Montaño RN

## 2021-10-08 NOTE — THERAPY DISCHARGE NOTE
Acute Care - Occupational Therapy Discharge  [facility]    Patient Name: Chika Posey  : 1945    MRN: [id number]                              Today's Date: 10/8/2021       Admit Date: 10/7/2021    Visit Dx:     ICD-10-CM ICD-9-CM   1. TIA (transient ischemic attack)  G45.9 435.9   2. History of stroke  Z86.73 V12.54   3. Anemia, unspecified type  D64.9 285.9     Patient Active Problem List   Diagnosis   • Cerebral hemorrhage ([facility])   • Headache   • Slurred speech   • PAD (peripheral artery disease) ([facility])   • Essential hypertension   • Chronic anticoagulation   • Paraparesis of both lower limbs ([facility])   • Acute-on-chronic kidney injury ([facility])   • Back pain   • Spell of altered cognition   • Generalized weakness   • Muscular weakness   • History of cerebral hemorrhage   • History of DVT (deep vein thrombosis)   • Bilateral carotid artery stenosis   • Angina pectoris ([facility])   • 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 ([facility])   • Altered mental status   • Hypertensive encephalopathy   • Other forms of systemic lupus erythematosus ([facility])   • Type 2 diabetes mellitus with stage 3 chronic kidney disease, without long-term current use of insulin ([facility])   • Basilar artery stenosis   • Stenosis of both vertebral arteries   • Chronic kidney disease   • TIA (transient ischemic attack)     Past Medical History:   Diagnosis Date   • Back pain    • Carotid artery occlusion    • Cerebral hemorrhage ([facility])    • Chronic kidney disease     MATT   • Compartment syndrome ([facility])    • DVT (deep venous thrombosis) ([facility])     Bilateral   • Generalized weakness    • Hyperlipidemia    • Hypertension    • Hypertensive encephalopathy    • Muscle weakness    • Paraparesis of both lower limbs ([facility])    • Peripheral vascular disease ([facility])    • Stroke ([facility])     bleed   • T2DM (type 2 diabetes mellitus) ([facility])      Past Surgical History:   Procedure Laterality Date   • AORTA BIFEMORAL BYPASS     • BREAST BIOPSY Right    •  CAROTID ENDARTERECTOMY Left    • FOREARM FASCIOTOMY Left    • INTERVENTIONAL RADIOLOGY PROCEDURE Bilateral 5/20/2021    Procedure: Carotid Cerebral Angiogram;  Surgeon: Silvino Hitchcock MD;  Location: [facility] INVASIVE LOCATION;  Service: Interventional Radiology;  Laterality: Bilateral;   • THROMBOLYSIS     • TUBAL ABDOMINAL LIGATION     • US ARTERIAL DOPPLER LOWER EXTREMITY  UNILATERAL     • VASCULAR SURGERY      Bilateral Stents     General Information     Row Name 10/08/21 1259          OT Time and Intention    Document Type  discharge evaluation/summary  -TA     Mode of Treatment  occupational therapy  -TA     Row Name 10/08/21 1259          General Information    Patient Profile Reviewed  yes  -TA     Prior Level of Function  independent:;all household mobility;community mobility;gait;transfer;bed mobility;ADL's;home management;cooking;using stairs  -TA     Existing Precautions/Restrictions  no known precautions/restrictions  -TA     Row Name 10/08/21 1259          Occupational Profile    Reason for Services/Referral (Occupational Profile)  TIA, fxl decline from PLOF  -TA     Patient Goals (Occupational Profile)  Return home  -TA     Row Name 10/08/21 1259          Living Environment    Lives With  alone  -TA     Row Name 10/08/21 1259          Cognition    Orientation Status (Cognition)  oriented x 4  -TA       User Key  (r) = Recorded By, (t) = Taken By, (c) = Cosigned By    Initials Name Provider Type    TA Harsha Katz, OT Occupational Therapist        Mobility/ADL's     Row Name 10/08/21 1259          Bed Mobility    Bed Mobility  bed mobility (all) activities  -TA     All Activities, [place] (Bed Mobility)  independent  -TA     Row Name 10/08/21 1259          Transfers    Sit-Stand [place] (Transfers)  independent  -TA     Row Name 10/08/21 1259          Functional Mobility    Functional Mobility- Comment  Defer to PT  -TA     Row Name 10/08/21 1259          Activities of  Daily Living    BADL Assessment/Intervention  lower body dressing;toileting  -TA     Row Name 10/08/21 1259          Lower Body Dressing Assessment/Training    [place] Level (Lower Body Dressing)  lower body dressing skills;independent  -TA     Row Name 10/08/21 1259          Toileting Assessment/Training    [place] Level (Toileting)  toileting skills;independent  -TA       User Key  (r) = Recorded By, (t) = Taken By, (c) = Cosigned By    Initials Name Provider Type    Harsha Nails OT Occupational Therapist        Obj/Interventions     Row Name 10/08/21 1259          Sensory Assessment (Somatosensory)    Sensory Assessment (Somatosensory)  bilateral UE;sensation intact  -TA     Row Name 10/08/21 1259          Vision Assessment/Intervention    Visual Impairment/Limitations  WFL;corrective lenses full-time  -TA     Row Name 10/08/21 1259          Range of Motion Comprehensive    General Range of Motion  bilateral upper extremity ROM WFL  -TA     Row Name 10/08/21 1259          Strength Comprehensive (MMT)    General Manual Muscle Testing (MMT) Assessment  no strength deficits identified  -TA     Comment, General Manual Muscle Testing (MMT) Assessment  BUE 5/5  -TA     Row Name 10/08/21 1259          Balance    Balance Assessment  sitting dynamic balance;standing dynamic balance  -TA     Dynamic Sitting Balance  WFL  -TA     Dynamic Standing Balance  WFL  -TA       User Key  (r) = Recorded By, (t) = Taken By, (c) = Cosigned By    Initials Name Provider Type    Harsha Nails OT Occupational Therapist        Goals/Plan    No documentation.       Clinical Impression     Row Name 10/08/21 1259          Pain Assessment    Additional Documentation  Pain Scale: Numbers Pre/Post-Treatment (Group)  -TA     Row Name 10/08/21 1259          Pain Scale: Numbers Pre/Post-Treatment    Pretreatment Pain Rating  0/10 - no pain  -TA     Posttreatment Pain Rating  0/10 - no pain  -TA     Pre/Posttreatment  Pain Comment  Pt denied pain, tolerated  -TA     Pain Intervention(s)  Ambulation/increased activity;Repositioned  -TA     Row Name 10/08/21 1259          Plan of Care Review    Plan of Care Reviewed With  patient;family  -TA     Progress  improving  -TA     Outcome Summary  VSS; Pt presents at fxl baseline/independent with ADL performance, fxl mobility for ADLs. No AE/DME needs identified. Recommend home at discharge.  -TA     Row Name 10/08/21 1259          Therapy Assessment/Plan (OT)    Patient/Family Therapy Goal Statement (OT)  Return home  -TA     Criteria for Skilled Therapeutic Interventions Met (OT)  no;no problems identified which require skilled intervention  -TA     Therapy Frequency (OT)  evaluation only  -TA     Row Name 10/08/21 1259          Therapy Plan Review/Discharge Plan (OT)    Anticipated Discharge Disposition (OT)  home  -TA     Row Name 10/08/21 1254          Vital Signs    Pre Systolic BP Rehab  -- VSS; RN cleared pt for tx  -TA     O2 Delivery Pre Treatment  room air  -TA     O2 Delivery Intra Treatment  room air  -TA     O2 Delivery Post Treatment  room air  -TA     Pre Patient Position  Supine  -TA     Intra Patient Position  Standing  -TA     Post Patient Position  Supine  -TA     Row Name 10/08/21 1253          Positioning and Restraints    Pre-Treatment Position  in bed  -TA     Post Treatment Position  bed  -TA     In Bed  notified nsg;fowlers;call light within reach;encouraged to call for assist;with family/caregiver;side rails up x2;legs elevated  -TA       User Key  (r) = Recorded By, (t) = Taken By, (c) = Cosigned By    Initials Name Provider Type    TA Harsha Katz, OT Occupational Therapist        Outcome Measures     Row Name 10/08/21 1252          How much help from another is currently needed...    Putting on and taking off regular lower body clothing?  4  -TA     Bathing (including washing, rinsing, and drying)  4  -TA     Toileting (which includes using toilet bed  pan or urinal)  4  -TA     Putting on and taking off regular upper body clothing  4  -TA     Taking care of personal grooming (such as brushing teeth)  4  -TA     Eating meals  4  -TA     AM-PAC 6 Clicks Score (OT)  24  -TA     Row Name 10/08/21 1015          How much help from another person do you currently need...    Turning from your back to your side while in flat bed without using bedrails?  4  -LO     Moving from lying on back to sitting on the side of a flat bed without bedrails?  4  -LO     Moving to and from a bed to a chair (including a wheelchair)?  4  -LO     Standing up from a chair using your arms (e.g., wheelchair, bedside chair)?  4  -LO     Climbing 3-5 steps with a railing?  4  -LO     To walk in hospital room?  4  -LO     AM-PAC 6 Clicks Score (PT)  24  -LO     Row Name 10/08/21 1259 10/08/21 1015       Modified Sara Scale    Pre-Stroke Modified [place] Scale  0 - No Symptoms at all.  -TA  0 - No Symptoms at all.  -LO    Modified Sara Scale  0 - No Symptoms at all.  -TA  0 - No Symptoms at all.  -LO    Row Name 10/08/21 1259 10/08/21 1015       Functional Assessment    Outcome Measure Options  AM-PAC 6 Clicks Daily Activity (OT);Modified [place]  -TA  AM-PAC 6 Clicks Basic Mobility (PT);Modified Sara  -LO      User Key  (r) = Recorded By, (t) = Taken By, (c) = Cosigned By    Initials Name Provider Type    Harsha Nails, OT Occupational Therapist    Radni Rothman, PT Physical Therapist        Occupational Therapy Education                 Title: PT OT SLP Therapies (In Progress)     Topic: Occupational Therapy (Done)     Point: ADL training (Done)     Description:   Instruct learner(s) on proper safety adaptation and remediation techniques during self care or transfers.   Instruct in proper use of assistive devices.              Learning Progress Summary           Patient Acceptance, E, VU by BRONWYN at 10/8/2021 1322                   Point: Home exercise program (Done)     Description:    Instruct learner(s) on appropriate technique for monitoring, assisting and/or progressing therapeutic exercises/activities.              Learning Progress Summary           Patient Acceptance, E, VU by TA at 10/8/2021 1322                   Point: Precautions (Done)     Description:   Instruct learner(s) on prescribed precautions during self-care and functional transfers.              Learning Progress Summary           Patient Acceptance, E, VU by TA at 10/8/2021 1322                   Point: Body mechanics (Done)     Description:   Instruct learner(s) on proper positioning and spine alignment during self-care, functional mobility activities and/or exercises.              Learning Progress Summary           Patient Acceptance, E, VU by TA at 10/8/2021 1322                               User Key     Initials Effective Dates Name Provider Type Discipline     06/16/21 -  Harsha Katz, OT Occupational Therapist OT              OT Recommendation and Plan  Retired Outcome Summary/Treatment Plan (OT)  Anticipated Discharge Disposition (OT): home  Therapy Frequency (OT): evaluation only  Plan of Care Review  Plan of Care Reviewed With: patient, family  Progress: improving  Outcome Summary: VSS; Pt presents at fxl baseline/independent with ADL performance, fxl mobility for ADLs. No AE/DME needs identified. Recommend home at discharge.  Plan of Care Reviewed With: patient, family  Outcome Summary: VSS; Pt presents at fxl baseline/independent with ADL performance, fxl mobility for ADLs. No AE/DME needs identified. Recommend home at discharge.     Time Calculation:   Time Calculation- OT     Row Name 10/08/21 1259 10/08/21 0939          Time Calculation- OT    OT Start Time  1259 ttc 0 minutes  -TA  --     Total Timed Code Minutes- OT  0 minute(s)  -TA  --     OT Received On  10/08/21  -TA  --        Timed Charges    52985 - Gait Training Minutes   --  8  -LO        Untimed Charges    OT Eval/Re-eval Minutes  34   -TA  --        Total Minutes    Timed Charges Total Minutes  --  8  -LO     Untimed Charges Total Minutes  34  -TA  --      Total Minutes  34  -TA  8  -LO       User Key  (r) = Recorded By, (t) = Taken By, (c) = Cosigned By    Initials Name Provider Type    Harsha Nails, OT Occupational Therapist    Randi Rothman PT Physical Therapist        Therapy Charges for Today     Code Description Service Date Service Provider Modifiers Qty    [id number] HC OT EVAL MOD COMPLEXITY 3 10/8/2021 Harsha Katz OT GO 1               Harsha Katz OT  10/8/2021

## 2021-10-11 NOTE — OUTREACH NOTE
Stroke Week 1 Survey      Responses   [facility], [place] patient discharged from? [place]   Does the patient have one of the following disease processes/diagnoses(primary or secondary)? Stroke (TIA)   Week 1 attempt successful? Yes   Call start time 1136   Call end time 1140   Discharge diagnosis TIA w/ rt side weakness   Is patient permission given to speak with other caregiver? Yes   List who call center can speak with son   Meds reviewed with patient/caregiver? Yes   Is the patient having any side effects they believe may be caused by any medication additions or changes? No   Does the patient have all medications ordered at discharge? N/A   Is the patient taking all medications as directed (includes completed medication regime)? Yes   Does the patient have a primary care provider?  Yes   Does the patient have an appointment with their PCP within 7 days of discharge? Yes   Comments regarding PCP PCP appt 10-12   Has the patient kept scheduled appointments due by today? N/A   Psychosocial issues? No   Does the patient require any assistance with activities of daily living such as eating, bathing, dressing, walking, etc.? No   Does the patient have any residual symptoms from stroke/TIA? No   Did the patient receive a copy of their discharge instructions? Yes   Nursing interventions Reviewed instructions with patient   What is the patient's perception of their health status since discharge? Returned to baseline/stable   Is the patient/caregiver able to teach back signs and symptoms related to disease process for when to call 911? Yes   If the patient is a current smoker, are they able to teach back resources for cessation? [phone]   Is the patient/caregiver able to teach back the hierarchy of who to call/visit for symptoms/problems? PCP, Specialist, Home health nurse, Urgent Care, ED, 911 Yes   Week 1 call completed? Yes   Revoked No further contact(revokes)-requires comment   Is the patient interested in  additional calls from an ambulatory ?  NOTE:  applies to high risk patients requiring additional follow-up. No   Graduated/Revoked comments goals met          Chanel Nair RN

## 2021-10-26 PROBLEM — R53.1 ACUTE RIGHT-SIDED WEAKNESS: Status: ACTIVE | Noted: 2021-01-01

## 2021-10-28 PROBLEM — R53.1 ACUTE RIGHT-SIDED WEAKNESS: Status: RESOLVED | Noted: 2021-01-01 | Resolved: 2021-01-01

## 2021-10-29 PROBLEM — D50.0 IRON DEFICIENCY ANEMIA DUE TO CHRONIC BLOOD LOSS: Status: ACTIVE | Noted: 2021-01-01

## 2021-10-29 NOTE — OUTREACH NOTE
Prep Survey      Responses   [facility] [place] [facility] patient discharged from? [place]   Is LACE score < 7 ? No   Emergency Room discharge w/ pulse ox? No   Eligibility Readm Mgmt   Discharge diagnosis Acute right-sided weakness, Bilateral carotid artery stenosis    Does the patient have one of the following disease processes/diagnoses(primary or secondary)? Other   Does the patient have Home health ordered? No   Is there a DME ordered? No   Prep survey completed? Yes          Karen Esteban RN

## 2021-11-02 NOTE — OUTREACH NOTE
Medical Week 1 Survey      Responses   [facility] patient discharged from? [place]   Does the patient have one of the following disease processes/diagnoses(primary or secondary)? Other   Week 1 attempt successful? Yes   Call start time 0942   Call end time 0951   Discharge diagnosis Acute right-sided weakness, Bilateral carotid artery stenosis    Is patient permission given to speak with other caregiver? Yes   Meds reviewed with patient/caregiver? Yes   Is the patient having any side effects they believe may be caused by any medication additions or changes? No   Does the patient have all medications ordered at discharge? Yes   Is the patient taking all medications as directed (includes completed medication regime)? Yes   Medication comments Plavix.   Does the patient have a primary care provider?  Yes   Does the patient have an appointment with their PCP within 7 days of discharge? Yes   Comments regarding PCP PCP11/08/2021- Dr. Borden 11/08/2021 - 1130am- 12/07/2021 Neuro- 11/12/2201- Hematologly.    Has the patient kept scheduled appointments due by today? N/A   Comments Discussed her appt times and dates in great detail.    Has home health visited the patient within 72 hours of discharge? N/A   Psychosocial issues? No   Did the patient receive a copy of their discharge instructions? Yes   Nursing interventions Reviewed instructions with patient   What is the patient's perception of their health status since discharge? Returned to baseline/stable   Is the patient/caregiver able to teach back signs and symptoms related to disease process for when to call PCP? Yes   Is the patient/caregiver able to teach back signs and symptoms related to disease process for when to call 911? Yes   Is the patient/caregiver able to teach back the hierarchy of who to call/visit for symptoms/problems? PCP, Specialist, Home health nurse, Urgent Care, ED, 911 Yes   If the patient is a current smoker, are they able to teach back  resources for cessation? [phone]   Week 1 call completed? Yes   Wrap up additional comments She is waiting for all of her follow up aptts.           Bre Vang RN

## 2021-11-08 NOTE — PROGRESS NOTES
NAME: KATHI NELSON   DOS: 2021  : 1945  PCP: Alyssia Greenfield MD    Chief Complaint:    Chief Complaint   Patient presents with   • Right-sided weakness   • Carotid stenosis       History of Present Illness:  76 y.o. female     I saw this 76-year-old female is a second opinion for one of my partners.  She has a complex vascular history including an occluded aorta as well as a left-sided lacunar region stroke in the subcortical white matter.  She also has a history of an intracranial hemorrhage years ago.  She was on Eliquis and had a secondary event that MRI was negative on and has been shown to have high-grade carotid stenosis with carotid ultrasounds and diagnostic angiogram demonstrating a probable 70% the CTA is read higher but there is no evidence of perfusion deficit she is here for evaluation with her daughter she is here for evaluation of TCAR    She has renal insufficiency  PMHX  Allergies:  Allergies   Allergen Reactions   • Adhesive Tape Hives   • Clopidogrel Itching     Itching   • Lactose Intolerance (Gi) GI Bleeding   • Morphine And Related Itching   • Plavix [Clopidogrel Bisulfate] Itching     Medications    Current Outpatient Medications:   •  amLODIPine (NORVASC) 10 MG tablet, Take 10 mg by mouth Daily., Disp: , Rfl:   •  apixaban (ELIQUIS) 2.5 MG tablet tablet, Take 1 tablet by mouth Every 12 (Twelve) Hours., Disp: 60 tablet, Rfl: 11  •  atorvastatin (LIPITOR) 80 MG tablet, Take 1 tablet by mouth Every Night., Disp: 30 tablet, Rfl: 2  •  cloNIDine (CATAPRES) 0.1 MG tablet, Take 1 tablet by mouth Every 12 (Twelve) Hours., Disp: 60 tablet, Rfl: 2  •  clopidogrel (PLAVIX) 75 MG tablet, TAKE ONE TABLET BY MOUTH DAILY, Disp: 30 tablet, Rfl: 2  •  hydrOXYzine pamoate (VISTARIL) 25 MG capsule, Take 25 mg by mouth Every Night., Disp: , Rfl:   •  levETIRAcetam (KEPPRA) 500 MG tablet, Take 500 mg by mouth 2 (Two) Times a Day., Disp: , Rfl:   •  losartan (COZAAR) 100 MG tablet, Take 100  mg by mouth Daily., Disp: , Rfl:   Past Medical History:  Past Medical History:   Diagnosis Date   • Back pain    • Carotid artery occlusion    • Cerebral hemorrhage (HCC)    • Chronic kidney disease     MATT   • Compartment syndrome (HCC)    • DVT (deep venous thrombosis) (HCC)     Bilateral   • Generalized weakness    • Hyperlipidemia    • Hypertension    • Hypertensive encephalopathy    • Muscle weakness    • Paraparesis of both lower limbs (HCC)    • Peripheral vascular disease (HCC)    • Stroke (HCC)     bleed   • T2DM (type 2 diabetes mellitus) (HCC)      Past Surgical History:  Past Surgical History:   Procedure Laterality Date   • AORTA BIFEMORAL BYPASS     • BREAST BIOPSY Right    • CAROTID ENDARTERECTOMY Left    • FOREARM FASCIOTOMY Left    • INTERVENTIONAL RADIOLOGY PROCEDURE Bilateral 2021    Procedure: Carotid Cerebral Angiogram;  Surgeon: Silvino Hitchcock MD;  Location: [facility] INVASIVE LOCATION;  Service: Interventional Radiology;  Laterality: Bilateral;   • THROMBOLYSIS     • TUBAL ABDOMINAL LIGATION     • US ARTERIAL DOPPLER LOWER EXTREMITY  UNILATERAL     • VASCULAR SURGERY      Bilateral Stents     Social Hx:  Social History     Tobacco Use   • Smoking status: Former Smoker     Packs/day: 1.00     Years: 25.00     Pack years: 25.00     Types: Cigarettes     Quit date: 3/22/2008     Years since quittin.6   • Smokeless tobacco: Never Used   Vaping Use   • Vaping Use: Never used   Substance Use Topics   • Alcohol use: Yes     Comment: occassional   • Drug use: No     Family Hx:  Family History   Problem Relation Age of Onset   • Hypertension Mother    • Stroke Mother    • Diabetes Mother    • Hypertension Father    • Stroke Father    • No Known Problems Sister    • No Known Problems Son    • Breast cancer Neg Hx    • Ovarian cancer Neg Hx      Review of Systems:        Review of Systems   Constitutional: Negative for activity change, appetite change, chills, diaphoresis,  fatigue, fever and unexpected weight change.   HENT: Negative for congestion, dental problem, drooling, ear discharge, ear pain, facial swelling, hearing loss, mouth sores, nosebleeds, postnasal drip, rhinorrhea, sinus pressure, sinus pain, sneezing, sore throat, tinnitus, trouble swallowing and voice change.    Eyes: Negative for photophobia, pain, discharge, redness, itching and visual disturbance.   Respiratory: Negative for apnea, cough, choking, chest tightness, shortness of breath, wheezing and stridor.    Cardiovascular: Negative for chest pain, palpitations and leg swelling.   Gastrointestinal: Negative for abdominal distention, abdominal pain, anal bleeding, blood in stool, constipation, diarrhea, nausea, rectal pain and vomiting.   Endocrine: Negative for cold intolerance, heat intolerance, polydipsia, polyphagia and polyuria.   Genitourinary: Negative for decreased urine volume, difficulty urinating, dysuria, enuresis, flank pain, frequency, genital sores, hematuria and urgency.   Musculoskeletal: Negative for arthralgias, back pain, gait problem, joint swelling, myalgias, neck pain and neck stiffness.   Skin: Negative for color change, pallor, rash and wound.   Allergic/Immunologic: Negative for environmental allergies, food allergies and immunocompromised state.   Neurological: Positive for speech difficulty, light-headedness and numbness. Negative for dizziness, tremors, seizures, syncope, facial asymmetry, weakness and headaches.   Hematological: Negative for adenopathy. Does not bruise/bleed easily.   Psychiatric/Behavioral: Negative for agitation, behavioral problems, confusion, decreased concentration, dysphoric mood, hallucinations, self-injury, sleep disturbance and suicidal ideas. The patient is not nervous/anxious and is not hyperactive.    All other systems reviewed and are negative.     I have reviewed this note template and all pertinent parts of the review of systems social, family history,  surgical history and medication list      Physical Examination:  Vitals:    21 1125   BP: 120/65   Pulse:    Temp:    SpO2:       General Appearance:   Well developed, well nourished, well groomed, alert, and cooperative.  Neurological examination:  Neurologic Exam  Vital signs were reviewed and documented in the chart  Patient appeared in good neurologic function with normal comprehension fluent speech  Mood and affect are normal  Sense of smell deferred    Cranial nerves grossly intact    Muscle bulk and tone normal  5 out of 5 strength no motor drift  Gait normal intact  Negative Romberg  No clonus long tract signs or myelopathy    Reflexes symmetric  No edema noted and extremities skin appears normal          Review of Imaging/DATA:  I reviewed all the studies in entirety and discussed the case with Dr. Hitchcock I reviewed the films it important note that calcium will likely have a high degree of artifactual narrowing on the CTA as well as the nongadolinium based MRAs because of her renal insufficiency    Perfusion imaging demonstrates no evidence of slow flow  She is got diffuse iCAD the bilateral cavernous regions  Carotid ultrasound velocities  [facility] NONINVASIVE LAB  [facility] [address] [address] [phone]             Chika Posey  Duplex Carotid Ultrasound CAR  Order# [id number]  Reading physician: Bobo Berrios MD Ordering physician: Carrie Miller APRN Study date: 10/27/21       Patient Information    Patient Name   Chika Posey MRN   [id number] Legal Sex   Female  (Age)   1945 (76 y.o.)     Clinical Indication    bilateral ICA stenosis     Interpretation Summary    · Right internal carotid artery stenosis at the upper end of the 50-69% range.  · Left internal carotid artery stenosis of >70%.  · There is antegrade flow in the vertebral arteries bilaterally.         Patient Hx Of Height, Weight, and Vitals    Height Weight BSA  "(Calculated - sq m) BMI (kg/m2) Pulse BP   150 cm (59.06\") 57.6 kg (127 lb) 1.52 sq meters 25.66 88 142/71     Study Findings    •     Right CCA Prox:  Smooth homogeneous plaque present.    •      Right CCA Mid:  Irregular heterogeneous plaque present.   •     Right CCA Dist:  Smooth homogeneous plaque present.   •     Right Carotid Bulb:  Calcified plaque present.   •     Right ICA Prox:  Calcified plaque present.   •     Right ICA Mid:  Smooth homogeneous plaque present.   •     Right ICA Dist:  No plaque visualized. Tortuous vessel.   •     Right ECA:  Irregular heterogeneous plaque present.   •     Right Vertebral:  Antegrade flow noted.       •     Left CCA Prox:  Smooth homogeneous plaque present.   •    Left CCA Mid:  Smooth homogeneous plaque present.   •     Left CCA Dist:  Smooth homogeneous plaque present.   •     Left Carotid Bulb:  Calcified plaque present.   •     Left ICA Prox:  Calcified plaque present.   •     Left ICA Mid:  No plaque visualized.   •     Left ICA Dist:  No plaque visualized.   •     Left ECA:  Smooth homogeneous plaque present.   •     Left Vertebral:  Antegrade flow noted.     Evidence of 50-69% stenosis at the right ICA. (High end of range)    Evidence of >70% stenosis at the left ICA.     Antegrade vertebral artery, bilaterally.     Study Impression    •    Right ICA Prox:  Imaging indicates 50-69% stenosis.      •    Left ICA Prox:  Imaging indicates >70% stenosis.     Cardiac History    Diagnosis Date Comment Source   Carotid artery occlusion      Hyperlipidemia      Hypertension      T2DM (type 2 diabetes mellitus) ([facility])        Social History      Tobacco Use    Former Smoker; Quit 3/22/2008; Smoked an average of 1 pack/day for 25 years; Smoked: Cigarettes.   Smokeless Tobacco: Never used smokeless tobacco.     Vaping Use    Never used        Blood Pressure Measurements      Right Side   Blood Pressure 163/75 mmHg              Carotid Velocities - Right Side     Systolic " "Diastolic   CCA Prox 177.4 cm/sec       19.1 cm/sec         CCA Mid 97.6 cm/sec       21.5 cm/sec         CCA Dist 66.2 cm/sec       20.4 cm/sec         Bulb 255 cm/sec       73.3 cm/sec         ICA Prox 253 cm/sec       62.9 cm/sec         ICA Mid 150.4 cm/sec       33.7 cm/sec         ICA Dist 62.2 cm/sec       19.5 cm/sec         .4 cm/sec       20.2 cm/sec         Vertebral 47.6 cm/sec       10 cm/sec         Subclavian 242 cm/sec       25.1 cm/sec         ICA/CCA 2.6                   Carotid Velocities - Left Side     Systolic Diastolic   CCA Prox 81 cm/sec       18.9 cm/sec         CCA Mid 76.1 cm/sec       17.5 cm/sec         CCA Dist 71.7 cm/sec       17.2 cm/sec         ICA Prox 266 cm/sec       102 cm/sec         ICA Mid 85.6 cm/sec       21.2 cm/sec         ICA Dist 62.4 cm/sec       24.9 cm/sec         ECA 86.9 cm/sec       11.8 cm/sec         Vertebral 75.1 cm/sec       26.2 cm/sec         Subclavian 179.8 cm/sec             ICA/CCA 3.7                These have been stable since January recent stroke showed no evidence of recurrent MRI she has left hemosiderin staining secondary to prior ICH  Diagnoses/Plan:    Ms. Posey is a 76 y.o. female   Very complex vascular patient with following issues  Aortic occlusion suspected  Renal insufficiency creatinine 1.3  History of prior left-sided posterior ICH  Was naïve to antiplatelet therapy with Plavix most recently had recurrent \"spells \"without evidence of MRI confirmation of stroke    Bilateral high-grade carotid stenosis probably just under 80% on the left    This is a highly complex patient that was referred to me for evaluation of TCAR.  Based on her spell symptoms the distribution of her stroke I am unclear as to whether or not this spell represents an atheroembolic stroke given its deep-seated nature her history of hypertension diabetes etc.    Additionally she has been on Plavix now as well as Eliquis    Are my standpoint all of the spells " occurred after exertional episodes, I would recommend a tilt table test, follow-up for cardiac monitoring to ensure that she does not have paroxysmal A. fib a flutter as a risk factor  Additionally there is some data about addition of Repatha for plaque stabilization I like her to discuss with with her cardiologist given her severe intracranial atherosclerosis    I offered her TCAR but explained that it may have unknown benefit in a patient with such diffuse atherosclerosis if she has definitive findings of recurrent stroke symptoms on scans I would consider LEFT TCAR if an MRI is positive  We will follow her closely and await her follow-up    I explained the complex nature of stroke the risk of disability hospitalization etc. but right now I do think that medical management is likely that the safest course of action

## 2021-11-10 NOTE — OUTREACH NOTE
Medical Week 2 Survey      Responses   [facility] patient discharged from? [place]   Does the patient have one of the following disease processes/diagnoses(primary or secondary)? Other   Week 2 attempt successful? Yes   Call start time 0938   Discharge diagnosis Acute right-sided weakness, Bilateral carotid artery stenosis    Call end time 0939   Meds reviewed with patient/caregiver? Yes   Is the patient having any side effects they believe may be caused by any medication additions or changes? No   Does the patient have all medications ordered at discharge? Yes   Does the patient have a primary care provider?  Yes   Does the patient have an appointment with their PCP within 7 days of discharge? Yes   Comments regarding PCP PCP11/08/2021- Dr. Borden 11/08/2021 - 1130am- 12/07/2021 Neuro- 11/12/2201- Hematologly.    Has the patient kept scheduled appointments due by today? Yes   Comments Has some upcoming appts.    Has home health visited the patient within 72 hours of discharge? N/A   Psychosocial issues? No   Did the patient receive a copy of their discharge instructions? Yes   Nursing interventions Reviewed instructions with patient   What is the patient's perception of their health status since discharge? Returned to baseline/stable   Is the patient/caregiver able to teach back signs and symptoms related to disease process for when to call PCP? Yes   Is the patient/caregiver able to teach back signs and symptoms related to disease process for when to call 911? Yes   Is the patient/caregiver able to teach back the hierarchy of who to call/visit for symptoms/problems? PCP, Specialist, Home health nurse, Urgent Care, ED, 911 Yes   If the patient is a current smoker, are they able to teach back resources for cessation? [phone]   Week 2 Call Completed? Yes   Wrap up additional comments Doing well. No questions or concerns.           Cesar Gomez RN

## 2021-11-12 NOTE — PROGRESS NOTES
CHIEF COMPLAINT: Microcytic anemia with thrombocytosis    REASON FOR REFERRAL: Microcytic anemia with thrombocytosis      RECORDS OBTAINED  Records of the patients history including those obtained from from hospital records and Dr. Greenfield were reviewed and summarized in detail.    Oncology/Hematology History Overview Note   1.  Microcytic anemia with thrombocytosis  2.  Cerebrovascular disease with stroke as below  3.  Peripheral vascular disease as below  4.  History of hypertensive encephalopathy  5.  Diabetes  6.  Hypertension  7.  History of cerebral hemorrhage  8.  History of DVT  9.  Acute on chronic kidney disease  10.  Seizures      Hematology history timeline:  -3/6/2018 hemoglobin 12   3/7/2018 was 10.8.  -5/27/2019 hemoglobin 10.2  -1/14/2021 through 1/18/2021 [facility] hospitalization with hemoglobin 13.2 on admission increased to 15.3 on 1/16/2021 on chronic anticoagulation for history of DVT and peripheral vascular disease with aortobifemoral bypass 2017 and history of left carotid endarterectomy and hemorrhagic stroke presented with altered mental status with aphasia and unresponsive previously doing well earlier in the morning family noticed difficulty speaking.  Had seizure-like activity in the hospital.  EEG was done and Keppra was continued per neurology recommendation.  Hemoglobin 15.3 with normal platelets 386,000 white count 11,880.   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  Came in unable to right upper or lower extremity.  Neurology service followed and showed small left frontal stroke with stable carotid disease.  Neurology recommended lifetime Plavix and aspirin.  Hemoglobin 9.4 with platelets 587,000.  -10/26/2021 through 10/28/2021 inpatient for acute right-sided weakness with bilateral carotid artery stenosis and hypertension with type 2 diabetes and chronic  kidney disease.  Hemoglobin 8.6 with platelets 530,000 on admission.  Hemoglobin 9 with MCV in the 70±1 since October 7, 2021 with platelets 524,000 on 10/28/2021.  Iron low 17 on 10/26/2021 with saturation 3% normal total iron-binding capacity and transferrin.  B12 and folate both normal.  History of peripheral artery disease with aortofemoral revascularization.  Had been admitted earlier in October with lacunar left frontal CVA.  Already on Eliquis.  CT angio neck revealed 85% right and 75% left carotid disease with the addition of Plavix to the Eliquis at time of discharge.  She presented with right facial droop with slurred speech and right-sided weakness.  Seen by stroke team in the emergency room.  CT head negative for acute process.  Neuro interventional service stop Plavix and Eliquis and initiated heparin and systolic to be kept over 130 with MRI brain and MRA showing 80-90% bilateral proximal interna carotid artery stenosis and 75% severe narrowing of right cavernous carotid artery with outpouching from right clinoid ICA.  Dr. Hitchcock did not consider a candidate for traditional carotid endarterectomy and considered a better candidate for TCAR.  Discharged on aspirin Plavix and statin with follow-up with Dr. Borden for TCAR discussion.    -11/12/2021 initial [facility] hematology consultation: Patient with multiple comorbidities and severe vascular disease as outlined above with neurologic sequelae from that being evaluated now for microcytic anemia.  We will repeat iron indices and see if the iron is holding up along with the ferritin.  She Clearly had a low iron on 10/26/2020But with transferrin normal and total iron binding capacity at the upper end of normal that I suspect relate to anemia of inflammation more than iron deficiency but we shall see.  She also may have concomitant anemia of chronic kidney disease but that is less likely with just modest decrease in the GFR into the 50s.  Bilirubin is normal  and no jaundice and unlikely to be hemolytic.  We will get her blood counts and iron indices and ferritin today and I will do telehealth visit with her to go over these results and to talk about timing if feasible of endoscopic screenings but with her other comorbidities and chronic anticoagulation that is going to be a difficult conundrum.  I will check her fecal occult blood.     Iron deficiency anemia due to chronic blood loss       HISTORY OF PRESENT ILLNESS:  The patient is a 76 y.o.  female, referred for microcytic anemia with thrombocytosis.  Multiple comorbidities with complex history as outlined above    REVIEW OF SYSTEMS:  No change in color caliber or consistency of stools    Past Medical History:   Diagnosis Date   • Back pain    • Carotid artery occlusion    • Cerebral hemorrhage (HCC)    • Chronic kidney disease     MATT   • Compartment syndrome (HCC)    • DVT (deep venous thrombosis) (HCC)     Bilateral   • Generalized weakness    • Hyperlipidemia    • Hypertension    • Hypertensive encephalopathy    • Muscle weakness    • Paraparesis of both lower limbs (HCC)    • Peripheral vascular disease (HCC)    • Stroke (HCC)     bleed   • T2DM (type 2 diabetes mellitus) (HCC)      Past Surgical History:   Procedure Laterality Date   • AORTA BIFEMORAL BYPASS     • BREAST BIOPSY Right    • CAROTID ENDARTERECTOMY Left    • FOREARM FASCIOTOMY Left    • INTERVENTIONAL RADIOLOGY PROCEDURE Bilateral 5/20/2021    Procedure: Carotid Cerebral Angiogram;  Surgeon: Silvino Hitchcock MD;  Location: [facility] INVASIVE LOC[facility];  Service: Interventional Radiology;  Laterality: Bilateral;   • THROMBOLYSIS     • TUBAL ABDOMINAL LIGATION     • US ARTERIAL DOPPLER LOWER EXTREMITY  UNILATERAL     • VASCULAR SURGERY      Bilateral Stents       Current Outpatient Medications on File Prior to Visit   Medication Sig Dispense Refill   • amLODIPine (NORVASC) 10 MG tablet Take 10 mg by mouth Daily.     • apixaban (ELIQUIS) 2.5  "MG tablet tablet Take 1 tablet by mouth Every 12 (Twelve) Hours. 60 tablet 11   • atorvastatin (LIPITOR) 80 MG tablet Take 1 tablet by mouth Every Night. 30 tablet 2   • cloNIDine (CATAPRES) 0.1 MG tablet Take 1 tablet by mouth Every 12 (Twelve) Hours. 60 tablet 2   • clopidogrel (PLAVIX) 75 MG tablet TAKE ONE TABLET BY MOUTH DAILY 30 tablet 2   • hydrOXYzine pamoate (VISTARIL) 25 MG capsule Take 25 mg by mouth Every Night.     • levETIRAcetam (KEPPRA) 500 MG tablet Take 500 mg by mouth 2 (Two) Times a Day.     • losartan (COZAAR) 100 MG tablet Take 100 mg by mouth Daily.       No current facility-administered medications on file prior to visit.       Allergies   Allergen Reactions   • Adhesive Tape Hives   • Clopidogrel Itching     Itching   • Lactose Intolerance (Gi) GI Bleeding   • Morphine And Related Itching   • Plavix [Clopidogrel Bisulfate] Itching       Social History     Socioeconomic History   • Marital status: Single   • Number of children: 1   Tobacco Use   • Smoking status: Former Smoker     Packs/day: 1.00     Years: 25.00     Pack years: 25.00     Types: Cigarettes     Quit date: 3/22/2008     Years since quittin.6   • Smokeless tobacco: Never Used   Vaping Use   • Vaping Use: Never used   Substance and Sexual Activity   • Alcohol use: Yes     Comment: occassional   • Drug use: No   • Sexual activity: Defer       Family History   Problem Relation Age of Onset   • Hypertension Mother    • Stroke Mother    • Diabetes Mother    • Hypertension Father    • Stroke Father    • No Known Problems Sister    • No Known Problems Son    • Breast cancer Neg Hx    • Ovarian cancer Neg Hx        PHYSICAL EXAM:  No jaundice or icterus.  No abdominal tenderness.    /71   Pulse 84   Temp 97.6 °F (36.4 °C)   Resp 18   Ht 149.9 cm (59\")   Wt 60.3 kg (133 lb)   SpO2 96%   BMI 26.86 kg/m²     ECOG score: 1           ECOG: (1) Restricted in Physically Strenuous Activity, Ambulatory & Able to Do Work of " [employer]    Lab Results   Component Value Date    HGB 9.0 (L) 10/28/2021    HCT 31.1 (L) 10/28/2021    MCV 71.3 (L) 10/28/2021     (H) 10/28/2021    WBC 7.23 10/28/2021    NEUTROABS 4.81 10/27/2021    LYMPHSABS 2.50 10/27/2021    MONOSABS 0.83 10/27/2021    EOSABS 0.18 10/27/2021    BASOSABS 0.04 10/27/2021     Lab Results   Component Value Date    GLUCOSE 167 (H) 10/28/2021    BUN 10 10/28/2021    CREATININE 1.01 (H) 10/28/2021     10/28/2021    K 3.6 10/28/2021     10/28/2021    CO2 21.0 (L) 10/28/2021    CALCIUM 9.3 10/28/2021    PROTEINTOT 7.6 10/28/2021    ALBUMIN 3.90 10/28/2021    BILITOT 0.3 10/28/2021    ALKPHOS 100 10/28/2021    AST 21 10/28/2021    ALT 18 10/28/2021         Assessment/Plan   1.  Microcytic anemia with thrombocytosis  2.  Cerebrovascular disease with stroke as below  3.  Peripheral vascular disease as below  4.  History of hypertensive encephalopathy  5.  Diabetes  6.  Hypertension  7.  History of cerebral hemorrhage  8.  History of DVT  9.  Acute on chronic kidney disease  10.  Seizures      Hematology history timeline:  -3/6/2018 hemoglobin 12   3/7/2018 was 10.8.  -5/27/2019 hemoglobin 10.2  -1/14/2021 through 1/18/2021 [facility] hospitalization with hemoglobin 13.2 on admission increased to 15.3 on 1/16/2021 on chronic anticoagulation for history of DVT and peripheral vascular disease with aortobifemoral bypass 2017 and history of left carotid endarterectomy and hemorrhagic stroke presented with altered mental status with aphasia and unresponsive previously doing well earlier in the morning family noticed difficulty speaking.  Had seizure-like activity in the hospital.  EEG was done and Keppra was continued per neurology recommendation.  Hemoglobin 15.3 with normal platelets 386,000 white count 11,880.   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  Came in unable to right upper or lower extremity.  Neurology service followed and showed small left frontal stroke with stable carotid disease.  Neurology recommended lifetime Plavix and aspirin.  Hemoglobin 9.4 with platelets 587,000.  -10/26/2021 through 10/28/2021 inpatient for acute right-sided weakness with bilateral carotid artery stenosis and hypertension with type 2 diabetes and chronic kidney disease.  Hemoglobin 8.6 with platelets 530,000 on admission.  Hemoglobin 9 with MCV in the 70±1 since October 7, 2021 with platelets 524,000 on 10/28/2021.  Iron low 17 on 10/26/2021 with saturation 3% normal total iron-binding capacity and transferrin.  B12 and folate both normal.  History of peripheral artery disease with aortofemoral revascularization.  Had been admitted earlier in October with lacunar left frontal CVA.  Already on Eliquis.  CT angio neck revealed 85% right and 75% left carotid disease with the addition of Plavix to the Eliquis at time of discharge.  She presented with right facial droop with slurred speech and right-sided weakness.  Seen by stroke team in the emergency room.  CT head negative for acute process.  Neuro interventional service stop Plavix and Eliquis and initiated heparin and systolic to be kept over 130 with MRI brain and MRA showing 80-90% bilateral proximal interna carotid artery stenosis and 75% severe narrowing of right cavernous carotid artery with outpouching from right clinoid ICA.  Dr. Hitchcock did not consider a candidate for traditional carotid endarterectomy and considered a better candidate for TCAR.  Discharged on aspirin Plavix and statin with follow-up with Dr. Borden for TCAR discussion.    -11/12/2021 initial [facility] hematology consultation: Patient with multiple comorbidities and severe vascular disease as outlined above with neurologic sequelae from that being evaluated now for microcytic anemia.  We will repeat iron indices and see if the iron is  holding up along with the ferritin.  She Clearly had a low iron on 10/26/2020But with transferrin normal and total iron binding capacity at the upper end of normal that I suspect relate to anemia of inflammation more than iron deficiency but we shall see.  She also may have concomitant anemia of chronic kidney disease but that is less likely with just modest decrease in the GFR into the 50s.  Bilirubin is normal and no jaundice and unlikely to be hemolytic.  We will get her blood counts and iron indices and ferritin today and I will do telehealth visit with her to go over these results and to talk about timing if feasible of endoscopic screenings but with her other comorbidities and chronic anticoagulation that is going to be a difficult conundrum.  I will check her fecal occult blood.  We will do a video visit in a week to go over results.    Total time of care inclusive of time spent prior to arrival reviewing and cataloging extensive prior records as outlined above and during visit translating this and the differential diagnosis of microcytic anemia and the potential need for endoscopic evaluation and complicating that relative to her antiplatelet therapy and other comorbidities as outlined above and putting forth this plan and testing and follow-up and arranging for such after visit took 1 hour patient care time throughout the day today.      Nomi Mathew MD    11/12/2021

## 2021-11-18 PROBLEM — D50.0 IRON DEFICIENCY ANEMIA DUE TO CHRONIC BLOOD LOSS: Chronic | Status: ACTIVE | Noted: 2021-01-01

## 2021-11-18 NOTE — OUTREACH NOTE
Medical Week 3 Survey      Responses   [facility] patient discharged from? [place]   Does the patient have one of the following disease processes/diagnoses(primary or secondary)? Other   Week 3 attempt successful? No   Unsuccessful attempts Attempt 1   Discharge diagnosis Acute right-sided weakness, Bilateral carotid artery stenosis           Kandy Byrnes RN

## 2021-11-18 NOTE — PROGRESS NOTES
Telehealth follow-up visit  This was an audio and video enabled telemedicine encounter.  Verbal consent given.  Done for COVID-19 risk reduction  CHIEF COMPLAINT: Follow-up iron deficiency anemia    Problem List:  Oncology/Hematology History Overview Note   1.  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  Cerebrovascular disease with stroke as below  3.  Peripheral vascular disease as below  4.  History of hypertensive encephalopathy  5.  Diabetes  6.  Hypertension  7.  History of cerebral hemorrhage  8.  History of DVT  9.  Acute on chronic kidney disease  10.  Seizures      Hematology history timeline:  -3/6/2018 hemoglobin 12   3/7/2018 was 10.8.  -5/27/2019 hemoglobin 10.2  -1/14/2021 through 1/18/2021 [facility] hospitalization with hemoglobin 13.2 on admission increased to 15.3 on 1/16/2021 on chronic anticoagulation for history of DVT and peripheral vascular disease with aortobifemoral bypass 2017 and history of left carotid endarterectomy and hemorrhagic stroke presented with altered mental status with aphasia and unresponsive previously doing well earlier in the morning family noticed difficulty speaking.  Had seizure-like activity in the hospital.  EEG was done and Keppra was continued per neurology recommendation.  Hemoglobin 15.3 with normal platelets 386,000 white count 11,880.     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  Came in unable to right upper or lower extremity.  Neurology service followed and showed small left frontal stroke with stable carotid disease.  Neurology recommended lifetime Plavix and aspirin.  Hemoglobin 9.4 with platelets 587,000.  -10/26/2021 through 10/28/2021 inpatient for acute right-sided weakness with bilateral carotid artery stenosis and hypertension with type 2 diabetes and chronic kidney disease.  Hemoglobin 8.6 with  platelets 530,000 on admission.  Hemoglobin 9 with MCV in the 70±1 since October 7, 2021 with platelets 524,000 on 10/28/2021.  Iron low 17 on 10/26/2021 with saturation 3% normal total iron-binding capacity and transferrin.  B12 and folate both normal.  History of peripheral artery disease with aortofemoral revascularization.  Had been admitted earlier in October with lacunar left frontal CVA.  Already on Eliquis.  CT angio neck revealed 85% right and 75% left carotid disease with the addition of Plavix to the Eliquis at time of discharge.  She presented with right facial droop with slurred speech and right-sided weakness.  Seen by stroke team in the emergency room.  CT head negative for acute process.  Neuro interventional service stop Plavix and Eliquis and initiated heparin and systolic to be kept over 130 with MRI brain and MRA showing 80-90% bilateral proximal interna carotid artery stenosis and 75% severe narrowing of right cavernous carotid artery with outpouching from right clinoid ICA.  Dr. Hitchcock did not consider a candidate for traditional carotid endarterectomy and considered a better candidate for TCAR.  Discharged on aspirin Plavix and statin with follow-up with Dr. Borden for TCAR discussion.    -11/12/2021 initial [facility]ology consultation: Patient with multiple comorbidities and severe vascular disease as outlined above with neurologic sequelae from that being evaluated now for microcytic anemia.  We will repeat iron indices and see if the iron is holding up along with the ferritin.  She Clearly had a low iron on 10/26/2020But with transferrin normal and total iron binding capacity at the upper end of normal that I suspect relate to anemia of inflammation more than iron deficiency but we shall see.  She also may have concomitant anemia of chronic kidney disease but that is less likely with just modest decrease in the GFR into the 50s.  Bilirubin is normal and no jaundice and unlikely to be  hemolytic.  We will get her blood counts and iron indices and ferritin today and I will do telehealth visit with her to go over these results and to talk about timing if feasible of endoscopic screenings but with her other comorbidities and chronic anticoagulation that is going to be a difficult conundrum.  I will check her fecal occult blood.    -11/12/2021 data:  Hemoglobin 9 MCV 74.5 otherwise unremarkable white count.  Platelet count 500,000.  Erythropoietin 70  Iron indices: Ferritin 49.62.  Iron low 20 with saturation 4% and total iron binding capacity 453 with transferrin 304.  Megaloblastic panel: B12 normal 676 with methylmalonic acid 143.  Folic acid 5.81 with homocystine slightly above normal 19 upper limit 15.  Hemolytic panel: SHAHRAM negative.  Reticulocyte count 1.53% and adequate.  Haptoglobin normal 69.  Bilirubin total direct and indirect all normal.  GFR 60 with creatinine 1.08.    -11/18/2021 [facility] hematology virtual follow-up visit: No new somatic complaints.  With ferritin on the lower end of normal and low iron and saturation with normal sedimentation rate and no megaloblastic or hemolytic process with an adequate reticulocyte count, this is likely iron deficiency anemia.  We will start her on ferritin 325 mg twice a day every other day with vitamin C to improve absorption.  Will refer her to gastroenterology and they can decide on if and when and whether they are able to do EGD/colonoscopy/PillCam in light of her comorbidities and what kind of stool testing they would want her to do.  Fecal occult blood on oral iron will of course be falsely positive.  I will for completeness sake check her JAK2 with reflex testing but I am virtually certain the thrombocytosis is due to her iron deficiency and not a myeloproliferative disorder.  Knowing this however is important with her history of venous and arterial clotting events.     Iron deficiency anemia due to chronic blood loss       HISTORY OF PRESENT  ILLNESS:  The patient is a 76 y.o. female, here for follow up on management of iron deficiency anemia with venous and arterial thrombotic events and hemorrhagic stroke.    Past Medical History:   Diagnosis Date   • Back pain    • Carotid artery occlusion    • Cerebral hemorrhage (HCC)    • Chronic kidney disease     MATT   • Compartment syndrome (HCC)    • DVT (deep venous thrombosis) (HCC)     Bilateral   • Generalized weakness    • Hyperlipidemia    • Hypertension    • Hypertensive encephalopathy    • Muscle weakness    • Paraparesis of both lower limbs (HCC)    • Peripheral vascular disease (HCC)    • Stroke (HCC)     bleed   • T2DM (type 2 diabetes mellitus) (HCC)      Past Surgical History:   Procedure Laterality Date   • AORTA BIFEMORAL BYPASS     • BREAST BIOPSY Right    • CAROTID ENDARTERECTOMY Left    • FOREARM FASCIOTOMY Left    • INTERVENTIONAL RADIOLOGY PROCEDURE Bilateral 5/20/2021    Procedure: Carotid Cerebral Angiogram;  Surgeon: Silvino Hitchcock MD;  Location: [facility] INVASIVE LOCATION;  Service: Interventional Radiology;  Laterality: Bilateral;   • THROMBOLYSIS     • TUBAL ABDOMINAL LIGATION     • US ARTERIAL DOPPLER LOWER EXTREMITY  UNILATERAL     • VASCULAR SURGERY      Bilateral Stents       Allergies   Allergen Reactions   • Adhesive Tape Hives   • Clopidogrel Itching     Itching   • Lactose Intolerance (Gi) GI Bleeding   • Morphine And Related Itching   • Plavix [Clopidogrel Bisulfate] Itching       Family History and Social History reviewed and changed as necessary    REVIEW OF SYSTEM:   No new somatic complaints.    PHYSICAL EXAM:  No dysarthria.  Able to communicate.  Alert and oriented    There were no vitals filed for this visit.  There were no vitals filed for this visit.       Vitals reviewed.    ECOG: (0) Fully Active - Able to Carry On All Pre-disease Performance Without Restriction    Lab Results   Component Value Date    HGB 9.0 (L) 11/12/2021    HCT 30.6 (L) 11/12/2021     MCV 74.5 (L) 11/12/2021     (H) 11/12/2021    WBC 4.85 11/12/2021    NEUTROABS 2.17 11/12/2021    LYMPHSABS 2.12 11/12/2021    MONOSABS 0.36 11/12/2021    EOSABS 0.14 11/12/2021    BASOSABS 0.04 11/12/2021       Lab Results   Component Value Date    GLUCOSE 115 (H) 11/12/2021    BUN 9 11/12/2021    CREATININE 1.08 (H) 11/12/2021     11/12/2021    K 3.4 (L) 11/12/2021     11/12/2021    CO2 24.0 11/12/2021    CALCIUM 9.2 11/12/2021    PROTEINTOT 7.6 11/12/2021    ALBUMIN 4.10 11/12/2021    BILITOT 0.3 11/12/2021    ALKPHOS 95 11/12/2021    AST 18 11/12/2021    ALT 18 11/12/2021             ASSESSMENT & PLAN:  1.  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  Cerebrovascular disease with stroke as below  3.  Peripheral vascular disease as below  4.  History of hypertensive encephalopathy  5.  Diabetes  6.  Hypertension  7.  History of cerebral hemorrhage  8.  History of DVT  9.  Acute on chronic kidney disease  10.  Seizures      Hematology history timeline:  -3/6/2018 hemoglobin 12   3/7/2018 was 10.8.  -5/27/2019 hemoglobin 10.2  -1/14/2021 through 1/18/2021 [facility] hospitalization with hemoglobin 13.2 on admission increased to 15.3 on 1/16/2021 on chronic anticoagulation for history of DVT and peripheral vascular disease with aortobifemoral bypass 2017 and history of left carotid endarterectomy and hemorrhagic stroke presented with altered mental status with aphasia and unresponsive previously doing well earlier in the morning family noticed difficulty speaking.  Had seizure-like activity in the hospital.  EEG was done and Keppra was continued per neurology recommendation.  Hemoglobin 15.3 with normal platelets 386,000 white count 11,880.     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  Came in unable to right upper or lower extremity.   Neurology service followed and showed small left frontal stroke with stable carotid disease.  Neurology recommended lifetime Plavix and aspirin.  Hemoglobin 9.4 with platelets 587,000.  -10/26/2021 through 10/28/2021 inpatient for acute right-sided weakness with bilateral carotid artery stenosis and hypertension with type 2 diabetes and chronic kidney disease.  Hemoglobin 8.6 with platelets 530,000 on admission.  Hemoglobin 9 with MCV in the 70±1 since October 7, 2021 with platelets 524,000 on 10/28/2021.  Iron low 17 on 10/26/2021 with saturation 3% normal total iron-binding capacity and transferrin.  B12 and folate both normal.  History of peripheral artery disease with aortofemoral revascularization.  Had been admitted earlier in October with lacunar left frontal CVA.  Already on Eliquis.  CT angio neck revealed 85% right and 75% left carotid disease with the addition of Plavix to the Eliquis at time of discharge.  She presented with right facial droop with slurred speech and right-sided weakness.  Seen by stroke team in the emergency room.  CT head negative for acute process.  Neuro interventional service stop Plavix and Eliquis and initiated heparin and systolic to be kept over 130 with MRI brain and MRA showing 80-90% bilateral proximal interna carotid artery stenosis and 75% severe narrowing of right cavernous carotid artery with outpouching from right clinoid ICA.  Dr. Hitchcock did not consider a candidate for traditional carotid endarterectomy and considered a better candidate for TCAR.  Discharged on aspirin Plavix and statin with follow-up with Dr. Borden for TCAR discussion.    -11/12/2021 initial [facility] hematology consultation: Patient with multiple comorbidities and severe vascular disease as outlined above with neurologic sequelae from that being evaluated now for microcytic anemia.  We will repeat iron indices and see if the iron is holding up along with the ferritin.  She Clearly had a low iron on  10/26/2020But with transferrin normal and total iron binding capacity at the upper end of normal that I suspect relate to anemia of inflammation more than iron deficiency but we shall see.  She also may have concomitant anemia of chronic kidney disease but that is less likely with just modest decrease in the GFR into the 50s.  Bilirubin is normal and no jaundice and unlikely to be hemolytic.  We will get her blood counts and iron indices and ferritin today and I will do telehealth visit with her to go over these results and to talk about timing if feasible of endoscopic screenings but with her other comorbidities and chronic anticoagulation that is going to be a difficult conundrum.  I will check her fecal occult blood.    -11/12/2021 data:  Hemoglobin 9 MCV 74.5 otherwise unremarkable white count.  Platelet count 500,000.  Erythropoietin 70  Iron indices: Ferritin 49.62.  Iron low 20 with saturation 4% and total iron binding capacity 453 with transferrin 304.  Megaloblastic panel: B12 normal 676 with methylmalonic acid 143.  Folic acid 5.81 with homocystine slightly above normal 19 upper limit 15.  Hemolytic panel: SHAHRAM negative.  Reticulocyte count 1.53% and adequate.  Haptoglobin normal 69.  Bilirubin total direct and indirect all normal.  GFR 60 with creatinine 1.08.    -11/18/2021 [facility] hematology virtual follow-up visit: No new somatic complaints.  With ferritin on the lower end of normal and low iron and saturation with normal sedimentation rate and no megaloblastic or hemolytic process with an adequate reticulocyte count, this is likely iron deficiency anemia.  We will start her on ferritin 325 mg twice a day every other day with vitamin C to improve absorption.  Will refer her to gastroenterology and they can decide on if and when and whether they are able to do EGD/colonoscopy/PillCam in light of her comorbidities and what kind of stool testing they would want her to do.  Fecal occult blood on oral iron  will of course be falsely positive.  I will for completeness sake check her JAK2 with reflex testing but I am virtually certain the thrombocytosis is due to her iron deficiency and not a myeloproliferative disorder.  Knowing this however is important with her history of venous and arterial clotting events.    Total time of care today inclusive of time spent today prior to her arrival reviewing interval labs from 11/12/2021 and during visit today translating that information and the differential diagnosis and the plan for work-up with GI and JAK2 mutation and follow-up with us in the weeks ahead on that result and assuming no JAK2 mutation then repeating iron indices in about 3-4 months and after visit instituting this plan took 30 minutes of total patient care time throughout the day today.  Nomi Mathew MD    11/18/2021

## 2021-11-22 NOTE — OUTREACH NOTE
Medical Week 3 Survey      Responses   [facility] [place] patient discharged from? [facility]   Does the patient have one of the following disease processes/diagnoses(primary or secondary)? Other   Week 3 attempt successful? Yes   Call start time 1722   Call end time 1724   Discharge diagnosis Acute right-sided weakness, Bilateral carotid artery stenosis    Meds reviewed with patient/caregiver? Yes   Is the patient having any side effects they believe may be caused by any medication additions or changes? No   Does the patient have all medications ordered at discharge? Yes   Is the patient taking all medications as directed (includes completed medication regime)? Yes   Does the patient have a primary care provider?  Yes   Does the patient have an appointment with their PCP within 7 days of discharge? Yes   Has the patient kept scheduled appointments due by today? Yes   Has home health visited the patient within 72 hours of discharge? N/A   Psychosocial issues? No   Did the patient receive a copy of their discharge instructions? Yes   Nursing interventions Reviewed instructions with patient   What is the patient's perception of their health status since discharge? Improving   Is the patient/caregiver able to teach back signs and symptoms related to disease process for when to call PCP? Yes   Is the patient/caregiver able to teach back signs and symptoms related to disease process for when to call 911? Yes   Is the patient/caregiver able to teach back the hierarchy of who to call/visit for symptoms/problems? PCP, Specialist, Home health nurse, Urgent Care, ED, 911 Yes   Additional teach back comments denies dizziness, SOB, has no questions   Week 3 Call Completed? Yes          Kellee Grajeda RN

## 2021-12-01 NOTE — OUTREACH NOTE
Medical Week 4 Survey      Responses   [facility] patient discharged from? [facility]   Does the patient have one of the following disease processes/diagnoses(primary or secondary)? Other   Week 4 attempt successful? Yes   Call start time 1023   Call end time 1030   Discharge diagnosis Acute right-sided weakness, Bilateral carotid artery stenosis    Is the patient taking all medications as directed (includes completed medication regime)? Yes   Has the patient kept scheduled appointments due by today? Yes   Psychosocial issues? No   What is the patient's perception of their health status since discharge? Improving   Is the patient/caregiver able to teach back signs and symptoms related to disease process for when to call PCP? Yes   Is the patient/caregiver able to teach back signs and symptoms related to disease process for when to call 911? Yes   Is the patient/caregiver able to teach back the hierarchy of who to call/visit for symptoms/problems? PCP, Specialist, Home health nurse, Urgent Care, ED, 911 Yes   If the patient is a current smoker, are they able to teach back resources for cessation? Not a smoker   Additional teach back comments States she is doing well.  Has two appt on Dec 6.  Will have vascular ultrasound and appt with Neuro.   Week 4 Call Completed? Yes   Would the patient like one additional call? No   Graduated Yes   Wrap up additional comments Denies questions or needs at this time          Hortencia Mendes LPN

## 2021-12-07 PROBLEM — G40.909 SEIZURE DISORDER (HCC): Status: ACTIVE | Noted: 2021-01-01

## 2021-12-07 NOTE — PROGRESS NOTES
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 Patient has been in the hospital 3 times in the past year. With her initial visit in January patient went with an episode of aphasia. 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 EEG showed frequent left anterior temporal sharp waves. Patient was started on Keppra at the time. Her second visit was on 10/7 when she went in with right sided weakness and speech changes. MRI brain showed an acute lacunar infarct in the left frontal region along with significant chronic ischemic changes and once again hemosiderosis in the left. CT angiogram of the head and neck showed 85% right ICA and 75% left proximal ICA stenosis as well as bilateral left more than right moderate to severe word stenosis. Patient was seen by neurosurgery however she was deemed to be a poor candidate for stenting because of occluded right radial and bilateral femoral occlusions providing limited vascular access and a poor candidate for CEA due to anatomic variations. Echocardiogram showed a normal EF with normal valves and normal left atrial size. She had been taking Eliquis 2.5 mg twice a day but was also started on Plavix as well as Lipitor 80 mg daily. Then a few days later on 10/26 patient maintained with another episode of unresponsiveness for a few seconds followed by right upper extremity weakness and speech changes. She had another MRI brain that did not show  any acute abnormalities. Carotid Doppler showed 50 to 69% right ICA and over 70% left ICA stenosis. Patient was discharged on her Eliquis, Plavix and Lipitor 80 mg. Her lipid panel was as follows-, TG 61, LDL 67, HDL 36. Last A1c was 6.3, hemoglobin was 9 and P2 Y12 of 147. She was also continued on Keppra now at 500 mg twice a day. After discharge she had a tilt table test that was negative. Yesterday patient had another carotid Doppler which shows worsening since the one in October with over 70% right ICA and near-total occlusion of the left ICA. She was a smoker in the past but quit 9 years ago. With regards to her intracerebral hemorrhage she states that it was possibly last year. For her prediabetes she states she was initially started on medications but was taken off of them.  Of note-I personally reviewed multiple MRI scans of the brain and her extensive hospital notes    The following portions of the patient's history were reviewed today and updated as of 12/07/2021  : allergies, current medications, past family history, past medical history, past social history, past surgical history and problem list  These document will be scanned to patient's chart.      Current Outpatient Medications:   •  amLODIPine (NORVASC) 10 MG tablet, Take 10 mg by mouth Daily., Disp: , Rfl:   •  apixaban (ELIQUIS) 2.5 MG tablet tablet, Take 1 tablet by mouth Every 12 (Twelve) Hours., Disp: 60 tablet, Rfl: 11  •  atorvastatin (LIPITOR) 80 MG tablet, Take 1 tablet by mouth Every Night., Disp: 30 tablet, Rfl: 2  •  cloNIDine (CATAPRES) 0.1 MG tablet, Take 1 tablet by mouth Every 12 (Twelve) Hours., Disp: 60 tablet, Rfl: 2  •  clopidogrel (PLAVIX) 75 MG tablet, TAKE ONE TABLET BY MOUTH DAILY, Disp: 30 tablet, Rfl: 2  •  ferrous sulfate 325 (65 FE) MG tablet, 325 mg p.o. twice daily every other day with vitamin C., Disp: 30 tablet, Rfl: 11  •  hydrOXYzine pamoate (VISTARIL) 25 MG capsule, Take 25 mg by mouth Every Night., Disp: ,  Rfl:   •  losartan (COZAAR) 100 MG tablet, Take 100 mg by mouth Daily., Disp: , Rfl:   •  levETIRAcetam (KEPPRA) 750 MG tablet, Take 1 tablet by mouth 2 (Two) Times a Day., Disp: 60 tablet, Rfl: 5   Past Medical History:   Diagnosis Date   • Back pain    • Carotid artery occlusion    • Cerebral hemorrhage (HCC)    • Chronic kidney disease     MATT   • Compartment syndrome (HCC)    • DVT (deep venous thrombosis) (HCC)     Bilateral   • Generalized weakness    • Hyperlipidemia    • Hypertension    • Hypertensive encephalopathy    • Muscle weakness    • Paraparesis of both lower limbs (HCC)    • Peripheral vascular disease (HCC)    • Stroke (HCC)     bleed   • T2DM (type 2 diabetes mellitus) (HCC)       Past Surgical History:   Procedure Laterality Date   • AORTA BIFEMORAL BYPASS     • BREAST BIOPSY Right    • CAROTID ENDARTERECTOMY Left    • FOREARM FASCIOTOMY Left    • INTERVENTIONAL RADIOLOGY PROCEDURE Bilateral 2021    Procedure: Carotid Cerebral Angiogram;  Surgeon: Silvino Hitchcock MD;  Location: [facility] INVASIVE LOCATION;  Service: Interventional Radiology;  Laterality: Bilateral;   • THROMBOLYSIS     • TUBAL ABDOMINAL LIGATION     • US ARTERIAL DOPPLER LOWER EXTREMITY  UNILATERAL     • VASCULAR SURGERY      Bilateral Stents      Family History   Problem Relation Age of Onset   • Hypertension Mother    • Stroke Mother    • Diabetes Mother    • Hypertension Father    • Stroke Father    • No Known Problems Sister    • No Known Problems Son    • Breast cancer Neg Hx    • Ovarian cancer Neg Hx       Social History     Socioeconomic History   • Marital status: Single   • Number of children: 1   Tobacco Use   • Smoking status: Former Smoker     Packs/day: 1.00     Years: 25.00     Pack years: 25.00     Types: Cigarettes     Quit date: 3/22/2008     Years since quittin.7   • Smokeless tobacco: Never Used   Vaping Use   • Vaping Use: Never used   Substance and Sexual Activity   • Alcohol use: Yes  "    Comment: occassional   • Drug use: No   • Sexual activity: Defer     Review of Systems   Neurological: Positive for seizures and speech difficulty.   All other systems reviewed and are negative.      Objective:    /66   Pulse 98   Ht 154.9 cm (61\")   Wt 59.4 kg (131 lb)   SpO2 94%   BMI 24.75 kg/m²     Neurology Exam:    General apperance: NAD.     Mental status: Alert, awake and oriented to time place and person.Could tell me the month, year and her ZIP Code    Recent and Remote memory: Intact.    Attention span and Concentration: Normal.     Language and Speech: Intact- No dysarthria.    Fluency, Naming , Repitition and Comprehension:  Intact    Cranial Nerves:   CN II: Visual fields are full. Intact. Fundi - Normal, No papillederma, Pupils - URSULA  CN III, IV and VI: Extraocular movements are intact. Normal saccades.   CN V: Facial sensation is intact.   CN VII: Muscles of facial expression reveal no asymmetry. Intact.   CN VIII: Hearing is intact. Whispered voice intact.   CN IX and X: Palate elevates symmetrically. Intact  CN XI: Shoulder shrug is intact.   CN XII: Tongue is midline without evidence of atrophy or fasciculation.     Ophthalmoscopic exam of optic disc-normal    Motor:  Right UE muscle strength 5/5. Normal tone.     Left UE muscle strength 5/5. Normal tone.      Right LE muscle strength5/5. Normal tone.     Left LE muscle strength 5/5. Normal tone.      Sensory: Normal light touch, vibration and pinprick sensation bilaterally.    DTRs: 2+ bilaterally in upper and lower extremities.    Babinski: Negative bilaterally.    Co-ordination: Normal finger-to-nose, heel to shin B/L.    Rhomberg: Negative.    Gait: Normal.    Cardiovascular: Regular rate and rhythm without murmur, gallop or rub.    Assessment and Plan:  1. Seizure disorder (HCC)  Patient's seizures occur from the left temporal lobe most likely from the region where she had her previous intracerebral hemorrhage  The episode " that she had in October with unresponsiveness was most likely a seizure  I will increase her Keppra to 750 mg twice a day    2. Sequelae, post-stroke  Patient had a left frontal lacunar infarct most likely due to small vessel disease. She has several vascular risk factors and is a vasculopath with a history of PVD status post aortofemoral bypass and bilateral carotid artery disease status post left CEA. Most recent carotid Doppler shows over 70% proximal right ICA and near-total occlusion of the proximal left ICA (not a candidate for stenting or LUCINA per endovascular). CTA head showed bilateral vertebral artery stenosis as well.  I have asked her to continue Plavix along with Eliquis 2.5 mg twice a day  She should also continue Lipitor 80 mg daily. Her LDL is well controlled at 67  Goal blood pressure less than 130/90. Her blood pressure is well controlled now  She is a prediabetic, A1c of 6.3. She has been making dietary modifications       Return in about 3 months (around 3/7/2022).     I spent over 45 minutes with the patient face to face out of which over 50% (30 minutes) was spent in management, instructions and education.     Lily Webster MD

## 2021-12-08 NOTE — PROGRESS NOTES
Subjective     Chief Complaint: Bilateral carotid stenosis    Patient ID: Chika Posey is a 76 y.o. female is here today for follow-up.    History of Present Illness    This is a 76-year-old woman who has been following up with severe, bilateral carotid stenosis.  She has an extensive vascular history including an occluded aorta, and history of fasciotomy on the left arm.  I asked her to see my partner Dr. Borden to have her evaluated for a TCAR and he did not recommend this procedure for her due to the extensive nature of her disease.  She presents today for routine follow-up.  Dr. Borden ordered a carotid ultrasound which she would also like to discuss the results of.    The following portions of the patient's history were reviewed and updated as appropriate: allergies, current medications, past family history, past medical history, past social history, past surgical history and problem list.    Family history:   Family History   Problem Relation Age of Onset   • Hypertension Mother    • Stroke Mother    • Diabetes Mother    • Hypertension Father    • Stroke Father    • No Known Problems Sister    • No Known Problems Son    • Breast cancer Neg Hx    • Ovarian cancer Neg Hx        Social history:   Social History     Socioeconomic History   • Marital status: Single   • Number of children: 1   Tobacco Use   • Smoking status: Former Smoker     Packs/day: 1.00     Years: 25.00     Pack years: 25.00     Types: Cigarettes     Quit date: 3/22/2008     Years since quittin.7   • Smokeless tobacco: Never Used   Vaping Use   • Vaping Use: Never used   Substance and Sexual Activity   • Alcohol use: Yes     Comment: occassional   • Drug use: No   • Sexual activity: Defer       Review of Systems   Constitutional: Negative for activity change, appetite change, chills, diaphoresis, fatigue, fever and unexpected weight change.   HENT: Negative for congestion, dental problem, drooling, ear discharge, ear pain, facial  "swelling, hearing loss, mouth sores, nosebleeds, postnasal drip, rhinorrhea, sinus pressure, sinus pain, sneezing, sore throat, tinnitus, trouble swallowing and voice change.    Eyes: Negative for photophobia, pain, discharge, redness, itching and visual disturbance.   Respiratory: Negative for apnea, cough, choking, chest tightness, shortness of breath, wheezing and stridor.    Cardiovascular: Negative for chest pain, palpitations and leg swelling.   Gastrointestinal: Negative for abdominal distention, abdominal pain, anal bleeding, blood in stool, constipation, diarrhea, nausea, rectal pain and vomiting.   Endocrine: Negative for cold intolerance, heat intolerance, polydipsia, polyphagia and polyuria.   Genitourinary: Negative for decreased urine volume, difficulty urinating, dysuria, enuresis, flank pain, frequency, genital sores, hematuria and urgency.   Musculoskeletal: Negative for arthralgias, back pain, gait problem, joint swelling, myalgias, neck pain and neck stiffness.   Skin: Negative for color change, pallor, rash and wound.   Allergic/Immunologic: Negative for environmental allergies, food allergies and immunocompromised state.   Neurological: Negative for dizziness, tremors, seizures, syncope, facial asymmetry, speech difficulty, weakness, light-headedness, numbness and headaches.   Hematological: Negative for adenopathy. Does not bruise/bleed easily.   Psychiatric/Behavioral: Negative for agitation, behavioral problems, confusion, decreased concentration, dysphoric mood, hallucinations, self-injury, sleep disturbance and suicidal ideas. The patient is not nervous/anxious and is not hyperactive.    All other systems reviewed and are negative.      Objective   Blood pressure 135/70, pulse 82, temperature 98.9 °F (37.2 °C), height 154.9 cm (60.98\"), weight 59.5 kg (131 lb 3.2 oz), SpO2 97 %, not currently breastfeeding.  Body mass index is 24.8 kg/m².    Physical Exam  Constitutional:       Appearance: " She is normal weight.   HENT:      Head: Normocephalic and atraumatic.      Mouth/Throat:      Mouth: Mucous membranes are moist.      Pharynx: Oropharynx is clear.   Eyes:      Extraocular Movements: Extraocular movements intact.      Conjunctiva/sclera: Conjunctivae normal.   Pulmonary:      Effort: Pulmonary effort is normal. No respiratory distress.   Neurological:      General: No focal deficit present.      Mental Status: She is alert and oriented to person, place, and time. Mental status is at baseline.      Coordination: Finger-Nose-Finger Test normal.      Gait: Gait is intact.   Psychiatric:         Speech: Speech normal.         Assessment/Plan     Independent Review of Radiographic Studies:      Her carotid ultrasound from 12/6/2021 has demonstrated significantly worsened left internal carotid artery stenosis with peak systolic velocities now 651.  I right side remains severe as well with peak systolic velocities of 289 and a ratio of 4.    Medical Decision Making:      This is an extremely complicated case.  She has a very high bifurcation on both sides and her plaque on the left side clearly extends to the level of the C2 vertebral body.  I think she is probably going to end up needing some sort of intervention.  I have asked her to go to cardiology for preoperative clearance.  If she can undergo general anesthesia with a reasonable amount of risk I think it probably makes sense to try an endarterectomy on her with the understanding that this is a relatively high risk procedure.  Carotid stenting would also be an option, however due to the extensively and heavily calcific nature of the plaque on both arteries, I think the likelihood of restenosis with a stent is high and the risk of thromboembolic complications with a stent is similarly high.  We will follow-up with her after she is undergone cardiac clearance and discuss options with her further.  She is to remain on Eliquis and Plavix in the  meantime.    Diagnoses and all orders for this visit:    1. Basilar artery stenosis (Primary)    2. Bilateral carotid artery stenosis    3. Essential hypertension    4. Chronic anticoagulation    5. Type 2 diabetes mellitus with stage 3 chronic kidney disease, without long-term current use of insulin, unspecified whether stage 3a or 3b CKD (HCC)        No follow-ups on file.           This document signed by GINGER Hitchcock MD December 8, 2021 15:15 EST

## 2021-12-08 NOTE — TELEPHONE ENCOUNTER
Provider:  Naldo  Caller: patient  Time of call:   2:09  Phone #:  [phone]  Surgery:   na  Surgery Date:  na  Last visit:   12-8-21  Next visit: 2-    MARKUS:         Reason for call:   Patient called back to clarify the message that she got. I read the note to her per Dr. Hitchcock to get cardiac clearance from Dr. Berrios. She is going to see him and let us know. Thank you.

## 2021-12-08 NOTE — TELEPHONE ENCOUNTER
Called and advised patient that per Dr. Hitchcock, she needed cardiac clearance. Patient is aware and is going to make an appt with Dr.Michael Berrios Cardiology here at [facility].

## 2021-12-15 NOTE — PROGRESS NOTES
[facility] AT [address], [address]  [place], [place], [address] [phone]  [facility]           OUTPATIENT CLINIC FOLLOW UP NOTE    Patient Care Team:  Patient Care Team:  Alyssia Greenfield MD as PCP - General (Internal Medicine)  Alyssia Greenfield MD as Referring Physician (Internal Medicine)    Subjective:      Chief Complaint   Patient presents with   • PAD (peripheral artery disease) (CMS/HCC)       HPI:    Chika Posey is a 76 y.o. female.  Cardiac focused problem list:  1. History of stroke/TIA  1. Left lacunar frontal stroke, early 10/2021  2. TIA secondary to cerebral hypoperfusion in the setting of high-grade bilateral cervical ICA stenosis, vertebrobasilar disease, anemia, late 10/2021  2. Carotid stenosis   1. s/p remote Left CEA  3. Spontaneous intraparenchymal hemorrhage while on Coumadin (INR was 4) 2/2018  4. Bilateral DVTs  1. Details not known  5. Peripheral vascular disease   1. Aortobifemoral bypass status post occlusion and reopening with a TPA protocol at  4/2017 that was complicated by LUE compartment syndrome requiring fasciotomy, acute hypoxic respiratory failure requiring intubation and psoas muscle hematoma/significant extravasation  6. Hypertension  7. Lupus.     The patient presents today for follow-up.    Since the patient's hospitalization, she has not had recurrent neurologic symptoms.  Denies chest pain, dyspnea.  Can walk a block without needing to take a break (for example walking to the electronic section at [facility]).      Has some left calf and bilateral thigh tightness.  This has not worsened, but it is what limits her exertional activities the most    Review of Systems:  Positive for claudication  Negative for exertional chest pain, dyspnea with exertion, lower extremity edema, palpitations, lightheadedness, syncope.    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 ([facility])   • Altered mental status   • Hypertensive encephalopathy   • Other forms of systemic lupus erythematosus ([facility])   • Type 2 diabetes mellitus with stage 3 chronic kidney disease, without long-term current use of insulin ([facility])   • Basilar artery stenosis   • Stenosis of both vertebral arteries   • Chronic kidney disease   • TIA (transient ischemic attack)   • Iron deficiency anemia due to chronic blood loss   • Seizure disorder ([facility])         Current Outpatient Medications:   •  amLODIPine (NORVASC) 10 MG tablet, Take 10 mg by mouth Daily., Disp: , Rfl:   •  apixaban (ELIQUIS) 2.5 MG tablet tablet, Take 1 tablet by mouth Every 12 (Twelve) Hours., Disp: 60 tablet, Rfl: 11  •  atorvastatin (LIPITOR) 80 MG tablet, Take 1 tablet by mouth Every Night., Disp: 30 tablet, Rfl: 2  •  cloNIDine (CATAPRES) 0.1 MG tablet, Take 1 tablet by mouth Every 12 (Twelve) Hours., Disp: 60 tablet, Rfl: 2  •  clopidogrel (PLAVIX) 75 MG tablet, TAKE ONE TABLET BY MOUTH DAILY, Disp: 30 tablet, Rfl: 2  •  hydrOXYzine pamoate (VISTARIL) 25 MG capsule, Take 25 mg by mouth Every Night., Disp: , Rfl:   •  levETIRAcetam (KEPPRA) 750 MG tablet, Take 1 tablet by mouth 2 (Two) Times a Day., Disp: 60 tablet, Rfl: 5  •  losartan (COZAAR) 100 MG tablet, Take 100 mg by mouth Daily., Disp: , Rfl:   •  ferrous sulfate 325 (65 FE) MG tablet, 325 mg p.o. twice daily every other day with vitamin C., Disp: 30 tablet, Rfl: 11    Allergies   Allergen Reactions   • Adhesive Tape  "Hives   • Clopidogrel Itching     Itching   • Lactose Intolerance (Gi) GI Bleeding   • Morphine And Related Itching   • Plavix [Clopidogrel Bisulfate] Itching        reports that she quit smoking about 13 years ago. Her smoking use included cigarettes. She has a 25.00 pack-year smoking history. She has never used smokeless tobacco.    Family History   Problem Relation Age of Onset   • Hypertension Mother    • Stroke Mother    • Diabetes Mother    • Hypertension Father    • Stroke Father    • No Known Problems Sister    • No Known Problems Son    • Breast cancer Neg Hx    • Ovarian cancer Neg Hx          Objective:   Blood pressure 130/68, pulse 85, height 154.9 cm (61\"), weight 59.9 kg (132 lb), SpO2 96 %, not currently breastfeeding.  CONSTITUTIONAL: No acute distress, normal affect  RESPIRATORY: Normal effort. Clear to auscultation bilaterally without wheezing or rales  CARDIOVASCULAR: Carotid bruits present. Regular rate and rhythm with normal S1 and S2. Without murmur, gallop or rub.  PERIPHERAL VASCULAR: There is no lower extremity edema bilaterally.      8/2019 exam: 1+ DP pulses bilaterally, right side more prominent than left.  12/2021 exam: Left ulnar and radial pulses 2+.  Right ulnar pulse 1-2+, difficult to palpate right radial pulse    Labs:    BUN   Date Value Ref Range Status   11/12/2021 9 8 - 23 mg/dL Final     Creatinine   Date Value Ref Range Status   11/12/2021 1.08 (H) 0.57 - 1.00 mg/dL Final   10/07/2021 1.00 0.60 - 1.30 mg/dL Final     Comment:     Serial Number: [id number]:  [id number]     Potassium   Date Value Ref Range Status   11/12/2021 3.4 (L) 3.5 - 5.2 mmol/L Final     ALT (SGPT)   Date Value Ref Range Status   11/12/2021 18 1 - 33 U/L Final     AST (SGOT)   Date Value Ref Range Status   11/12/2021 18 1 - 32 U/L Final       Lab Results   Component Value Date    CHOL 116 10/27/2021    CHOL 138 10/08/2021    CHOL 151 01/15/2021     Lab Results   Component Value Date    TRIG 61 10/27/2021 "    TRIG 65 10/08/2021    TRIG 57 01/15/2021     Lab Results   Component Value Date    HDL 36 (L) 10/27/2021    HDL 41 10/08/2021    HDL 44 01/15/2021     No components found for: LDLCALC  Lab Results   Component Value Date    VLDL 13 10/27/2021    VLDL 13 10/08/2021    VLDL 12 01/15/2021     Lab Results   Component Value Date    LDLHDL 1.88 10/27/2021    LDLHDL 2.05 10/08/2021    LDLHDL 2.17 01/15/2021       PCP labs 3/2020: A1c 7.9%, TSH 1.29, hemoglobin 13.5, platelets 373, , TG 88, HDL 35, LDL 94, creatinine 1.43, AST 21, ALT 24    Diagnostic Data:    Procedures    14-day cardiac event monitor 6/2019  -3 events all were normal sinus rhythm.  -Rare ectopy.  -No significant arrhythmia.    Arterial duplex 2/2019  · Right CFA appears occluded with a collateral from EIA to distal CFA (just above bifurcation). Right SFA proximal appears occluded. There appears to be a collateral from distal CFA(at bifurcation) to mid SFA.  · Left CFA appears to be occluded with a collateral from EIA to the profunda artery. Left proximal and mid SFA appears occluded and becomes patent by a collateral at the distal SFA.  · Flow is diminished but patent distal to these lesions bilaterally.    Results for orders placed during the hospital encounter of 10/07/21    Adult Transthoracic Echo Complete W/ Cont if Necessary Per Protocol (With Agitated Saline)    Interpretation Summary  · Estimated left ventricular EF = 65%  · Left ventricular wall thickness is consistent with mild concentric hypertrophy.  · The cardiac valves are anatomically and functionally normal.      TTE 3/2018  · Left ventricular systolic function is hyperdynamic (EF > 70).  · Left ventricular diastolic dysfunction (grade I) consistent with impaired relaxation.  · Normal right ventricular cavity size, wall thickness, systolic function and septal motion noted.  · Saline test results are negative ( technically difficult and limited study).  · There is no evidence of  pericardial effusion.  · No evidence of pulmonary hypertension is present.  · No significant structural valvular abnormality demonstrated.     Stress Test 5/2018  · Left ventricular ejection fraction is normal (Calculated EF = 67%).  · Myocardial perfusion imaging indicates a medium-sized, mildly severe area of ischemia located in the inferior wall and lateral wall.  · Impressions are consistent with an intermediate risk study.    Assessment and Plan:     Possible anginal equivalent  Mixed hyperlipidemia  -Abnormal stress test in the low to intermediate risk category.  -Currently asymptomatic  -Continue aspirin, statin  -Continue amlodipine at as an antihypertensive and antianginal    Peripheral artery disease  -Medical therapy and lifestyle modification  -Anticipate repeat arterial Duplex at next visit if with persistent claudication.  We will have her undergo management of her carotid disease first.    Essential hypertension  -Continue current medication    CVA   Carotid stenosis  Reoperative cardiac risk assessment  -Despite her cardiac risk profile, the patient is able to perform 4 metabolic equivalents without cardiopulmonary symptoms  -As such, she is medically optimized for surgery and is okay to proceed with carotid artery surgery with general anesthesia per Dr. Hitchcock's recommendations.  -Perioperatively, can hold Eliquis.  If she can be continued on clopidogrel, I would recommend doing so.  If needing to stop clopidogrel from a bleeding risk standpoint, would initiate aspirin 81 mg daily perioperatively      - Return in about 6 months (around 6/15/2022) for Next scheduled follow-up with an ECG.    Bobo Berrios MD  12/15/21 15:09 EST

## 2022-01-01 ENCOUNTER — APPOINTMENT (OUTPATIENT)
Dept: GENERAL RADIOLOGY | Facility: HOSPITAL | Age: 77
End: 2022-01-01

## 2022-01-01 ENCOUNTER — ANESTHESIA (OUTPATIENT)
Dept: CARDIOLOGY | Facility: HOSPITAL | Age: 77
End: 2022-01-01

## 2022-01-01 ENCOUNTER — ANESTHESIA (OUTPATIENT)
Dept: GASTROENTEROLOGY | Facility: HOSPITAL | Age: 77
End: 2022-01-01

## 2022-01-01 ENCOUNTER — HOSPITAL ENCOUNTER (INPATIENT)
Facility: HOSPITAL | Age: 77
LOS: 3 days | Discharge: HOME | End: 2022-08-28
Attending: NEUROLOGICAL SURGERY | Admitting: NEUROLOGICAL SURGERY

## 2022-01-01 ENCOUNTER — OFFICE VISIT (OUTPATIENT)
Dept: NEUROSURGERY | Facility: CLINIC | Age: 77
End: 2022-01-01

## 2022-01-01 ENCOUNTER — TELEPHONE (OUTPATIENT)
Dept: NEUROSURGERY | Facility: CLINIC | Age: 77
End: 2022-01-01

## 2022-01-01 ENCOUNTER — HOSPITAL ENCOUNTER (OUTPATIENT)
Dept: CARDIOLOGY | Facility: HOSPITAL | Age: 77
Discharge: HOME | End: 2022-06-15
Admitting: NEUROLOGICAL SURGERY

## 2022-01-01 ENCOUNTER — READMISSION MANAGEMENT (OUTPATIENT)
Dept: CALL CENTER | Facility: HOSPITAL | Age: 77
End: 2022-01-01

## 2022-01-01 ENCOUNTER — ANESTHESIA EVENT (OUTPATIENT)
Dept: GASTROENTEROLOGY | Facility: HOSPITAL | Age: 77
End: 2022-01-01

## 2022-01-01 ENCOUNTER — APPOINTMENT (OUTPATIENT)
Dept: CT IMAGING | Facility: HOSPITAL | Age: 77
End: 2022-01-01

## 2022-01-01 ENCOUNTER — HOSPITAL ENCOUNTER (INPATIENT)
Facility: HOSPITAL | Age: 77
LOS: 2 days | Discharge: HOME | End: 2022-08-20
Attending: EMERGENCY MEDICINE | Admitting: HOSPITALIST

## 2022-01-01 ENCOUNTER — APPOINTMENT (OUTPATIENT)
Dept: MRI IMAGING | Facility: HOSPITAL | Age: 77
End: 2022-01-01

## 2022-01-01 ENCOUNTER — ANESTHESIA EVENT (OUTPATIENT)
Dept: CARDIOLOGY | Facility: HOSPITAL | Age: 77
End: 2022-01-01

## 2022-01-01 ENCOUNTER — APPOINTMENT (OUTPATIENT)
Dept: CARDIOLOGY | Facility: HOSPITAL | Age: 77
End: 2022-01-01

## 2022-01-01 VITALS — HEIGHT: 61 IN | WEIGHT: 128.31 LBS | BODY MASS INDEX: 24.22 KG/M2

## 2022-01-01 VITALS
DIASTOLIC BLOOD PRESSURE: 52 MMHG | HEART RATE: 68 BPM | BODY MASS INDEX: 23.48 KG/M2 | TEMPERATURE: 97.3 F | OXYGEN SATURATION: 94 % | HEIGHT: 61 IN | SYSTOLIC BLOOD PRESSURE: 83 MMHG | RESPIRATION RATE: 16 BRPM | WEIGHT: 124.34 LBS

## 2022-01-01 VITALS
RESPIRATION RATE: 16 BRPM | DIASTOLIC BLOOD PRESSURE: 65 MMHG | SYSTOLIC BLOOD PRESSURE: 113 MMHG | HEIGHT: 61 IN | HEART RATE: 88 BPM | WEIGHT: 132.7 LBS | BODY MASS INDEX: 25.05 KG/M2

## 2022-01-01 VITALS
RESPIRATION RATE: 18 BRPM | SYSTOLIC BLOOD PRESSURE: 151 MMHG | HEIGHT: 61 IN | DIASTOLIC BLOOD PRESSURE: 87 MMHG | HEART RATE: 82 BPM | HEIGHT: 61 IN | TEMPERATURE: 98.3 F | OXYGEN SATURATION: 99 % | WEIGHT: 128.4 LBS | WEIGHT: 127 LBS | BODY MASS INDEX: 23.98 KG/M2 | DIASTOLIC BLOOD PRESSURE: 72 MMHG | SYSTOLIC BLOOD PRESSURE: 116 MMHG | RESPIRATION RATE: 16 BRPM | BODY MASS INDEX: 24.24 KG/M2

## 2022-01-01 VITALS — BODY MASS INDEX: 24.17 KG/M2 | HEIGHT: 61 IN | WEIGHT: 128 LBS

## 2022-01-01 DIAGNOSIS — I65.23 BILATERAL CAROTID ARTERY STENOSIS: Primary | Chronic | ICD-10-CM

## 2022-01-01 DIAGNOSIS — D50.0 IRON DEFICIENCY ANEMIA DUE TO CHRONIC BLOOD LOSS: Chronic | ICD-10-CM

## 2022-01-01 DIAGNOSIS — G40.909 SEIZURE DISORDER: Chronic | ICD-10-CM

## 2022-01-01 DIAGNOSIS — N18.30 TYPE 2 DIABETES MELLITUS WITH STAGE 3 CHRONIC KIDNEY DISEASE, WITHOUT LONG-TERM CURRENT USE OF INSULIN, UNSPECIFIED WHETHER STAGE 3A OR 3B CKD: ICD-10-CM

## 2022-01-01 DIAGNOSIS — I65.23 BILATERAL CAROTID ARTERY STENOSIS: ICD-10-CM

## 2022-01-01 DIAGNOSIS — I63.9 ACUTE ISCHEMIC STROKE: ICD-10-CM

## 2022-01-01 DIAGNOSIS — I65.22 LEFT CAROTID ARTERY STENOSIS: ICD-10-CM

## 2022-01-01 DIAGNOSIS — I65.23 BILATERAL CAROTID ARTERY STENOSIS: Primary | ICD-10-CM

## 2022-01-01 DIAGNOSIS — E11.22 TYPE 2 DIABETES MELLITUS WITH STAGE 3 CHRONIC KIDNEY DISEASE, WITHOUT LONG-TERM CURRENT USE OF INSULIN, UNSPECIFIED WHETHER STAGE 3A OR 3B CKD: ICD-10-CM

## 2022-01-01 DIAGNOSIS — I10 ESSENTIAL HYPERTENSION: ICD-10-CM

## 2022-01-01 DIAGNOSIS — I10 ESSENTIAL HYPERTENSION: Chronic | ICD-10-CM

## 2022-01-01 DIAGNOSIS — Z98.890 H/O CAROTID ENDARTERECTOMY: ICD-10-CM

## 2022-01-01 DIAGNOSIS — Z98.890 H/O CAROTID ENDARTERECTOMY: Primary | ICD-10-CM

## 2022-01-01 DIAGNOSIS — I67.4 HYPERTENSIVE ENCEPHALOPATHY: ICD-10-CM

## 2022-01-01 DIAGNOSIS — D62 ACUTE BLOOD LOSS ANEMIA: Primary | ICD-10-CM

## 2022-01-01 DIAGNOSIS — K92.1 MELENA: ICD-10-CM

## 2022-01-01 DIAGNOSIS — I65.03 STENOSIS OF BOTH VERTEBRAL ARTERIES: ICD-10-CM

## 2022-01-01 DIAGNOSIS — I65.03 STENOSIS OF BOTH VERTEBRAL ARTERIES: Primary | ICD-10-CM

## 2022-01-01 DIAGNOSIS — Z74.09 IMPAIRED MOBILITY: ICD-10-CM

## 2022-01-01 DIAGNOSIS — Z79.01 CHRONIC ANTICOAGULATION: ICD-10-CM

## 2022-01-01 LAB
ABO GROUP BLD: NORMAL
ABO GROUP BLD: NORMAL
ACANTHOCYTES BLD QL SMEAR: NORMAL
ALBUMIN SERPL-MCNC: 4.5 G/DL (ref 3.5–5.2)
ALBUMIN/GLOB SERPL: 1.1 G/DL
ALP SERPL-CCNC: 105 U/L (ref 39–117)
ALT SERPL W P-5'-P-CCNC: 33 U/L (ref 1–33)
AMPHET+METHAMPHET UR QL: NEGATIVE
AMPHETAMINES UR QL: NEGATIVE
ANION GAP SERPL CALCULATED.3IONS-SCNC: 10 MMOL/L (ref 5–15)
ANION GAP SERPL CALCULATED.3IONS-SCNC: 10 MMOL/L (ref 5–15)
ANION GAP SERPL CALCULATED.3IONS-SCNC: 11 MMOL/L (ref 5–15)
ANION GAP SERPL CALCULATED.3IONS-SCNC: 14 MMOL/L (ref 5–15)
ANION GAP SERPL CALCULATED.3IONS-SCNC: 8 MMOL/L (ref 5–15)
ANION GAP SERPL CALCULATED.3IONS-SCNC: 9 MMOL/L (ref 5–15)
ANISOCYTOSIS BLD QL: NORMAL
AST SERPL-CCNC: 36 U/L (ref 1–32)
BACTERIA UR QL AUTO: ABNORMAL /HPF
BARBITURATES UR QL SCN: NEGATIVE
BASOPHILS # BLD AUTO: 0.02 10*3/MM3 (ref 0–0.2)
BASOPHILS # BLD AUTO: 0.03 10*3/MM3 (ref 0–0.2)
BASOPHILS # BLD AUTO: 0.03 10*3/MM3 (ref 0–0.2)
BASOPHILS # BLD AUTO: 0.04 10*3/MM3 (ref 0–0.2)
BASOPHILS NFR BLD AUTO: 0.2 % (ref 0–1.5)
BASOPHILS NFR BLD AUTO: 0.3 % (ref 0–1.5)
BASOPHILS NFR BLD AUTO: 0.3 % (ref 0–1.5)
BASOPHILS NFR BLD AUTO: 0.4 % (ref 0–1.5)
BASOPHILS NFR BLD AUTO: 0.5 % (ref 0–1.5)
BASOPHILS NFR BLD AUTO: 0.6 % (ref 0–1.5)
BENZODIAZ UR QL SCN: NEGATIVE
BH BB BLOOD EXPIRATION DATE: NORMAL
BH BB BLOOD EXPIRATION DATE: NORMAL
BH BB BLOOD TYPE BARCODE: 7300
BH BB BLOOD TYPE BARCODE: 7300
BH BB DISPENSE STATUS: NORMAL
BH BB DISPENSE STATUS: NORMAL
BH BB PRODUCT CODE: NORMAL
BH BB PRODUCT CODE: NORMAL
BH BB UNIT NUMBER: NORMAL
BH BB UNIT NUMBER: NORMAL
BH CV LEFT CCA HIDDEN LRR: 1 CM/S
BH CV MID LEFT ICA HIDDEN LRR: 1 CM
BH CV VAS CAROTID LEFT DISTAL STENT EDV: 70 CM/S
BH CV VAS CAROTID LEFT DISTAL STENT: 241 CM/S
BH CV VAS CAROTID LEFT DISTAL TO STENT EDV: 29 CM/S
BH CV VAS CAROTID LEFT DISTAL TO STENT: 117 CM/S
BH CV VAS CAROTID LEFT MID STENT EDV: 87 CM/S
BH CV VAS CAROTID LEFT MID STENT: 349 CM/S
BH CV VAS CAROTID LEFT PROXIMAL STENT EDV: 22 CM/S
BH CV VAS CAROTID LEFT PROXIMAL STENT: 119 CM/S
BH CV VAS CAROTID LEFT PROXIMAL TO STENT: 102 CM/S
BH CV VAS CAROTID LEFT STENT NATIVE VESSEL PROXIMAL ED: 29 CM/S
BH CV XLRA MEAS LEFT DIST CCA EDV: -22.3 CM/SEC
BH CV XLRA MEAS LEFT DIST CCA EDV: 12.8 CM/SEC
BH CV XLRA MEAS LEFT DIST CCA PSV: -119.4 CM/SEC
BH CV XLRA MEAS LEFT DIST CCA PSV: 64.9 CM/SEC
BH CV XLRA MEAS LEFT DIST ICA EDV: -37.3 CM/SEC
BH CV XLRA MEAS LEFT DIST ICA EDV: 17.8 CM/SEC
BH CV XLRA MEAS LEFT DIST ICA PSV: -137.5 CM/SEC
BH CV XLRA MEAS LEFT DIST ICA PSV: 64.2 CM/SEC
BH CV XLRA MEAS LEFT ICA/CCA RATIO: 10.43
BH CV XLRA MEAS LEFT ICA/CCA RATIO: 3.38
BH CV XLRA MEAS LEFT MID CCA EDV: -27.9 CM/SEC
BH CV XLRA MEAS LEFT MID CCA EDV: 11 CM/SEC
BH CV XLRA MEAS LEFT MID CCA PSV: -104.8 CM/SEC
BH CV XLRA MEAS LEFT MID CCA PSV: 63.1 CM/SEC
BH CV XLRA MEAS LEFT MID ICA EDV: -71.6 CM/SEC
BH CV XLRA MEAS LEFT MID ICA EDV: 275.9 CM/SEC
BH CV XLRA MEAS LEFT MID ICA PSV: -242.7 CM/SEC
BH CV XLRA MEAS LEFT MID ICA PSV: 657.9 CM/SEC
BH CV XLRA MEAS LEFT PROX CCA EDV: 13.8 CM/SEC
BH CV XLRA MEAS LEFT PROX CCA EDV: 22.3 CM/SEC
BH CV XLRA MEAS LEFT PROX CCA PSV: 68.6 CM/SEC
BH CV XLRA MEAS LEFT PROX CCA PSV: 95.7 CM/SEC
BH CV XLRA MEAS LEFT PROX ECA EDV: 35.9 CM/SEC
BH CV XLRA MEAS LEFT PROX ECA PSV: -117.5 CM/SEC
BH CV XLRA MEAS LEFT PROX ECA PSV: 113.1 CM/SEC
BH CV XLRA MEAS LEFT PROX ICA EDV: -89 CM/SEC
BH CV XLRA MEAS LEFT PROX ICA EDV: 253.1 CM/SEC
BH CV XLRA MEAS LEFT PROX ICA PSV: -351 CM/SEC
BH CV XLRA MEAS LEFT PROX ICA PSV: 641.6 CM/SEC
BH CV XLRA MEAS LEFT PROX SCLA PSV: 162.1 CM/SEC
BH CV XLRA MEAS LEFT PROX SCLA PSV: 216.3 CM/SEC
BH CV XLRA MEAS LEFT VERTEBRAL A EDV: -27 CM/SEC
BH CV XLRA MEAS LEFT VERTEBRAL A EDV: 29.7 CM/SEC
BH CV XLRA MEAS LEFT VERTEBRAL A PSV: -91.8 CM/SEC
BH CV XLRA MEAS LEFT VERTEBRAL A PSV: 81.7 CM/SEC
BH CV XLRA MEAS RIGHT DIST CCA EDV: -18.2 CM/SEC
BH CV XLRA MEAS RIGHT DIST CCA EDV: 17.9 CM/SEC
BH CV XLRA MEAS RIGHT DIST CCA PSV: -68.8 CM/SEC
BH CV XLRA MEAS RIGHT DIST CCA PSV: 71.5 CM/SEC
BH CV XLRA MEAS RIGHT DIST ICA EDV: -49.1 CM/SEC
BH CV XLRA MEAS RIGHT DIST ICA EDV: 48.4 CM/SEC
BH CV XLRA MEAS RIGHT DIST ICA PSV: -159.1 CM/SEC
BH CV XLRA MEAS RIGHT DIST ICA PSV: 128.5 CM/SEC
BH CV XLRA MEAS RIGHT ICA/CCA RATIO: 2.79
BH CV XLRA MEAS RIGHT ICA/CCA RATIO: 3.11
BH CV XLRA MEAS RIGHT MID CCA EDV: -20.1 CM/SEC
BH CV XLRA MEAS RIGHT MID CCA EDV: 18.8 CM/SEC
BH CV XLRA MEAS RIGHT MID CCA PSV: -70.7 CM/SEC
BH CV XLRA MEAS RIGHT MID CCA PSV: 93.3 CM/SEC
BH CV XLRA MEAS RIGHT MID ICA EDV: -60.9 CM/SEC
BH CV XLRA MEAS RIGHT MID ICA EDV: 65.6 CM/SEC
BH CV XLRA MEAS RIGHT MID ICA PSV: -198.4 CM/SEC
BH CV XLRA MEAS RIGHT MID ICA PSV: 186.6 CM/SEC
BH CV XLRA MEAS RIGHT PROX CCA EDV: -14.2 CM/SEC
BH CV XLRA MEAS RIGHT PROX CCA EDV: 17.1 CM/SEC
BH CV XLRA MEAS RIGHT PROX CCA PSV: -75.1 CM/SEC
BH CV XLRA MEAS RIGHT PROX CCA PSV: 65.5 CM/SEC
BH CV XLRA MEAS RIGHT PROX ECA PSV: -84.9 CM/SEC
BH CV XLRA MEAS RIGHT PROX ICA EDV: -48.1 CM/SEC
BH CV XLRA MEAS RIGHT PROX ICA EDV: 107 CM/SEC
BH CV XLRA MEAS RIGHT PROX ICA PSV: -175.8 CM/SEC
BH CV XLRA MEAS RIGHT PROX ICA PSV: 290 CM/SEC
BH CV XLRA MEAS RIGHT PROX SCLA PSV: 239.4 CM/SEC
BH CV XLRA MEAS RIGHT PROX SCLA PSV: 301.7 CM/SEC
BH CV XLRA MEAS RIGHT VERTEBRAL A EDV: -10.5 CM/SEC
BH CV XLRA MEAS RIGHT VERTEBRAL A EDV: 11.7 CM/SEC
BH CV XLRA MEAS RIGHT VERTEBRAL A PSV: -38.8 CM/SEC
BH CV XLRA MEAS RIGHT VERTEBRAL A PSV: 114.8 CM/SEC
BH CVPROX LEFT ICA HIDDEN LRR: 1 CM
BILIRUB SERPL-MCNC: 0.5 MG/DL (ref 0–1.2)
BILIRUB UR QL STRIP: NEGATIVE
BLD GP AB SCN SERPL QL: NEGATIVE
BUN BLDA-MCNC: 6 MG/DL (ref 8–26)
BUN SERPL-MCNC: 12 MG/DL (ref 8–23)
BUN SERPL-MCNC: 13 MG/DL (ref 8–23)
BUN SERPL-MCNC: 7 MG/DL (ref 8–23)
BUN SERPL-MCNC: 7 MG/DL (ref 8–23)
BUN SERPL-MCNC: 8 MG/DL (ref 8–23)
BUN SERPL-MCNC: 9 MG/DL (ref 8–23)
BUN/CREAT SERPL: 10.4 (ref 7–25)
BUN/CREAT SERPL: 11.2 (ref 7–25)
BUN/CREAT SERPL: 12.5 (ref 7–25)
BUN/CREAT SERPL: 6.5 (ref 7–25)
BUN/CREAT SERPL: 8.3 (ref 7–25)
BUN/CREAT SERPL: 9.2 (ref 7–25)
BUPRENORPHINE SERPL-MCNC: NEGATIVE NG/ML
BURR CELLS BLD QL SMEAR: NORMAL
CA-I BLDA-SCNC: 1.31 MMOL/L (ref 1.2–1.32)
CALCIUM SPEC-SCNC: 6.9 MG/DL (ref 8.6–10.5)
CALCIUM SPEC-SCNC: 8.3 MG/DL (ref 8.6–10.5)
CALCIUM SPEC-SCNC: 8.7 MG/DL (ref 8.6–10.5)
CALCIUM SPEC-SCNC: 9.2 MG/DL (ref 8.6–10.5)
CALCIUM SPEC-SCNC: 9.3 MG/DL (ref 8.6–10.5)
CALCIUM SPEC-SCNC: 9.5 MG/DL (ref 8.6–10.5)
CANNABINOIDS SERPL QL: NEGATIVE
CHLORIDE BLDA-SCNC: 103 MMOL/L (ref 98–109)
CHLORIDE SERPL-SCNC: 101 MMOL/L (ref 98–107)
CHLORIDE SERPL-SCNC: 103 MMOL/L (ref 98–107)
CHLORIDE SERPL-SCNC: 106 MMOL/L (ref 98–107)
CHLORIDE SERPL-SCNC: 106 MMOL/L (ref 98–107)
CHLORIDE SERPL-SCNC: 110 MMOL/L (ref 98–107)
CHLORIDE SERPL-SCNC: 118 MMOL/L (ref 98–107)
CHOLEST SERPL-MCNC: 119 MG/DL (ref 0–200)
CLARITY UR: CLEAR
CO2 BLDA-SCNC: 23 MMOL/L (ref 24–29)
CO2 SERPL-SCNC: 17 MMOL/L (ref 22–29)
CO2 SERPL-SCNC: 18 MMOL/L (ref 22–29)
CO2 SERPL-SCNC: 21 MMOL/L (ref 22–29)
CO2 SERPL-SCNC: 21 MMOL/L (ref 22–29)
CO2 SERPL-SCNC: 22 MMOL/L (ref 22–29)
CO2 SERPL-SCNC: 23 MMOL/L (ref 22–29)
COCAINE UR QL: NEGATIVE
COLOR UR: YELLOW
CREAT BLDA-MCNC: 1 MG/DL (ref 0.6–1.3)
CREAT SERPL-MCNC: 0.77 MG/DL (ref 0.57–1)
CREAT SERPL-MCNC: 0.84 MG/DL (ref 0.57–1)
CREAT SERPL-MCNC: 0.96 MG/DL (ref 0.57–1)
CREAT SERPL-MCNC: 0.98 MG/DL (ref 0.57–1)
CREAT SERPL-MCNC: 1.07 MG/DL (ref 0.57–1)
CREAT SERPL-MCNC: 1.16 MG/DL (ref 0.57–1)
CROSSMATCH INTERPRETATION: NORMAL
CROSSMATCH INTERPRETATION: NORMAL
D-LACTATE SERPL-SCNC: 1.5 MMOL/L (ref 0.5–2)
DEPRECATED RDW RBC AUTO: 45.5 FL (ref 37–54)
DEPRECATED RDW RBC AUTO: 46 FL (ref 37–54)
DEPRECATED RDW RBC AUTO: 46.9 FL (ref 37–54)
DEPRECATED RDW RBC AUTO: 48.8 FL (ref 37–54)
DEPRECATED RDW RBC AUTO: 49.4 FL (ref 37–54)
DEPRECATED RDW RBC AUTO: 57.1 FL (ref 37–54)
DEPRECATED RDW RBC AUTO: 61.1 FL (ref 37–54)
EGFRCR SERPLBLD CKD-EPI 2021: 49.3 ML/MIN/1.73
EGFRCR SERPLBLD CKD-EPI 2021: 54.3 ML/MIN/1.73
EGFRCR SERPLBLD CKD-EPI 2021: 58.9 ML/MIN/1.73
EGFRCR SERPLBLD CKD-EPI 2021: 60.3 ML/MIN/1.73
EGFRCR SERPLBLD CKD-EPI 2021: 61.8 ML/MIN/1.73
EGFRCR SERPLBLD CKD-EPI 2021: 72.6 ML/MIN/1.73
EGFRCR SERPLBLD CKD-EPI 2021: 80.6 ML/MIN/1.73
EOSINOPHIL # BLD AUTO: 0.03 10*3/MM3 (ref 0–0.4)
EOSINOPHIL # BLD AUTO: 0.05 10*3/MM3 (ref 0–0.4)
EOSINOPHIL # BLD AUTO: 0.07 10*3/MM3 (ref 0–0.4)
EOSINOPHIL # BLD AUTO: 0.15 10*3/MM3 (ref 0–0.4)
EOSINOPHIL # BLD AUTO: 0.26 10*3/MM3 (ref 0–0.4)
EOSINOPHIL # BLD AUTO: 0.27 10*3/MM3 (ref 0–0.4)
EOSINOPHIL NFR BLD AUTO: 0.3 % (ref 0.3–6.2)
EOSINOPHIL NFR BLD AUTO: 0.5 % (ref 0.3–6.2)
EOSINOPHIL NFR BLD AUTO: 1.1 % (ref 0.3–6.2)
EOSINOPHIL NFR BLD AUTO: 1.9 % (ref 0.3–6.2)
EOSINOPHIL NFR BLD AUTO: 2.8 % (ref 0.3–6.2)
EOSINOPHIL NFR BLD AUTO: 3 % (ref 0.3–6.2)
ERYTHROCYTE [DISTWIDTH] IN BLOOD BY AUTOMATED COUNT: 20.4 % (ref 12.3–15.4)
ERYTHROCYTE [DISTWIDTH] IN BLOOD BY AUTOMATED COUNT: 20.5 % (ref 12.3–15.4)
ERYTHROCYTE [DISTWIDTH] IN BLOOD BY AUTOMATED COUNT: 20.7 % (ref 12.3–15.4)
ERYTHROCYTE [DISTWIDTH] IN BLOOD BY AUTOMATED COUNT: 20.7 % (ref 12.3–15.4)
ERYTHROCYTE [DISTWIDTH] IN BLOOD BY AUTOMATED COUNT: 21 % (ref 12.3–15.4)
ERYTHROCYTE [DISTWIDTH] IN BLOOD BY AUTOMATED COUNT: 22.5 % (ref 12.3–15.4)
ERYTHROCYTE [DISTWIDTH] IN BLOOD BY AUTOMATED COUNT: 23.2 % (ref 12.3–15.4)
ETHANOL BLD-MCNC: <10 MG/DL (ref 0–10)
FLUAV RNA RESP QL NAA+PROBE: NOT DETECTED
FLUAV RNA RESP QL NAA+PROBE: NOT DETECTED
FLUBV RNA RESP QL NAA+PROBE: NOT DETECTED
FLUBV RNA RESP QL NAA+PROBE: NOT DETECTED
GLOBULIN UR ELPH-MCNC: 4.1 GM/DL
GLUCOSE BLDC GLUCOMTR-MCNC: 102 MG/DL (ref 70–130)
GLUCOSE BLDC GLUCOMTR-MCNC: 103 MG/DL (ref 70–130)
GLUCOSE BLDC GLUCOMTR-MCNC: 104 MG/DL (ref 70–130)
GLUCOSE BLDC GLUCOMTR-MCNC: 105 MG/DL (ref 70–130)
GLUCOSE BLDC GLUCOMTR-MCNC: 113 MG/DL (ref 70–130)
GLUCOSE BLDC GLUCOMTR-MCNC: 123 MG/DL (ref 70–130)
GLUCOSE BLDC GLUCOMTR-MCNC: 141 MG/DL (ref 70–130)
GLUCOSE BLDC GLUCOMTR-MCNC: 146 MG/DL (ref 70–130)
GLUCOSE BLDC GLUCOMTR-MCNC: 193 MG/DL (ref 70–130)
GLUCOSE SERPL-MCNC: 109 MG/DL (ref 65–99)
GLUCOSE SERPL-MCNC: 110 MG/DL (ref 65–99)
GLUCOSE SERPL-MCNC: 115 MG/DL (ref 65–99)
GLUCOSE SERPL-MCNC: 118 MG/DL (ref 65–99)
GLUCOSE SERPL-MCNC: 123 MG/DL (ref 65–99)
GLUCOSE SERPL-MCNC: 92 MG/DL (ref 65–99)
GLUCOSE UR STRIP-MCNC: NEGATIVE MG/DL
HBA1C MFR BLD: 6.6 % (ref 4.8–5.6)
HCT VFR BLD AUTO: 20.1 % (ref 34–46.6)
HCT VFR BLD AUTO: 20.3 % (ref 34–46.6)
HCT VFR BLD AUTO: 25.9 % (ref 34–46.6)
HCT VFR BLD AUTO: 26.4 % (ref 34–46.6)
HCT VFR BLD AUTO: 26.8 % (ref 34–46.6)
HCT VFR BLD AUTO: 27.9 % (ref 34–46.6)
HCT VFR BLD AUTO: 28.5 % (ref 34–46.6)
HCT VFR BLD AUTO: 31.4 % (ref 34–46.6)
HCT VFR BLD AUTO: 31.8 % (ref 34–46.6)
HCT VFR BLD AUTO: 32.6 % (ref 34–46.6)
HCT VFR BLDA CALC: 36 % (ref 38–51)
HDLC SERPL-MCNC: 36 MG/DL (ref 40–60)
HGB BLD-MCNC: 6 G/DL (ref 12–15.9)
HGB BLD-MCNC: 6.1 G/DL (ref 12–15.9)
HGB BLD-MCNC: 8.1 G/DL (ref 12–15.9)
HGB BLD-MCNC: 8.2 G/DL (ref 12–15.9)
HGB BLD-MCNC: 8.3 G/DL (ref 12–15.9)
HGB BLD-MCNC: 8.4 G/DL (ref 12–15.9)
HGB BLD-MCNC: 8.7 G/DL (ref 12–15.9)
HGB BLD-MCNC: 9.4 G/DL (ref 12–15.9)
HGB BLD-MCNC: 9.5 G/DL (ref 12–15.9)
HGB BLD-MCNC: 9.6 G/DL (ref 12–15.9)
HGB BLDA-MCNC: 12.2 G/DL (ref 12–17)
HGB UR QL STRIP.AUTO: NEGATIVE
HOLD SPECIMEN: NORMAL
HYALINE CASTS UR QL AUTO: ABNORMAL /LPF
IMM GRANULOCYTES # BLD AUTO: 0.01 10*3/MM3 (ref 0–0.05)
IMM GRANULOCYTES # BLD AUTO: 0.01 10*3/MM3 (ref 0–0.05)
IMM GRANULOCYTES # BLD AUTO: 0.04 10*3/MM3 (ref 0–0.05)
IMM GRANULOCYTES # BLD AUTO: 0.05 10*3/MM3 (ref 0–0.05)
IMM GRANULOCYTES # BLD AUTO: 0.06 10*3/MM3 (ref 0–0.05)
IMM GRANULOCYTES # BLD AUTO: 0.12 10*3/MM3 (ref 0–0.05)
IMM GRANULOCYTES NFR BLD AUTO: 0.1 % (ref 0–0.5)
IMM GRANULOCYTES NFR BLD AUTO: 0.2 % (ref 0–0.5)
IMM GRANULOCYTES NFR BLD AUTO: 0.5 % (ref 0–0.5)
IMM GRANULOCYTES NFR BLD AUTO: 0.5 % (ref 0–0.5)
IMM GRANULOCYTES NFR BLD AUTO: 0.6 % (ref 0–0.5)
IMM GRANULOCYTES NFR BLD AUTO: 1.2 % (ref 0–0.5)
INR PPP: 1.4 (ref 0.8–1.2)
KETONES UR QL STRIP: NEGATIVE
LDLC SERPL CALC-MCNC: 70 MG/DL (ref 0–100)
LDLC/HDLC SERPL: 1.98 {RATIO}
LEUKOCYTE ESTERASE UR QL STRIP.AUTO: ABNORMAL
LYMPHOCYTES # BLD AUTO: 1.14 10*3/MM3 (ref 0.7–3.1)
LYMPHOCYTES # BLD AUTO: 1.4 10*3/MM3 (ref 0.7–3.1)
LYMPHOCYTES # BLD AUTO: 1.63 10*3/MM3 (ref 0.7–3.1)
LYMPHOCYTES # BLD AUTO: 1.66 10*3/MM3 (ref 0.7–3.1)
LYMPHOCYTES # BLD AUTO: 1.72 10*3/MM3 (ref 0.7–3.1)
LYMPHOCYTES # BLD AUTO: 2.19 10*3/MM3 (ref 0.7–3.1)
LYMPHOCYTES NFR BLD AUTO: 11.8 % (ref 19.6–45.3)
LYMPHOCYTES NFR BLD AUTO: 16.1 % (ref 19.6–45.3)
LYMPHOCYTES NFR BLD AUTO: 16.2 % (ref 19.6–45.3)
LYMPHOCYTES NFR BLD AUTO: 17.6 % (ref 19.6–45.3)
LYMPHOCYTES NFR BLD AUTO: 21.1 % (ref 19.6–45.3)
LYMPHOCYTES NFR BLD AUTO: 35.3 % (ref 19.6–45.3)
MAGNESIUM SERPL-MCNC: 1.4 MG/DL (ref 1.6–2.4)
MAGNESIUM SERPL-MCNC: 1.8 MG/DL (ref 1.6–2.4)
MAGNESIUM SERPL-MCNC: 1.9 MG/DL (ref 1.6–2.4)
MAGNESIUM SERPL-MCNC: 2.2 MG/DL (ref 1.6–2.4)
MAGNESIUM SERPL-MCNC: 2.4 MG/DL (ref 1.6–2.4)
MAXIMAL PREDICTED HEART RATE: 145 BPM
MAXIMAL PREDICTED HEART RATE: 145 BPM
MCH RBC QN AUTO: 19.2 PG (ref 26.6–33)
MCH RBC QN AUTO: 19.4 PG (ref 26.6–33)
MCH RBC QN AUTO: 19.5 PG (ref 26.6–33)
MCH RBC QN AUTO: 19.9 PG (ref 26.6–33)
MCH RBC QN AUTO: 19.9 PG (ref 26.6–33)
MCH RBC QN AUTO: 21.2 PG (ref 26.6–33)
MCH RBC QN AUTO: 21.9 PG (ref 26.6–33)
MCHC RBC AUTO-ENTMCNC: 28.8 G/DL (ref 31.5–35.7)
MCHC RBC AUTO-ENTMCNC: 29.4 G/DL (ref 31.5–35.7)
MCHC RBC AUTO-ENTMCNC: 29.9 G/DL (ref 31.5–35.7)
MCHC RBC AUTO-ENTMCNC: 30 G/DL (ref 31.5–35.7)
MCHC RBC AUTO-ENTMCNC: 31 G/DL (ref 31.5–35.7)
MCV RBC AUTO: 64.2 FL (ref 79–97)
MCV RBC AUTO: 65.1 FL (ref 79–97)
MCV RBC AUTO: 66 FL (ref 79–97)
MCV RBC AUTO: 66.3 FL (ref 79–97)
MCV RBC AUTO: 66.6 FL (ref 79–97)
MCV RBC AUTO: 70.7 FL (ref 79–97)
MCV RBC AUTO: 73.8 FL (ref 79–97)
METHADONE UR QL SCN: NEGATIVE
MICROCYTES BLD QL: NORMAL
MICROCYTES BLD QL: NORMAL
MONOCYTES # BLD AUTO: 0.57 10*3/MM3 (ref 0.1–0.9)
MONOCYTES # BLD AUTO: 0.6 10*3/MM3 (ref 0.1–0.9)
MONOCYTES # BLD AUTO: 0.72 10*3/MM3 (ref 0.1–0.9)
MONOCYTES # BLD AUTO: 0.77 10*3/MM3 (ref 0.1–0.9)
MONOCYTES # BLD AUTO: 0.79 10*3/MM3 (ref 0.1–0.9)
MONOCYTES # BLD AUTO: 0.82 10*3/MM3 (ref 0.1–0.9)
MONOCYTES NFR BLD AUTO: 6.2 % (ref 5–12)
MONOCYTES NFR BLD AUTO: 7.2 % (ref 5–12)
MONOCYTES NFR BLD AUTO: 8.1 % (ref 5–12)
MONOCYTES NFR BLD AUTO: 9.2 % (ref 5–12)
MONOCYTES NFR BLD AUTO: 9.4 % (ref 5–12)
MONOCYTES NFR BLD AUTO: 9.8 % (ref 5–12)
NEUTROPHILS NFR BLD AUTO: 3.33 10*3/MM3 (ref 1.7–7)
NEUTROPHILS NFR BLD AUTO: 5.25 10*3/MM3 (ref 1.7–7)
NEUTROPHILS NFR BLD AUTO: 53.6 % (ref 42.7–76)
NEUTROPHILS NFR BLD AUTO: 6.14 10*3/MM3 (ref 1.7–7)
NEUTROPHILS NFR BLD AUTO: 6.9 10*3/MM3 (ref 1.7–7)
NEUTROPHILS NFR BLD AUTO: 66.6 % (ref 42.7–76)
NEUTROPHILS NFR BLD AUTO: 7.5 10*3/MM3 (ref 1.7–7)
NEUTROPHILS NFR BLD AUTO: 7.78 10*3/MM3 (ref 1.7–7)
NEUTROPHILS NFR BLD AUTO: 70.6 % (ref 42.7–76)
NEUTROPHILS NFR BLD AUTO: 70.7 % (ref 42.7–76)
NEUTROPHILS NFR BLD AUTO: 74.7 % (ref 42.7–76)
NEUTROPHILS NFR BLD AUTO: 80.8 % (ref 42.7–76)
NITRITE UR QL STRIP: NEGATIVE
NRBC BLD AUTO-RTO: 0 /100 WBC (ref 0–0.2)
OPIATES UR QL: NEGATIVE
OVALOCYTES BLD QL SMEAR: NORMAL
OXYCODONE UR QL SCN: NEGATIVE
PCP UR QL SCN: NEGATIVE
PH UR STRIP.AUTO: 6.5 [PH] (ref 5–8)
PHOSPHATE SERPL-MCNC: 2.2 MG/DL (ref 2.5–4.5)
PHOSPHATE SERPL-MCNC: 2.4 MG/DL (ref 2.5–4.5)
PHOSPHATE SERPL-MCNC: 2.5 MG/DL (ref 2.5–4.5)
PHOSPHATE SERPL-MCNC: 3.2 MG/DL (ref 2.5–4.5)
PLAT MORPH BLD: NORMAL
PLAT MORPH BLD: NORMAL
PLATELET # BLD AUTO: 373 10*3/MM3 (ref 140–450)
PLATELET # BLD AUTO: 377 10*3/MM3 (ref 140–450)
PLATELET # BLD AUTO: 377 10*3/MM3 (ref 140–450)
PLATELET # BLD AUTO: 390 10*3/MM3 (ref 140–450)
PLATELET # BLD AUTO: 528 10*3/MM3 (ref 140–450)
PLATELET # BLD AUTO: 531 10*3/MM3 (ref 140–450)
PLATELET # BLD AUTO: 587 10*3/MM3 (ref 140–450)
PMV BLD AUTO: 8.7 FL (ref 6–12)
PMV BLD AUTO: 8.7 FL (ref 6–12)
PMV BLD AUTO: 9.2 FL (ref 6–12)
PMV BLD AUTO: 9.2 FL (ref 6–12)
PMV BLD AUTO: 9.4 FL (ref 6–12)
POTASSIUM BLDA-SCNC: 3.5 MMOL/L (ref 3.5–4.9)
POTASSIUM SERPL-SCNC: 3.1 MMOL/L (ref 3.5–5.2)
POTASSIUM SERPL-SCNC: 3.3 MMOL/L (ref 3.5–5.2)
POTASSIUM SERPL-SCNC: 3.6 MMOL/L (ref 3.5–5.2)
POTASSIUM SERPL-SCNC: 4 MMOL/L (ref 3.5–5.2)
POTASSIUM SERPL-SCNC: 4 MMOL/L (ref 3.5–5.2)
POTASSIUM SERPL-SCNC: 4.3 MMOL/L (ref 3.5–5.2)
PROCALCITONIN SERPL-MCNC: 0.05 NG/ML (ref 0–0.25)
PROPOXYPH UR QL: NEGATIVE
PROT SERPL-MCNC: 8.6 G/DL (ref 6–8.5)
PROT UR QL STRIP: NEGATIVE
PROTHROMBIN TIME: 16.6 SECONDS (ref 12.8–15.2)
QT INTERVAL: 444 MS
QT INTERVAL: 454 MS
QTC INTERVAL: 490 MS
QTC INTERVAL: 521 MS
RBC # BLD AUTO: 3.02 10*6/MM3 (ref 3.77–5.28)
RBC # BLD AUTO: 3.06 10*6/MM3 (ref 3.77–5.28)
RBC # BLD AUTO: 3.79 10*6/MM3 (ref 3.77–5.28)
RBC # BLD AUTO: 3.86 10*6/MM3 (ref 3.77–5.28)
RBC # BLD AUTO: 4.82 10*6/MM3 (ref 3.77–5.28)
RBC # BLD AUTO: 4.94 10*6/MM3 (ref 3.77–5.28)
RBC # BLD AUTO: 4.95 10*6/MM3 (ref 3.77–5.28)
RBC # UR STRIP: ABNORMAL /HPF
REF LAB TEST METHOD: ABNORMAL
RH BLD: POSITIVE
RH BLD: POSITIVE
RIGHT ARM BP: NORMAL MMHG
RSV RNA NPH QL NAA+NON-PROBE: NOT DETECTED
SARS-COV-2 RNA RESP QL NAA+PROBE: NOT DETECTED
SARS-COV-2 RNA RESP QL NAA+PROBE: NOT DETECTED
SCHISTOCYTES BLD QL SMEAR: NORMAL
SODIUM BLD-SCNC: 142 MMOL/L (ref 138–146)
SODIUM SERPL-SCNC: 134 MMOL/L (ref 136–145)
SODIUM SERPL-SCNC: 134 MMOL/L (ref 136–145)
SODIUM SERPL-SCNC: 138 MMOL/L (ref 136–145)
SODIUM SERPL-SCNC: 139 MMOL/L (ref 136–145)
SODIUM SERPL-SCNC: 140 MMOL/L (ref 136–145)
SODIUM SERPL-SCNC: 143 MMOL/L (ref 136–145)
SP GR UR STRIP: 1.02 (ref 1–1.03)
SQUAMOUS #/AREA URNS HPF: ABNORMAL /HPF
STRESS TARGET HR: 123 BPM
STRESS TARGET HR: 123 BPM
T&S EXPIRATION DATE: NORMAL
TARGETS BLD QL SMEAR: NORMAL
TRICYCLICS UR QL SCN: NEGATIVE
TRIGL SERPL-MCNC: 58 MG/DL (ref 0–150)
TROPONIN T SERPL-MCNC: <0.01 NG/ML (ref 0–0.03)
UNIT  ABO: NORMAL
UNIT  ABO: NORMAL
UNIT  RH: NORMAL
UNIT  RH: NORMAL
UROBILINOGEN UR QL STRIP: ABNORMAL
VIT B12 BLD-MCNC: 603 PG/ML (ref 211–946)
VLDLC SERPL-MCNC: 13 MG/DL (ref 5–40)
WBC # UR STRIP: ABNORMAL /HPF
WBC MORPH BLD: NORMAL
WBC MORPH BLD: NORMAL
WBC NRBC COR # BLD: 10.04 10*3/MM3 (ref 3.4–10.8)
WBC NRBC COR # BLD: 6.21 10*3/MM3 (ref 3.4–10.8)
WBC NRBC COR # BLD: 7.88 10*3/MM3 (ref 3.4–10.8)
WBC NRBC COR # BLD: 8.37 10*3/MM3 (ref 3.4–10.8)
WBC NRBC COR # BLD: 8.69 10*3/MM3 (ref 3.4–10.8)
WBC NRBC COR # BLD: 9.64 10*3/MM3 (ref 3.4–10.8)
WBC NRBC COR # BLD: 9.77 10*3/MM3 (ref 3.4–10.8)
WHOLE BLOOD HOLD COAG: NORMAL
WHOLE BLOOD HOLD SPECIMEN: NORMAL

## 2022-01-01 PROCEDURE — C1725 CATH, TRANSLUMIN NON-LASER: HCPCS | Performed by: NEUROLOGICAL SURGERY

## 2022-01-01 PROCEDURE — 86900 BLOOD TYPING SEROLOGIC ABO: CPT | Performed by: NURSE PRACTITIONER

## 2022-01-01 PROCEDURE — 80061 LIPID PANEL: CPT | Performed by: NURSE PRACTITIONER

## 2022-01-01 PROCEDURE — 80306 DRUG TEST PRSMV INSTRMNT: CPT | Performed by: NURSE PRACTITIONER

## 2022-01-01 PROCEDURE — 80047 BASIC METABLC PNL IONIZED CA: CPT

## 2022-01-01 PROCEDURE — 86901 BLOOD TYPING SEROLOGIC RH(D): CPT | Performed by: NURSE PRACTITIONER

## 2022-01-01 PROCEDURE — 99223 1ST HOSP IP/OBS HIGH 75: CPT | Performed by: NURSE PRACTITIONER

## 2022-01-01 PROCEDURE — P9016 RBC LEUKOCYTES REDUCED: HCPCS

## 2022-01-01 PROCEDURE — 25010000002 PHENYLEPHRINE 10 MG/ML SOLUTION 5 ML VIAL: Performed by: NURSE ANESTHETIST, CERTIFIED REGISTERED

## 2022-01-01 PROCEDURE — 25010000002 FENTANYL CITRATE (PF) 50 MCG/ML SOLUTION

## 2022-01-01 PROCEDURE — C1876 STENT, NON-COA/NON-COV W/DEL: HCPCS | Performed by: NEUROLOGICAL SURGERY

## 2022-01-01 PROCEDURE — 84100 ASSAY OF PHOSPHORUS: CPT | Performed by: NURSE PRACTITIONER

## 2022-01-01 PROCEDURE — 99214 OFFICE O/P EST MOD 30 MIN: CPT | Performed by: NEUROLOGICAL SURGERY

## 2022-01-01 PROCEDURE — C1769 GUIDE WIRE: HCPCS | Performed by: NEUROLOGICAL SURGERY

## 2022-01-01 PROCEDURE — 86901 BLOOD TYPING SEROLOGIC RH(D): CPT

## 2022-01-01 PROCEDURE — 83036 HEMOGLOBIN GLYCOSYLATED A1C: CPT | Performed by: NURSE PRACTITIONER

## 2022-01-01 PROCEDURE — 99223 1ST HOSP IP/OBS HIGH 75: CPT | Performed by: INTERNAL MEDICINE

## 2022-01-01 PROCEDURE — 99024 POSTOP FOLLOW-UP VISIT: CPT | Performed by: PHYSICIAN ASSISTANT

## 2022-01-01 PROCEDURE — 0 MAGNESIUM SULFATE 4 GM/100ML SOLUTION: Performed by: INTERNAL MEDICINE

## 2022-01-01 PROCEDURE — 25010000002 PROPOFOL 10 MG/ML EMULSION

## 2022-01-01 PROCEDURE — 86900 BLOOD TYPING SEROLOGIC ABO: CPT

## 2022-01-01 PROCEDURE — 81001 URINALYSIS AUTO W/SCOPE: CPT | Performed by: EMERGENCY MEDICINE

## 2022-01-01 PROCEDURE — 97530 THERAPEUTIC ACTIVITIES: CPT

## 2022-01-01 PROCEDURE — 0DJ08ZZ INSPECTION OF UPPER INTESTINAL TRACT, VIA NATURAL OR ARTIFICIAL OPENING ENDOSCOPIC: ICD-10-PCS | Performed by: INTERNAL MEDICINE

## 2022-01-01 PROCEDURE — 85027 COMPLETE CBC AUTOMATED: CPT | Performed by: NURSE PRACTITIONER

## 2022-01-01 PROCEDURE — 99231 SBSQ HOSP IP/OBS SF/LOW 25: CPT | Performed by: NURSE PRACTITIONER

## 2022-01-01 PROCEDURE — 85007 BL SMEAR W/DIFF WBC COUNT: CPT | Performed by: NEUROLOGICAL SURGERY

## 2022-01-01 PROCEDURE — 25010000002 ONDANSETRON PER 1 MG: Performed by: NURSE PRACTITIONER

## 2022-01-01 PROCEDURE — 80048 BASIC METABOLIC PNL TOTAL CA: CPT | Performed by: NURSE PRACTITIONER

## 2022-01-01 PROCEDURE — 83605 ASSAY OF LACTIC ACID: CPT | Performed by: NURSE PRACTITIONER

## 2022-01-01 PROCEDURE — 82607 VITAMIN B-12: CPT | Performed by: NURSE PRACTITIONER

## 2022-01-01 PROCEDURE — 87636 SARSCOV2 & INF A&B AMP PRB: CPT | Performed by: NURSE PRACTITIONER

## 2022-01-01 PROCEDURE — C1887 CATHETER, GUIDING: HCPCS | Performed by: NEUROLOGICAL SURGERY

## 2022-01-01 PROCEDURE — 99285 EMERGENCY DEPT VISIT HI MDM: CPT

## 2022-01-01 PROCEDURE — 99232 SBSQ HOSP IP/OBS MODERATE 35: CPT | Performed by: NURSE PRACTITIONER

## 2022-01-01 PROCEDURE — 037L3DZ DILATION OF LEFT INTERNAL CAROTID ARTERY WITH INTRALUMINAL DEVICE, PERCUTANEOUS APPROACH: ICD-10-PCS | Performed by: NEUROLOGICAL SURGERY

## 2022-01-01 PROCEDURE — 84145 PROCALCITONIN (PCT): CPT | Performed by: NURSE PRACTITIONER

## 2022-01-01 PROCEDURE — 93880 EXTRACRANIAL BILAT STUDY: CPT | Performed by: INTERNAL MEDICINE

## 2022-01-01 PROCEDURE — 25010000002 PROPOFOL 10 MG/ML EMULSION: Performed by: NURSE ANESTHETIST, CERTIFIED REGISTERED

## 2022-01-01 PROCEDURE — C1894 INTRO/SHEATH, NON-LASER: HCPCS | Performed by: NEUROLOGICAL SURGERY

## 2022-01-01 PROCEDURE — 70450 CT HEAD/BRAIN W/O DYE: CPT

## 2022-01-01 PROCEDURE — 82962 GLUCOSE BLOOD TEST: CPT

## 2022-01-01 PROCEDURE — 99222 1ST HOSP IP/OBS MODERATE 55: CPT | Performed by: INTERNAL MEDICINE

## 2022-01-01 PROCEDURE — 99233 SBSQ HOSP IP/OBS HIGH 50: CPT | Performed by: STUDENT IN AN ORGANIZED HEALTH CARE EDUCATION/TRAINING PROGRAM

## 2022-01-01 PROCEDURE — 99232 SBSQ HOSP IP/OBS MODERATE 35: CPT | Performed by: NEUROLOGICAL SURGERY

## 2022-01-01 PROCEDURE — 85025 COMPLETE CBC W/AUTO DIFF WBC: CPT | Performed by: INTERNAL MEDICINE

## 2022-01-01 PROCEDURE — 85025 COMPLETE CBC W/AUTO DIFF WBC: CPT | Performed by: EMERGENCY MEDICINE

## 2022-01-01 PROCEDURE — 86850 RBC ANTIBODY SCREEN: CPT | Performed by: NURSE PRACTITIONER

## 2022-01-01 PROCEDURE — 93005 ELECTROCARDIOGRAM TRACING: CPT | Performed by: EMERGENCY MEDICINE

## 2022-01-01 PROCEDURE — 85025 COMPLETE CBC W/AUTO DIFF WBC: CPT | Performed by: NURSE PRACTITIONER

## 2022-01-01 PROCEDURE — 99239 HOSP IP/OBS DSCHRG MGMT >30: CPT | Performed by: NURSE PRACTITIONER

## 2022-01-01 PROCEDURE — 99024 POSTOP FOLLOW-UP VISIT: CPT | Performed by: NEUROLOGICAL SURGERY

## 2022-01-01 PROCEDURE — 37215 TRANSCATH STENT CCA W/EPS: CPT | Performed by: RADIOLOGY

## 2022-01-01 PROCEDURE — 86923 COMPATIBILITY TEST ELECTRIC: CPT

## 2022-01-01 PROCEDURE — 25010000002 PHENYLEPHRINE 10 MG/ML SOLUTION 1 ML VIAL: Performed by: NURSE ANESTHETIST, CERTIFIED REGISTERED

## 2022-01-01 PROCEDURE — 85014 HEMATOCRIT: CPT | Performed by: NURSE PRACTITIONER

## 2022-01-01 PROCEDURE — 80048 BASIC METABOLIC PNL TOTAL CA: CPT | Performed by: NEUROLOGICAL SURGERY

## 2022-01-01 PROCEDURE — 99441 PR PHYS/QHP TELEPHONE EVALUATION 5-10 MIN: CPT | Performed by: NEUROLOGICAL SURGERY

## 2022-01-01 PROCEDURE — 84484 ASSAY OF TROPONIN QUANT: CPT | Performed by: EMERGENCY MEDICINE

## 2022-01-01 PROCEDURE — 93880 EXTRACRANIAL BILAT STUDY: CPT

## 2022-01-01 PROCEDURE — 25010000002 FENTANYL CITRATE (PF) 50 MCG/ML SOLUTION: Performed by: NEUROLOGICAL SURGERY

## 2022-01-01 PROCEDURE — 82077 ASSAY SPEC XCP UR&BREATH IA: CPT | Performed by: NURSE PRACTITIONER

## 2022-01-01 PROCEDURE — 25010000002 ONDANSETRON PER 1 MG: Performed by: NURSE ANESTHETIST, CERTIFIED REGISTERED

## 2022-01-01 PROCEDURE — 97165 OT EVAL LOW COMPLEX 30 MIN: CPT

## 2022-01-01 PROCEDURE — 0042T HC CT CEREBRAL PERFUSION W/WO CONTRAST: CPT

## 2022-01-01 PROCEDURE — 37215 TRANSCATH STENT CCA W/EPS: CPT | Performed by: NEUROLOGICAL SURGERY

## 2022-01-01 PROCEDURE — 85007 BL SMEAR W/DIFF WBC COUNT: CPT | Performed by: EMERGENCY MEDICINE

## 2022-01-01 PROCEDURE — 87637 SARSCOV2&INF A&B&RSV AMP PRB: CPT | Performed by: NURSE PRACTITIONER

## 2022-01-01 PROCEDURE — 85014 HEMATOCRIT: CPT | Performed by: INTERNAL MEDICINE

## 2022-01-01 PROCEDURE — 83735 ASSAY OF MAGNESIUM: CPT | Performed by: INTERNAL MEDICINE

## 2022-01-01 PROCEDURE — 80048 BASIC METABOLIC PNL TOTAL CA: CPT | Performed by: INTERNAL MEDICINE

## 2022-01-01 PROCEDURE — 70551 MRI BRAIN STEM W/O DYE: CPT

## 2022-01-01 PROCEDURE — C1884 EMBOLIZATION PROTECT SYST: HCPCS | Performed by: NEUROLOGICAL SURGERY

## 2022-01-01 PROCEDURE — 85025 COMPLETE CBC W/AUTO DIFF WBC: CPT | Performed by: NEUROLOGICAL SURGERY

## 2022-01-01 PROCEDURE — 84100 ASSAY OF PHOSPHORUS: CPT | Performed by: INTERNAL MEDICINE

## 2022-01-01 PROCEDURE — 74176 CT ABD & PELVIS W/O CONTRAST: CPT

## 2022-01-01 PROCEDURE — 0 IOPAMIDOL PER 1 ML: Performed by: EMERGENCY MEDICINE

## 2022-01-01 PROCEDURE — 97162 PT EVAL MOD COMPLEX 30 MIN: CPT

## 2022-01-01 PROCEDURE — 85018 HEMOGLOBIN: CPT | Performed by: INTERNAL MEDICINE

## 2022-01-01 PROCEDURE — 70498 CT ANGIOGRAPHY NECK: CPT

## 2022-01-01 PROCEDURE — 85014 HEMATOCRIT: CPT

## 2022-01-01 PROCEDURE — 92523 SPEECH SOUND LANG COMPREHEN: CPT

## 2022-01-01 PROCEDURE — 97166 OT EVAL MOD COMPLEX 45 MIN: CPT

## 2022-01-01 PROCEDURE — 43235 EGD DIAGNOSTIC BRUSH WASH: CPT | Performed by: INTERNAL MEDICINE

## 2022-01-01 PROCEDURE — 85018 HEMOGLOBIN: CPT | Performed by: NURSE PRACTITIONER

## 2022-01-01 PROCEDURE — 0 IODIXANOL PER 1 ML: Performed by: NEUROLOGICAL SURGERY

## 2022-01-01 PROCEDURE — 99232 SBSQ HOSP IP/OBS MODERATE 35: CPT | Performed by: INTERNAL MEDICINE

## 2022-01-01 PROCEDURE — 93005 ELECTROCARDIOGRAM TRACING: CPT | Performed by: NURSE PRACTITIONER

## 2022-01-01 PROCEDURE — 70496 CT ANGIOGRAPHY HEAD: CPT

## 2022-01-01 PROCEDURE — 93010 ELECTROCARDIOGRAM REPORT: CPT | Performed by: INTERNAL MEDICINE

## 2022-01-01 PROCEDURE — 83735 ASSAY OF MAGNESIUM: CPT | Performed by: NURSE PRACTITIONER

## 2022-01-01 PROCEDURE — 80053 COMPREHEN METABOLIC PANEL: CPT | Performed by: EMERGENCY MEDICINE

## 2022-01-01 PROCEDURE — 83735 ASSAY OF MAGNESIUM: CPT | Performed by: EMERGENCY MEDICINE

## 2022-01-01 PROCEDURE — 71045 X-RAY EXAM CHEST 1 VIEW: CPT

## 2022-01-01 PROCEDURE — 85610 PROTHROMBIN TIME: CPT

## 2022-01-01 PROCEDURE — 36430 TRANSFUSION BLD/BLD COMPNT: CPT

## 2022-01-01 DEVICE — XACT CAROTID STENT SYSTEM 10.0 MM X 40 MM TAPERED
Type: IMPLANTABLE DEVICE | Site: CAROTID | Status: FUNCTIONAL
Brand: XACT

## 2022-01-01 RX ORDER — FENTANYL CITRATE 50 UG/ML
INJECTION, SOLUTION INTRAMUSCULAR; INTRAVENOUS
Status: COMPLETED
Start: 2022-01-01 | End: 2022-01-01

## 2022-01-01 RX ORDER — HYDROCODONE BITARTRATE AND ACETAMINOPHEN 5; 325 MG/1; MG/1
1 TABLET ORAL ONCE AS NEEDED
Status: DISCONTINUED | OUTPATIENT
Start: 2022-01-01 | End: 2022-01-01 | Stop reason: HOSPADM

## 2022-01-01 RX ORDER — FENTANYL CITRATE 50 UG/ML
50 INJECTION, SOLUTION INTRAMUSCULAR; INTRAVENOUS
Status: DISCONTINUED | OUTPATIENT
Start: 2022-01-01 | End: 2022-01-01 | Stop reason: HOSPADM

## 2022-01-01 RX ORDER — PROMETHAZINE HYDROCHLORIDE 25 MG/1
25 TABLET ORAL ONCE AS NEEDED
Status: DISCONTINUED | OUTPATIENT
Start: 2022-01-01 | End: 2022-01-01 | Stop reason: HOSPADM

## 2022-01-01 RX ORDER — HYDROMORPHONE HYDROCHLORIDE 1 MG/ML
0.5 INJECTION, SOLUTION INTRAMUSCULAR; INTRAVENOUS; SUBCUTANEOUS
Status: DISCONTINUED | OUTPATIENT
Start: 2022-01-01 | End: 2022-01-01 | Stop reason: HOSPADM

## 2022-01-01 RX ORDER — ONDANSETRON HYDROCHLORIDE 8 MG/1
8 TABLET, FILM COATED ORAL EVERY 8 HOURS PRN
Qty: 30 TABLET | Refills: 1 | Status: SHIPPED | OUTPATIENT
Start: 2022-01-01

## 2022-01-01 RX ORDER — ONDANSETRON 2 MG/ML
4 INJECTION INTRAMUSCULAR; INTRAVENOUS ONCE AS NEEDED
Status: DISCONTINUED | OUTPATIENT
Start: 2022-01-01 | End: 2022-01-01 | Stop reason: HOSPADM

## 2022-01-01 RX ORDER — SODIUM CHLORIDE 9 MG/ML
INJECTION, SOLUTION INTRAVENOUS CONTINUOUS PRN
Status: DISCONTINUED | OUTPATIENT
Start: 2022-01-01 | End: 2022-01-01 | Stop reason: SURG

## 2022-01-01 RX ORDER — MAGNESIUM SULFATE HEPTAHYDRATE 40 MG/ML
2 INJECTION, SOLUTION INTRAVENOUS AS NEEDED
Status: DISCONTINUED | OUTPATIENT
Start: 2022-01-01 | End: 2022-01-01 | Stop reason: HOSPADM

## 2022-01-01 RX ORDER — ASPIRIN 81 MG/1
81 TABLET ORAL DAILY
Qty: 30 TABLET | Refills: 5 | Status: SHIPPED | OUTPATIENT
Start: 2022-01-01

## 2022-01-01 RX ORDER — PROPOFOL 10 MG/ML
VIAL (ML) INTRAVENOUS AS NEEDED
Status: DISCONTINUED | OUTPATIENT
Start: 2022-01-01 | End: 2022-01-01 | Stop reason: SURG

## 2022-01-01 RX ORDER — DROPERIDOL 2.5 MG/ML
0.62 INJECTION, SOLUTION INTRAMUSCULAR; INTRAVENOUS
Status: DISCONTINUED | OUTPATIENT
Start: 2022-01-01 | End: 2022-01-01 | Stop reason: HOSPADM

## 2022-01-01 RX ORDER — ONDANSETRON 2 MG/ML
4 INJECTION INTRAMUSCULAR; INTRAVENOUS ONCE
Status: COMPLETED | OUTPATIENT
Start: 2022-01-01 | End: 2022-01-01

## 2022-01-01 RX ORDER — LABETALOL HYDROCHLORIDE 5 MG/ML
5 INJECTION, SOLUTION INTRAVENOUS
Status: DISCONTINUED | OUTPATIENT
Start: 2022-01-01 | End: 2022-01-01 | Stop reason: HOSPADM

## 2022-01-01 RX ORDER — AMLODIPINE BESYLATE 10 MG/1
10 TABLET ORAL
Status: DISCONTINUED | OUTPATIENT
Start: 2022-01-01 | End: 2022-01-01 | Stop reason: HOSPADM

## 2022-01-01 RX ORDER — LIDOCAINE HYDROCHLORIDE 10 MG/ML
INJECTION, SOLUTION EPIDURAL; INFILTRATION; INTRACAUDAL; PERINEURAL AS NEEDED
Status: DISCONTINUED | OUTPATIENT
Start: 2022-01-01 | End: 2022-01-01 | Stop reason: SURG

## 2022-01-01 RX ORDER — IPRATROPIUM BROMIDE AND ALBUTEROL SULFATE 2.5; .5 MG/3ML; MG/3ML
3 SOLUTION RESPIRATORY (INHALATION) ONCE AS NEEDED
Status: DISCONTINUED | OUTPATIENT
Start: 2022-01-01 | End: 2022-01-01 | Stop reason: HOSPADM

## 2022-01-01 RX ORDER — SODIUM CHLORIDE 0.9 % (FLUSH) 0.9 %
10 SYRINGE (ML) INJECTION AS NEEDED
Status: DISCONTINUED | OUTPATIENT
Start: 2022-01-01 | End: 2022-01-01 | Stop reason: HOSPADM

## 2022-01-01 RX ORDER — IODIXANOL 320 MG/ML
INJECTION, SOLUTION INTRAVASCULAR AS NEEDED
Status: DISCONTINUED | OUTPATIENT
Start: 2022-01-01 | End: 2022-01-01 | Stop reason: HOSPADM

## 2022-01-01 RX ORDER — SODIUM CHLORIDE 0.9 % (FLUSH) 0.9 %
3 SYRINGE (ML) INJECTION EVERY 12 HOURS SCHEDULED
Status: DISCONTINUED | OUTPATIENT
Start: 2022-01-01 | End: 2022-01-01 | Stop reason: HOSPADM

## 2022-01-01 RX ORDER — POTASSIUM CHLORIDE 750 MG/1
40 CAPSULE, EXTENDED RELEASE ORAL AS NEEDED
Status: DISCONTINUED | OUTPATIENT
Start: 2022-01-01 | End: 2022-01-01 | Stop reason: HOSPADM

## 2022-01-01 RX ORDER — PANTOPRAZOLE SODIUM 40 MG/10ML
80 INJECTION, POWDER, LYOPHILIZED, FOR SOLUTION INTRAVENOUS ONCE
Status: COMPLETED | OUTPATIENT
Start: 2022-01-01 | End: 2022-01-01

## 2022-01-01 RX ORDER — ONDANSETRON 2 MG/ML
INJECTION INTRAMUSCULAR; INTRAVENOUS AS NEEDED
Status: DISCONTINUED | OUTPATIENT
Start: 2022-01-01 | End: 2022-01-01 | Stop reason: SURG

## 2022-01-01 RX ORDER — LOSARTAN POTASSIUM 50 MG/1
100 TABLET ORAL DAILY
Status: DISCONTINUED | OUTPATIENT
Start: 2022-01-01 | End: 2022-01-01 | Stop reason: HOSPADM

## 2022-01-01 RX ORDER — ASPIRIN 325 MG
325 TABLET, DELAYED RELEASE (ENTERIC COATED) ORAL DAILY
Status: DISCONTINUED | OUTPATIENT
Start: 2022-01-01 | End: 2022-01-01

## 2022-01-01 RX ORDER — CLONIDINE HYDROCHLORIDE 0.1 MG/1
0.1 TABLET ORAL EVERY 12 HOURS SCHEDULED
Status: DISCONTINUED | OUTPATIENT
Start: 2022-01-01 | End: 2022-01-01 | Stop reason: HOSPADM

## 2022-01-01 RX ORDER — SODIUM CHLORIDE 0.9 % (FLUSH) 0.9 %
10 SYRINGE (ML) INJECTION EVERY 12 HOURS SCHEDULED
Status: DISCONTINUED | OUTPATIENT
Start: 2022-01-01 | End: 2022-01-01 | Stop reason: HOSPADM

## 2022-01-01 RX ORDER — DROPERIDOL 2.5 MG/ML
0.62 INJECTION, SOLUTION INTRAMUSCULAR; INTRAVENOUS ONCE AS NEEDED
Status: DISCONTINUED | OUTPATIENT
Start: 2022-01-01 | End: 2022-01-01 | Stop reason: HOSPADM

## 2022-01-01 RX ORDER — BUPIVACAINE HCL/0.9 % NACL/PF 0.125 %
PLASTIC BAG, INJECTION (ML) EPIDURAL AS NEEDED
Status: DISCONTINUED | OUTPATIENT
Start: 2022-01-01 | End: 2022-01-01 | Stop reason: SURG

## 2022-01-01 RX ORDER — POTASSIUM CHLORIDE 1.5 G/1.77G
40 POWDER, FOR SOLUTION ORAL AS NEEDED
Status: DISCONTINUED | OUTPATIENT
Start: 2022-01-01 | End: 2022-01-01 | Stop reason: HOSPADM

## 2022-01-01 RX ORDER — LEVETIRACETAM 750 MG/1
750 TABLET ORAL 2 TIMES DAILY
Status: DISCONTINUED | OUTPATIENT
Start: 2022-01-01 | End: 2022-01-01 | Stop reason: HOSPADM

## 2022-01-01 RX ORDER — LEVETIRACETAM 750 MG/1
750 TABLET ORAL 2 TIMES DAILY
COMMUNITY
Start: 2022-01-01

## 2022-01-01 RX ORDER — AMLODIPINE BESYLATE 10 MG/1
10 TABLET ORAL DAILY
Status: DISCONTINUED | OUTPATIENT
Start: 2022-01-01 | End: 2022-01-01 | Stop reason: HOSPADM

## 2022-01-01 RX ORDER — PANTOPRAZOLE SODIUM 40 MG/1
40 TABLET, DELAYED RELEASE ORAL
Status: DISCONTINUED | OUTPATIENT
Start: 2022-01-01 | End: 2022-01-01

## 2022-01-01 RX ORDER — PROMETHAZINE HYDROCHLORIDE 25 MG/1
25 SUPPOSITORY RECTAL ONCE AS NEEDED
Status: DISCONTINUED | OUTPATIENT
Start: 2022-01-01 | End: 2022-01-01 | Stop reason: HOSPADM

## 2022-01-01 RX ORDER — ASPIRIN 81 MG/1
81 TABLET ORAL DAILY
Status: DISCONTINUED | OUTPATIENT
Start: 2022-01-01 | End: 2022-01-01 | Stop reason: HOSPADM

## 2022-01-01 RX ORDER — PANTOPRAZOLE SODIUM 40 MG/10ML
40 INJECTION, POWDER, LYOPHILIZED, FOR SOLUTION INTRAVENOUS EVERY 12 HOURS
Status: DISCONTINUED | OUTPATIENT
Start: 2022-01-01 | End: 2022-01-01 | Stop reason: HOSPADM

## 2022-01-01 RX ORDER — SODIUM CHLORIDE 0.9 % (FLUSH) 0.9 %
3-10 SYRINGE (ML) INJECTION AS NEEDED
Status: DISCONTINUED | OUTPATIENT
Start: 2022-01-01 | End: 2022-01-01 | Stop reason: HOSPADM

## 2022-01-01 RX ORDER — NALOXONE HCL 0.4 MG/ML
0.4 VIAL (ML) INJECTION AS NEEDED
Status: DISCONTINUED | OUTPATIENT
Start: 2022-01-01 | End: 2022-01-01 | Stop reason: HOSPADM

## 2022-01-01 RX ORDER — MEPERIDINE HYDROCHLORIDE 25 MG/ML
12.5 INJECTION INTRAMUSCULAR; INTRAVENOUS; SUBCUTANEOUS
Status: DISCONTINUED | OUTPATIENT
Start: 2022-01-01 | End: 2022-01-01 | Stop reason: HOSPADM

## 2022-01-01 RX ORDER — SODIUM CHLORIDE, SODIUM LACTATE, POTASSIUM CHLORIDE, CALCIUM CHLORIDE 600; 310; 30; 20 MG/100ML; MG/100ML; MG/100ML; MG/100ML
INJECTION, SOLUTION INTRAVENOUS CONTINUOUS PRN
Status: DISCONTINUED | OUTPATIENT
Start: 2022-01-01 | End: 2022-01-01 | Stop reason: SURG

## 2022-01-01 RX ORDER — ALBUMIN, HUMAN INJ 5% 5 %
SOLUTION INTRAVENOUS
Status: DISPENSED
Start: 2022-01-01 | End: 2022-01-01

## 2022-01-01 RX ORDER — GLYCOPYRROLATE 0.2 MG/ML
INJECTION INTRAMUSCULAR; INTRAVENOUS AS NEEDED
Status: DISCONTINUED | OUTPATIENT
Start: 2022-01-01 | End: 2022-01-01 | Stop reason: SURG

## 2022-01-01 RX ORDER — MAGNESIUM SULFATE HEPTAHYDRATE 40 MG/ML
4 INJECTION, SOLUTION INTRAVENOUS AS NEEDED
Status: DISCONTINUED | OUTPATIENT
Start: 2022-01-01 | End: 2022-01-01 | Stop reason: HOSPADM

## 2022-01-01 RX ORDER — ATORVASTATIN CALCIUM 40 MG/1
80 TABLET, FILM COATED ORAL NIGHTLY
Status: DISCONTINUED | OUTPATIENT
Start: 2022-01-01 | End: 2022-01-01 | Stop reason: HOSPADM

## 2022-01-01 RX ORDER — ROCURONIUM BROMIDE 10 MG/ML
INJECTION, SOLUTION INTRAVENOUS AS NEEDED
Status: DISCONTINUED | OUTPATIENT
Start: 2022-01-01 | End: 2022-01-01 | Stop reason: SURG

## 2022-01-01 RX ORDER — LIDOCAINE HYDROCHLORIDE 10 MG/ML
INJECTION, SOLUTION EPIDURAL; INFILTRATION; INTRACAUDAL; PERINEURAL AS NEEDED
Status: DISCONTINUED | OUTPATIENT
Start: 2022-01-01 | End: 2022-01-01 | Stop reason: HOSPADM

## 2022-01-01 RX ORDER — ALBUMIN, HUMAN INJ 5% 5 %
250 SOLUTION INTRAVENOUS ONCE
Status: DISCONTINUED | OUTPATIENT
Start: 2022-01-01 | End: 2022-01-01 | Stop reason: HOSPADM

## 2022-01-01 RX ADMIN — LEVETIRACETAM 750 MG: 750 TABLET, FILM COATED ORAL at 08:01

## 2022-01-01 RX ADMIN — AMLODIPINE BESYLATE 10 MG: 10 TABLET ORAL at 08:01

## 2022-01-01 RX ADMIN — LEVETIRACETAM 750 MG: 750 TABLET, FILM COATED ORAL at 21:06

## 2022-01-01 RX ADMIN — ATORVASTATIN CALCIUM 80 MG: 40 TABLET, FILM COATED ORAL at 21:02

## 2022-01-01 RX ADMIN — LOSARTAN POTASSIUM 100 MG: 50 TABLET, FILM COATED ORAL at 08:12

## 2022-01-01 RX ADMIN — LEVETIRACETAM 750 MG: 750 TABLET, FILM COATED ORAL at 20:18

## 2022-01-01 RX ADMIN — MAGNESIUM SULFATE HEPTAHYDRATE 4 G: 40 INJECTION, SOLUTION INTRAVENOUS at 10:23

## 2022-01-01 RX ADMIN — PHENYLEPHRINE HYDROCHLORIDE 0.2 MCG/KG/MIN: 10 INJECTION INTRAVENOUS at 11:59

## 2022-01-01 RX ADMIN — ATORVASTATIN CALCIUM 80 MG: 40 TABLET, FILM COATED ORAL at 22:19

## 2022-01-01 RX ADMIN — Medication 10 ML: at 08:34

## 2022-01-01 RX ADMIN — CLONIDINE HYDROCHLORIDE 0.1 MG: 0.1 TABLET ORAL at 20:10

## 2022-01-01 RX ADMIN — ROCURONIUM BROMIDE 20 MG: 10 INJECTION INTRAVENOUS at 12:44

## 2022-01-01 RX ADMIN — LEVETIRACETAM 750 MG: 750 TABLET, FILM COATED ORAL at 21:03

## 2022-01-01 RX ADMIN — ASPIRIN 81 MG: 81 TABLET, COATED ORAL at 08:12

## 2022-01-01 RX ADMIN — FENTANYL CITRATE 50 MCG: 50 INJECTION, SOLUTION INTRAMUSCULAR; INTRAVENOUS at 15:28

## 2022-01-01 RX ADMIN — PANTOPRAZOLE SODIUM 40 MG: 40 TABLET, DELAYED RELEASE ORAL at 05:16

## 2022-01-01 RX ADMIN — PROPOFOL 50 MG: 10 INJECTION, EMULSION INTRAVENOUS at 09:27

## 2022-01-01 RX ADMIN — Medication 10 ML: at 08:02

## 2022-01-01 RX ADMIN — ROCURONIUM BROMIDE 50 MG: 10 INJECTION INTRAVENOUS at 11:37

## 2022-01-01 RX ADMIN — TICAGRELOR 90 MG: 90 TABLET ORAL at 21:05

## 2022-01-01 RX ADMIN — MAGNESIUM SULFATE HEPTAHYDRATE 4 G: 40 INJECTION, SOLUTION INTRAVENOUS at 13:39

## 2022-01-01 RX ADMIN — TICAGRELOR 90 MG: 90 TABLET ORAL at 08:33

## 2022-01-01 RX ADMIN — POTASSIUM CHLORIDE 40 MEQ: 750 CAPSULE, EXTENDED RELEASE ORAL at 13:39

## 2022-01-01 RX ADMIN — TICAGRELOR 90 MG: 90 TABLET ORAL at 08:28

## 2022-01-01 RX ADMIN — APIXABAN 5 MG: 5 TABLET, FILM COATED ORAL at 20:18

## 2022-01-01 RX ADMIN — LEVETIRACETAM 750 MG: 750 TABLET, FILM COATED ORAL at 10:58

## 2022-01-01 RX ADMIN — APIXABAN 2.5 MG: 2.5 TABLET, FILM COATED ORAL at 22:19

## 2022-01-01 RX ADMIN — LEVETIRACETAM 750 MG: 750 TABLET, FILM COATED ORAL at 20:09

## 2022-01-01 RX ADMIN — APIXABAN 5 MG: 5 TABLET, FILM COATED ORAL at 08:12

## 2022-01-01 RX ADMIN — IOPAMIDOL 125 ML: 755 INJECTION, SOLUTION INTRAVENOUS at 16:35

## 2022-01-01 RX ADMIN — APIXABAN 2.5 MG: 2.5 TABLET, FILM COATED ORAL at 08:28

## 2022-01-01 RX ADMIN — GLYCOPYRROLATE 0.2 MCG: 0.2 INJECTION INTRAMUSCULAR; INTRAVENOUS at 13:35

## 2022-01-01 RX ADMIN — Medication 10 ML: at 21:10

## 2022-01-01 RX ADMIN — SODIUM CHLORIDE, POTASSIUM CHLORIDE, SODIUM LACTATE AND CALCIUM CHLORIDE: 600; 310; 30; 20 INJECTION, SOLUTION INTRAVENOUS at 09:24

## 2022-01-01 RX ADMIN — SUGAMMADEX 200 MG: 100 INJECTION, SOLUTION INTRAVENOUS at 13:54

## 2022-01-01 RX ADMIN — ONDANSETRON 4 MG: 2 INJECTION INTRAMUSCULAR; INTRAVENOUS at 17:57

## 2022-01-01 RX ADMIN — ONDANSETRON 4 MG: 2 INJECTION INTRAMUSCULAR; INTRAVENOUS at 11:45

## 2022-01-01 RX ADMIN — Medication 10 ML: at 08:16

## 2022-01-01 RX ADMIN — AMLODIPINE BESYLATE 10 MG: 10 TABLET ORAL at 10:13

## 2022-01-01 RX ADMIN — POTASSIUM CHLORIDE 40 MEQ: 750 CAPSULE, EXTENDED RELEASE ORAL at 21:02

## 2022-01-01 RX ADMIN — SODIUM CHLORIDE: 9 INJECTION, SOLUTION INTRAVENOUS at 11:30

## 2022-01-01 RX ADMIN — POTASSIUM CHLORIDE 40 MEQ: 1.5 POWDER, FOR SOLUTION ORAL at 18:46

## 2022-01-01 RX ADMIN — PANTOPRAZOLE SODIUM 40 MG: 40 INJECTION, POWDER, FOR SOLUTION INTRAVENOUS at 08:16

## 2022-01-01 RX ADMIN — CLONIDINE HYDROCHLORIDE 0.1 MG: 0.1 TABLET ORAL at 08:12

## 2022-01-01 RX ADMIN — TICAGRELOR 90 MG: 90 TABLET ORAL at 08:01

## 2022-01-01 RX ADMIN — CLONIDINE HYDROCHLORIDE 0.1 MG: 0.1 TABLET ORAL at 08:01

## 2022-01-01 RX ADMIN — TICAGRELOR 180 MG: 90 TABLET ORAL at 17:20

## 2022-01-01 RX ADMIN — APIXABAN 2.5 MG: 2.5 TABLET, FILM COATED ORAL at 08:33

## 2022-01-01 RX ADMIN — LEVETIRACETAM 750 MG: 750 TABLET, FILM COATED ORAL at 11:21

## 2022-01-01 RX ADMIN — TICAGRELOR 90 MG: 90 TABLET ORAL at 20:18

## 2022-01-01 RX ADMIN — LEVETIRACETAM 750 MG: 750 TABLET, FILM COATED ORAL at 08:28

## 2022-01-01 RX ADMIN — PROPOFOL 120 MG: 10 INJECTION, EMULSION INTRAVENOUS at 11:36

## 2022-01-01 RX ADMIN — Medication 10 ML: at 20:19

## 2022-01-01 RX ADMIN — PANTOPRAZOLE SODIUM 40 MG: 40 INJECTION, POWDER, FOR SOLUTION INTRAVENOUS at 08:11

## 2022-01-01 RX ADMIN — LEVETIRACETAM 750 MG: 750 TABLET, FILM COATED ORAL at 08:12

## 2022-01-01 RX ADMIN — Medication 10 ML: at 22:19

## 2022-01-01 RX ADMIN — LOSARTAN POTASSIUM 100 MG: 50 TABLET, FILM COATED ORAL at 08:01

## 2022-01-01 RX ADMIN — TICAGRELOR 90 MG: 90 TABLET ORAL at 21:03

## 2022-01-01 RX ADMIN — AMLODIPINE BESYLATE 10 MG: 10 TABLET ORAL at 08:12

## 2022-01-01 RX ADMIN — LIDOCAINE HYDROCHLORIDE 50 MG: 10 INJECTION, SOLUTION EPIDURAL; INFILTRATION; INTRACAUDAL; PERINEURAL at 11:36

## 2022-01-01 RX ADMIN — SODIUM CHLORIDE 5 MG/HR: 9 INJECTION, SOLUTION INTRAVENOUS at 20:12

## 2022-01-01 RX ADMIN — Medication 10 ML: at 08:13

## 2022-01-01 RX ADMIN — POTASSIUM & SODIUM PHOSPHATES POWDER PACK 280-160-250 MG 2 PACKET: 280-160-250 PACK at 06:17

## 2022-01-01 RX ADMIN — ASPIRIN 81 MG: 81 TABLET, COATED ORAL at 21:06

## 2022-01-01 RX ADMIN — ATORVASTATIN CALCIUM 80 MG: 40 TABLET, FILM COATED ORAL at 21:05

## 2022-01-01 RX ADMIN — FENTANYL CITRATE 50 MCG: 50 INJECTION, SOLUTION INTRAMUSCULAR; INTRAVENOUS at 15:51

## 2022-01-01 RX ADMIN — SUGAMMADEX 200 MG: 100 INJECTION, SOLUTION INTRAVENOUS at 14:16

## 2022-01-01 RX ADMIN — PANTOPRAZOLE SODIUM 40 MG: 40 INJECTION, POWDER, FOR SOLUTION INTRAVENOUS at 20:18

## 2022-01-01 RX ADMIN — POTASSIUM & SODIUM PHOSPHATES POWDER PACK 280-160-250 MG 2 PACKET: 280-160-250 PACK at 18:46

## 2022-01-01 RX ADMIN — SODIUM CHLORIDE 5 MG/HR: 9 INJECTION, SOLUTION INTRAVENOUS at 04:04

## 2022-01-01 RX ADMIN — POTASSIUM CHLORIDE 40 MEQ: 1.5 POWDER, FOR SOLUTION ORAL at 10:23

## 2022-01-01 RX ADMIN — POTASSIUM & SODIUM PHOSPHATES POWDER PACK 280-160-250 MG 2 PACKET: 280-160-250 PACK at 10:23

## 2022-01-01 RX ADMIN — LIDOCAINE HYDROCHLORIDE 50 MG: 10 INJECTION, SOLUTION EPIDURAL; INFILTRATION; INTRACAUDAL; PERINEURAL at 09:27

## 2022-01-01 RX ADMIN — TICAGRELOR 90 MG: 90 TABLET ORAL at 08:12

## 2022-01-01 RX ADMIN — PANTOPRAZOLE SODIUM 40 MG: 40 INJECTION, POWDER, FOR SOLUTION INTRAVENOUS at 20:09

## 2022-01-01 RX ADMIN — LOSARTAN POTASSIUM 100 MG: 50 TABLET, FILM COATED ORAL at 10:58

## 2022-01-01 RX ADMIN — Medication 100 MCG: at 11:36

## 2022-01-01 RX ADMIN — CLONIDINE HYDROCHLORIDE 0.1 MG: 0.1 TABLET ORAL at 20:18

## 2022-01-01 RX ADMIN — ATORVASTATIN CALCIUM 80 MG: 40 TABLET, FILM COATED ORAL at 20:18

## 2022-01-01 RX ADMIN — ATORVASTATIN CALCIUM 80 MG: 40 TABLET, FILM COATED ORAL at 20:09

## 2022-01-01 RX ADMIN — Medication 10 ML: at 08:28

## 2022-01-01 RX ADMIN — CLONIDINE HYDROCHLORIDE 0.1 MG: 0.1 TABLET ORAL at 10:58

## 2022-01-01 RX ADMIN — ASPIRIN 81 MG: 81 TABLET, COATED ORAL at 10:58

## 2022-01-01 RX ADMIN — PANTOPRAZOLE SODIUM 80 MG: 40 INJECTION, POWDER, FOR SOLUTION INTRAVENOUS at 13:54

## 2022-01-01 RX ADMIN — APIXABAN 5 MG: 5 TABLET, FILM COATED ORAL at 12:18

## 2022-01-01 RX ADMIN — ASPIRIN 81 MG: 81 TABLET, COATED ORAL at 08:01

## 2022-01-01 RX ADMIN — APIXABAN 2.5 MG: 2.5 TABLET, FILM COATED ORAL at 21:03

## 2022-01-01 RX ADMIN — AMLODIPINE BESYLATE 10 MG: 10 TABLET ORAL at 10:58

## 2022-01-01 RX ADMIN — TICAGRELOR 90 MG: 90 TABLET ORAL at 12:18

## 2022-01-01 RX ADMIN — PHENYLEPHRINE HYDROCHLORIDE 0.3 MCG/KG/MIN: 10 INJECTION INTRAVENOUS at 18:00

## 2022-01-01 RX ADMIN — APIXABAN 5 MG: 5 TABLET, FILM COATED ORAL at 08:42

## 2022-03-14 PROBLEM — Z98.890 H/O CAROTID ENDARTERECTOMY: Status: ACTIVE | Noted: 2022-01-01

## 2022-03-14 NOTE — PROGRESS NOTES
Subjective     Chief Complaint: Symptomatic left internal carotid artery stenosis    Patient ID: Chika Posey is a 76 y.o. female is here today for follow-up.    History of Present Illness    This is a 76-year-old woman who has been following for symptomatic left internal carotid artery stenosis for several months now.  She is an unusually difficult/complicated case due to her multiple medical comorbidities.    She was evaluated for a TCAR but deemed not to be a candidate due to the extensive calcific nature of her disease, particularly in the left common carotid artery.    I brought her into the office today to discuss planning a carotid endarterectomy with her, however after speaking with her and examining her, it came to light that she has actually already had a prior left carotid endarterectomy performed at the [facility].    The following portions of the patient's history were reviewed and updated as appropriate: allergies, current medications, past family history, past medical history, past social history, past surgical history and problem list.    Family history:   Family History   Problem Relation Age of Onset   • Hypertension Mother    • Stroke Mother    • Diabetes Mother    • Hypertension Father    • Stroke Father    • No Known Problems Sister    • No Known Problems Son    • Breast cancer Neg Hx    • Ovarian cancer Neg Hx        Social history:   Social History     Socioeconomic History   • Marital status: Single   • Number of children: 1   Tobacco Use   • Smoking status: Former Smoker     Packs/day: 1.00     Years: 25.00     Pack years: 25.00     Types: Cigarettes     Quit date: 3/22/2008     Years since quittin.9   • Smokeless tobacco: Never Used   Vaping Use   • Vaping Use: Never used   Substance and Sexual Activity   • Alcohol use: Yes     Comment: occassional   • Drug use: No   • Sexual activity: Defer       Review of Systems   Constitutional: Negative for activity change,  "appetite change, chills, diaphoresis, fatigue, fever and unexpected weight change.   HENT: Negative for congestion, dental problem, drooling, ear discharge, ear pain, facial swelling, hearing loss, mouth sores, nosebleeds, postnasal drip, rhinorrhea, sinus pressure, sinus pain, sneezing, sore throat, tinnitus, trouble swallowing and voice change.    Eyes: Negative for photophobia, pain, discharge, redness, itching and visual disturbance.   Respiratory: Negative for apnea, cough, choking, chest tightness, shortness of breath, wheezing and stridor.    Cardiovascular: Negative for chest pain, palpitations and leg swelling.   Gastrointestinal: Negative for abdominal distention, abdominal pain, anal bleeding, blood in stool, constipation, diarrhea, nausea, rectal pain and vomiting.   Endocrine: Negative for cold intolerance, heat intolerance, polydipsia, polyphagia and polyuria.   Genitourinary: Negative for decreased urine volume, difficulty urinating, dysuria, enuresis, flank pain, frequency, genital sores, hematuria and urgency.   Musculoskeletal: Negative for arthralgias, back pain, gait problem, joint swelling, myalgias, neck pain and neck stiffness.   Skin: Negative for color change, pallor, rash and wound.   Allergic/Immunologic: Negative for environmental allergies, food allergies and immunocompromised state.   Neurological: Negative for dizziness, tremors, seizures, syncope, facial asymmetry, speech difficulty, weakness, light-headedness, numbness and headaches.   Hematological: Negative for adenopathy. Does not bruise/bleed easily.   Psychiatric/Behavioral: Negative for agitation, behavioral problems, confusion, decreased concentration, dysphoric mood, hallucinations, self-injury, sleep disturbance and suicidal ideas. The patient is not nervous/anxious and is not hyperactive.    All other systems reviewed and are negative.      Objective   Blood pressure 113/65, pulse 88, resp. rate 16, height 154.9 cm (61\"), " weight 60.2 kg (132 lb 11.2 oz), not currently breastfeeding.  Body mass index is 25.07 kg/m².    Physical Exam  Constitutional:       General: She is not in acute distress.     Appearance: She is well-developed. She is not diaphoretic.   HENT:      Head: Normocephalic and atraumatic.   Pulmonary:      Effort: Pulmonary effort is normal.   Skin:     General: Skin is warm and dry.   Neurological:      Mental Status: She is alert and oriented to person, place, and time.      Cranial Nerves: No cranial nerve deficit.         Assessment/Plan     Independent Review of Radiographic Studies:      She has no new imaging for me to review.  She does have a carotid ultrasound which was performed on December 6 which demonstrates very high velocities of the left internal carotid artery.  This is essentially unchanged in comparison to her prior studies.    Medical Decision Making:      She is not a candidate for endarterectomy due to her prior surgery.  If she elects for intervention then she would need a carotid stent from the right brachial artery.  I suspect that she will probably need an intervention on the right side as well as there is very high stenosis seen on her most recent angiogram.  In any case, I held a lengthy and protracted conversation with her regarding the risks and benefits of continued medical treatment or intervention.  She is on the fence about how she would like to proceed, so I have asked her to go home and think about it and I will follow-up with her in 3 months, or sooner if clinically indicated.  I did review the signs and symptoms of stroke with the patient.  I directed her to contact my office or call 911 if she think she is having a stroke and/or TIA.    Diagnoses and all orders for this visit:    1. Stenosis of both vertebral arteries (Primary)    2. Bilateral carotid artery stenosis    3. H/O left CEA, remote        Return in about 3 months (around 6/14/2022).           This document signed by GINGER  Haresh Hitchcock MD March 14, 2022 11:39 EDT

## 2022-06-13 NOTE — PROGRESS NOTES
Subjective     Chief Complaint: Bilateral carotid stenosis, history of left carotid endarterectomy    Patient ID: Chika Posey is a 77 y.o. female is here today for follow-up.    History of Present Illness    This is a 77-year-old woman who we have been following for symptomatic left internal carotid artery stenosis.  She is an unusually complicated vasculopath.  She really only has 1 artery which is suitable for accessing her carotids endovascularly which is brachial artery on the right side.  She reports that she has not had any more strokes or TIAs since her last visit.  She is still taking Plavix and Eliquis.    The following portions of the patient's history were reviewed and updated as appropriate: allergies, current medications, past family history, past medical history, past social history, past surgical history and problem list.    Family history:   Family History   Problem Relation Age of Onset   • Hypertension Mother    • Stroke Mother    • Diabetes Mother    • Hypertension Father    • Stroke Father    • No Known Problems Sister    • No Known Problems Son    • Breast cancer Neg Hx    • Ovarian cancer Neg Hx        Social history:   Social History     Socioeconomic History   • Marital status: Single   • Number of children: 1   Tobacco Use   • Smoking status: Former Smoker     Packs/day: 1.00     Years: 25.00     Pack years: 25.00     Types: Cigarettes     Quit date: 3/22/2008     Years since quittin.2   • Smokeless tobacco: Never Used   Vaping Use   • Vaping Use: Never used   Substance and Sexual Activity   • Alcohol use: Yes     Comment: occassional   • Drug use: No   • Sexual activity: Defer       Review of Systems   Constitutional: Negative for activity change, appetite change, chills, diaphoresis, fatigue, fever and unexpected weight change.   HENT: Negative for congestion, dental problem, drooling, ear discharge, ear pain, facial swelling, hearing loss, mouth sores, nosebleeds, postnasal  "drip, rhinorrhea, sinus pressure, sneezing, sore throat, tinnitus, trouble swallowing and voice change.    Eyes: Negative for photophobia, pain, discharge, redness, itching and visual disturbance.   Respiratory: Negative for apnea, cough, choking, chest tightness, shortness of breath, wheezing and stridor.    Cardiovascular: Negative for chest pain, palpitations and leg swelling.   Gastrointestinal: Negative for abdominal distention, abdominal pain, anal bleeding, blood in stool, constipation, diarrhea, nausea, rectal pain and vomiting.   Endocrine: Negative for cold intolerance, heat intolerance, polydipsia, polyphagia and polyuria.   Genitourinary: Negative for decreased urine volume, difficulty urinating, dysuria, enuresis, flank pain, frequency, genital sores, hematuria and urgency.   Musculoskeletal: Negative for arthralgias, back pain, gait problem, joint swelling, myalgias, neck pain and neck stiffness.   Skin: Negative for color change, pallor, rash and wound.   Allergic/Immunologic: Negative for environmental allergies, food allergies and immunocompromised state.   Neurological: Negative for dizziness, tremors, seizures, syncope, facial asymmetry, speech difficulty, weakness, light-headedness, numbness and headaches.   Hematological: Negative for adenopathy. Does not bruise/bleed easily.   Psychiatric/Behavioral: Negative for agitation, behavioral problems, confusion, decreased concentration, dysphoric mood, hallucinations, self-injury, sleep disturbance and suicidal ideas. The patient is not nervous/anxious and is not hyperactive.    All other systems reviewed and are negative.      Objective   Blood pressure 116/72, resp. rate 16, height 154.9 cm (61\"), weight 58.2 kg (128 lb 6.4 oz), not currently breastfeeding.  Body mass index is 24.26 kg/m².    Physical Exam  Constitutional:       General: She is not in acute distress.     Appearance: She is well-developed. She is not diaphoretic.   HENT:      Head: " Normocephalic and atraumatic.   Pulmonary:      Effort: Pulmonary effort is normal.   Skin:     General: Skin is warm and dry.   Neurological:      Mental Status: She is alert and oriented to person, place, and time.      Cranial Nerves: No cranial nerve deficit.         Assessment & Plan     Independent Review of Radiographic Studies:      She has no new imaging for me to review.  She did have a carotid ultrasound in December of last year which demonstrated near complete occlusion of the left ICA.    Medical Decision Making:      I once again discussed surgical management options with her.  She probably would need a left ICA stent which is a relatively risky procedure given the extensive nature of her calcific disease.  A revision carotid endarterectomy in the left side is really not an option in my opinion.  She also has about 70% stenosis of the right ICA.  I would like to at a minimum order another screening/surveillance ultrasound of the bilateral carotid arteries.  I will establish a telephone follow-up with her after these tests have been completed to discuss further management options.  She told me that she is not interested in proceeding with a stent at this time and she would like to continue to treated medically.  She does appear to understand the complex medical decision making at work.  I once again reviewed the signs and symptoms of stroke/TIA with her.  I directed her to call 911 if she thinks he is having a stroke or TIA.    Diagnoses and all orders for this visit:    1. Bilateral carotid artery stenosis (Primary)  -     Duplex Carotid Ultrasound CAR; Future    2. H/O left CEA, remote    3. Essential hypertension    4. Hypertensive encephalopathy        No follow-ups on file.           This document signed by GINGER Hitchcock MD June 13, 2022 11:28 EDT

## 2022-06-17 NOTE — PROGRESS NOTES
Subjective     Chief Complaint: Carotid stenosis follow-up    Patient ID: Chika Posey is a 77 y.o. female is here today for follow-up.    History of Present Illness    This is a 77-year-old woman with a known history of bilateral carotid stenosis.  She has symptomatic left internal carotid artery stenosis but she is unusually high risk for intervention due to her multiple medical comorbidities.  She has a history of a prior CVA on the left side and has difficult vascular access due to her diffuse and severe atherosclerotic disease.  I scheduled her for a surveillance carotid ultrasound and telephone consultation was established today to discuss the results.  Reasonable attempts were made to establish a video visit and these were unsuccessful.    You have chosen to receive care through a telephone visit. Do you consent to use a telephone visit for your medical care today? Yes      The following portions of the patient's history were reviewed and updated as appropriate: allergies, current medications, past family history, past medical history, past social history, past surgical history and problem list.    Family history:   Family History   Problem Relation Age of Onset   • Hypertension Mother    • Stroke Mother    • Diabetes Mother    • Hypertension Father    • Stroke Father    • No Known Problems Sister    • No Known Problems Son    • Breast cancer Neg Hx    • Ovarian cancer Neg Hx        Social history:   Social History     Socioeconomic History   • Marital status: Single   • Number of children: 1   Tobacco Use   • Smoking status: Former Smoker     Packs/day: 1.00     Years: 25.00     Pack years: 25.00     Types: Cigarettes     Quit date: 3/22/2008     Years since quittin.2   • Smokeless tobacco: Never Used   Vaping Use   • Vaping Use: Never used   Substance and Sexual Activity   • Alcohol use: Yes     Comment: occassional   • Drug use: No   • Sexual activity: Defer       Review of Systems  "  Constitutional: Negative for activity change, appetite change, chills, diaphoresis, fatigue, fever and unexpected weight change.   HENT: Negative for congestion, dental problem, drooling, ear discharge, ear pain, facial swelling, hearing loss, mouth sores, nosebleeds, postnasal drip, rhinorrhea, sinus pressure, sneezing, sore throat, tinnitus, trouble swallowing and voice change.    Eyes: Negative for photophobia, pain, discharge, redness, itching and visual disturbance.   Respiratory: Negative for apnea, cough, choking, chest tightness, shortness of breath, wheezing and stridor.    Cardiovascular: Negative for chest pain, palpitations and leg swelling.   Gastrointestinal: Negative for abdominal distention, abdominal pain, anal bleeding, blood in stool, constipation, diarrhea, nausea, rectal pain and vomiting.   Endocrine: Negative for cold intolerance, heat intolerance, polydipsia, polyphagia and polyuria.   Genitourinary: Negative for decreased urine volume, difficulty urinating, dysuria, enuresis, flank pain, frequency, genital sores, hematuria and urgency.   Musculoskeletal: Negative for arthralgias, back pain, gait problem, joint swelling, myalgias, neck pain and neck stiffness.   Skin: Negative for color change, pallor, rash and wound.   Allergic/Immunologic: Negative for environmental allergies, food allergies and immunocompromised state.   Neurological: Negative for dizziness, tremors, seizures, syncope, facial asymmetry, speech difficulty, weakness, light-headedness, numbness and headaches.   Hematological: Negative for adenopathy. Does not bruise/bleed easily.   Psychiatric/Behavioral: Negative for agitation, behavioral problems, confusion, decreased concentration, dysphoric mood, hallucinations, self-injury, sleep disturbance and suicidal ideas. The patient is not nervous/anxious and is not hyperactive.    All other systems reviewed and are negative.      Objective   Height 154.9 cm (61\"), weight 58.1 " kg (128 lb), not currently breastfeeding.  Body mass index is 24.19 kg/m².    Physical Exam    Assessment & Plan     Independent Review of Radiographic Studies:      Available for my review is a carotid ultrasound.  A comparison study from December is available.  She still has severe left internal carotid artery stenosis which is essentially unchanged in comparison to her study from December.  At that time her peak systolic velocities were 651 cm/s with a ratio of 11.  The most recent study from [facility] 15 demonstrates peak velocities of 657 with a ratio of 10.    Medical Decision Making:      Patient is certainly at high risk of stroke although I would say she is also at high risk of complications from intervention.  I again recommended a carotid stent but the patient deferred.  She would like to continue with her medications for the time being.  She acknowledges that she has had significant risk of stroke without surgical intervention.  I will follow-up with her and several months, or sooner if clinically indicated.  I once again reviewed the signs and symptoms of stroke with her.  I directed her to call 911 if she think she is having a stroke and/or TIA.    Approximately 7 minutes was spent on today's phone call.    Diagnoses and all orders for this visit:    1. Bilateral carotid artery stenosis (Primary)    2. H/O left CEA, remote    3. Stenosis of both vertebral arteries    4. Chronic anticoagulation    5. Type 2 diabetes mellitus with stage 3 chronic kidney disease, without long-term current use of insulin, unspecified whether stage 3a or 3b CKD (HCC)        No follow-ups on file.           This document signed by GINGER Hitchcock MD June 17, 2022 10:18 EDT

## 2022-08-18 PROBLEM — E11.9 TYPE 2 DIABETES MELLITUS (HCC): Chronic | Status: ACTIVE | Noted: 2021-04-02

## 2022-08-18 PROBLEM — G40.909 SEIZURE DISORDER (HCC): Chronic | Status: ACTIVE | Noted: 2021-01-01

## 2022-08-18 PROBLEM — G45.9 TIA (TRANSIENT ISCHEMIC ATTACK): Status: ACTIVE | Noted: 2022-01-01

## 2022-08-18 PROBLEM — I65.23 BILATERAL CAROTID ARTERY STENOSIS: Chronic | Status: ACTIVE | Noted: 2019-02-20

## 2022-08-18 PROBLEM — I73.9 PAD (PERIPHERAL ARTERY DISEASE): Chronic | Status: ACTIVE | Noted: 2018-02-14

## 2022-08-18 PROBLEM — E11.9 TYPE 2 DIABETES MELLITUS (HCC): Status: ACTIVE | Noted: 2021-04-02

## 2022-08-18 PROBLEM — I65.03 STENOSIS OF BOTH VERTEBRAL ARTERIES: Chronic | Status: ACTIVE | Noted: 2021-05-06

## 2022-08-18 PROBLEM — Z79.01 CHRONIC ANTICOAGULATION: Chronic | Status: ACTIVE | Noted: 2018-02-14

## 2022-08-18 PROBLEM — I10 ESSENTIAL HYPERTENSION: Chronic | Status: ACTIVE | Noted: 2018-02-14

## 2022-08-18 NOTE — CONSULTS
Stroke Consult Note    Patient Name: Chika Posey   MRN: [id number]  Age: 77 y.o.  Sex: female  : 1945    Primary Care Physician: Alyssia Greenfield MD  Referring Physician: Yin HIDALGO STROKE TEAM CALLED: 1615 EST     TIME PATIENT SEEN: 1620 EST    Handedness: Right  Race: -American    Chief Complaint/Reason for Consultation: Transient right-sided hemiplegia    HPI: Chika Posey is a 77-year-old female with a PMH significant for right frontal stroke, severe carotid disease (followed by Dr. Hitchcock), LCEA, ICH, HTN, HLD, PVD s/p aortofemoral bypass, seizures, compartment syndrome, DVT, CKD, and hypertensive encephalopathy who presents to [facility] ED with complaints of transient right-sided hemiplegia.  Patient states that she had an episode yesterday of complete right-sided flaccidity that lasted for several minutes before resolving.  She had a recurrence of symptoms earlier today resolved after several minutes.  She was seen by her PCP earlier today who recommended patient come to the ED for further evaluation.    On arrival to [facility], her BP is 138/64.  NIH is currently 0.  She is currently taking Eliquis at home.  Patient was supposed to be on Plavix at home but stopped due to itching.  She has not been on any antiplatelet therapy. she was recently seen by Dr. Hitchcock  who recommended left carotid stenting due to high stroke risk given her severe left carotid stenosis.  Patient declined at that time.  CT head today negative for any acute findings. CTP with a diffuse perfusion deficit in the left cerebral hemisphere. CTA H/N with critical LICA stenosis,  LORRAINE stenosis, high-grade basilar artery stenosis, and multifocal narrowing in the bilateral vertebral arteries.      Patient discussed with Dr. Hitchcock of neurosurgery.  She is not a candidate for intervention at this time due to the risk greater than the benefit given her extensive calcific disease.  As she has not been on antiplatelet  therapy we will load with Brilinta, start IV fluids, maintain tight blood pressure control, and monitor in the ICU.    Last Known Normal Date/Time:  Symptoms currently resolved    Review of Systems   HENT: Negative.    Eyes: Negative.    Respiratory: Negative.  Negative for cough and shortness of breath.    Cardiovascular: Negative for chest pain and palpitations.   Gastrointestinal: Negative for nausea and vomiting.   Genitourinary: Negative.    Musculoskeletal: Negative.    Neurological: Positive for speech difficulty and weakness.      Past Medical History:   Diagnosis Date   • Back pain    • Carotid artery occlusion    • Cerebral hemorrhage (HCC)    • Chronic kidney disease     MATT   • Compartment syndrome (HCC)    • DVT (deep venous thrombosis) (HCC)     Bilateral   • Generalized weakness    • Hyperlipidemia    • Hypertension    • Hypertensive encephalopathy    • Muscle weakness    • Paraparesis of both lower limbs (HCC)    • Peripheral vascular disease (HCC)    • Stroke (HCC)     bleed   • T2DM (type 2 diabetes mellitus) (HCC)      Past Surgical History:   Procedure Laterality Date   • AORTA BIFEMORAL BYPASS     • BREAST BIOPSY Right    • CAROTID ENDARTERECTOMY Left    • FOREARM FASCIOTOMY Left    • INTERVENTIONAL RADIOLOGY PROCEDURE Bilateral 5/20/2021    Procedure: Carotid Cerebral Angiogram;  Surgeon: Silvino Hitchcock MD;  Location: [facility] INVASIVE LOCATION;  Service: Interventional Radiology;  Laterality: Bilateral;   • THROMBOLYSIS     • TUBAL ABDOMINAL LIGATION     • US ARTERIAL DOPPLER LOWER EXTREMITY  UNILATERAL     • VASCULAR SURGERY      Bilateral Stents     Family History   Problem Relation Age of Onset   • Hypertension Mother    • Stroke Mother    • Diabetes Mother    • Hypertension Father    • Stroke Father    • No Known Problems Sister    • No Known Problems Son    • Breast cancer Neg Hx    • Ovarian cancer Neg Hx      Social History     Socioeconomic History   • Marital status:  Single   • Number of children: 1   Tobacco Use   • Smoking status: Former Smoker     Packs/day: 1.00     Years: 25.00     Pack years: 25.00     Types: Cigarettes     Quit date: 3/22/2008     Years since quittin.4   • Smokeless tobacco: Never Used   Vaping Use   • Vaping Use: Never used   Substance and Sexual Activity   • Alcohol use: Yes     Comment: occassional   • Drug use: No   • Sexual activity: Defer     Allergies   Allergen Reactions   • Adhesive Tape Hives   • Clopidogrel Itching     Itching   • Lactose Intolerance (Gi) GI Bleeding   • Morphine And Related Itching   • Plavix [Clopidogrel Bisulfate] Itching     Prior to Admission medications    Medication Sig Start Date End Date Taking? Authorizing Provider   amLODIPine (NORVASC) 10 MG tablet Take 10 mg by mouth Daily. 10/12/21   Ryan Estrada MD   apixaban (ELIQUIS) 2.5 MG tablet tablet Take 1 tablet by mouth Every 12 (Twelve) Hours. 18   Bobo Berrios MD   atorvastatin (LIPITOR) 80 MG tablet Take 1 tablet by mouth Every Night. 21   Jie Gracia APRN   cloNIDine (CATAPRES) 0.1 MG tablet Take 1 tablet by mouth Every 12 (Twelve) Hours. 21   Jie Gracia APRN   clopidogrel (PLAVIX) 75 MG tablet TAKE ONE TABLET BY MOUTH DAILY 21   Yoseph Busby PA-C   hydrOXYzine pamoate (VISTARIL) 25 MG capsule Take 25 mg by mouth Every Night. 10/12/21   Ryan Estrada MD   levETIRAcetam (KEPPRA) 500 MG tablet  22   Ryan Estrada MD   losartan (COZAAR) 100 MG tablet Take 100 mg by mouth Daily.    Ryan Estrada MD         Temp:  [97.7 °F (36.5 °C)] 97.7 °F (36.5 °C)  Heart Rate:  [77] 77  Resp:  [18] 18  BP: (138)/(64) 138/64  Neurological Exam  Mental Status  Awake, alert and oriented to person, place and time. Oriented to person, place, and time. Speech is normal. Language is fluent with no aphasia. Fund of knowledge is appropriate for level of education.    Cranial Nerves  CN II: Visual acuity is normal. Visual  fields full to confrontation.  CN III, IV, VI: Extraocular movements intact bilaterally. Normal lids and orbits bilaterally. Pupils equal round and reactive to light bilaterally.  CN V: Facial sensation is normal.  CN VII: Full and symmetric facial movement.  CN IX, X: Palate elevates symmetrically  CN XI: Shoulder shrug strength is normal.  CN XII: Tongue midline without atrophy or fasciculations.    Motor   Strength is 5/5 throughout all four extremities.    Sensory  Light touch is normal in upper and lower extremities.     Reflexes                                            Right                      Left  Plantar                           Downgoing                Downgoing    Coordination  Right: Finger-to-nose normal. Heel-to-shin normal.Left: Finger-to-nose normal.    Gait    Not assessed.      Physical Exam  Constitutional:       Comments: Elderly -American female in no acute distress   HENT:      Head: Normocephalic and atraumatic.   Eyes:      General: Lids are normal.      Extraocular Movements: Extraocular movements intact.      Pupils: Pupils are equal, round, and reactive to light.   Cardiovascular:      Rate and Rhythm: Normal rate and regular rhythm.   Pulmonary:      Effort: Pulmonary effort is normal. No respiratory distress.   Abdominal:      General: There is no distension.      Palpations: Abdomen is soft.   Musculoskeletal:         General: Normal range of motion.      Cervical back: Normal range of motion and neck supple.   Skin:     General: Skin is warm and dry.   Neurological:      General: No focal deficit present.      Mental Status: She is oriented to person, place, and time. Mental status is at baseline.      Deep Tendon Reflexes: Strength normal.   Psychiatric:         Mood and Affect: Mood normal.         Speech: Speech normal.         Behavior: Behavior normal.         Acute Stroke Data    Thrombolytic Inclusion / Exclusion Criteria    Time: 16:23 EDT  Person Administering  Scale: EDMOND Ch    Inclusion Criteria  [x]   18 years of age or greater   []   Onset of symptoms < 4.5 hours before beginning treatment (stroke onset = time patient was last seen well or without symptoms).   []   Diagnosis of acute ischemic stroke causing measurable disabling deficit (Complete Hemianopia, Any Aphasia, Visual or Sensory Extinction, Any weakness limiting sustained effort against gravity)   []   Any remaining deficit considered potentially disabling in view of patient and practitioner   Exclusion criteria (Do not proceed with Alteplase if any are checked under exclusion criteria)  []   Onset unknown or GREATER than 4.5 hours   []   ICH on CT/MRI   []   CT demonstrates hypodensity representing acute or subacute infarct   []   Significant head trauma or prior stroke in the previous 3 months   []   Symptoms suggestive of subarachnoid hemorrhage   []   History of un-ruptured intracranial aneurysm GREATER than 10 mm   []   Recent intracranial or intraspinal surgery within the last 3 months   []   Arterial puncture at a non-compressible site in the previous 7 days   []   Active internal bleeding   []   Acute bleeding tendency   []   Platelet count LESS than 100,000 for known hematological diseases such as leukemia, thrombocytopenia or chronic cirrhosis   []   Current use of anticoagulant with INR GREATER than 1.7 or PT GREATER than 15 seconds, aPTT GREATER than 40 seconds   []   Heparin received within 48 hours, resulting in abnormally elevated aPTT GREATER than upper limit of normal   [x]   Current use of direct thrombin inhibitors or direct factor Xa inhibitors in the past 48 hours   []   Elevated blood pressure refractory to treatment (systolic GREATER than 185 mm/Hg or diastolic  GREATER than 110 mm/Hg   []   Suspected infective endocarditis and aortic arch dissection   []   Current use of therapeutic treatment dose of low-molecular-weight heparin (LMWH) within the previous 24 hours   []    Structural GI malignancy or bleed   Relative exclusion for all patients  []   Only minor non-disabling symptoms   []   Pregnancy   []   Seizure at onset with postictal residual neurological impairments   []   Major surgery or previous trauma within past 14 days   []   History of previous spontaneous ICH, intracranial neoplasm, or AV malformation   []   Postpartum (within previous 14 days)   []   Recent GI or urinary tract hemorrhage (within previous 21 days)   []   Recent acute MI (within previous 3 months)   []   History of un-ruptured intracranial aneurysm LESS than 10 mm   []   History of ruptured intracranial aneurysm   []   Blood glucose LESS than 50 mg/dL (2.7 mmol/L)   []   Dural puncture within the last 7 days   []   Known GREATER than 10 cerebral microbleeds   Additional exclusions for patients with symptoms onset between 3 and 4.5 hours.  []   Age > 80.   []   On any anticoagulants regardless of INR  >>> Warfarin (Coumadin), Heparin, Enoxaparin (Lovenox), fondaparinux (Arixtra), bivalirudin (Angiomax), Argatroban, dabigatran (Pradaxa), rivaroxaban (Xarelto), or apixaban (Eliquis)   []   Severe stroke (NIHSS > 25).   []   History of BOTH diabetes and previous ischemic stroke.   []   The risks and benefits have been discussed with the patient or family related to the administration of IV thrombolytic therapy for stroke symptoms.   []   I have discussed and reviewed the patient's case and imaging with the attending prior to IV thrombolytic therapy.    Time IV thrombolytic administered       Hospital Meds:  Scheduled-    Infusions-     PRNs- •  sodium chloride    Functional Status Prior to Current Stroke/Sara Score:  MODIFIED SARA SCALE (to be assessed for each patient having history of stroke) []Stroke history but not assessed  [x]0: No symptoms at all  []1: No significant disability despite symptoms  []2: Slight disability  []3: Moderate disability  []4: Moderately severe disability  []5: Severe  disability  []6: Death         NIH Stroke Scale  Time: 1615 EDT  Person Administering Scale: EDMOND Ch    Interval: baseline  1a. Level of Consciousness: 0-->Alert, keenly responsive  1b. LOC Questions: 0-->Answers both questions correctly  1c. LOC Commands: 0-->Performs both tasks correctly  2. Best Gaze: 0-->Normal  3. Visual: 0-->No visual loss  4. Facial Palsy: 0-->Normal symmetrical movements  5a. Motor Arm, Left: 0-->No drift, limb holds 90 (or 45) degrees for full 10 secs  5b. Motor Arm, Right: 0-->No drift, limb holds 90 (or 45) degrees for full 10 secs  6a. Motor Leg, Left: 0-->No drift, leg holds 30 degree position for full 5 secs  6b. Motor Leg, Right: 0-->No drift, leg holds 30 degree position for full 5 secs  7. Limb Ataxia: 0-->Absent  8. Sensory: 0-->Normal, no sensory loss  9. Best Language: 0-->No aphasia, normal  10. Dysarthria: 0-->Normal  11. Extinction and Inattention (formerly Neglect): 0-->No abnormality    Total (NIH Stroke Scale): 0    Results Reviewed:  CT Angiogram Neck    Result Date: 8/18/2022   1. 50-75% stenosis of the right and left cavernous carotid arteries as on prior study. 2. High-grade basilar origin stenosis again noted. 3. Heavy calcification of both intracranial vertebral arteries, with potential high-grade underlying stenosis.      CT Angiogram Head w AI Analysis of LVO    Result Date: 8/18/2022   1. 50-75% stenosis of the right and left cavernous carotid arteries as on prior study. 2. High-grade basilar origin stenosis again noted. 3. Heavy calcification of both intracranial vertebral arteries, with potential high-grade underlying stenosis.      CT CEREBRAL PERFUSION WITH & WITHOUT CONTRAST    Result Date: 8/18/2022  Findings consistent with diminished blood flow and less extensive diffuse ischemia throughout the left cerebral hemisphere, including both MCA and RODERICK territory, which may be related to patient's known high-grade carotid stenosis. No evidence of  infarction.      .    Results for orders placed during the hospital encounter of 10/07/21    Adult Transthoracic Echo Complete W/ Cont if Necessary Per Protocol (With Agitated Saline)    Interpretation Summary  · Estimated left ventricular EF = 65%  · Left ventricular wall thickness is consistent with mild concentric hypertrophy.  · The cardiac valves are anatomically and functionally normal.     Results for orders placed during the hospital encounter of 06/15/22    Duplex Carotid Ultrasound CAR    Interpretation Summary  · Right internal carotid artery stenosis of >70%.  · Left internal carotid artery stenosis near total occlusion.  · There is antegrade flow in the vertebral arteries bilaterally.  · When compared to the previous carotid duplex ultrasound performed in December 2021, there has been no significant change.    Assessment/Plan:    77-year-old female with a PMH significant for right frontal stroke, severe carotid disease (followed by Dr. Hitchcock), LCEA, ICH, HTN, HLD, PVD s/p aortofemoral bypass, seizures (followed by Dr. Webster), compartment syndrome, DVT, CKD, and hypertensive encephalopathy who presents to [facility] ED with complaints of transient right-sided hemiplegia.      Antiplatelet PTA: None, Pt stopped Plavix d/t itching  Anticoagulant PTA: Eliquis 2.5 mg twice daily      Transient right-sided hemiplegia  -Likely due to severe left ICA stenosis  -NIH 0, GCS 15  -Admit to the ICU  -TIA/Ischemic stroke w/o thrombolytic therapy order set  -MRI pending to assess for AIS  -CT head today negative for any acute findings.   -CTP with a diffuse perfusion deficit in the left cerebral hemisphere.   -CTA H/N with critical LICA stenosis,  LORRAINE stenosis, high-grade basilar artery stenosis, and multifocal narrowing in the bilateral vertebral arteries.  -MRI pending  -Recent carotid ultrasound on 6/15/2022. LORRAINE stenosis greater than 70%.  LICA stenosis near total occluded. Peak velocities of 657 with a ratio of 10.    -She is not a candidate for intervention at this time due to the risk greater than the benefit given her extensive calcific disease per Dr. Hitchcock.  -Continue home Eliquis 2.5mg BID  -Load with Brilinta 180mg now followed by 90mg BID.   -HTN; SBP goal 140-180. IVF. Demarcus as needed  -HLD, continue high dose statin  -Seizures; Continue home Keppra once dose is clarified by nursing staff. Per EMR, dose increased to 750mg BID per Dr. Webster last year.  -HgbA1c in the am  -PT/OT/SLP  -Plan discussed with the patient, Dr. Esqueda, Dr. Hitchcock, Dr. Soares, Dr. Parker, and nursing staff. Stroke Neurology will continue to follow. Please call with any questions or concerns.     EDMOND Ch, AGACNP-BC  August 18, 2022  16:23 EDT

## 2022-08-18 NOTE — H&P
Intensive Care Admission Note     TIA (transient ischemic attack)    History of Present Illness     Chika Posey is a 77 y.o. female former smoker with PMH of T2DM, DVT, HTN, dyslipidemia, PVD s/p aorto-bifemoral bypass in 2017, + COVID-19 in 2020, prior hemorrhagic stroke, seizure on Keppra, left CVA s/p prior CEA performed at [facility] with known L ICA stenosis deemed a high risk for surgical intervention chronically anticoagulated on Eliquis who presents to [facility] ED on 8/18 with complaints of transient right-sided weakness.     She is followed by Dr. Hitchcock last evaluated in his office in June with carotid duplex revealing unchanged L ICA stenosis. She is a complicated vasculopath and is not a candidate for CEA due to her prior surgery. He recommended proceeding with left carotid stent placement, however the patient deferred. She was maintained on Eliquis and Plavix.     This afternoon the patient developed some right-sided weakness which resolved and had an episode yesterday as well. She reportedly has not been taking her Plavix due to itching. She was evaluated by her PCP who recommended ED evaluation. NIH 0 and CT head negative for an acute process. CTP does show a perfusion defect within the left cerebral hemisphere and CTA of the head/neck reiterates known critical bilateral ICA stenosis essentially unchanged from the prior study, high-grade mid right vertebral artery stenosis, and moderate left vertebral stenosis. Dr. Hitchcock was consulted and the patient is not a candidate for intervention at this time. She was given Brilinta with recommendation of permissive hypertension and will be admitted to the ICU for higher level of care.     Patient was seen in the emergency department.  She is without current focal weakness or neurological change.  She has no visual change.    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.09]    • Severe bilateral carotid artery stenosis [I65.23]    • H/O ICH (3/2018)  [Z86.79]    • H/O DVT  [Z86.718]    • PAD s/p aorto-bifemoral bypass  [I73.9]    • Essential hypertension [I10]    • Chronic anticoagulation (Eliquis)  [Z79.01]      Past Surgical History:   Procedure Laterality Date   • AORTA BIFEMORAL BYPASS     • BREAST BIOPSY Right    • CAROTID ENDARTERECTOMY Left    • FOREARM FASCIOTOMY Left    • INTERVENTIONAL RADIOLOGY PROCEDURE Bilateral 5/20/2021    Procedure: Carotid Cerebral Angiogram;  Surgeon: Silvino Hitchcock MD;  Location: [facility] INVASIVE LOCATION;  Service: Interventional Radiology;  Laterality: Bilateral;   • THROMBOLYSIS     • TUBAL ABDOMINAL LIGATION     • US ARTERIAL DOPPLER LOWER EXTREMITY  UNILATERAL     • VASCULAR SURGERY      Bilateral Stents       Allergies   Allergen Reactions   • Adhesive Tape Hives   • Clopidogrel Itching     Itching   • Lactose Intolerance (Gi) GI Bleeding   • Morphine And Related Itching   • Plavix [Clopidogrel Bisulfate] Itching     No current facility-administered medications on file prior to encounter.     Current Outpatient Medications on File Prior to Encounter   Medication Sig   • apixaban (ELIQUIS) 2.5 MG tablet tablet Take 1 tablet by mouth Every 12 (Twelve) Hours.   • atorvastatin (LIPITOR) 80 MG tablet Take 1 tablet by mouth Every Night.   • cloNIDine (CATAPRES) 0.1 MG tablet Take 1 tablet by mouth Every 12 (Twelve) Hours.   • levETIRAcetam (KEPPRA) 500 MG tablet 750 mg.   • losartan  "(COZAAR) 100 MG tablet Take 100 mg by mouth Daily.   • amLODIPine (NORVASC) 10 MG tablet Take 10 mg by mouth Daily.   • clopidogrel (PLAVIX) 75 MG tablet TAKE ONE TABLET BY MOUTH DAILY   • hydrOXYzine pamoate (VISTARIL) 25 MG capsule Take 25 mg by mouth Every Night.     MEDICATION LIST AND ALLERGIES REVIEWED.    Family History   Problem Relation Age of Onset   • Hypertension Mother    • Stroke Mother    • Diabetes Mother    • Hypertension Father    • Stroke Father    • No Known Problems Sister    • No Known Problems Son    • Breast cancer Neg Hx    • Ovarian cancer Neg Hx      Social History     Tobacco Use   • Smoking status: Former Smoker     Packs/day: 1.00     Years: 25.00     Pack years: 25.00     Types: Cigarettes     Quit date: 3/22/2008     Years since quittin.4   • Smokeless tobacco: Never Used   Vaping Use   • Vaping Use: Never used   Substance Use Topics   • Alcohol use: Yes     Comment: occassional   • Drug use: No     Social History     Social History Narrative    Patient lives alone, in [place].    Retired nursing assistant    Caffeine: 0-1 cups coffee, rarely soda     FAMILY AND SOCIAL HISTORY REVIEWED.    Review of Systems  Full review of systems has been completed and is negative except as mentioned in the HPI.    Physical Exam and Clinical Information   /75   Pulse 75   Temp 97.7 °F (36.5 °C) (Oral)   Resp 18   Ht 154.9 cm (61\")   Wt 58.1 kg (128 lb)   SpO2 97%   BMI 24.19 kg/m²   Physical Exam  Constitutional:       Appearance: Normal appearance.   HENT:      Nose: Nose normal. No congestion.      Mouth/Throat:      Mouth: Mucous membranes are moist.      Pharynx: No oropharyngeal exudate.   Eyes:      General: No scleral icterus.     Pupils: Pupils are equal, round, and reactive to light.   Neck:      Vascular: No carotid bruit.   Cardiovascular:      Rate and Rhythm: Normal rate and regular rhythm.      Pulses: Normal pulses.      Heart sounds: Normal heart sounds. No murmur " heard.    No friction rub.   Pulmonary:      Effort: Pulmonary effort is normal. No respiratory distress.      Breath sounds: Normal breath sounds. No stridor. No wheezing, rhonchi or rales.   Chest:      Chest wall: No tenderness.   Abdominal:      General: Abdomen is flat. Bowel sounds are normal. There is no distension.      Palpations: There is no mass.   Musculoskeletal:         General: Normal range of motion.      Cervical back: Normal range of motion and neck supple.   Skin:     Capillary Refill: Capillary refill takes less than 2 seconds.   Neurological:      General: No focal deficit present.      Mental Status: She is alert and oriented to person, place, and time.         Results from last 7 days   Lab Units 08/18/22  1626 08/18/22  1625   WBC 10*3/mm3  --  6.21   HEMOGLOBIN g/dL  --  9.6*   HEMOGLOBIN, POC g/dL 12.2  --    PLATELETS 10*3/mm3  --  528*     Results from last 7 days   Lab Units 08/18/22  1626 08/18/22  1625   SODIUM mmol/L  --  139   POTASSIUM mmol/L  --  3.6   CO2 mmol/L  --  22.0   BUN mg/dL  --  7*   CREATININE mg/dL 1.00 1.07*   MAGNESIUM mg/dL  --  1.9   GLUCOSE mg/dL  --  110*     Estimated Creatinine Clearance: 39.8 mL/min (by C-G formula based on SCr of 1 mg/dL).          Lab Results   Component Value Date    LACTATE 1.5 08/18/2022        I reviewed the patient's results and images.     Impression     TIA    PAD s/p aorto-bifemoral bypass     Essential hypertension    Chronic anticoagulation (Eliquis)     H/O ICH (3/2018)     H/O DVT     Severe bilateral carotid artery stenosis    T2DM     Stenosis of both vertebral arteries    Seizure disorder on Keppra     Plan/Recommendations     The patient has been medically noncompliant with her antiplatelet agent and has been loaded with Brilinta.  She will be admitted to the intensive care unit for close monitoring, neurovascular checks, and possible pressor support to maintain blood pressure between 140 and 180 mmHg systolic.  Further plan  per the neurosurgical service regarding the possibility of eventual intervention on the stenotic carotid vessel however review of the chart shows that this likely would not be a possibility.  Medical therapy for history of diabetes mellitus.  Continue with current anticonvulsants.  Follow-up labs and orders will be placed for tomorrow morning.  This patient remains at high risk of worsening secondary to recurrent TIA versus CVA with the need for IV pressor support to maintain perfusion pressures.      Daniel Parker MD, [facility]  Pulmonary and Critical Care Medicine  08/18/22 19:34 EDT       CC: Alyssia Greenfield MD

## 2022-08-19 PROBLEM — I63.9 ACUTE ISCHEMIC STROKE: Status: ACTIVE | Noted: 2022-01-01

## 2022-08-19 NOTE — PLAN OF CARE
Goal Outcome Evaluation:  Plan of Care Reviewed With: patient        Progress: no change (eval)   SLP evaluation completed. Will sign-off cognitive communication. Please see note for further details and recommendations.

## 2022-08-19 NOTE — THERAPY EVALUATION
Patient Name: Chika Posey  : 1945    MRN: [id number]                              Today's Date: 2022       Admit Date: 2022    Visit Dx:     ICD-10-CM ICD-9-CM   1. 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)   • Hypertensive encephalopathy   • Other forms of systemic lupus erythematosus (HCC)   • T2DM    • Basilar artery stenosis   • Stenosis of both vertebral arteries   • Chronic kidney disease   • Iron deficiency anemia due to chronic blood loss   • Seizure disorder on Keppra    • H/O left CEA, remote   • TIA   • Acute ischemic stroke (HCC)     Past Medical History:   Diagnosis Date   • Back pain    • Carotid artery occlusion    • Cerebral hemorrhage (HCC)    • Chronic kidney disease     MATT   • Compartment syndrome (HCC)    • DVT (deep venous thrombosis) (HCC)     Bilateral   • Generalized weakness    • Hyperlipidemia    • Hypertension    • Hypertensive encephalopathy    • Muscle weakness    • Paraparesis of both lower limbs (HCC)    • Peripheral vascular disease (HCC)    • Spell of altered cognition 3/6/2018   • Stroke (HCC)     bleed   • T2DM (type 2 diabetes mellitus) (HCC)      Past Surgical History:   Procedure Laterality Date   • AORTA BIFEMORAL BYPASS     • BREAST BIOPSY Right    • CAROTID ENDARTERECTOMY Left    • FOREARM FASCIOTOMY Left    • INTERVENTIONAL RADIOLOGY PROCEDURE Bilateral 2021    Procedure: Carotid Cerebral Angiogram;  Surgeon: Silvino Hitchcock MD;  Location: [facility] INVASIVE LOCATION;  Service: Interventional Radiology;  Laterality: Bilateral;   • THROMBOLYSIS     • TUBAL ABDOMINAL LIGATION     • US ARTERIAL DOPPLER LOWER EXTREMITY  UNILATERAL     • VASCULAR  SURGERY      Bilateral Stents      General Information     Row Name 08/19/22 1019          Physical Therapy Time and Intention    Document Type evaluation (P)   -AB     Mode of Treatment physical therapy (P)   -AB     Row Name 08/19/22 1019          General Information    Patient Profile Reviewed yes (P)   -AB     Prior Level of Function independent:;all household mobility;community mobility;gait;transfer;bed mobility;home management;shopping (P)   Pt owns rolling walker but has not used. Performed community ambulation with no AD  -AB     Barriers to Rehab medically complex (P)   -AB     Row Name 08/19/22 1019          Living Environment    People in Home alone (P)   -AB     Row Name 08/19/22 1019          Home Main Entrance    Number of Stairs, Main Entrance none (P)   -AB     Row Name 08/19/22 1019          Stairs Within Home, Primary    Number of Stairs, Within Home, Primary none (P)   -AB     Row Name 08/19/22 1019          Cognition    Orientation Status (Cognition) oriented x 4 (P)   -AB     Row Name 08/19/22 1019          Safety Issues, Functional Mobility    Safety Issues Affecting Function (Mobility) safety precaution awareness;safety precautions follow-through/compliance;awareness of need for assistance;insight into deficits/self-awareness (P)   -AB     Impairments Affecting Function (Mobility) balance;endurance/activity tolerance;strength (P)   -AB           User Key  (r) = Recorded By, (t) = Taken By, (c) = Cosigned By    Initials Name Provider Type    AB Chyna Brown, PT Student PT Student               Mobility     Row Name 08/19/22 1020          Bed Mobility    Bed Mobility supine-sit (P)   -AB     Supine-Sit [place] (Bed Mobility) 1 person assist;minimum assist (75% patient effort) (P)   -AB     Comment, (Bed Mobility) Assist for scooting forward (P)   -AB     Row Name 08/19/22 1020          Transfers    Comment, (Transfers) Good safety awareness (P)   -AB     Row Name 08/19/22 1020           Bed-Chair Transfer    Bed-Chair [place] (Transfers) contact guard;1 person assist (P)   -AB     Assistive Device (Bed-Chair Transfers) other (see comments) (P)   BUE supported by tele monitor  -AB     Row Name 08/19/22 1020          Sit-Stand Transfer    Sit-Stand [place] (Transfers) contact guard;1 person assist;verbal cues (P)   -AB     Comment, (Sit-Stand Transfer) x2 STS from bed and toilet. Good stability upon standing. (P)   -AB     Row Name 08/19/22 1020          Gait/Stairs (Locomotion)    [place] Level (Gait) contact guard;verbal cues;1 person assist (P)   -AB     Assistive Device (Gait) other (see comments) (P)   BUE supported by tele monitor  -AB     Distance in Feet (Gait) 130 (P)   -AB     Deviations/Abnormal Patterns (Gait) bilateral deviations;base of support, narrow;vy decreased;gait speed decreased;stride length decreased (P)   -AB     Bilateral Gait Deviations forward flexed posture (P)   -AB     [place] Level (Stairs) not tested (P)   -AB     Comment, (Gait/Stairs) Pt demo's step through gait pattern with decreased stride length and forward flexed posture. Cues provided for upright posture and gaze forward. Further ambulation limited by fatigue. (P)   -AB           User Key  (r) = Recorded By, (t) = Taken By, (c) = Cosigned By    Initials Name Provider Type    AB Chyna Brown, PT Student PT Student               Obj/Interventions     Row Name 08/19/22 1025          Range of Motion Comprehensive    General Range of Motion bilateral lower extremity ROM WFL (P)   -AB     Row Name 08/19/22 1025          Strength Comprehensive (MMT)    General Manual Muscle Testing (MMT) Assessment lower extremity strength deficits identified (P)   -AB     Comment, General Manual Muscle Testing (MMT) Assessment BLE strength grossly 4/5. R=L (P)   -AB     Row Name 08/19/22 1025          Balance    Balance Assessment sitting static balance;standing static balance;standing dynamic  balance;sitting dynamic balance (P)   -AB     Static Sitting Balance independent (P)   -AB     Dynamic Sitting Balance standby assist (P)   -AB     Position, Sitting Balance unsupported;sitting edge of bed (P)   -AB     Static Standing Balance contact guard;1-person assist (P)   -AB     Dynamic Standing Balance contact guard;1-person assist (P)   -AB     Position/Device Used, Standing Balance supported (P)   -AB     Balance Interventions sitting;standing;sit to stand;supported;static;dynamic (P)   -AB     Comment, Balance No LOB; Good stability throughout (P)   -AB     Row Name 08/19/22 1025          Sensory Assessment (Somatosensory)    Sensory Assessment (Somatosensory) LE sensation intact (P)   -AB           User Key  (r) = Recorded By, (t) = Taken By, (c) = Cosigned By    Initials Name Provider Type    Chyna Milner, PT Student PT Student               Goals/Plan     Row Name 08/19/22 1029          Bed Mobility Goal 1 (PT)    Activity/Assistive Device (Bed Mobility Goal 1, PT) sit to supine;supine to sit (P)   -AB     [place] Level/Cues Needed (Bed Mobility Goal 1, PT) independent (P)   -AB     Time Frame (Bed Mobility Goal 1, PT) long term goal (LTG);2 weeks (P)   -AB     Row Name 08/19/22 1029          Transfer Goal 1 (PT)    Activity/Assistive Device (Transfer Goal 1, PT) sit-to-stand/stand-to-sit;bed-to-chair/chair-to-bed (P)   -AB     [place] Level/Cues Needed (Transfer Goal 1, PT) independent (P)   -AB     Time Frame (Transfer Goal 1, PT) long term goal (LTG);2 weeks (P)   -AB     Row Name 08/19/22 1029          Gait Training Goal 1 (PT)    Activity/Assistive Device (Gait Training Goal 1, PT) gait (walking locomotion) (P)   -AB     [place] Level (Gait Training Goal 1, PT) independent (P)   -AB     Distance (Gait Training Goal 1, PT) 400 (P)   -AB     Time Frame (Gait Training Goal 1, PT) long term goal (LTG);2 weeks (P)   -AB     Row Name 08/19/22 1029          Therapy Assessment/Plan  (PT)    Planned Therapy Interventions (PT) balance training;bed mobility training;gait training;home exercise program;patient/family education;strengthening;transfer training (P)   -AB           User Key  (r) = Recorded By, (t) = Taken By, (c) = Cosigned By    Initials Name Provider Type    Chyna Milner, PT Student PT Student               Clinical Impression     Row Name 08/19/22 1026          Pain    Pretreatment Pain Rating 0/10 - no pain (P)   -AB     Posttreatment Pain Rating 0/10 - no pain (P)   -AB     Additional Documentation Pain Scale: Numbers Pre/Post-Treatment (Group) (P)   -AB     Row Name 08/19/22 1026          Plan of Care Review    Plan of Care Reviewed With patient (P)   -AB     Progress no change (P)   -AB     Outcome Evaluation PT initial eval completed. Pt presents with decreased strength bilaterally, poor functional endurance, and intermittent unsteadiness. Ambulation of 130' with CGAx1 and BUE support was well tolerated. Cues for upright posture and increasing stride length. Pt will continue to benefit from skilled IPPT. PT rec d/c home with assist. (P)   -AB     Row Name 08/19/22 1026          Therapy Assessment/Plan (PT)    Rehab Potential (PT) good, to achieve stated therapy goals (P)   -AB     Criteria for Skilled Interventions Met (PT) yes;meets criteria;skilled treatment is necessary (P)   -AB     Therapy Frequency (PT) daily (P)   -AB     Row Name 08/19/22 1026          Vital Signs    Pre Systolic BP Rehab 139 (P)   -AB     Pre Treatment Diastolic BP 79 (P)   -AB     Post Systolic BP Rehab 153 (P)   -AB     Post Treatment Diastolic BP 83 (P)   -AB     Pretreatment Heart Rate (beats/min) 83 (P)   -AB     Posttreatment Heart Rate (beats/min) 89 (P)   -AB     Pre SpO2 (%) 99 (P)   -AB     O2 Delivery Pre Treatment room air (P)   -AB     O2 Delivery Intra Treatment room air (P)   -AB     Post SpO2 (%) 99 (P)   -AB     O2 Delivery Post Treatment room air (P)   -AB     Pre Patient  Position Supine (P)   -AB     Intra Patient Position Standing (P)   -AB     Post Patient Position Sitting (P)   -AB     Row Name 08/19/22 1026          Positioning and Restraints    Pre-Treatment Position in bed (P)   -AB     Post Treatment Position chair (P)   -AB     In Chair notified nsg;reclined;sitting;call light within reach;encouraged to call for assist;exit alarm on;RUE elevated;LUE elevated;waffle cushion;legs elevated;patient within staff view (P)   -AB           User Key  (r) = Recorded By, (t) = Taken By, (c) = Cosigned By    Initials Name Provider Type    AB Chyna Brown, PT Student PT Student               Outcome Measures     Row Name 08/19/22 1030          How much help from another person do you currently need...    Turning from your back to your side while in flat bed without using bedrails? 4 (P)   -AB     Moving from lying on back to sitting on the side of a flat bed without bedrails? 3 (P)   -AB     Moving to and from a bed to a chair (including a wheelchair)? 3 (P)   -AB     Standing up from a chair using your arms (e.g., wheelchair, bedside chair)? 3 (P)   -AB     Climbing 3-5 steps with a railing? 3 (P)   -AB     To walk in hospital room? 3 (P)   -AB     AM-PAC 6 Clicks Score (PT) 19 (P)   -AB     Highest level of mobility 6 --> Walked 10 steps or more (P)   -AB     Row Name 08/19/22 1030          Functional Assessment    Outcome Measure Options AM-PAC 6 Clicks Basic Mobility (PT) (P)   -AB           User Key  (r) = Recorded By, (t) = Taken By, (c) = Cosigned By    Initials Name Provider Type    AB Chyna Brown, PT Student PT Student                             Physical Therapy Education                 Title: PT OT SLP Therapies (In Progress)     Topic: Physical Therapy (In Progress)     Point: Mobility training (Done)     Learning Progress Summary           Patient Acceptance, E,TB,D, VU,DU,NR by AB at 8/19/2022 1030                   Point: Home exercise program (Not Started)      Learner Progress:  Not documented in this visit.          Point: Body mechanics (Done)     Learning Progress Summary           Patient Acceptance, E,TB,D, VU,DU,NR by AB at 8/19/2022 1030                   Point: Precautions (Done)     Learning Progress Summary           Patient Acceptance, E,TB,D, VU,DU,NR by AB at 8/19/2022 1030                               User Key     Initials Effective Dates Name Provider Type Discipline    AB 05/23/22 -  Chyna Brown, PT Student PT Student PT              PT Recommendation and Plan  Planned Therapy Interventions (PT): (P) balance training, bed mobility training, gait training, home exercise program, patient/family education, strengthening, transfer training  Plan of Care Reviewed With: (P) patient  Progress: (P) no change  Outcome Evaluation: (P) PT initial eval completed. Pt presents with decreased strength bilaterally, poor functional endurance, and intermittent unsteadiness. Ambulation of 130' with CGAx1 and BUE support was well tolerated. Cues for upright posture and increasing stride length. Pt will continue to benefit from skilled IPPT. PT rec d/c home with assist.     Time Calculation:    PT Charges     Row Name 08/19/22 1031             Time Calculation    Start Time 0941 (P)   -AB      PT Received On 08/19/22 (P)   -AB      PT Goal Re-Cert Due Date 08/29/22 (P)   -AB              Timed Charges    75757 - PT Therapeutic Activity Minutes 8 (P)   -AB              Untimed Charges    PT Eval/Re-eval Minutes 46 (P)   -AB              Total Minutes    Timed Charges Total Minutes 8 (P)   -AB      Untimed Charges Total Minutes 46 (P)   -AB       Total Minutes 54 (P)   -AB            User Key  (r) = Recorded By, (t) = Taken By, (c) = Cosigned By    Initials Name Provider Type    AB Chyna Brown, PT Student PT Student              Therapy Charges for Today     Code Description Service Date Service Provider Modifiers Qty    [id number]  PT THERAPEUTIC ACT EA 15 MIN  8/19/2022 Chyna Brown, PT Student GP 1    [id number] HC PT EVAL MOD COMPLEXITY 4 8/19/2022 Chyna Brown, PT Student GP 1          PT G-Codes  Outcome Measure Options: (P) AM-PAC 6 Clicks Basic Mobility (PT)  AM-PAC 6 Clicks Score (PT): (P) 19    CHYNA BROWN, PT Student  8/19/2022

## 2022-08-19 NOTE — PROGRESS NOTES
Chief complaint: Left MCA/RODERICK watershed stroke, critical stenosis of the left internal carotid artery    Admit Diagnosis:   Acute ischemic stroke (HCC) [I63.9]     Subjective: Improved overnight.  Still with some dysarthric speech.    Objective:    Vitals:    22 1245   BP:    Pulse: 86   Resp:    Temp:    SpO2: 99%     Pulse  Av  Min: 66  Max: 97  Systolic (24hrs), Av , Min:115 , Max:172     Diastolic (24hrs), Av, Min:64, Max:105    Temp (24hrs), Av.2 °F (36.8 °C), Min:97.5 °F (36.4 °C), Max:99 °F (37.2 °C)      She sitting in bed.  She is alert, pleasant, conversant, and appropriate.    Lab Results   Component Value Date     (L) 2022       A/P:   Admit Diagnosis:   Acute ischemic stroke (HCC) [I63.9]     The patient tells me that she stopped taking her Plavix several months ago because it was making her itch.  This is likely the etiology of the worsening of her carotid stenosis.    She is an unusually complicated patient with poor vascular access.  She is high risk for TCAR, carotid endarterectomy, and carotid stenting.  I think the best option for her at this point is probably a carotid stent although this will need to be done under general anesthesia.  She should continue her anticoagulants but we will start her on some Brilinta for stroke risk reduction.  She acknowledges the relatively high risk nature of her condition and consents to proceed with a carotid stent.  We will plan on doing this next week at some point, probably Thursday under general anesthesia.  She can probably go to the floor from my standpoint.

## 2022-08-19 NOTE — CASE MANAGEMENT/SOCIAL WORK
Discharge Planning Assessment  [facility]     Patient Name: Chika Posey  MRN: [id number]  Today's Date: 8/19/2022    Admit Date: 8/18/2022     Discharge Needs Assessment     Row Name 08/19/22 1419       Living Environment    People in Home Alone      Current Living Arrangements home    Primary Care Provided by self    Provides Primary Care For no one    Family Caregiver if Needed sibling(s)    Family Caregiver Names Six siblings    Quality of Family Relationships helpful;involved;supportive    Able to Return to Prior Arrangements yes       Transition Planning    Patient/Family Anticipates Transition to home    Patient/Family Anticipated Services at Transition none    Transportation Anticipated family or friend will provide       Discharge Needs Assessment    Readmission Within the Last 30 Days no previous admission in last 30 days    Equipment Currently Used at Home none    Concerns to be Addressed no discharge needs identified    Anticipated Changes Related to Illness none    Equipment Needed After Discharge none    Current Discharge Risk chronically ill;lives alone               Discharge Plan     Row Name 08/19/22 1420       Plan    Plan Home    Patient/Family in Agreement with Plan   yes    Plan Comments   -I met with Ms. Posey today at the bedside to initiate discharge planning. Ms. Posey lives in [facility] alone. She is independent with all things, uses no dme, and is not current with any therapy. She has dependable transportation provided by her siblings.     No dischargne needs have beenidentified. Plan is home and she is agreeable. Case management will continue to follow Ms. Posey's progress. She has been ordered out of the ICU.     If needed, the weekend  can be reached at [phone]        Final Discharge Disposition Code 01 - home or self-care           Expected Discharge Date and Time     Expected Discharge Date Expected Discharge Time    Aug 20, 2022           Demographic Summary     Row Name 08/19/22 1418       General Information    General Information Comments Primary provider is Alyssia Greenfield MD.   [employer].  Fully vaccinated for covid with 2 boosters.   No advanced directive or living will.               Functional Status     Row Name 08/19/22 1419       Functional Status, IADL    Medications independent    Meal Preparation independent    Housekeeping independent    Laundry independent    Shopping independent       Mental Status    General Appearance WDL WDL       Mental Status Summary    Recent Changes in Mental Status/Cognitive Functioning no changes       Employment/    Employment Status retired           Genny Almonte RN

## 2022-08-19 NOTE — PLAN OF CARE
Problem: Adult Inpatient Plan of Care  Goal: Plan of Care Review  Flowsheets  Taken 8/19/2022 1814 by Meghna Robison RN  Plan of Care Reviewed With:   patient   son  Taken 8/19/2022 1445 by Angelika Pleitez OT  Outcome Evaluation: OT eval complete. Pt presents w/ decreased activity tolerance, generalized weakness (RUE>LUE), and mild balance deficits limiting her ADL independence. Pt CGA for STS & functional mobility in turcios w/ 1 LOB noted requiring min A to correct, I w/ LBD, min A for UBD. Pt would benefit from continued skilled IPOT services to address current functional deficits. Rec home w/ A at d/c.   Goal Outcome Evaluation:  Plan of Care Reviewed With: patient, son

## 2022-08-19 NOTE — PLAN OF CARE
Goal Outcome Evaluation:  Plan of Care Reviewed With: (P) patient        Progress: (P) no change  Outcome Evaluation: (P) PT initial eval completed. Pt presents with decreased strength bilaterally, poor functional endurance, and intermittent unsteadiness. Ambulation of 130' with CGAx1 and BUE support was well tolerated. Cues for upright posture and increasing stride length. Pt will continue to benefit from skilled IPPT. PT rec d/c home with assist.

## 2022-08-19 NOTE — THERAPY EVALUATION
Acute Care - Speech Language Pathology Initial Evaluation  [facility]   Cognitive-Communication Evaluation     Patient Name: Chika Posey  : 1945  MRN: [id number]  Today's Date: 2022               Admit Date: 2022     Visit Dx:    ICD-10-CM ICD-9-CM   1. 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)   • Hypertensive encephalopathy   • Other forms of systemic lupus erythematosus (HCC)   • T2DM    • Basilar artery stenosis   • Stenosis of both vertebral arteries   • Chronic kidney disease   • Iron deficiency anemia due to chronic blood loss   • Seizure disorder on Keppra    • H/O left CEA, remote   • TIA   • Acute ischemic stroke (HCC)     Past Medical History:   Diagnosis Date   • Back pain    • Carotid artery occlusion    • Cerebral hemorrhage (HCC)    • Chronic kidney disease     MATT   • Compartment syndrome (HCC)    • DVT (deep venous thrombosis) (HCC)     Bilateral   • Generalized weakness    • Hyperlipidemia    • Hypertension    • Hypertensive encephalopathy    • Muscle weakness    • Paraparesis of both lower limbs (HCC)    • Peripheral vascular disease (HCC)    • Spell of altered cognition 3/6/2018   • Stroke (HCC)     bleed   • T2DM (type 2 diabetes mellitus) (HCC)      Past Surgical History:   Procedure Laterality Date   • AORTA BIFEMORAL BYPASS     • BREAST BIOPSY Right    • CAROTID ENDARTERECTOMY Left    • FOREARM FASCIOTOMY Left    • INTERVENTIONAL RADIOLOGY PROCEDURE Bilateral 2021    Procedure: Carotid Cerebral Angiogram;  Surgeon: Silvino Hitchcock MD;  Location: [facility] INVASIVE LOCATION;  Service: Interventional Radiology;  Laterality: Bilateral;   • THROMBOLYSIS     •  TUBAL ABDOMINAL LIGATION     • US ARTERIAL DOPPLER LOWER EXTREMITY  UNILATERAL     • VASCULAR SURGERY      Bilateral Stents       SLP Recommendation and Plan  SLP Diagnosis: Pt presents w/ functional speech/language/cognition. No further SLP services warranted at this time. (08/19/22 0930)        [facility] Criteria for Skilled Therapy Interventions Met: no problems identified which require skilled intervention (08/19/22 0930)  Anticipated Discharge Disposition (SLP): unknown (08/19/22 0930)   Plan of Care Reviewed With: patient (08/19/22 1022)  Progress: no change (eval) (08/19/22 1022)      SLP EVALUATION (last 72 hours)     SLP SLC Evaluation     Row Name 08/19/22 0930                   Communication Assessment/Intervention    Document Type evaluation  -EN        Subjective Information no complaints  -EN        Patient Observations alert;cooperative;agree to therapy  -EN        Patient/Family/Caregiver Comments/Observations no family present  -EN        Patient Effort good  -EN        Symptoms Noted During/After Treatment none  -EN                  General Information    Patient Profile Reviewed yes  -EN        Pertinent History Of Current Problem Pt adm w/ transient R side weakness. hx of T2DM, DVT, HTN, dyslipidemia, PVD s/p aorto-bifemoral bypass in 2017, + COVID-19 in 2020, prior hemorrhagic stroke, seizure on Keppra, left CVA s/p prior CEA performed at [facility] with known L ICA stenosis. Consulted per CVA protocol.  -EN        Precautions/Limitations, Vision WFL;corrective lenses needed for reading  -EN        Precautions/Limitations, Hearing WFL;for purposes of eval  -EN        Prior Level of Function-Communication WFL  -EN        Plans/Goals Discussed with patient;agreed upon  -EN        Barriers to Rehab none identified  -EN        Patient's Goals for Discharge return to home  -EN                  Pain    Additional Documentation Pain Scale: FACES Pre/Post-Treatment (Group)  -EN                  Pain Scale: FACES  Pre/Post-Treatment    Pain: FACES Scale, Pretreatment 0-->no hurt  -EN        Posttreatment Pain Rating 0-->no hurt  -EN                  Comprehension Assessment/Intervention    Comprehension Assessment/Intervention Auditory Comprehension  -EN                  Auditory Comprehension Assessment/Intervention    Auditory Comprehension (Communication) WFL  -EN        Able to Identify Objects/Pictures (Communication) pictures of common objects;WFL  -EN        Answers Questions (Communication) yes/no;wh questions;personal;concrete;complex;WFL  -EN        Able to Follow Commands (Communication) 2-step;WFL  -EN        Narrative Discourse simple paragraph level;conversational level;WFL  -EN                  Expression Assessment/Intervention    Expression Assessment/Intervention verbal expression  -EN                  Verbal Expression Assessment/Intervention    Verbal Expression WFL  -EN        Automatic Speech (Communication) response to greeting;WFL  -EN        Responsive Naming simple;WFL  -EN        Confrontational Naming high frequency;low frequency;WFL  -EN        Spontaneous/Functional Words simple;WFL  -EN        Sentence Formulation simple;complex;WFL  -EN        Conversational Discourse/Fluency WFL  -EN                  Oral Motor Structure and Function    Dentition Assessment natural, present and adequate  -EN        Mucosal Quality moist, healthy  -EN                  Oral Musculature and Cranial Nerve Assessment    Oral Motor General Assessment WFL  -EN                  Motor Speech Assessment/Intervention    Motor Speech Function WFL  -EN                  Cursory Voice Assessment/Intervention    Quality and Resonance (Voice) WFL  -EN                  Cognitive Assessment Intervention- SLP    Cognitive Function (Cognition) WFL  -EN        Orientation Status (Cognition) awareness of basic personal information;person;place;time;situation;WFL  -EN        Attention (Cognitive) sustained;WFL  -EN        Thought  Organization (Cognitive) drawing conclusions;mental manipulation;WFL  -EN        Reasoning (Cognitive) simple;deductive;WFL  -EN        Problem Solving (Cognitive) WFL  -EN        Executive Function (Cognition) WFL  -EN        Pragmatics (Communication) affect;awareness to non-verbal signals;initiation;eye contact;topic maintenance;turn taking;WFL  -EN        Right Hemisphere Function WFL  -EN                  SLP Evaluation Clinical Impressions    SLP Diagnosis Pt presents w/ functional speech/language/cognition. No further SLP services warranted at this time.  -EN        Rehab Potential/Prognosis good  -EN        SLC Criteria for Skilled Therapy Interventions Met no problems identified which require skilled intervention  -EN        Functional Impact no impact on function  -EN                  Recommendations    Therapy Frequency (SLP SLC) evaluation only  -EN        Anticipated Discharge Disposition (SLP) unknown  -EN              User Key  (r) = Recorded By, (t) = Taken By, (c) = Cosigned By    Initials Name Effective Dates    EN Diane Anton MS CCC-SLP 06/22/22 -                    EDUCATION  The patient has been educated in the following areas:     Cognitive Impairment Communication Impairment.    Time Calculation:      Time Calculation- SLP     Row Name 08/19/22 1021             Time Calculation- SLP    SLP Start Time 0930  -EN      SLP Received On 08/19/22  -EN              Untimed Charges    SLP Eval/Re-eval  ST Eval Speech and Production w/ Language - 21354  -EN      51635-[id number] Eval Speech and Production w/ Language Minutes 25  -EN              Total Minutes    Untimed Charges Total Minutes 25  -EN       Total Minutes 25  -EN            User Key  (r) = Recorded By, (t) = Taken By, (c) = Cosigned By    Initials Name Provider Type    EN Diane Anton MS CCC-SLP Speech and Language Pathologist                Therapy Charges for Today     Code Description Service Date Service Provider Modifiers Qty     [id number]  ST EVAL SPEECH AND PROD W LANG  2 8/19/2022 Diane Anton, MS CCC-SLP GN 1          Patient was not wearing a face mask and did not exhibit coughing during this therapy encounter.  Procedure performed was not aerosolizing, did not involve close contact (within 6 feet for at least 15 minutes or longer), and did not involve contact with infectious secretions or specimens.  Therapist used appropriate personal protective equipment including gloves, standard procedure mask and eye protection.  Appropriate PPE was worn during the entire therapy session.  Hand hygiene was completed before and after therapy session.              Diane Anton MS CCC-SLP  8/19/2022

## 2022-08-19 NOTE — CONSULTS
Consult received for diabetes education. Chart reviewed.She was diagnosed in 2021. A1c is 6.6%. Patient has been seen during previous hospital admissions for diabetes education. She has a meter at home and occasionally checks. Reviewed target goals per ADA. Encouraged her to continue to monitor blood sugars and review with provider. Patient confirmed she does not take any medications for diabetes at this time.   Discussed and taught patient about type 2 diabetes self-management and importance of blood glucose control to reduce complications. Target blood glucose readings and A1c goals per ADA were reviewed. Reviewed with patient current A1c and discussed its significance. Signs, symptoms and treatment of hyperglycemia and hypoglycemia were discussed. She denies hypoglycemia symptoms. All questions answered and encouraged follow up with PCP.     Patient does not qualify for the follow up stroke class based on the exclusion criteria of BMI <25 (BMI is 23.20).

## 2022-08-19 NOTE — PLAN OF CARE
Goal Outcome Evaluation:  Plan of Care Reviewed With: patient        Progress: improving  Outcome Evaluation: NIHSS 0. FC. A&O x4.  DELEON.  PERRLA 3+.  SBP ranging from 140-180. NSR. Passed dysphagia screening with increase of diet to consistent carb.  UOP >700 mls.  Normothermic. Denies pain.  Sats >93% on room air.  Lung sounds clear.

## 2022-08-19 NOTE — PROGRESS NOTES
Stroke Progress Note       Chief Complaint:  Right sided weakness     Subjective    Subjective     Subjective:  No acute events overnight   Patient feels strong today  Walking with physical therapy     Review of Systems   Constitutional: No fatigue  HENT: Negative for nosebleeds and rhinorrhea.    Eyes: Negative for redness.   Respiratory: Negative for cough.    Gastrointestinal: Negative for anal bleeding.   Endocrine: Negative for polydipsia.   Genitourinary: Negative for enuresis and urgency.   Musculoskeletal: Negative for joint swelling.   Neurological: Negative for tremors.   Psychiatric/Behavioral: Negative for hallucinations.     Objective      Temp:  [97.5 °F (36.4 °C)-99 °F (37.2 °C)] 98.3 °F (36.8 °C)  Heart Rate:  [66-97] 92  Resp:  [14-18] 14  BP: (102-172)/() 147/81    NEURO    MENTAL STATUS: AAOx3, memory intact, fund of knowledge appropriate    LANG/SPEECH: Naming and repetition intact, fluent, follows 3-step commands    CRANIAL NERVES:    Pupils equal and reactive, EOMI intact, no gaze deviation, no nystagmus  No facial droop, cough and gag intact, shoulder shrug intact, tongue midline     MOTOR:  Moves all extremities equally    SENSORY: Normal to light touch all throughout     COORDINATION: Normal finger to nose and heel to shin, no tremor, no dysmetria    STATION: Not assessed due to patient condition    GAIT: Not assessed due to patient condition  Results Review:    I reviewed the patient's new clinical results.    Lab Results (last 24 hours)     Procedure Component Value Units Date/Time    POC Glucose Once [[id number]]  (Normal) Collected: 08/19/22 1207    Specimen: Blood Updated: 08/19/22 1209     Glucose 105 mg/dL      Comment: Meter: [id number] : [id number] Skip ALCANTARA       POC Glucose Once [[id number]]  (Abnormal) Collected: 08/19/22 0550    Specimen: Blood Updated: 08/19/22 0554     Glucose 146 mg/dL      Comment: Meter: [id number] : [id number] Kalpesh Spring        Hemoglobin A1c [[id number]]  (Abnormal) Collected: 08/19/22 0321    Specimen: Blood Updated: 08/19/22 0430     Hemoglobin A1C 6.60 %     Narrative:      Hemoglobin A1C Ranges:    Increased Risk for Diabetes  5.7% to 6.4%  Diabetes                     >= 6.5%  Diabetic Goal                < 7.0%    Lipid Panel [[id number]]  (Abnormal) Collected: 08/19/22 0321    Specimen: Blood Updated: 08/19/22 0347     Total Cholesterol 119 mg/dL      Triglycerides 58 mg/dL      HDL Cholesterol 36 mg/dL      LDL Cholesterol  70 mg/dL      VLDL Cholesterol 13 mg/dL      LDL/HDL Ratio 1.98    Narrative:      Cholesterol Reference Ranges  (U.S. Department of Health and Human Services ATP III Classifications)    Desirable          <200 mg/dL  Borderline High    200-239 mg/dL  High Risk          >240 mg/dL      Triglyceride Reference Ranges  (U.S. Department of Health and Human Services ATP III Classifications)    Normal           <150 mg/dL  Borderline High  150-199 mg/dL  High             200-499 mg/dL  Very High        >500 mg/dL    HDL Reference Ranges  (U.S. Department of Health and Human Services ATP III Classifications)    Low     <40 mg/dl (major risk factor for CHD)  High    >60 mg/dl ('negative' risk factor for CHD)        LDL Reference Ranges  (U.S. Department of Health and Human Services ATP III Classifications)    Optimal          <100 mg/dL  Near Optimal     100-129 mg/dL  Borderline High  130-159 mg/dL  High             160-189 mg/dL  Very High        >189 mg/dL    Basic Metabolic Panel [[id number]]  (Abnormal) Collected: 08/19/22 0321    Specimen: Blood Updated: 08/19/22 0347     Glucose 123 mg/dL      BUN 7 mg/dL      Creatinine 0.84 mg/dL      Sodium 134 mmol/L      Potassium 3.1 mmol/L      Chloride 101 mmol/L      CO2 23.0 mmol/L      Calcium 9.3 mg/dL      BUN/Creatinine Ratio 8.3     Anion Gap 10.0 mmol/L      eGFR 72.6 mL/min/1.73      Comment: National Kidney Foundation and American Society of Nephrology (ASN) Task  Force recommended calculation based on the Chronic Kidney Disease Epidemiology Collaboration (CKD-EPI) equation refit without adjustment for race.       Narrative:      GFR Normal >60  Chronic Kidney Disease <60  Kidney Failure <15      Phosphorus [[id number]]  (Normal) Collected: 08/19/22 0321    Specimen: Blood Updated: 08/19/22 0347     Phosphorus 2.5 mg/dL     Magnesium [[id number]]  (Normal) Collected: 08/19/22 0321    Specimen: Blood Updated: 08/19/22 0347     Magnesium 1.8 mg/dL     CBC (No Diff) [[id number]]  (Abnormal) Collected: 08/19/22 0321    Specimen: Blood Updated: 08/19/22 0333     WBC 8.37 10*3/mm3      RBC 4.82 10*6/mm3      Hemoglobin 9.4 g/dL      Hematocrit 31.4 %      MCV 65.1 fL      MCH 19.5 pg      MCHC 29.9 g/dL      RDW 20.4 %      RDW-SD 46.0 fl      MPV 8.7 fL      Platelets 531 10*3/mm3     POC Glucose Once [[id number]]  (Normal) Collected: 08/19/22 0011    Specimen: Blood Updated: 08/19/22 0019     Glucose 103 mg/dL      Comment: Meter: [id number] : [id number] Kalpesh Spring       [place] Draw [[id number]] Collected: 08/18/22 1625    Specimen: Blood Updated: 08/18/22 2033    Narrative:      The following orders were created for panel order [place] Draw.  Procedure                               Abnormality         Status                     ---------                               -----------         ------                     Green Top (Gel)[[id number]]                                  Final result               Lavender Top[[id number]]                                     Final result               Gold Top - SST[[id number]]                                   Final result               Cook Top[[id number]]                                         Final result               Light Blue Top[[id number]]                                   Final result                 Please view results for these tests on the individual orders.    Gray Top [[id number]] Collected: 08/18/22 1625    Specimen: Blood  "Updated: 08/18/22 2033     Extra Tube Hold for add-ons.     Comment: Auto resulted.       COVID PRE-OP / PRE-PROCEDURE SCREENING ORDER (NO ISOLATION) - Swab, Nasopharynx [[id number]]  (Normal) Collected: 08/18/22 1815    Specimen: Swab from Nasopharynx Updated: 08/18/22 1910    Narrative:      The following orders were created for panel order COVID PRE-OP / PRE-PROCEDURE SCREENING ORDER (NO ISOLATION) - Swab, Nasopharynx.  Procedure                               Abnormality         Status                     ---------                               -----------         ------                     COVID-19 and FLU A/B PCR...[[id number]]  Normal              Final result                 Please view results for these tests on the individual orders.    COVID-19 and FLU A/B PCR - Swab, Nasopharynx [[id number]]  (Normal) Collected: 08/18/22 1815    Specimen: Swab from Nasopharynx Updated: 08/18/22 1910     COVID19 Not Detected     Influenza A PCR Not Detected     Influenza B PCR Not Detected    Narrative:      Fact sheet for providers: https://www.fda.gov/media/409897/download    Fact sheet for patients: https://www.fda.gov/media/612404/download    Test performed by PCR.    Procalcitonin [[id number]]  (Normal) Collected: 08/18/22 1625    Specimen: Blood Updated: 08/18/22 1821     Procalcitonin 0.05 ng/mL     Narrative:      As a Marker for Sepsis (Non-Neonates):    1. <0.5 ng/mL represents a low risk of severe sepsis and/or septic shock.  2. >2 ng/mL represents a high risk of severe sepsis and/or septic shock.    As a Marker for Lower Respiratory Tract Infections that require antibiotic therapy:    PCT on Admission    Antibiotic Therapy       6-12 Hrs later    >0.5                Strongly Recommended  >0.25 - <0.5        Recommended   0.1 - 0.25          Discouraged              Remeasure/reassess PCT  <0.1                Strongly Discouraged     Remeasure/reassess PCT    As 28 day mortality risk marker: \"Change in Procalcitonin " "Result\" (>80% or <=80%) if Day 0 (or Day 1) and Day 4 values are available. Refer to http://[email or web address]-pct-calculator.[email or web address]    Change in PCT <=80%  A decrease of PCT levels below or equal to 80% defines a positive change in PCT test result representing a higher risk for 28-day all-cause mortality of patients diagnosed with severe sepsis for septic shock.    Change in PCT >80%  A decrease of PCT levels of more than 80% defines a negative change in PCT result representing a lower risk for 28-day all-cause mortality of patients diagnosed with severe sepsis or septic shock.       Urine Drug Screen - Urine, Clean Catch [[id number]]  (Normal) Collected: 08/18/22 1642    Specimen: Urine, Clean Catch Updated: 08/18/22 1820     THC, Screen, Urine Negative     Phencyclidine (PCP), Urine Negative     Cocaine Screen, Urine Negative     Methamphetamine, Ur Negative     Opiate Screen Negative     Amphetamine Screen, Urine Negative     Benzodiazepine Screen, Urine Negative     Tricyclic Antidepressants Screen Negative     Methadone Screen, Urine Negative     Barbiturates Screen, Urine Negative     Oxycodone Screen, Urine Negative     Propoxyphene Screen Negative     Buprenorphine, Screen, Urine Negative    Narrative:      Cutoff For Drugs Screened:    Amphetamines               500 ng/ml  Barbiturates               200 ng/ml  Benzodiazepines            150 ng/ml  Cocaine                    150 ng/ml  Methadone                  200 ng/ml  Opiates                    100 ng/ml  Phencyclidine               25 ng/ml  THC                            50 ng/ml  Methamphetamine            500 ng/ml  Tricyclic Antidepressants  300 ng/ml  Oxycodone                  100 ng/ml  Propoxyphene               300 ng/ml  Buprenorphine               10 ng/ml    The normal value for all drugs tested is negative. This report includes unconfirmed screening results, with the cutoff values listed, to be used for medical treatment purposes only.  Unconfirmed " results must not be used for non-medical purposes such as employment or legal testing.  Clinical consideration should be applied to any drug of abuse test, particularly when unconfirmed results are used.      Lactic Acid, Plasma [[id number]]  (Normal) Collected: 08/18/22 1625    Specimen: Blood Updated: 08/18/22 1810     Lactate 1.5 mmol/L      Comment: Falsely depressed results may occur on samples drawn from patients receiving N-Acetylcysteine (NAC) or Metamizole.       Ethanol [[id number]]  (Normal) Collected: 08/18/22 1625    Specimen: Blood Updated: 08/18/22 1808     Ethanol <10 mg/dL     Narrative:      Elevated lactic acid concentration and lactate dehydrogenase(LD) activity may falsely elevate enzymatically determined ethanol levels. Not for legal purposes.     Gold Top - SST [[id number]] Collected: 08/18/22 1625    Specimen: Blood Updated: 08/18/22 1734     Extra Tube Hold for add-ons.     Comment: Auto resulted.       Green Top (Gel) [[id number]] Collected: 08/18/22 1625    Specimen: Blood Updated: 08/18/22 1734     Extra Tube Hold for add-ons.     Comment: Auto resulted.       Lavender Top [[id number]] Collected: 08/18/22 1625    Specimen: Blood Updated: 08/18/22 1734     Extra Tube hold for add-on     Comment: Auto resulted       Light Blue Top [[id number]] Collected: 08/18/22 1625    Specimen: Blood Updated: 08/18/22 1734     Extra Tube Hold for add-ons.     Comment: Auto resulted       Comprehensive Metabolic Panel [[id number]]  (Abnormal) Collected: 08/18/22 1625    Specimen: Blood Updated: 08/18/22 1701     Glucose 110 mg/dL      BUN 7 mg/dL      Creatinine 1.07 mg/dL      Sodium 139 mmol/L      Potassium 3.6 mmol/L      Chloride 106 mmol/L      CO2 22.0 mmol/L      Calcium 9.5 mg/dL      Total Protein 8.6 g/dL      Albumin 4.50 g/dL      ALT (SGPT) 33 U/L      AST (SGOT) 36 U/L      Alkaline Phosphatase 105 U/L      Total Bilirubin 0.5 mg/dL      Globulin 4.1 gm/dL      Comment: Calculated Result         A/G Ratio 1.1 g/dL      BUN/Creatinine Ratio 6.5     Anion Gap 11.0 mmol/L      eGFR 54.3 mL/min/1.73      Comment: National Kidney Foundation and American Society of Nephrology (ASN) Task Force recommended calculation based on the Chronic Kidney Disease Epidemiology Collaboration (CKD-EPI) equation refit without adjustment for race.       Narrative:      GFR Normal >60  Chronic Kidney Disease <60  Kidney Failure <15      Magnesium [[id number]]  (Normal) Collected: 08/18/22 1625    Specimen: Blood Updated: 08/18/22 1701     Magnesium 1.9 mg/dL     Troponin [[id number]]  (Normal) Collected: 08/18/22 1625    Specimen: Blood Updated: 08/18/22 1659     Troponin T <0.010 ng/mL     Narrative:      Troponin T Reference Range:  <= 0.03 ng/mL-   Negative for AMI  >0.03 ng/mL-     Abnormal for myocardial necrosis.  Clinicians would have to utilize clinical acumen, EKG, Troponin and serial changes to determine if it is an Acute Myocardial Infarction or myocardial injury due to an underlying chronic condition.       Results may be falsely decreased if patient taking Biotin.      CBC & Differential [[id number]]  (Abnormal) Collected: 08/18/22 1625    Specimen: Blood Updated: 08/18/22 1657    Narrative:      The following orders were created for panel order CBC & Differential.  Procedure                               Abnormality         Status                     ---------                               -----------         ------                     CBC Auto Differential[[id number]]        Abnormal            Final result               Scan Slide[[id number]]                                       Final result                 Please view results for these tests on the individual orders.    CBC Auto Differential [[id number]]  (Abnormal) Collected: 08/18/22 1625    Specimen: Blood Updated: 08/18/22 1657     WBC 6.21 10*3/mm3      RBC 4.94 10*6/mm3      Hemoglobin 9.6 g/dL      Hematocrit 32.6 %      MCV 66.0 fL      MCH 19.4 pg      MCHC  29.4 g/dL      RDW 20.7 %      RDW-SD 46.9 fl      MPV 8.7 fL      Platelets 528 10*3/mm3      Neutrophil % 53.6 %      Lymphocyte % 35.3 %      Monocyte % 9.2 %      Eosinophil % 1.1 %      Basophil % 0.6 %      Immature Grans % 0.2 %      Neutrophils, Absolute 3.33 10*3/mm3      Lymphocytes, Absolute 2.19 10*3/mm3      Monocytes, Absolute 0.57 10*3/mm3      Eosinophils, Absolute 0.07 10*3/mm3      Basophils, Absolute 0.04 10*3/mm3      Immature Grans, Absolute 0.01 10*3/mm3      nRBC 0.0 /100 WBC     Scan Slide [[id number]] Collected: 08/18/22 1625    Specimen: Blood Updated: 08/18/22 1657     Microcytes Mod/2+     Ovalocytes Slight/1+     Schistocytes Slight/1+     Target Cells Mod/2+     WBC Morphology Normal     Platelet Morphology Normal    Urinalysis With Microscopic If Indicated (No Culture) - Urine, Clean Catch [[id number]]  (Abnormal) Collected: 08/18/22 1642    Specimen: Urine, Clean Catch Updated: 08/18/22 1655     Color, UA Yellow     Appearance, UA Clear     pH, UA 6.5     Specific Gravity, UA 1.017     Glucose, UA Negative     Ketones, UA Negative     Bilirubin, UA Negative     Blood, UA Negative     Protein, UA Negative     Leuk Esterase, UA Small (1+)     Nitrite, UA Negative     Urobilinogen, UA 0.2 E.U./dL    Urinalysis, Microscopic Only - Urine, Clean Catch [[id number]]  (Abnormal) Collected: 08/18/22 1642    Specimen: Urine, Clean Catch Updated: 08/18/22 1655     RBC, UA 0-2 /HPF      WBC, UA 3-5 /HPF      Bacteria, UA None Seen /HPF      Squamous Epithelial Cells, UA 0-2 /HPF      Hyaline Casts, UA 0-6 /LPF      Methodology Automated Microscopy    POC Protime / INR [[id number]]  (Abnormal) Collected: 08/18/22 1625    Specimen: Blood Updated: 08/18/22 1629     Protime 16.6 seconds      INR 1.4     Comment: Serial Number: [id number]:  [id number]       POC CHEM 8 [[id number]]  (Abnormal) Collected: 08/18/22 1626    Specimen: Blood Updated: 08/18/22 1628     Glucose 113 mg/dL      BUN 6 mg/dL       Creatinine 1.00 mg/dL      Sodium 142 mmol/L      POC Potassium 3.5 mmol/L      Chloride 103 mmol/L      Total CO2 23 mmol/L      Hemoglobin 12.2 g/dL      Comment: Serial Number: [id number]  [id number]        Hematocrit 36 %      Ionized Calcium 1.31 mmol/L      eGFR 58.9 mL/min/1.73         CT Angiogram Neck    Result Date: 8/19/2022   1. 50-75% stenosis of the right and left cavernous carotid arteries as on prior study. 2. High-grade basilar origin stenosis again noted. 3. Heavy calcification of both intracranial vertebral arteries, with potential high-grade underlying stenosis.  This report was finalized on 8/19/2022 11:18 AM by Dr. Joseph Dumas MD.      MRI Brain Without Contrast    Result Date: 8/19/2022  [place] distribution acute infarcts are present involving the anterior circulation on the left as above, with additional tiny cortical infarcts present in the left temporal and occipital lobes. There is no evidence of significant associated edema or hemorrhage. Surrounding advanced age-related changes are also noted.  This report was finalized on 8/19/2022 5:44 AM by Richie Jones.      XR Chest 1 View    Result Date: 8/18/2022  Small new area of atelectasis in the medial left lung base.  This report was finalized on 8/18/2022 5:44 PM by Dr. Joseph Dumas MD.      CT Head Without Contrast Stroke Protocol    Result Date: 8/18/2022  Age-related changes of the brain as above, otherwise without evidence of acute intracranial abnormality.  Scan performed 4:13 PM 8/18/2022. Results discussed with the stroke team by Richie Jones in person at 4:17 PM same day  This report was finalized on 8/18/2022 5:30 PM by Richie Jones.      CT Angiogram Head w AI Analysis of LVO    Result Date: 8/19/2022   1. 50-75% stenosis of the right and left cavernous carotid arteries as on prior study. 2. High-grade basilar origin stenosis again noted. 3. Heavy calcification of both intracranial vertebral arteries, with potential  high-grade underlying stenosis.  This report was finalized on 8/19/2022 11:18 AM by Dr. Joseph Dumas MD.      CT CEREBRAL PERFUSION WITH & WITHOUT CONTRAST    Result Date: 8/19/2022  Findings consistent with diminished blood flow and less extensive diffuse ischemia throughout the left cerebral hemisphere, including both MCA and RODERICK territory, which may be related to patient's known high-grade carotid stenosis. No evidence of infarction.  This report was finalized on 8/19/2022 9:54 AM by Dr. Joseph Dumas MD.      Results for orders placed during the hospital encounter of 10/07/21    Adult Transthoracic Echo Complete W/ Cont if Necessary Per Protocol (With Agitated Saline)    Interpretation Summary  · Estimated left ventricular EF = 65%  · Left ventricular wall thickness is consistent with mild concentric hypertrophy.  · The cardiac valves are anatomically and functionally normal.            Assessment/Plan     Assessment/Plan:  77-year-old female with significant vascular risk factors including LCEA, HTN, HLD, PVD s/p aortofemoral bypass, DVT, presented with an episode of transient right-sided hemiplegia.      Left border zone infarcts,symptomatic left ICA stenosis   -currently patient is on ticagrelor( switched from plavix due to compliance issues this admission) and low dose apixaban  -plan for left carotid stenting next week by Dr. Hitchcock   -stable to be transferred to the floor     Hypertension- blood pressure goal 140-180      Neurology will continue to follow.        David Soares MD  08/19/22  14:53 EDT

## 2022-08-19 NOTE — PROGRESS NOTES
"INTENSIVIST NOTE     Hospital Day: 1    Ms. Chika Posey, 77 y.o. female is followed for:   CVA versus TIA       SUBJECTIVE     Interval history:    This morning the patient is awake alert and oriented.  She has no new complaints.  She is afebrile with most recent blood pressure of 151/77.  She is in normal sinus rhythm.  She is on room air.  Dr. Hitchcock has cleared her for telemetry transfer.    The patient's relevant past medical, surgical and social history were reviewed and updated in Epic as appropriate.       OBJECTIVE     Vital Sign Min/Max for last 24 hours  Temp  Min: 97.5 °F (36.4 °C)  Max: 99 °F (37.2 °C)   BP  Min: 115/92  Max: 172/76   Pulse  Min: 66  Max: 97   Resp  Min: 14  Max: 18   SpO2  Min: 91 %  Max: 100 %   No data recorded   Weight  Min: 55.7 kg (122 lb 12.7 oz)  Max: 58.1 kg (128 lb)      Intake/Output Summary (Last 24 hours) at 8/19/2022 1343  Last data filed at 8/19/2022 1000  Gross per 24 hour   Intake 240 ml   Output 800 ml   Net -560 ml      Flowsheet Rows    Flowsheet Row First Filed Value   Admission Height 154.9 cm (61\") Documented at 08/18/2022 1526   Admission Weight 58.1 kg (128 lb) Documented at 08/18/2022 1526             08/18/22  1526 08/18/22 2051   Weight: 58.1 kg (128 lb) 55.7 kg (122 lb 12.7 oz)            Objective:  General Appearance:  In no acute distress.    Vital signs: (most recent): Blood pressure 151/77, pulse 85, temperature 98.3 °F (36.8 °C), temperature source Oral, resp. rate 14, height 154.9 cm (61\"), weight 55.7 kg (122 lb 12.7 oz), SpO2 99 %, not currently breastfeeding.    HEENT: Normal HEENT exam.    Lungs:  Normal effort and normal respiratory rate.  Breath sounds clear to auscultation.  She is not in respiratory distress.  No rales, wheezes or rhonchi.    Heart: Normal rate.  Regular rhythm.  S1 normal and S2 normal.  No murmur, gallop or friction rub.   Chest: Symmetric chest wall expansion.   Abdomen: Abdomen is soft and non-distended.  Bowel " sounds are normal.   There is no abdominal tenderness.   There is no mass. There is no splenomegaly. There is no hepatomegaly.   Extremities: There is no deformity or dependent edema.    Neurological: Patient is alert and oriented to person, place and time.    Pupils:  Pupils are equal, round, and reactive to light.    Skin:  Warm and dry.              I reviewed the patient's new clinical results.  I reviewed the patient's new imaging results/reports including actual images and agree with reports.    CT Angiogram Neck    Result Date: 8/19/2022   1. 50-75% stenosis of the right and left cavernous carotid arteries as on prior study. 2. High-grade basilar origin stenosis again noted. 3. Heavy calcification of both intracranial vertebral arteries, with potential high-grade underlying stenosis.  This report was finalized on 8/19/2022 11:18 AM by Dr. Joseph Dumas MD.      MRI Brain Without Contrast    Result Date: 8/19/2022  [place] distribution acute infarcts are present involving the anterior circulation on the left as above, with additional tiny cortical infarcts present in the left temporal and occipital lobes. There is no evidence of significant associated edema or hemorrhage. Surrounding advanced age-related changes are also noted.  This report was finalized on 8/19/2022 5:44 AM by Richie Jones.      XR Chest 1 View    Result Date: 8/18/2022  Small new area of atelectasis in the medial left lung base.  This report was finalized on 8/18/2022 5:44 PM by Dr. Joseph Dumas MD.      CT Head Without Contrast Stroke Protocol    Result Date: 8/18/2022  Age-related changes of the brain as above, otherwise without evidence of acute intracranial abnormality.  Scan performed 4:13 PM 8/18/2022. Results discussed with the stroke team by Richie Jones in person at 4:17 PM same day  This report was finalized on 8/18/2022 5:30 PM by Richie Jones.      CT Angiogram Head w AI Analysis of LVO    Result Date: 8/19/2022   1.  50-75% stenosis of the right and left cavernous carotid arteries as on prior study. 2. High-grade basilar origin stenosis again noted. 3. Heavy calcification of both intracranial vertebral arteries, with potential high-grade underlying stenosis.  This report was finalized on 8/19/2022 11:18 AM by Dr. Joseph Dumas MD.      CT CEREBRAL PERFUSION WITH & WITHOUT CONTRAST    Result Date: 8/19/2022  Findings consistent with diminished blood flow and less extensive diffuse ischemia throughout the left cerebral hemisphere, including both MCA and RODERICK territory, which may be related to patient's known high-grade carotid stenosis. No evidence of infarction.  This report was finalized on 8/19/2022 9:54 AM by Dr. Joseph Dumas MD.         INFUSIONS       Results from last 7 days   Lab Units 08/19/22  0321 08/18/22  1626 08/18/22  1625   WBC 10*3/mm3 8.37  --  6.21   HEMOGLOBIN g/dL 9.4*  --  9.6*   HEMOGLOBIN, POC g/dL  --  12.2  --    HEMATOCRIT % 31.4*  --  32.6*   HEMATOCRIT POC %  --  36*  --    PLATELETS 10*3/mm3 531*  --  528*     Results from last 7 days   Lab Units 08/19/22  0321 08/18/22  1626 08/18/22  1625   SODIUM mmol/L 134*  --  139   POTASSIUM mmol/L 3.1*  --  3.6   CHLORIDE mmol/L 101  --  106   CO2 mmol/L 23.0  --  22.0   BUN mg/dL 7*  --  7*   GLUCOSE mg/dL 123*  --  110*   CREATININE mg/dL 0.84 1.00 1.07*   MAGNESIUM mg/dL 1.8  --  1.9   CALCIUM mg/dL 9.3  --  9.5               Patient isn't on Tube Feeding   /h  Patient doesn't have any tube feeding modular orders    Mechanical Ventilator:   Settings: Observed:                                                I reviewed the patient's medications.    Assessment & Plan   ASSESSMENT/PLAN     Active Hospital Problems    Diagnosis    • **TIA    • Acute ischemic stroke (HCC)    • Seizure disorder on Keppra     • Stenosis of both vertebral arteries    • T2DM     • Severe bilateral carotid artery stenosis      S/P L CEA at [facility]      • H/O DVT     • H/O ICH (3/2018)        While on warfarin  Felt to be spontaneous in nature       • Essential hypertension    • Chronic anticoagulation (Eliquis)     • PAD s/p aorto-bifemoral bypass       Initially performed at [facility]   Thrombosed her graft and required tPA at [facility] in 2017           77 y.o. female former smoker with PMH of T2DM, DVT, HTN, dyslipidemia, PVD s/p aorto-bifemoral bypass in 2017, + COVID-19 in 2020, prior hemorrhagic stroke, seizure on Keppra, left CVA s/p prior CEA performed at [facility] with known L ICA stenosis deemed a high risk for surgical intervention chronically anticoagulated on Eliquis who presents to [facility] ED on 8/18 with complaints of transient right-sided weakness.      She is followed by Dr. Hitchcock last evaluated in his office in June with carotid duplex revealing unchanged L ICA stenosis. She is a complicated vasculopath and is not a candidate for CEA due to her prior surgery. He recommended proceeding with left carotid stent placement, however the patient deferred. She was maintained on Eliquis and Plavix.     On the afternoon of admission the patient developed transient right-sided weakness and had an episode on the day prior to admission.  In the ER her NIH was 0 and CT of the head was negative for any acute process.    Dr. Hitchcock saw the patient in consultation and it was felt that the cause of her worsening was due to the fact that she stopped taking her Plavix several months ago because it was making her itch.  Plans were for a carotid stent under general anesthesia with the addition of Brilinta in addition to her current anticoagulants and continuing with permissive hypertension.    This morning the patient is awake alert and oriented.  She has no new complaints.  She is afebrile with most recent blood pressure of 151/77.  She is in normal sinus rhythm.  She is on room air.  Dr. Hitchcock has cleared her for telemetry transfer.    Plan:    Telemetry transfer  Brilinta  Eliquis  Keppra  Statin  Tentative plans are for  carotid stent under general anesthesia next week     I discussed the patient's findings and my recommendations with patient and nursing staff     Plan of care and goals reviewed with multidisciplinary team at daily rounds.    .    Bhaskar Fowler MD  Pulmonary and Critical Care Medicine  08/19/22 13:43 EDT

## 2022-08-19 NOTE — PLAN OF CARE
Goal Outcome Evaluation:  Plan of Care Reviewed With: patient           Outcome Evaluation: OT franny complete. Pt presents w/ decreased activity tolerance, generalized weakness (RUE>LUE), and mild balance deficits limiting her ADL independence. Pt CGA for STS & functional mobility in turcios w/ 1 LOB noted requiring min A to correct, I w/ LBD, min A for UBD. Pt would benefit from continued skilled IPOT services to address current functional deficits. Rec home w/ A at d/c.

## 2022-08-19 NOTE — THERAPY EVALUATION
Patient Name: Chika Posey  : 1945    MRN: [id number]                              Today's Date: 2022       Admit Date: 2022    Visit Dx:     ICD-10-CM ICD-9-CM   1. Severe bilateral carotid artery stenosis  I65.23 433.10     433.30   2. 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)   • Hypertensive encephalopathy   • Other forms of systemic lupus erythematosus (HCC)   • T2DM    • Basilar artery stenosis   • Stenosis of both vertebral arteries   • Chronic kidney disease   • Iron deficiency anemia due to chronic blood loss   • Seizure disorder on Keppra    • H/O left CEA, remote   • TIA   • Acute ischemic stroke (HCC)     Past Medical History:   Diagnosis Date   • Back pain    • Carotid artery occlusion    • Cerebral hemorrhage (HCC)    • Chronic kidney disease     MATT   • Compartment syndrome (HCC)    • DVT (deep venous thrombosis) (HCC)     Bilateral   • Generalized weakness    • Hyperlipidemia    • Hypertension    • Hypertensive encephalopathy    • Muscle weakness    • Paraparesis of both lower limbs (HCC)    • Peripheral vascular disease (HCC)    • Spell of altered cognition 3/6/2018   • Stroke (HCC)     bleed   • T2DM (type 2 diabetes mellitus) (HCC)      Past Surgical History:   Procedure Laterality Date   • AORTA BIFEMORAL BYPASS     • BREAST BIOPSY Right    • CAROTID ENDARTERECTOMY Left    • FOREARM FASCIOTOMY Left    • INTERVENTIONAL RADIOLOGY PROCEDURE Bilateral 2021    Procedure: Carotid Cerebral Angiogram;  Surgeon: Silvino Hitchcock MD;  Location: [facility] INVASIVE LOCATION;  Service: Interventional Radiology;  Laterality: Bilateral;   • THROMBOLYSIS     • TUBAL ABDOMINAL  LIGATION     • US ARTERIAL DOPPLER LOWER EXTREMITY  UNILATERAL     • VASCULAR SURGERY      Bilateral Stents      General Information     Row Name 08/19/22 1441          OT Time and Intention    Document Type evaluation  -MA     Mode of Treatment occupational therapy  -MA     Row Name 08/19/22 1441          General Information    Patient Profile Reviewed yes  -MA     Prior Level of Function independent:;bed mobility;ADL's;transfer;all household mobility;community mobility;dependent:;driving  Pt has RW that she does not use.  -MA     Existing Precautions/Restrictions fall  -MA     Barriers to Rehab medically complex  -MA     Row Name 08/19/22 1441          Living Environment    People in Home alone  -MA     Row Name 08/19/22 1441          Home Main Entrance    Number of Stairs, Main Entrance none  -MA     Row Name 08/19/22 1441          Stairs Within Home, Primary    Number of Stairs, Within Home, Primary none  -MA     Row Name 08/19/22 1441          Cognition    Orientation Status (Cognition) oriented x 3  -MA     Row Name 08/19/22 1441          Safety Issues, Functional Mobility    Safety Issues Affecting Function (Mobility) awareness of need for assistance;insight into deficits/self-awareness;safety precaution awareness;safety precautions follow-through/compliance;sequencing abilities  -MA     Impairments Affecting Function (Mobility) balance;endurance/activity tolerance;strength  -MA           User Key  (r) = Recorded By, (t) = Taken By, (c) = Cosigned By    Initials Name Provider Type    Angelika Ames OT Occupational Therapist                 Mobility/ADL's     Row Name 08/19/22 1442          Bed Mobility    Comment, (Bed Mobility) Pt received and left UIC  -MA     Row Name 08/19/22 1442          Transfers    Transfers sit-stand transfer;stand-sit transfer  -MA     Sit-Stand [place] (Transfers) contact guard;verbal cues  -MA     Stand-Sit [place] (Transfers) contact guard;verbal cues  -MA     Row  Name 08/19/22 1442          Functional Mobility    Functional Mobility- Ind. Level contact guard assist;verbal cues required  -MA     Functional Mobility-Distance (Feet) 80  -MA     Functional Mobility- Safety Issues balance decreased during turns;step length decreased;loses balance backward  -MA     Functional Mobility- Comment Pt w/ noted unsteadiness w/ no AD, 1 LOB req min A to correct. Pt refused RW despite encouragement.  -MA     Row Name 08/19/22 1442          Activities of Daily Living    BADL Assessment/Intervention lower body dressing;upper body dressing  -MA     Row Name 08/19/22 1442          Lower Body Dressing Assessment/Training    [place] Level (Lower Body Dressing) don;doff;socks;independent  -MA     Position (Lower Body Dressing) unsupported sitting  -MA     Row Name 08/19/22 1442          Upper Body Dressing Assessment/Training    [place] Level (Upper Body Dressing) don;doff;pajama/robe;minimum assist (75% patient effort);verbal cues  -MA     Position (Upper Body Dressing) unsupported sitting  -MA           User Key  (r) = Recorded By, (t) = Taken By, (c) = Cosigned By    Initials Name Provider Type    Angelika Ames OT Occupational Therapist               Obj/Interventions     Row Name 08/19/22 1444          Sensory Assessment (Somatosensory)    Sensory Assessment (Somatosensory) UE sensation intact  -MA     Row Name 08/19/22 1444          Vision Assessment/Intervention    Visual Impairment/Limitations WFL  -MA     Row Name 08/19/22 1444          Range of Motion Comprehensive    General Range of Motion bilateral upper extremity ROM WFL  -MA     Row Name 08/19/22 1444          Strength Comprehensive (MMT)    General Manual Muscle Testing (MMT) Assessment upper extremity strength deficits identified  -MA     Comment, General Manual Muscle Testing (MMT) Assessment LUE grossly 4/5, RUE grossly 4-/5  -MA     Row Name 08/19/22 1444          Balance    Balance Assessment sitting static  balance;sitting dynamic balance;sit to stand dynamic balance;standing static balance;standing dynamic balance  -MA     Static Sitting Balance independent  -MA     Dynamic Sitting Balance independent  -MA     Position, Sitting Balance unsupported;sitting in chair  -MA     Sit to Stand Dynamic Balance contact guard;verbal cues  -MA     Static Standing Balance contact guard;verbal cues  -MA     Dynamic Standing Balance contact guard;verbal cues  -MA     Position/Device Used, Standing Balance unsupported  -MA     Balance Interventions sitting;sit to stand;occupation based/functional task  -MA     Comment, Balance Pt w/ 1 noted LOB requiring min A to correct  -MA           User Key  (r) = Recorded By, (t) = Taken By, (c) = Cosigned By    Initials Name Provider Type    Angelika Ames, OT Occupational Therapist               Goals/Plan     Row Name 08/19/22 1447          Transfer Goal 1 (OT)    Activity/Assistive Device (Transfer Goal 1, OT) bed-to-chair/chair-to-bed;toilet;commode;walker, rolling  -MA     [place] Level/Cues Needed (Transfer Goal 1, OT) supervision required  -MA     Time Frame (Transfer Goal 1, OT) long term goal (LTG);10 days  -MA     Progress/Outcome (Transfer Goal 1, OT) goal ongoing  -MA     Row Name 08/19/22 1447          Grooming Goal 1 (OT)    Activity/Device (Grooming Goal 1, OT) hair care;oral care;wash face, hands  standing sinkside w/ no LOB noted  -MA     [place] (Grooming Goal 1, OT) standby assist  -MA     Time Frame (Grooming Goal 1, OT) long term goal (LTG);10 days  -MA     Progress/Outcome (Grooming Goal 1, OT) goal ongoing  -MA     Row Name 08/19/22 1447          Strength Goal 1 (OT)    Strength Goal 1 (OT) Pt will tolerate 10 reps of BUE TE to support ADL independence.  -MA     Time Frame (Strength Goal 1, OT) long term goal (LTG);10 days  -MA     Progress/Outcome (Strength Goal 1, OT) goal ongoing  -MA     Row Name 08/19/22 1447          Therapy Assessment/Plan (OT)     Planned Therapy Interventions (OT) activity tolerance training;adaptive equipment training;functional balance retraining;IADL retraining;occupation/activity based interventions;ROM/therapeutic exercise;strengthening exercise;transfer/mobility retraining  -MA           User Key  (r) = Recorded By, (t) = Taken By, (c) = Cosigned By    Initials Name Provider Type    Angelika Ames, TONO Occupational Therapist               Clinical Impression     [facility] Name 08/19/22 1445          Pain Assessment    Pretreatment Pain Rating 0/10 - no pain  -MA     Posttreatment Pain Rating 0/10 - no pain  -MA     [facility] Name 08/19/22 1445          Plan of Care Review    Plan of Care Reviewed With patient  -MA     Outcome Evaluation OT eval complete. Pt presents w/ decreased activity tolerance, generalized weakness (RUE>LUE), and mild balance deficits limiting her ADL independence. Pt CGA for STS & functional mobility in turcios w/ 1 LOB noted requiring min A to correct, I w/ LBD, min A for UBD. Pt would benefit from continued skilled IPOT services to address current functional deficits. Rec home w/ A at d/c.  -MA     Row Name 08/19/22 1445          Therapy Assessment/Plan (OT)    Rehab Potential (OT) good, to achieve stated therapy goals  -MA     Criteria for Skilled Therapeutic Interventions Met (OT) yes;skilled treatment is necessary  -MA     Therapy Frequency (OT) daily  -MA     Row Name 08/19/22 1445          Therapy Plan Review/Discharge Plan (OT)    Anticipated Discharge Disposition (OT) home with assist  -MA     Row Name 08/19/22 1445          Vital Signs    Pre Systolic BP Rehab 102  -MA     Pre Treatment Diastolic BP 90  -MA     Post Systolic BP Rehab 147  -MA     Post Treatment Diastolic BP 81  -MA     Pretreatment Heart Rate (beats/min) 87  -MA     Posttreatment Heart Rate (beats/min) 84  -MA     Pre SpO2 (%) 99  -MA     O2 Delivery Pre Treatment room air  -MA     O2 Delivery Intra Treatment room air  -MA     Post SpO2 (%) 98  -MA      O2 Delivery Post Treatment room air  -MA     Pre Patient Position Sitting  -MA     Intra Patient Position Standing  -MA     Post Patient Position Sitting  -MA     Row Name 08/19/22 1444          Positioning and Restraints    Pre-Treatment Position sitting in chair/recliner  -MA     Post Treatment Position chair  -MA     In Chair notified nsg;reclined;call light within reach;encouraged to call for assist;exit alarm on;waffle cushion;legs elevated;RUE elevated;LUE elevated  -MA           User Key  (r) = Recorded By, (t) = Taken By, (c) = Cosigned By    Initials Name Provider Type    Angelika Ames, TONO Occupational Therapist               Outcome Measures     Row Name 08/19/22 1448          How much help from another is currently needed...    Putting on and taking off regular lower body clothing? 3  -MA     Bathing (including washing, rinsing, and drying) 3  -MA     Toileting (which includes using toilet bed pan or urinal) 3  -MA     Putting on and taking off regular upper body clothing 3  -MA     Taking care of personal grooming (such as brushing teeth) 3  -MA     Eating meals 4  -MA     AM-PAC 6 Clicks Score (OT) 19  -MA     Row Name 08/19/22 1030          How much help from another person do you currently need...    Turning from your back to your side while in flat bed without using bedrails? 4  -AY (r) AB (t) AY (c)     Moving from lying on back to sitting on the side of a flat bed without bedrails? 3  -AY (r) AB (t) AY (c)     Moving to and from a bed to a chair (including a wheelchair)? 3  -AY (r) AB (t) AY (c)     Standing up from a chair using your arms (e.g., wheelchair, bedside chair)? 3  -AY (r) AB (t) AY (c)     Climbing 3-5 steps with a railing? 3  -AY (r) AB (t) AY (c)     To walk in hospital room? 3  -AY (r) AB (t) AY (c)     AM-PAC 6 Clicks Score (PT) 19  -AY (r) AB (t)     Highest level of mobility 6 --> Walked 10 steps or more  -AY (r) AB (t)     Row Name 08/19/22 1519          Modified [place]  Scale    Pre-Stroke Modified Sara Scale 6 - Unable to determine (UTD) from the medical record documentation  -MA     Modified Ivins Scale 2 - Slight disability.  Unable to carry out all previous activities but able to look after own affairs without assistance.  -MA     Row Name 08/19/22 1448 08/19/22 1030       Functional Assessment    Outcome Measure Options AM-PAC 6 Clicks Daily Activity (OT);Modified Ivins  -MA AM-PAC 6 Clicks Basic Mobility (PT)  -AY (r) AB (t) AY (c)          User Key  (r) = Recorded By, (t) = Taken By, (c) = Cosigned By    Initials Name Provider Type    Chyna Velasquez, PT Physical Therapist    Angelika Ames, OT Occupational Therapist    Chyna Milner, PT Student PT Student                Occupational Therapy Education                 Title: PT OT SLP Therapies (In Progress)     Topic: Occupational Therapy (In Progress)     Point: ADL training (In Progress)     Description:   Instruct learner(s) on proper safety adaptation and remediation techniques during self care or transfers.   Instruct in proper use of assistive devices.              Learning Progress Summary           Patient Acceptance, E, NR by MA at 8/19/2022 1449                   Point: Home exercise program (Not Started)     Description:   Instruct learner(s) on appropriate technique for monitoring, assisting and/or progressing therapeutic exercises/activities.              Learner Progress:  Not documented in this visit.          Point: Precautions (In Progress)     Description:   Instruct learner(s) on prescribed precautions during self-care and functional transfers.              Learning Progress Summary           Patient Acceptance, E, NR by MA at 8/19/2022 1449                   Point: Body mechanics (In Progress)     Description:   Instruct learner(s) on proper positioning and spine alignment during self-care, functional mobility activities and/or exercises.              Learning Progress Summary            Patient Acceptance, E, NR by MA at 8/19/2022 1449                               User Key     Initials Effective Dates Name Provider Type Discipline    MA 11/19/20 -  Angelika Pleitez OT Occupational Therapist OT              OT Recommendation and Plan  Planned Therapy Interventions (OT): activity tolerance training, adaptive equipment training, functional balance retraining, IADL retraining, occupation/activity based interventions, ROM/therapeutic exercise, strengthening exercise, transfer/mobility retraining  Therapy Frequency (OT): daily  Plan of Care Review  Plan of Care Reviewed With: patient  Outcome Evaluation: OT eval complete. Pt presents w/ decreased activity tolerance, generalized weakness (RUE>LUE), and mild balance deficits limiting her ADL independence. Pt CGA for STS & functional mobility in turcios w/ 1 LOB noted requiring min A to correct, I w/ LBD, min A for UBD. Pt would benefit from continued skilled IPOT services to address current functional deficits. Rec home w/ A at d/c.     Time Calculation:    Time Calculation- OT     Row Name 08/19/22 1449             Time Calculation- OT    OT Start Time 1359  -MA      OT Received On 08/19/22  -MA      OT Goal Re-Cert Due Date 08/29/22  -MA              Timed Charges    37898 - OT Therapeutic Activity Minutes 6  -MA      25230 - OT Self Care/Mgmt Minutes 3  -MA              Untimed Charges    OT Eval/Re-eval Minutes 38  -MA              Total Minutes    Timed Charges Total Minutes 9  -MA      Untimed Charges Total Minutes 38  -MA       Total Minutes 47  -MA            User Key  (r) = Recorded By, (t) = Taken By, (c) = Cosigned By    Initials Name Provider Type    MA Angelika Pleitez OT Occupational Therapist              Therapy Charges for Today     Code Description Service Date Service Provider Modifiers Qty    34039446186 HC OT THERAPEUTIC ACT EA 15 MIN 8/19/2022 Angelika Pleitez OT GO 1    37540581495 HC OT EVAL LOW COMPLEXITY 3 8/19/2022 Pricilla  Angelika, OT GO 1               Angelika Pleitez OT  8/19/2022

## 2022-08-19 NOTE — ED PROVIDER NOTES
Subjective   Pt presents with stroke-like symptoms earlier today that have resolved.    yesterday she had approx 15 minute episode of total right sided weakness.  It resolved.  Family tried to get her to seek eval and she refused.  Today she had another episode this time also with speech impairment and she agreed to see PCP.  PCP promptly sent her here for evaluation.  She denies any symptoms at this time.    Per chart review she has severe bilateral carotid disease and has been refusing stenting.  She is on DAPT therapy but she hasn't been taking her Plavix because it made her itch.      History provided by:  Patient, relative and medical records      Review of Systems   Constitutional: Negative for fever.   Respiratory: Negative for cough and shortness of breath.    Cardiovascular: Negative for chest pain.   Gastrointestinal: Negative for vomiting.   Neurological: Positive for speech difficulty and weakness.   All other systems reviewed and are negative.      Past Medical History:   Diagnosis Date   • Back pain    • Carotid artery occlusion    • Cerebral hemorrhage (HCC)    • Chronic kidney disease     MATT   • Compartment syndrome (HCC)    • DVT (deep venous thrombosis) (HCC)     Bilateral   • Generalized weakness    • Hyperlipidemia    • Hypertension    • Hypertensive encephalopathy    • Muscle weakness    • Paraparesis of both lower limbs (HCC)    • Peripheral vascular disease (HCC)    • Spell of altered cognition 3/6/2018   • Stroke (HCC)     bleed   • T2DM (type 2 diabetes mellitus) (HCC)        Allergies   Allergen Reactions   • Adhesive Tape Hives   • Clopidogrel Itching     Itching   • Lactose Intolerance (Gi) GI Bleeding   • Morphine And Related Itching   • Plavix [Clopidogrel Bisulfate] Itching       Past Surgical History:   Procedure Laterality Date   • AORTA BIFEMORAL BYPASS     • BREAST BIOPSY Right    • CAROTID ENDARTERECTOMY Left    • FOREARM FASCIOTOMY Left    • INTERVENTIONAL RADIOLOGY PROCEDURE  Bilateral 2021    Procedure: Carotid Cerebral Angiogram;  Surgeon: Silvino Hitchcock MD;  Location: [facility] INVASIVE LOCATION;  Service: Interventional Radiology;  Laterality: Bilateral;   • THROMBOLYSIS     • TUBAL ABDOMINAL LIGATION     • US ARTERIAL DOPPLER LOWER EXTREMITY  UNILATERAL     • VASCULAR SURGERY      Bilateral Stents       Family History   Problem Relation Age of Onset   • Hypertension Mother    • Stroke Mother    • Diabetes Mother    • Hypertension Father    • Stroke Father    • No Known Problems Sister    • No Known Problems Son    • Breast cancer Neg Hx    • Ovarian cancer Neg Hx        Social History     Socioeconomic History   • Marital status: Single   • Number of children: 1   Tobacco Use   • Smoking status: Former Smoker     Packs/day: 1.00     Years: 25.00     Pack years: 25.00     Types: Cigarettes     Quit date: 3/22/2008     Years since quittin.4   • Smokeless tobacco: Never Used   Vaping Use   • Vaping Use: Never used   Substance and Sexual Activity   • Alcohol use: Yes     Comment: occassional   • Drug use: No   • Sexual activity: Defer           Objective   Physical Exam  Vitals and nursing note reviewed.   Constitutional:       General: She is not in acute distress.     Appearance: Normal appearance. She is not ill-appearing.   HENT:      Head: Normocephalic and atraumatic.      Mouth/Throat:      Mouth: Mucous membranes are moist.   Eyes:      General: No scleral icterus.        Right eye: No discharge.         Left eye: No discharge.      Conjunctiva/sclera: Conjunctivae normal.   Cardiovascular:      Rate and Rhythm: Normal rate and regular rhythm.      Heart sounds: No murmur heard.  Pulmonary:      Effort: Pulmonary effort is normal. No respiratory distress.      Breath sounds: Normal breath sounds. No wheezing.   Abdominal:      General: Bowel sounds are normal. There is no distension.      Palpations: Abdomen is soft.      Tenderness: There is no abdominal  tenderness. There is no guarding or rebound.   Musculoskeletal:         General: No swelling. Normal range of motion.      Cervical back: Normal range of motion and neck supple.   Skin:     General: Skin is warm and dry.      Findings: No rash.   Neurological:      General: No focal deficit present.      Mental Status: She is alert and oriented to person, place, and time. Mental status is at baseline.      Comments: Speech fluent and articulate.  No facial droop.  5/5 strength in arms and legs.  Normal .  Mentation normal.   Psychiatric:         Mood and Affect: Mood normal.         Behavior: Behavior normal.         Thought Content: Thought content normal.         Critical Care  Performed by: Praveen Esqueda MD  Authorized by: Praveen Esqueda MD     Critical care provider statement:     Critical care time (minutes):  35    Critical care time was exclusive of:  Separately billable procedures and treating other patients    Critical care was necessary to treat or prevent imminent or life-threatening deterioration of the following conditions:  CNS failure or compromise    Critical care was time spent personally by me on the following activities:  Discussions with consultants, examination of patient, obtaining history from patient or surrogate, ordering and performing treatments and interventions, ordering and review of laboratory studies, ordering and review of radiographic studies and review of old charts    I assumed direction of critical care for this patient from another provider in my specialty: no      Care discussed with: admitting provider                 ED Course         Records as above. Discussed with stroke team, pt over for emergent imaging.  Ischemia seen on perfusion, carotid disease redemonstrated.  On a Xa inhibitor so not a TNKase candidate.  Findings discussed with stroke team, they do not plan to take her to the lab. Recommend IV fluids and ICU admission.    Labs benign.  EKG SR.        Discussed with ICU attending.                             MDM  Number of Diagnoses or Management Options     Amount and/or Complexity of Data Reviewed  Clinical lab tests: ordered and reviewed  Tests in the radiology section of CPT®: ordered and reviewed  Decide to obtain previous medical records or to obtain history from someone other than the patient: yes  Obtain history from someone other than the patient: yes  Review and summarize past medical records: yes  Discuss the patient with other providers: yes  Independent visualization of images, tracings, or specimens: yes    Critical Care  Total time providing critical care: 30-74 minutes      Final diagnoses:   Acute ischemic stroke (HCC)       ED Disposition  ED Disposition     ED Disposition   Decision to Admit    Condition   --    Comment   Level of Care: Critical Care [6]   Admitting Physician: GABRIELA BALL [[id number]]               No follow-up provider specified.       Medication List      No changes were made to your prescriptions during this visit.          Praveen Esqueda MD  08/19/22 0019

## 2022-08-20 NOTE — PLAN OF CARE
Goal Outcome Evaluation:  Plan of Care Reviewed With: patient        Progress: improving  Outcome Evaluation: DC'd Home

## 2022-08-20 NOTE — PLAN OF CARE
Goal Outcome Evaluation:   Patient has been resting comfortably with no acute signs of distress. VSS. No significant changes in condition overnight. NIH 0. Ambulates well with little assistance. Will continue to monitor as patient progresses towards discharge.

## 2022-08-20 NOTE — DISCHARGE SUMMARY
Discharge Summary    Patient name: Chika Posey  CSN: [id number]  MRN: [id number]  : 1945  Today's date: 2022     Date of Admission: 2022  Date of Discharge:  2022    Admitting Physician:  Daniel Parker MD  Primary Care Provider: Alyssia Greenfield MD  Consultations:   Sobia Motley APRN  Neurology  Silvino Hitchcock MD  Neurosurgery    Admission Diagnosis: TIA     Discharge Diagnoses:   Active Hospital Problems    Diagnosis    • **TIA    • Stenosis of both vertebral arteries    • Severe bilateral carotid artery stenosis      S/P L CEA at [facility]      • Seizure disorder on Keppra     • Acute ischemic stroke (HCC)    • T2DM     • H/O DVT     • H/O ICH (3/2018)       While on warfarin  Felt to be spontaneous in nature       • Essential hypertension    • Chronic anticoagulation (Eliquis)     • PAD s/p aorto-bifemoral bypass       Initially performed at [facility]   Thrombosed her graft and required tPA at [facility] in           Allergies:  Adhesive tape, Clopidogrel, Lactose intolerance (gi), Morphine and related, and Plavix [clopidogrel bisulfate]    Code Status and Medical Interventions:   Ordered at: 22 1755     Code Status (Patient has no pulse and is not breathing):    CPR (Attempt to Resuscitate)     Medical Interventions (Patient has pulse or is breathing):    Full Support       Procedures/Testing:  CT head  CTA head/neck   CTP   MRI        History of Present Illness:  Chika Posey is a 77 y.o. female former smoker with PMH of T2DM, DVT, HTN, dyslipidemia, PVD s/p aorto-bifemoral bypass in , + COVID-19 in , prior hemorrhagic stroke, seizure on Keppra, left CVA s/p prior CEA performed at [facility] with known L ICA stenosis deemed a high risk for surgical intervention chronically anticoagulated on Eliquis who presents to [facility] ED on  with complaints of transient right-sided weakness.      She is followed by Dr. Hitchcock last evaluated in his office in   with carotid duplex revealing unchanged L ICA stenosis. She is a complicated vasculopath and is not a candidate for CEA due to her prior surgery. He recommended proceeding with left carotid stent placement, however the patient deferred. She was maintained on Eliquis and Plavix.      This afternoon the patient developed some right-sided weakness which resolved and had an episode yesterday as well. She reportedly has not been taking her Plavix due to itching. She was evaluated by her PCP who recommended ED evaluation. NIH 0 and CT head negative for an acute process. CTP does show a perfusion defect within the left cerebral hemisphere and CTA of the head/neck reiterates known critical bilateral ICA stenosis essentially unchanged from the prior study, high-grade mid right vertebral artery stenosis, and moderate left vertebral stenosis. Dr. Hitchcock was consulted and the patient is not a candidate for intervention at this time. She was given Brilinta with recommendation of permissive hypertension and will be admitted to the ICU for higher level of care.    Hospital Course:  The patient was admitted for reasons mentioned above in the HPI and underwent an ischemic stroke work-up which was negative. NIH remained a 0 and the etiology of her symptomatology was felt to be the patient's self-discontinuation of her Plavix dosing given her significant vascular history. She was placed on Brilinta with continuation of Eliquis and statin therapy. She was evaluated by Dr. Hitchcock who once again recommended proceeding with carotid stent placement as early as next week.     At this time it is felt that Ms. Posey has achieved maximum benefit from hospitalization with return to neurologic baseline, therefore is appropriate for discharge home today. She is to remain on Eliquis, Brilinta, and statin per neurosurgery's recommendation. The tentative plan is to perform a carotid stent placement on 8/25/22 by Dr. Hitchcock. The patient has been  "notified that she needs to contact Dr. Hitchcock's office on Monday to confirm she wishes to proceed with surgery and also to undergo pre-op clearance. Dr. Hitchcock recommends that if she agrees to surgery she will need to be off Eliquis for 24 hours. Medications and follow-ups listed below.     Vitals:  /87   Pulse 82   Temp 98.3 °F (36.8 °C) (Oral)   Resp 18   Ht 154.9 cm (61\")   Wt 57.6 kg (127 lb)   SpO2 99%   BMI 24.00 kg/m²     Physical Exam:  Constitutional:  Sitting upright in bed. Appears well-developed and well-nourished. No distress.   HEENT:  Normocephalic and atraumatic. PERRL  Neck:  Neck supple. No JVD present.   CV: Normal rate, regular rhythm,  intact distal pulses.  No gallop, murmur or rub.  Pulmonary/Chest: Effort normal and breath sounds normal. No respiratory distress. No wheezes, rhonchi or rales.   Abdominal: Soft. +BS. No distension and no mass. There is no tenderness.   Musculoskeletal: Decreased muscle tone and strength  Neurological: Alert and oriented to person, place, and time.  No focal deficits  Skin: Skin is warm and dry. No rash noted.   Extremities:  No clubbing, edema or cyanosis  Psychiatric: Normal mood and affect. Behavior is normal.     Interval: baseline  1a. Level of Consciousness: 0-->Alert, keenly responsive  1b. LOC Questions: 0-->Answers both questions correctly  1c. LOC Commands: 0-->Performs both tasks correctly  2. Best Gaze: 0-->Normal  3. Visual: 0-->No visual loss  4. Facial Palsy: 0-->Normal symmetrical movements  5a. Motor Arm, Left: 0-->No drift, limb holds 90 (or 45) degrees for full 10 secs  5b. Motor Arm, Right: 0-->No drift, limb holds 90 (or 45) degrees for full 10 secs  6a. Motor Leg, Left: 0-->No drift, leg holds 30 degree position for full 5 secs  6b. Motor Leg, Right: 0-->No drift, leg holds 30 degree position for full 5 secs  7. Limb Ataxia: 0-->Absent  8. Sensory: 0-->Normal, no sensory loss  9. Best Language: 0-->No aphasia, normal  10. " Dysarthria: 0-->Normal  11. Extinction and Inattention (formerly Neglect): 0-->No abnormality    Total (NIH Stroke Scale): 0    Labs:  Results from last 7 days   Lab Units 08/20/22  0506   WBC 10*3/mm3 7.88   HEMOGLOBIN g/dL 9.5*   HEMATOCRIT % 31.8*   PLATELETS 10*3/mm3 587*     Results from last 7 days   Lab Units 08/20/22  0506 08/18/22  1626 08/18/22  1625   SODIUM mmol/L 134*   < > 139   POTASSIUM mmol/L 4.0   < > 3.6   CHLORIDE mmol/L 103   < > 106   CO2 mmol/L 21.0*   < > 22.0   BUN mg/dL 12   < > 7*   CREATININE mg/dL 0.96   < > 1.07*   CALCIUM mg/dL 9.2   < > 9.5   BILIRUBIN mg/dL  --   --  0.5   ALK PHOS U/L  --   --  105   ALT (SGPT) U/L  --   --  33   AST (SGOT) U/L  --   --  36*   GLUCOSE mg/dL 115*   < > 110*    < > = values in this interval not displayed.         Magnesium   Date Value Ref Range Status   08/19/2022 1.8 1.6 - 2.4 mg/dL Final   08/18/2022 1.9 1.6 - 2.4 mg/dL Final     Phosphorus   Date Value Ref Range Status   08/19/2022 2.5 2.5 - 4.5 mg/dL Final                    Discharge Medications      New Medications      Instructions Start Date   ticagrelor 90 MG tablet tablet  Commonly known as: BRILINTA   90 mg, Oral, Every 12 Hours Scheduled         Continue These Medications      Instructions Start Date   amLODIPine 10 MG tablet  Commonly known as: NORVASC   10 mg, Oral, Daily      apixaban 2.5 MG tablet tablet  Commonly known as: ELIQUIS   2.5 mg, Oral, Every 12 Hours Scheduled      atorvastatin 80 MG tablet  Commonly known as: LIPITOR   80 mg, Oral, Nightly      cloNIDine 0.1 MG tablet  Commonly known as: CATAPRES   0.1 mg, Oral, Every 12 Hours Scheduled      hydrOXYzine pamoate 25 MG capsule  Commonly known as: VISTARIL   25 mg, Oral, Nightly      levETIRAcetam 750 MG tablet  Commonly known as: KEPPRA   750 mg, Oral, 2 Times Daily      losartan 100 MG tablet  Commonly known as: COZAAR   100 mg, Oral, Daily         Stop These Medications    clopidogrel 75 MG tablet  Commonly known as:  PLAVIX            Diet Instructions     Diet: Regular, Consistent Carbohydrate; Thin      Discharge Diet:  Regular  Consistent Carbohydrate       Fluid Consistency: Thin          Activity Instructions     Activity as Tolerated            Follow-up Appointments  Future Appointments   Date Time Provider Department Center   10/6/2022 11:00 AM Bruce Ceballos MD [facility]     Additional Instructions for the Follow-ups that You Need to Schedule     Discharge Follow-up with PCP   As directed       Currently Documented PCP:    Alyssia Greenfield MD    PCP Phone Number:    [phone]     Follow Up Details: In 2-4 weeks         Discharge Follow-up with Specified Provider: Dr. Hitchcock   As directed      To: Dr. Hitchcock    Follow Up Details: Patient to call Dr. Hitchcock's office on Monday to arrange potential carotid intervention               Discharge Instructions:  1. Discharge home today   2. Medications as above   3. Follow-ups as above  1. Contact Dr. Hitchcock's office on Monday if wishes to proceed with carotid stent placement tentatively scheduled for 8/25/22  2. If wishes to proceed with surgery Eliquis needs to be held for 24 hours per Dr. Hitchcock   4. Medication compliance strongly encouraged  5. If symptoms worsen or recur, seek medical attention or call 911      EDMOND Ayon, AGACNP-BC, FNP-BC   Pulmonary and Critical Care     Time: I spent  35  minutes on this discharge activity which included: face-to-face encounter with the patient, reviewing the data in the system, coordination of the care with the nursing staff as well as consultants, documentation, and entering orders.      CC: Alyssia Greenfield MD

## 2022-08-21 NOTE — OUTREACH NOTE
Prep Survey    Flowsheet Row Responses   [facility] facility patient discharged from? [place]   Is LACE score < 7 ? No   Emergency Room discharge w/ pulse ox? No   Eligibility Readm Mgmt   Discharge diagnosis TIA    Does the patient have one of the following disease processes/diagnoses(primary or secondary)? Stroke (TIA)   Does the patient have Home health ordered? No   Is there a DME ordered? No   Prep survey completed? Yes          HENRY GALLARDO - Registered Nurse

## 2022-08-22 NOTE — TELEPHONE ENCOUNTER
Provider:  Naldo  Surgery/Procedure:    Surgery/Procedure Date:    Last visit:   Office Visit with Silvino Hitchcock MD (06/17/2022)    Next visit: TBD     Reason for call:  Patient requesting to move forward with surgery. Patient was last seen on 06/17/22, does patient need to come in for Sx discussion?

## 2022-08-22 NOTE — TELEPHONE ENCOUNTER
Caller: Kathy Quinn    Relationship to patient: Emergency Contact    Best call back number:[phone]    Patient is needing:PATIENT IS CALLING TO STATE SHE WOULD LIKE TO GO FORWARD W/SURGERY, PLEASE CALL BACK TO SCHEDULE,     WT X1 NA

## 2022-08-24 NOTE — OUTREACH NOTE
Stroke Week 1 Survey    Flowsheet Row Responses   [facility] patient discharged from? [place]   Does the patient have one of the following disease processes/diagnoses(primary or secondary)? Stroke (TIA)   Week 1 attempt successful? Yes   Call start time 1134   Call end time 1137   Discharge diagnosis TIA    Is patient permission given to speak with other caregiver? Yes   List who call center can speak with son or sister   Meds reviewed with patient/caregiver? Yes   Is the patient having any side effects they believe may be caused by any medication additions or changes? No   Does the patient have all medications ordered at discharge? Yes   Is the patient taking all medications as directed (includes completed medication regime)? Yes   Does the patient have a primary care provider?  Yes   Does the patient have an appointment with their PCP within 7 days of discharge? Yes   Comments regarding PCP .   Has the patient kept scheduled appointments due by today? N/A   What is the Home health agency?  .   Does the patient require any assistance with activities of daily living such as eating, bathing, dressing, walking, etc.? No   Does the patient have any residual symptoms from stroke/TIA? No   Comments Pt denies issues.   Did the patient receive a copy of their discharge instructions? Yes   Nursing interventions Reviewed instructions with patient   What is the patient's perception of their health status since discharge? Returned to baseline/stable   Nursing interventions Nurse provided patient education   Is the patient/caregiver able to teach back the risk factors for a stroke? High blood pressure-goal below 120/80   Is the patient/caregiver able to teach back signs and symptoms related to disease process for when to call 911? Yes   If the patient is a current smoker, are they able to teach back resources for cessation? Not a smoker   Is the patient/caregiver able to teach back the hierarchy of who to call/visit for  symptoms/problems? PCP, Specialist, Home health nurse, Urgent Care, ED, 911 Yes   Week 1 call completed? Yes   Revoked No further contact(revokes)-requires comment   Is the patient interested in additional calls from an ambulatory ?  NOTE:  applies to high risk patients requiring additional follow-up. No   Graduated/Revoked comments goals met/stable          LISSET SANTOS - Registered Nurse

## 2022-08-25 PROBLEM — Z87.891 FORMER SMOKER: Status: ACTIVE | Noted: 2022-01-01

## 2022-08-25 PROBLEM — E78.5 DYSLIPIDEMIA: Status: ACTIVE | Noted: 2022-01-01

## 2022-08-25 PROBLEM — Z91.199 HISTORY OF NONCOMPLIANCE WITH MEDICAL TREATMENT: Status: ACTIVE | Noted: 2022-01-01

## 2022-08-25 PROBLEM — E78.5 DYSLIPIDEMIA: Chronic | Status: ACTIVE | Noted: 2022-01-01

## 2022-08-25 PROBLEM — I65.22 CAROTID STENOSIS, LEFT: Status: ACTIVE | Noted: 2022-01-01

## 2022-08-25 NOTE — ANESTHESIA PROCEDURE NOTES
Airway  Urgency: elective    Date/Time: 8/25/2022 11:39 AM  Airway not difficult    General Information and Staff    Patient location during procedure: OR  CRNA/CAA: Richardson Ott CRNA  SRNA: Denzel Gaspar SRNA  Indications and Patient Condition  Indications for airway management: airway protection    Preoxygenated: yes  MILS not maintained throughout  Mask difficulty assessment: 1 - vent by mask    Final Airway Details  Final airway type: endotracheal airway      Successful airway: ETT  Cuffed: yes   Successful intubation technique: direct laryngoscopy  Endotracheal tube insertion site: oral  Blade: Rama  Blade size: 3  ETT size (mm): 7.0  Cormack-Lehane Classification: grade I - full view of glottis  Placement verified by: chest auscultation and capnometry   Cuff volume (mL): 7  Measured from: lips  ETT/EBT  to lips (cm): 21  Number of attempts at approach: 1  Assessment: lips, teeth, and gum same as pre-op and atraumatic intubation    Additional Comments  Negative epigastric sounds, Breath sound equal bilaterally with symmetric chest rise and fall

## 2022-08-25 NOTE — ANESTHESIA PREPROCEDURE EVALUATION
Anesthesia Evaluation     Patient summary reviewed and Nursing notes reviewed   NPO Solid Status: > 8 hours  NPO Liquid Status: > 2 hours           Airway   Mallampati: I  TM distance: >3 FB  Neck ROM: full  No difficulty expected  Dental    (+) upper dentures    Pulmonary     breath sounds clear to auscultation  (+) a smoker Former,   Cardiovascular     ECG reviewed  Rhythm: regular  Rate: normal    (+) hypertension, PVD, hyperlipidemia,  carotid artery disease carotid bilateral      Neuro/Psych  (+) seizures well controlled, TIA, CVA,    GI/Hepatic/Renal/Endo    (+)   renal disease, diabetes mellitus,     Musculoskeletal     Abdominal    Substance History      OB/GYN          Other                      Anesthesia Plan    ASA 3     general     intravenous induction     Anesthetic plan, risks, benefits, and alternatives have been provided, discussed and informed consent has been obtained with: patient and child.    Plan discussed with CRNA.        CODE STATUS:

## 2022-08-25 NOTE — ANESTHESIA POSTPROCEDURE EVALUATION
Patient: Chika Posey    Procedure Summary     Date: 08/25/22 Room / Location: [facility] /  [facility]    Anesthesia Start: 1129 Anesthesia Stop: 1450    Procedure: Carotid Stent (Left ) Diagnosis:       H/O carotid endarterectomy      Bilateral carotid artery stenosis      Left carotid artery stenosis      (H/O carotid endarterectomy [Z98.890] Bilateral carotid artery stenosis [I65.23])    Providers: Silvino Hitchcock MD Provider: Lewis Estes MD    Anesthesia Type: general ASA Status: 3          Anesthesia Type: general    Vitals  Vitals Value Taken Time   BP 83/52 08/28/22 1000   Temp 97.3 °F (36.3 °C) 08/28/22 0800   Pulse 68 08/28/22 1000   Resp 16 08/28/22 0800   SpO2 94 % 08/28/22 1000           Post Anesthesia Care and Evaluation    Patient location during evaluation: PACU  Patient participation: complete - patient participated  Level of consciousness: awake and alert  Pain management: adequate    Airway patency: patent  Anesthetic complications: No anesthetic complications  PONV Status: none  Cardiovascular status: hemodynamically stable and acceptable  Respiratory status: nonlabored ventilation, acceptable and nasal cannula  Hydration status: acceptable

## 2022-08-26 PROBLEM — K92.1 MELENA: Status: ACTIVE | Noted: 2022-01-01

## 2022-08-26 PROBLEM — D62 ACUTE BLOOD LOSS ANEMIA: Status: ACTIVE | Noted: 2022-01-01

## 2022-08-27 NOTE — ANESTHESIA PREPROCEDURE EVALUATION
Anesthesia Evaluation     Patient summary reviewed and Nursing notes reviewed   NPO Solid Status: > 8 hours  NPO Liquid Status: > 8 hours           Airway   Mallampati: I  TM distance: >3 FB  Neck ROM: full  No difficulty expected  Dental      Pulmonary     breath sounds clear to auscultation  Cardiovascular     ECG reviewed  Rhythm: regular  Rate: normal    (+) hypertension, PVD,  carotid artery disease carotid bilateral      Neuro/Psych  (+) seizures, TIA, CVA,    GI/Hepatic/Renal/Endo    (+)   renal disease, diabetes mellitus,     Musculoskeletal     (+) back pain,   Abdominal    Substance History      OB/GYN          Other                        Anesthesia Plan    ASA 3     general   total IV anesthesia  intravenous induction     Anesthetic plan, risks, benefits, and alternatives have been provided, discussed and informed consent has been obtained with: patient.    Plan discussed with CRNA.        CODE STATUS:    Level Of Support Discussed With: Patient  Code Status (Patient has no pulse and is not breathing): CPR (Attempt to Resuscitate)  Medical Interventions (Patient has pulse or is breathing): Full Support  Release to patient: Routine Release

## 2022-08-27 NOTE — ANESTHESIA POSTPROCEDURE EVALUATION
Patient: Chika Posey    Procedure Summary     Date: 08/27/22 Room / Location:  [facility]    Anesthesia Start: 0924 Anesthesia Stop:     Procedure: ESOPHAGOGASTRODUODENOSCOPY (N/A ) Diagnosis:       Acute blood loss anemia      Melena      (Acute blood loss anemia [D62])      (Melena [K92.1])    Surgeons: Brunner, Mark I, MD Provider: Lewis Estes MD    Anesthesia Type: general ASA Status: 3          Anesthesia Type: general    Vitals  Vitals Value Taken Time   /82 08/27/22 0940   Temp 97 °F (36.1 °C) 08/27/22 0940   Pulse 80 08/27/22 0940   Resp 14 08/27/22 0940   SpO2 100 % 08/27/22 0940           Post Anesthesia Care and Evaluation    Patient location during evaluation: bedside  Patient participation: complete - patient participated  Level of consciousness: lethargic  Pain management: adequate    Airway patency: patent  Anesthetic complications: No anesthetic complications  PONV Status: none  Cardiovascular status: hemodynamically stable and acceptable  Respiratory status: nonlabored ventilation, acceptable and nasal cannula  Hydration status: acceptable

## 2022-08-29 NOTE — OUTREACH NOTE
Prep Survey    Flowsheet Row Responses   [facility] facility patient discharged from? [place]   Is LACE score < 7 ? No   Emergency Room discharge w/ pulse ox? No   Eligibility Readm Mgmt   Discharge diagnosis Acute blood loss anemia   Does the patient have one of the following disease processes/diagnoses(primary or secondary)? General Surgery   Does the patient have Home health ordered? No   Is there a DME ordered? No   Prep survey completed? Yes          CATHI SUN - Registered Nurse         Continue Regimen: Clobetasol sol QD and once a week  cover the scalp with shower cap for 1 hour Detail Level: Zone Render In Strict Bullet Format?: No

## 2022-08-31 NOTE — OUTREACH NOTE
General Surgery Week 1 Survey    Flowsheet Row Responses   [facility] patient discharged from? [place]   Does the patient have one of the following disease processes/diagnoses(primary or secondary)? General Surgery   Week 1 attempt successful? Yes   Call start time 1153   Call end time 1157   Discharge diagnosis Acute blood loss anemia   Person spoke with today (if not patient) and relationship patient   Meds reviewed with patient/caregiver? Yes   Is the patient having any side effects they believe may be caused by any medication additions or changes? No   Does the patient have all medications related to this admission filled (includes all antibiotics, pain medications, etc.) Yes   Is the patient taking all medications as directed (includes completed medication regime)? Yes   Does the patient have a follow up appointment scheduled with their surgeon? Yes   Has the patient kept scheduled appointments due by today? N/A   Comments Patient has a followup with [facility] surgery on 10/6/2022   Psychosocial issues? No   Comments Pt denies issues.   Did the patient receive a copy of their discharge instructions? Yes   Nursing interventions Reviewed instructions with patient   What is the patient's perception of their health status since discharge? Improving   Nursing interventions Nurse provided patient education   Is the patient /caregiver able to teach back basic post-op care? Keep incision areas clean,dry and protected, Take showers only when approved by MD-sponge bathe until then, No tub bath, swimming, or hot tub until instructed by MD   Is the patient/caregiver able to teach back signs and symptoms of incisional infection? Increased redness, swelling or pain at the incisonal site, Increased drainage or bleeding, Incisional warmth, Pus or odor from incision, Fever   Is the patient/caregiver able to teach back steps to recovery at home? Set small, achievable goals for return to baseline health, Rest and rebuild  strength, gradually increase activity, Make a list of questions for surgeon's appointment   If the patient is a current smoker, are they able to teach back resources for cessation? Not a smoker   Is the patient/caregiver able to teach back the hierarchy of who to call/visit for symptoms/problems? PCP, Specialist, Home health nurse, Urgent Care, ED, 911 Yes   Additional teach back comments Doing well.    Week 1 call completed? Yes          DAVID SUN - Registered Nurse

## 2022-09-08 NOTE — OUTREACH NOTE
General Surgery Week 2 Survey    Flowsheet Row Responses   [facility] facility patient discharged from? [place]   Does the patient have one of the following disease processes/diagnoses(primary or secondary)? General Surgery   Week 2 attempt successful? No   Unsuccessful attempts Attempt 1          CHRIS GALLOWAY - Registered Nurse

## 2022-09-12 NOTE — TELEPHONE ENCOUNTER
Provider:  Naldo  Surgery/Procedure:  Carotid Stent  Surgery/Procedure Date:  8-25-22  Last visit:  6-17-22   Next visit: NA     Reason for call: Patient called and said that she was having a lot of N&V she thinks it might be coming from the [facility].  Is there anything else she could try.  Please Advise. Thank you.

## 2022-09-12 NOTE — TELEPHONE ENCOUNTER
If patient has been taking it for the last few weeks it is unlikely to be from the Brilinta, she should probably see her primary care provider for an in-depth assessment.  Please reiterate to her that she should not discontinue the Brilinta without our permission.

## 2022-09-12 NOTE — TELEPHONE ENCOUNTER
Called and s/w pt.  She states she has been having the N/V for about a week now.  She states the Brilinta has these side effects listed.      I explained to the patient that it is unlikely that the Brilinta is causing her symptoms and recommended she be evaluated by her PCP.  She states she is unable to see her PCP until next month.  She admits to having some dizziness lately but denies any other symptoms or changes in vision, etc.  She denies fever or other illness.      I advised pt to monitor for new or worsening symptoms and to see UC if needed so she may be evaluated sooner than next month in case she is coming down with something else.      I was very adamant that the pt not d/c her Brilinta and stressed the importance of continuing to take it.  She verbalized understanding and was thankful for the call.

## 2022-09-20 NOTE — TELEPHONE ENCOUNTER
Provider:  Dr. Hitchcock  Surgery/Procedure:  Bilateral Carotid Duplex (08/26/2022 12:07)  Duplex Carotid Ultrasound CAR (06/15/2022 16:45)    Surgery/Procedure Date:  8/26/22;; 6/15/22;; several older US' in [facility]  Last visit:   Office Visit with Silvino Hitchcock MD (06/17/2022)    Next visit: ---     Reason for call:     Patient is prescribed Brilinta by Dr. Hitchcock. She states it is causing her a lot of nausea and would like to know if she can take something else.

## 2022-09-21 NOTE — TELEPHONE ENCOUNTER
"Patient's daughter called back regarding Chika's discomfort with this medication. I attempted to call [phone], no answer and VM was full. I will attempt to reach patient via VIV[facility] as well.     \"Hedayron Farr,     In previous encounter, you were taking Plavix but we discontinued it due to causing you lots of itching. Brilinta unfortunately is now causing you nausea.      We can switch you back to Plavix and Eliquis but because of your medical history, you will need one or the other, probably for life.      We just need to determine at this point, which side effect is most tolerable so that we can keep you at a healthy baseline. Plavix with possible itching, or the Brilinta with nausea.      I tried to call you back at the [phone] number, but was unable to leave a message due to the mailbox being full.      Please let me know how you'd like to move forward with your medications.      Thank you,      Maria Esther ROSSI CMA         \"      This patient is a bit complicated.  She is 4 weeks out from her CEA (done 8/26/22)but has had a history of GI issues and bleeding.  You had mentioned the possibility of aspirin Plavix and Eliquis but wanted her on at least a dual antiplatelet for 6 weeks post surgery.  Do have recommendations since she says she is having nausea with the Brilinta?   She had been on Plavix initially, but had stopped it herself as she said it caused \"itching\".     Per Dr. Hitchcock  She can switch to Plavix and Eliquis.  Unfortunately, she will have to choose which bothers less, Plavix or Brilinta.  She will need one of the other probably for life.       "

## 2022-10-18 ENCOUNTER — TELEPHONE (OUTPATIENT)
Dept: CARDIAC SURGERY | Facility: CLINIC | Age: 77
End: 2022-10-18

## 2022-10-18 NOTE — TELEPHONE ENCOUNTER
“Please be informed that patient has passed. Patient has been marked  in the system. The date of death is: 10/3/22    Caller: Kathy Quinn    Relationship: Emergency Contact    Best call back number: [phone]

## 2023-08-03 NOTE — TELEPHONE ENCOUNTER
Please let patient know that her tsh is down to 2.163 which is very good and is even lower than what it was when Dr. Gilmore was checking it. I would not recommend going up on the [place] thyroid at this time. Please have her keep her f/u with Dr. Carroll Pt received recall letter per dl for 6 mon f/u, pt verified demo

## (undated) DEVICE — LEX NEURO ANGIOGRAPHY: Brand: MEDLINE INDUSTRIES, INC.

## (undated) DEVICE — CATH ANGIO SIM2 HNB5.0 .038 100 P NS

## (undated) DEVICE — RADIFOCUS GLIDEWIRE: Brand: GLIDEWIRE

## (undated) DEVICE — CONTN GRAD MEAS TRIANG 32OZ BLK

## (undated) DEVICE — CVR PROB ULTRASND/TRANSD W/GEL 7X11IN STRL

## (undated) DEVICE — VIATRAC 14 PLUS PERIPHERAL DILATATION CATHETER 5.0 MM X 20 MM X 135 CM: Brand: VIATRAC

## (undated) DEVICE — GLIDESHEATH SLENDER STAINLESS STEEL KIT: Brand: GLIDESHEATH SLENDER

## (undated) DEVICE — STPCK 3/WY HP M/RA W/OFF/HNDL 1050PSI STRL

## (undated) DEVICE — ROTATING HEMOSTATIC VALVE .096": Brand: RHV

## (undated) DEVICE — RADIFOCUS TORQUE DEVICE MULTI-TORQUE VISE: Brand: RADIFOCUS TORQUE DEVICE

## (undated) DEVICE — R2P DESTINATION SLENDER GUIDING SHEATH: Brand: R2P DESTINATION SLENDER

## (undated) DEVICE — SYR LUERLOK 50ML

## (undated) DEVICE — SKIN PREP TRAY W/CHG: Brand: MEDLINE INDUSTRIES, INC.

## (undated) DEVICE — BAREWIRE WORKHORSE FILTER DELIVERY WIRE 315 CM: Brand: BAREWIRE

## (undated) DEVICE — CATH MIC PHENOM .021 .018IN 160CM

## (undated) DEVICE — Device

## (undated) DEVICE — LUBE GEL ENDOGLIDE 1.1OZ

## (undated) DEVICE — DEV INFL MONARCH 25W

## (undated) DEVICE — SPNG ENDO BEDSIDE TUB ENZYM

## (undated) DEVICE — NC TREK CORONARY DILATATION CATHETER 3.0 MM X 20 MM / RAPID-EXCHANGE: Brand: NC TREK

## (undated) DEVICE — KT ORCA ORCAPOD DISP STRL

## (undated) DEVICE — SOL IRR H2O BTL 1000ML STRL

## (undated) DEVICE — PINNACLE INTRODUCER SHEATH: Brand: PINNACLE

## (undated) DEVICE — STPCK LP 1WY RA 200PSI

## (undated) DEVICE — KT VLV HEMO MAP ACC PLS LG/BORE MTL/INTRO W/TORQ/DEV

## (undated) DEVICE — TUBING, SUCTION, 1/4" X 10', STRAIGHT: Brand: MEDLINE

## (undated) DEVICE — GUIDEWIRE WITH ICE™ HYDROPHILIC COATING: Brand: TRANSEND™ EX

## (undated) DEVICE — INTRO SHEATH ART/FEM ENGAGE .038 6F12CM

## (undated) DEVICE — TREK CORONARY DILATATION CATHETER 4.0 MM X 25 MM / RAPID-EXCHANGE: Brand: TREK

## (undated) DEVICE — ST ACC MICROPUNCTURE .018 TRANSLSS/SS/TP 5F/10CM 21G/7CM

## (undated) DEVICE — THE BITE BLOCK MAXI, LATEX FREE STRAP IS USED TO PROTECT THE ENDOSCOPE INSERTION TUBE FROM BEING BITTEN BY THE PATIENT.

## (undated) DEVICE — INTRAOPERATIVE COVER KIT, 10 PACK: Brand: SITE-RITE

## (undated) DEVICE — HYBRID CO2 TUBING/CAP SET FOR OLYMPUS® SCOPES & CO2 SOURCE: Brand: ERBE

## (undated) DEVICE — EMBOSHIELD NAV6 EMBOLIC PROTECTION SYSTEM 7.2 MM X 190 CM: Brand: EMBOSHIELD  NAV6

## (undated) DEVICE — DEV COMP RAD PRELUDESYNC 24CM

## (undated) DEVICE — CATH TEMPO 5F BER 100CM: Brand: TEMPO

## (undated) DEVICE — TOTAL TRAY, 16FR 10ML SIL FOLEY, URN: Brand: MEDLINE

## (undated) DEVICE — INTRO ACCSR BLNT TP